# Patient Record
Sex: FEMALE | Race: WHITE | NOT HISPANIC OR LATINO | Employment: UNEMPLOYED | URBAN - METROPOLITAN AREA
[De-identification: names, ages, dates, MRNs, and addresses within clinical notes are randomized per-mention and may not be internally consistent; named-entity substitution may affect disease eponyms.]

---

## 2018-01-23 ENCOUNTER — OFFICE VISIT CONVERTED (OUTPATIENT)
Dept: ONCOLOGY | Facility: HOSPITAL | Age: 28
End: 2018-01-23
Attending: NURSE PRACTITIONER

## 2018-03-28 ENCOUNTER — OFFICE VISIT CONVERTED (OUTPATIENT)
Dept: OTOLARYNGOLOGY | Facility: CLINIC | Age: 28
End: 2018-03-28
Attending: OTOLARYNGOLOGY

## 2018-03-28 ENCOUNTER — CONVERSION ENCOUNTER (OUTPATIENT)
Dept: OTOLARYNGOLOGY | Facility: CLINIC | Age: 28
End: 2018-03-28

## 2018-04-24 ENCOUNTER — CONVERSION ENCOUNTER (OUTPATIENT)
Dept: FAMILY MEDICINE CLINIC | Facility: CLINIC | Age: 28
End: 2018-04-24

## 2018-04-24 ENCOUNTER — OFFICE VISIT CONVERTED (OUTPATIENT)
Dept: FAMILY MEDICINE CLINIC | Facility: CLINIC | Age: 28
End: 2018-04-24
Attending: FAMILY MEDICINE

## 2018-05-22 ENCOUNTER — OFFICE VISIT CONVERTED (OUTPATIENT)
Dept: OTOLARYNGOLOGY | Facility: CLINIC | Age: 28
End: 2018-05-22
Attending: OTOLARYNGOLOGY

## 2018-06-12 ENCOUNTER — CONVERSION ENCOUNTER (OUTPATIENT)
Dept: FAMILY MEDICINE CLINIC | Facility: CLINIC | Age: 28
End: 2018-06-12

## 2018-06-12 ENCOUNTER — OFFICE VISIT CONVERTED (OUTPATIENT)
Dept: FAMILY MEDICINE CLINIC | Facility: CLINIC | Age: 28
End: 2018-06-12
Attending: FAMILY MEDICINE

## 2018-09-13 ENCOUNTER — CONVERSION ENCOUNTER (OUTPATIENT)
Dept: FAMILY MEDICINE CLINIC | Facility: CLINIC | Age: 28
End: 2018-09-13

## 2018-09-13 ENCOUNTER — OFFICE VISIT CONVERTED (OUTPATIENT)
Dept: FAMILY MEDICINE CLINIC | Facility: CLINIC | Age: 28
End: 2018-09-13
Attending: FAMILY MEDICINE

## 2018-09-26 ENCOUNTER — OFFICE VISIT CONVERTED (OUTPATIENT)
Dept: OTOLARYNGOLOGY | Facility: CLINIC | Age: 28
End: 2018-09-26
Attending: OTOLARYNGOLOGY

## 2018-12-19 ENCOUNTER — OFFICE VISIT CONVERTED (OUTPATIENT)
Dept: OTOLARYNGOLOGY | Facility: CLINIC | Age: 28
End: 2018-12-19
Attending: OTOLARYNGOLOGY

## 2019-01-30 ENCOUNTER — HOSPITAL ENCOUNTER (OUTPATIENT)
Dept: OTHER | Facility: HOSPITAL | Age: 29
Discharge: HOME | End: 2019-01-30
Attending: INTERNAL MEDICINE

## 2019-01-31 ENCOUNTER — HOSPITAL ENCOUNTER (OUTPATIENT)
Dept: ONCOLOGY | Facility: HOSPITAL | Age: 29
Discharge: HOME | End: 2019-01-31
Attending: INTERNAL MEDICINE

## 2019-01-31 LAB
BASOPHILS # BLD AUTO: 0.02 10*3/UL (ref 0–0.2)
BASOPHILS NFR BLD AUTO: 0.28 % (ref 0–3)
EOSINOPHIL # BLD AUTO: 0.04 10*3/UL (ref 0–0.7)
EOSINOPHIL # BLD AUTO: 0.59 % (ref 0–7)
ERYTHROCYTE [DISTWIDTH] IN BLOOD BY AUTOMATED COUNT: 12.9 % (ref 11.5–14.5)
HBA1C MFR BLD: 7.58 G/DL (ref 12–16)
HCT VFR BLD AUTO: 20.5 % (ref 37–47)
LYMPHOCYTES # BLD AUTO: 1.15 10*3/UL (ref 1–5)
MCH RBC QN AUTO: 31.2 PG (ref 27–31)
MCHC RBC AUTO-ENTMCNC: 36.9 G/DL (ref 33–37)
MCV RBC AUTO: 84.5 FL (ref 81–99)
MONOCYTES # BLD AUTO: 0.72 10*3/UL (ref 0.2–1.2)
MONOCYTES NFR BLD AUTO: 10.2 % (ref 3–10)
NEUTROPHILS # BLD AUTO: 5.13 10*3/UL (ref 2–8)
NEUTROPHILS NFR BLD AUTO: 72.7 % (ref 30–85)
NRBC BLD AUTO-RTO: 0 % (ref 0–0.01)
PLATELET # BLD AUTO: 124 10*3/UL (ref 130–400)
PMV BLD AUTO: 7.6 FL (ref 7.4–10.4)
RBC # BLD AUTO: 2.43 10*6/UL (ref 4.2–5.4)
VARIANT LYMPHS NFR BLD MANUAL: 16.3 % (ref 20–45)
WBC # BLD AUTO: 7.06 10*3/UL (ref 4.8–10.8)

## 2019-02-01 LAB
CONV IMMUNOGLOBULIN G (IGG): 711 MG/DL (ref 700–1600)
CONV IMMUNOGLOBULIN M (IGM): 106 MG/DL (ref 26–217)
IGA SERPL-MCNC: 34 MG/DL (ref 87–352)

## 2019-02-03 ENCOUNTER — HOSPITAL ENCOUNTER (OUTPATIENT)
Dept: INFUSION THERAPY | Facility: HOSPITAL | Age: 29
Discharge: HOME | End: 2019-02-03
Attending: NURSE PRACTITIONER

## 2019-02-03 LAB
ABO GROUP BLD: NORMAL
BLD GP AB SCN SERPL QL: NORMAL
CONV ABD CONTROL: NORMAL
RH BLD: NORMAL

## 2019-02-04 ENCOUNTER — CONVERSION ENCOUNTER (OUTPATIENT)
Dept: FAMILY MEDICINE CLINIC | Facility: CLINIC | Age: 29
End: 2019-02-04

## 2019-02-04 ENCOUNTER — OFFICE VISIT CONVERTED (OUTPATIENT)
Dept: FAMILY MEDICINE CLINIC | Facility: CLINIC | Age: 29
End: 2019-02-04
Attending: FAMILY MEDICINE

## 2019-02-11 ENCOUNTER — HOSPITAL ENCOUNTER (OUTPATIENT)
Dept: OTHER | Facility: HOSPITAL | Age: 29
Discharge: HOME | End: 2019-02-11
Attending: INTERNAL MEDICINE

## 2019-02-11 LAB
BUN SERPL-MCNC: 23 MG/DL (ref 5–25)
CREAT UR-MCNC: 1.77 MG/DL (ref 0.5–0.9)

## 2019-02-12 LAB
CREAT 24H UR-MCNC: 54.9 MG/DL
CREAT 24H UR-MRATE: 0.4 G/(24.H) (ref 0.4–1.8)
SPECIMEN VOL 24H UR: 700 ML

## 2019-02-13 ENCOUNTER — HOSPITAL ENCOUNTER (OUTPATIENT)
Dept: OTHER | Facility: HOSPITAL | Age: 29
Discharge: HOME | End: 2019-02-13
Attending: INTERNAL MEDICINE

## 2019-02-13 LAB — CREAT UR-MCNC: 1.59 MG/DL (ref 0.5–0.9)

## 2019-02-18 ENCOUNTER — HOSPITAL ENCOUNTER (OUTPATIENT)
Dept: PERIOP | Facility: HOSPITAL | Age: 29
Setting detail: HOSPITAL OUTPATIENT SURGERY
Discharge: HOME | End: 2019-02-18
Attending: SURGERY

## 2019-02-18 LAB — HCG UR QL: NEGATIVE

## 2019-03-06 ENCOUNTER — HOSPITAL ENCOUNTER (OUTPATIENT)
Dept: LAB | Facility: HOSPITAL | Age: 29
Discharge: HOME | End: 2019-03-06
Attending: INTERNAL MEDICINE

## 2019-03-06 LAB
ANION GAP SERPL CALC-SCNC: 23 MMOL/L (ref 8–19)
APPEARANCE UR: CLEAR
BASOPHILS # BLD AUTO: 0.02 10*3/UL (ref 0–0.2)
BASOPHILS NFR BLD AUTO: 0.2 % (ref 0–3)
BILIRUB UR QL: NEGATIVE
BUN SERPL-MCNC: 8 MG/DL (ref 5–25)
BUN/CREAT SERPL: 7 {RATIO} (ref 6–20)
CALCIUM SERPL-MCNC: 9.8 MG/DL (ref 8.7–10.4)
CHLORIDE SERPL-SCNC: 108 MMOL/L (ref 99–111)
COLOR UR: YELLOW
CONV ABS IMM GRAN: 0.05 10*3/UL (ref 0–0.2)
CONV BACTERIA: NEGATIVE
CONV CO2: 17 MMOL/L (ref 22–32)
CONV COLLECTION SOURCE (UA): ABNORMAL
CONV IMMATURE GRAN: 0.5 % (ref 0–1.8)
CONV UROBILINOGEN IN URINE BY AUTOMATED TEST STRIP: 0.2 {EHRLICHU}/DL (ref 0.1–1)
CREAT UR-MCNC: 1.1 MG/DL (ref 0.5–0.9)
DEPRECATED RDW RBC AUTO: 61.1 FL (ref 36.4–46.3)
EOSINOPHIL # BLD AUTO: 0.02 10*3/UL (ref 0–0.7)
EOSINOPHIL # BLD AUTO: 0.2 % (ref 0–7)
ERYTHROCYTE [DISTWIDTH] IN BLOOD BY AUTOMATED COUNT: 17 % (ref 11.7–14.4)
GFR SERPLBLD BASED ON 1.73 SQ M-ARVRAT: >60 ML/MIN/{1.73_M2}
GLUCOSE SERPL-MCNC: 71 MG/DL (ref 65–99)
GLUCOSE UR QL: NEGATIVE MG/DL
HBA1C MFR BLD: 9 G/DL (ref 12–16)
HCT VFR BLD AUTO: 29.2 % (ref 37–47)
HGB UR QL STRIP: ABNORMAL
KETONES UR QL STRIP: NEGATIVE MG/DL
LEUKOCYTE ESTERASE UR QL STRIP: ABNORMAL
LYMPHOCYTES # BLD AUTO: 2.41 10*3/UL (ref 1–5)
MCH RBC QN AUTO: 30.7 PG (ref 27–31)
MCHC RBC AUTO-ENTMCNC: 30.8 G/DL (ref 33–37)
MCV RBC AUTO: 99.7 FL (ref 81–99)
MONOCYTES # BLD AUTO: 0.63 10*3/UL (ref 0.2–1.2)
MONOCYTES NFR BLD AUTO: 5.8 % (ref 3–10)
NEUTROPHILS # BLD AUTO: 7.68 10*3/UL (ref 2–8)
NEUTROPHILS NFR BLD AUTO: 71 % (ref 30–85)
NITRITE UR QL STRIP: NEGATIVE
NRBC CBCN: 0 % (ref 0–0.7)
OSMOLALITY SERPL CALC.SUM OF ELEC: 293 MOSM/KG (ref 273–304)
PH UR STRIP.AUTO: 7 [PH] (ref 5–8)
PLATELET # BLD AUTO: 344 10*3/UL (ref 130–400)
PMV BLD AUTO: 9.9 FL (ref 9.4–12.3)
POTASSIUM SERPL-SCNC: 4.6 MMOL/L (ref 3.5–5.3)
PROT UR QL: NEGATIVE MG/DL
RBC # BLD AUTO: 2.93 10*6/UL (ref 4.2–5.4)
RBC #/AREA URNS HPF: ABNORMAL /[HPF]
SODIUM SERPL-SCNC: 143 MMOL/L (ref 135–147)
SP GR UR: 1.01 (ref 1–1.03)
VARIANT LYMPHS NFR BLD MANUAL: 22.3 % (ref 20–45)
WBC # BLD AUTO: 10.81 10*3/UL (ref 4.8–10.8)
WBC #/AREA URNS HPF: ABNORMAL /[HPF]

## 2019-03-11 ENCOUNTER — OFFICE VISIT CONVERTED (OUTPATIENT)
Dept: FAMILY MEDICINE CLINIC | Facility: CLINIC | Age: 29
End: 2019-03-11
Attending: FAMILY MEDICINE

## 2019-03-13 ENCOUNTER — HOSPITAL ENCOUNTER (OUTPATIENT)
Dept: OTHER | Facility: HOSPITAL | Age: 29
Discharge: HOME | End: 2019-03-13
Attending: INTERNAL MEDICINE

## 2019-03-14 LAB
CONV IMMUNOGLOBULIN G (IGG): 440 MG/DL (ref 700–1600)
CONV IMMUNOGLOBULIN M (IGM): 70 MG/DL (ref 26–217)
IGA SERPL-MCNC: 22 MG/DL (ref 87–352)

## 2019-04-12 ENCOUNTER — HOSPITAL ENCOUNTER (OUTPATIENT)
Dept: OTHER | Facility: HOSPITAL | Age: 29
Discharge: HOME | End: 2019-04-12
Attending: INTERNAL MEDICINE

## 2019-04-12 LAB
ALBUMIN SERPL-MCNC: 3.6 G/DL (ref 3.5–5)
ALBUMIN/GLOB SERPL: 1.3 {RATIO} (ref 1.4–2.6)
ALP SERPL-CCNC: 122 U/L (ref 42–98)
ALT SERPL-CCNC: 9 U/L (ref 10–40)
ANION GAP SERPL CALC-SCNC: 15 MMOL/L (ref 8–19)
AST SERPL-CCNC: 14 U/L (ref 15–50)
BASOPHILS # BLD AUTO: 0.03 10*3/UL (ref 0–0.2)
BASOPHILS NFR BLD AUTO: 0.3 % (ref 0–3)
BILIRUB SERPL-MCNC: 0.21 MG/DL (ref 0.2–1.3)
BUN SERPL-MCNC: 11 MG/DL (ref 5–25)
BUN/CREAT SERPL: 12 {RATIO} (ref 6–20)
CALCIUM SERPL-MCNC: 9.6 MG/DL (ref 8.7–10.4)
CHLORIDE SERPL-SCNC: 104 MMOL/L (ref 99–111)
CONV ABS IMM GRAN: 0.03 10*3/UL (ref 0–0.2)
CONV CO2: 24 MMOL/L (ref 22–32)
CONV IMMATURE GRAN: 0.3 % (ref 0–1.8)
CONV TOTAL PROTEIN: 6.3 G/DL (ref 6.3–8.2)
CREAT UR-MCNC: 0.95 MG/DL (ref 0.5–0.9)
DEPRECATED RDW RBC AUTO: 46.1 FL (ref 36.4–46.3)
EOSINOPHIL # BLD AUTO: 0.02 10*3/UL (ref 0–0.7)
EOSINOPHIL # BLD AUTO: 0.2 % (ref 0–7)
ERYTHROCYTE [DISTWIDTH] IN BLOOD BY AUTOMATED COUNT: 13.2 % (ref 11.7–14.4)
GFR SERPLBLD BASED ON 1.73 SQ M-ARVRAT: >60 ML/MIN/{1.73_M2}
GLOBULIN UR ELPH-MCNC: 2.7 G/DL (ref 2–3.5)
GLUCOSE SERPL-MCNC: 78 MG/DL (ref 65–99)
HBA1C MFR BLD: 9.7 G/DL (ref 12–16)
HCT VFR BLD AUTO: 29.5 % (ref 37–47)
LYMPHOCYTES # BLD AUTO: 2.15 10*3/UL (ref 1–5)
MCH RBC QN AUTO: 31.5 PG (ref 27–31)
MCHC RBC AUTO-ENTMCNC: 32.9 G/DL (ref 33–37)
MCV RBC AUTO: 95.8 FL (ref 81–99)
MONOCYTES # BLD AUTO: 0.57 10*3/UL (ref 0.2–1.2)
MONOCYTES NFR BLD AUTO: 5.4 % (ref 3–10)
NEUTROPHILS # BLD AUTO: 7.82 10*3/UL (ref 2–8)
NEUTROPHILS NFR BLD AUTO: 73.6 % (ref 30–85)
NRBC CBCN: 0 % (ref 0–0.7)
OSMOLALITY SERPL CALC.SUM OF ELEC: 286 MOSM/KG (ref 273–304)
PLATELET # BLD AUTO: 271 10*3/UL (ref 130–400)
PMV BLD AUTO: 9.5 FL (ref 9.4–12.3)
POTASSIUM SERPL-SCNC: 3.8 MMOL/L (ref 3.5–5.3)
RBC # BLD AUTO: 3.08 10*6/UL (ref 4.2–5.4)
SODIUM SERPL-SCNC: 139 MMOL/L (ref 135–147)
VARIANT LYMPHS NFR BLD MANUAL: 20.2 % (ref 20–45)
WBC # BLD AUTO: 10.62 10*3/UL (ref 4.8–10.8)

## 2019-04-13 LAB
CONV IMMUNOGLOBULIN G (IGG): 331 MG/DL (ref 700–1600)
CONV IMMUNOGLOBULIN M (IGM): 71 MG/DL (ref 26–217)
IGA SERPL-MCNC: 20 MG/DL (ref 87–352)

## 2019-04-17 ENCOUNTER — OFFICE VISIT CONVERTED (OUTPATIENT)
Dept: FAMILY MEDICINE CLINIC | Facility: CLINIC | Age: 29
End: 2019-04-17
Attending: NURSE PRACTITIONER

## 2019-05-01 ENCOUNTER — OFFICE VISIT CONVERTED (OUTPATIENT)
Dept: OTOLARYNGOLOGY | Facility: CLINIC | Age: 29
End: 2019-05-01
Attending: OTOLARYNGOLOGY

## 2019-05-01 ENCOUNTER — CONVERSION ENCOUNTER (OUTPATIENT)
Dept: OTOLARYNGOLOGY | Facility: CLINIC | Age: 29
End: 2019-05-01

## 2019-05-14 ENCOUNTER — HOSPITAL ENCOUNTER (OUTPATIENT)
Dept: OTHER | Facility: HOSPITAL | Age: 29
Discharge: HOME | End: 2019-05-14
Attending: INTERNAL MEDICINE

## 2019-05-14 LAB
ALBUMIN SERPL-MCNC: 3.5 G/DL (ref 3.5–5)
ALBUMIN/GLOB SERPL: 1.4 {RATIO} (ref 1.4–2.6)
ALP SERPL-CCNC: 145 U/L (ref 42–98)
ALT SERPL-CCNC: 11 U/L (ref 10–40)
ANION GAP SERPL CALC-SCNC: 15 MMOL/L (ref 8–19)
AST SERPL-CCNC: 12 U/L (ref 15–50)
BASOPHILS # BLD AUTO: 0.04 10*3/UL (ref 0–0.2)
BASOPHILS NFR BLD AUTO: 0.3 % (ref 0–3)
BILIRUB SERPL-MCNC: 0.21 MG/DL (ref 0.2–1.3)
BUN SERPL-MCNC: 7 MG/DL (ref 5–25)
BUN/CREAT SERPL: 9 {RATIO} (ref 6–20)
CALCIUM SERPL-MCNC: 9.8 MG/DL (ref 8.7–10.4)
CHLORIDE SERPL-SCNC: 106 MMOL/L (ref 99–111)
CONV ABS IMM GRAN: 0.06 10*3/UL (ref 0–0.2)
CONV CO2: 23 MMOL/L (ref 22–32)
CONV IMMATURE GRAN: 0.4 % (ref 0–1.8)
CONV TOTAL PROTEIN: 6 G/DL (ref 6.3–8.2)
CREAT UR-MCNC: 0.82 MG/DL (ref 0.5–0.9)
DEPRECATED RDW RBC AUTO: 43.9 FL (ref 36.4–46.3)
EOSINOPHIL # BLD AUTO: 0.03 10*3/UL (ref 0–0.7)
EOSINOPHIL # BLD AUTO: 0.2 % (ref 0–7)
ERYTHROCYTE [DISTWIDTH] IN BLOOD BY AUTOMATED COUNT: 12.8 % (ref 11.7–14.4)
GFR SERPLBLD BASED ON 1.73 SQ M-ARVRAT: >60 ML/MIN/{1.73_M2}
GLOBULIN UR ELPH-MCNC: 2.5 G/DL (ref 2–3.5)
GLUCOSE SERPL-MCNC: 111 MG/DL (ref 65–99)
HBA1C MFR BLD: 10 G/DL (ref 12–16)
HCT VFR BLD AUTO: 29.6 % (ref 37–47)
LYMPHOCYTES # BLD AUTO: 2.23 10*3/UL (ref 1–5)
MCH RBC QN AUTO: 31.6 PG (ref 27–31)
MCHC RBC AUTO-ENTMCNC: 33.8 G/DL (ref 33–37)
MCV RBC AUTO: 93.7 FL (ref 81–99)
MONOCYTES # BLD AUTO: 0.58 10*3/UL (ref 0.2–1.2)
MONOCYTES NFR BLD AUTO: 4.3 % (ref 3–10)
NEUTROPHILS # BLD AUTO: 10.57 10*3/UL (ref 2–8)
NEUTROPHILS NFR BLD AUTO: 78.3 % (ref 30–85)
NRBC CBCN: 0 % (ref 0–0.7)
OSMOLALITY SERPL CALC.SUM OF ELEC: 289 MOSM/KG (ref 273–304)
PLATELET # BLD AUTO: 242 10*3/UL (ref 130–400)
PMV BLD AUTO: 9.7 FL (ref 9.4–12.3)
POTASSIUM SERPL-SCNC: 3.7 MMOL/L (ref 3.5–5.3)
RBC # BLD AUTO: 3.16 10*6/UL (ref 4.2–5.4)
SODIUM SERPL-SCNC: 140 MMOL/L (ref 135–147)
VARIANT LYMPHS NFR BLD MANUAL: 16.5 % (ref 20–45)
WBC # BLD AUTO: 13.51 10*3/UL (ref 4.8–10.8)

## 2019-05-15 LAB
CONV IMMUNOGLOBULIN G (IGG): 297 MG/DL (ref 700–1600)
CONV IMMUNOGLOBULIN M (IGM): 68 MG/DL (ref 26–217)
IGA SERPL-MCNC: 22 MG/DL (ref 87–352)

## 2019-05-22 ENCOUNTER — HOSPITAL ENCOUNTER (OUTPATIENT)
Dept: OTHER | Facility: HOSPITAL | Age: 29
Setting detail: RECURRING SERIES
Discharge: HOME | End: 2019-05-31
Attending: INTERNAL MEDICINE

## 2019-05-22 LAB
ALBUMIN SERPL-MCNC: 3.6 G/DL (ref 3.5–5)
ALBUMIN/GLOB SERPL: 1.3 {RATIO} (ref 1.4–2.6)
ALP SERPL-CCNC: 173 U/L (ref 42–98)
ALT SERPL-CCNC: 14 U/L (ref 10–40)
ANION GAP SERPL CALC-SCNC: 17 MMOL/L (ref 8–19)
AST SERPL-CCNC: 14 U/L (ref 15–50)
BASOPHILS # BLD AUTO: 0.01 10*3/UL (ref 0–0.2)
BASOPHILS NFR BLD AUTO: 0.1 % (ref 0–3)
BILIRUB SERPL-MCNC: 0.25 MG/DL (ref 0.2–1.3)
BUN SERPL-MCNC: 13 MG/DL (ref 5–25)
BUN/CREAT SERPL: 14 {RATIO} (ref 6–20)
CALCIUM SERPL-MCNC: 9.1 MG/DL (ref 8.7–10.4)
CHLORIDE SERPL-SCNC: 104 MMOL/L (ref 99–111)
CONV ABS IMM GRAN: 0.02 10*3/UL (ref 0–0.54)
CONV CO2: 21 MMOL/L (ref 22–32)
CONV EOSINOPHILS PERCENT BY MANUAL COUNT: 0.6 % (ref 0–7)
CONV IMMATURE GRAN: 0.2 % (ref 0–0.4)
CONV TOTAL PROTEIN: 6.4 G/DL (ref 6.3–8.2)
CREAT UR-MCNC: 0.92 MG/DL (ref 0.5–0.9)
EOSINOPHIL # BLD MANUAL: 0.07 10*3/UL (ref 0–0.7)
ERYTHROCYTE [DISTWIDTH] IN BLOOD BY AUTOMATED COUNT: 12.9 % (ref 11.5–14.5)
ERYTHROCYTE [DISTWIDTH] IN BLOOD BY AUTOMATED COUNT: 42.7 FL
GFR SERPLBLD BASED ON 1.73 SQ M-ARVRAT: >60 ML/MIN/{1.73_M2}
GLOBULIN UR ELPH-MCNC: 2.8 G/DL (ref 2–3.5)
GLUCOSE SERPL-MCNC: 80 MG/DL (ref 65–99)
HBA1C MFR BLD: 10.4 G/DL (ref 12–16)
HCT VFR BLD AUTO: 31 % (ref 37–47)
LYMPHOCYTES # BLD AUTO: 2.64 10*3/UL (ref 1–5)
LYMPHOCYTES NFR BLD AUTO: 22.2 % (ref 20–45)
MCH RBC QN AUTO: 30.6 PG (ref 27–31)
MCHC RBC AUTO-ENTMCNC: 33.5 G/DL (ref 33–37)
MCV RBC AUTO: 91.2 FL (ref 81–99)
MONOCYTES # BLD AUTO: 0.66 10*3/UL (ref 0.2–1.2)
MONOCYTES NFR BLD MANUAL: 5.5 % (ref 3–10)
NEUTROPHILS # BLD AUTO: 8.51 10*3/UL (ref 2–8)
NEUTROPHILS NFR BLD MANUAL: 71.4 % (ref 30–85)
OSMOLALITY SERPL CALC.SUM OF ELEC: 285 MOSM/KG (ref 273–304)
PLATELET # BLD AUTO: 269 10*3/UL (ref 130–400)
PMV BLD AUTO: 8.7 FL (ref 7.4–10.4)
POTASSIUM SERPL-SCNC: 3.6 MMOL/L (ref 3.5–5.3)
RBC MORPH BLD: 3.4 10*6/UL (ref 4.2–5.4)
SODIUM SERPL-SCNC: 138 MMOL/L (ref 135–147)
WBC # BLD AUTO: 11.91 10*3/UL (ref 4.8–10.8)

## 2019-05-23 LAB
CONV IMMUNOGLOBULIN G (IGG): 285 MG/DL (ref 700–1600)
CONV IMMUNOGLOBULIN M (IGM): 72 MG/DL (ref 26–217)
IGA SERPL-MCNC: 23 MG/DL (ref 87–352)

## 2019-07-02 ENCOUNTER — HOSPITAL ENCOUNTER (OUTPATIENT)
Dept: OTHER | Facility: HOSPITAL | Age: 29
Setting detail: RECURRING SERIES
Discharge: HOME | End: 2019-07-31
Attending: INTERNAL MEDICINE

## 2019-07-02 LAB
ALBUMIN SERPL-MCNC: 3.7 G/DL (ref 3.5–5)
ALBUMIN/GLOB SERPL: 1.3 {RATIO} (ref 1.4–2.6)
ALP SERPL-CCNC: 131 U/L (ref 42–98)
ALT SERPL-CCNC: 15 U/L (ref 10–40)
ANION GAP SERPL CALC-SCNC: 16 MMOL/L (ref 8–19)
AST SERPL-CCNC: 15 U/L (ref 15–50)
BASOPHILS # BLD AUTO: 0.03 10*3/UL (ref 0–0.2)
BASOPHILS NFR BLD AUTO: 0.3 % (ref 0–3)
BILIRUB SERPL-MCNC: 0.44 MG/DL (ref 0.2–1.3)
BUN SERPL-MCNC: 11 MG/DL (ref 5–25)
BUN/CREAT SERPL: 12 {RATIO} (ref 6–20)
CALCIUM SERPL-MCNC: 9.2 MG/DL (ref 8.7–10.4)
CHLORIDE SERPL-SCNC: 107 MMOL/L (ref 99–111)
CONV ABS IMM GRAN: 0.03 10*3/UL (ref 0–0.54)
CONV CO2: 22 MMOL/L (ref 22–32)
CONV EOSINOPHILS PERCENT BY MANUAL COUNT: 0.3 % (ref 0–7)
CONV IMMATURE GRAN: 0.3 % (ref 0–0.4)
CONV TOTAL PROTEIN: 6.6 G/DL (ref 6.3–8.2)
CREAT UR-MCNC: 0.94 MG/DL (ref 0.5–0.9)
EOSINOPHIL # BLD MANUAL: 0.03 10*3/UL (ref 0–0.7)
ERYTHROCYTE [DISTWIDTH] IN BLOOD BY AUTOMATED COUNT: 13.3 % (ref 11.5–14.5)
ERYTHROCYTE [DISTWIDTH] IN BLOOD BY AUTOMATED COUNT: 45 FL
GFR SERPLBLD BASED ON 1.73 SQ M-ARVRAT: >60 ML/MIN/{1.73_M2}
GLOBULIN UR ELPH-MCNC: 2.9 G/DL (ref 2–3.5)
GLUCOSE SERPL-MCNC: 79 MG/DL (ref 65–99)
HBA1C MFR BLD: 11.3 G/DL (ref 12–16)
HCT VFR BLD AUTO: 33.6 % (ref 37–47)
LYMPHOCYTES # BLD AUTO: 2.37 10*3/UL (ref 1–5)
LYMPHOCYTES NFR BLD AUTO: 20.5 % (ref 20–45)
MCH RBC QN AUTO: 30.4 PG (ref 27–31)
MCHC RBC AUTO-ENTMCNC: 33.6 G/DL (ref 33–37)
MCV RBC AUTO: 90.3 FL (ref 81–99)
MONOCYTES # BLD AUTO: 0.64 10*3/UL (ref 0.2–1.2)
MONOCYTES NFR BLD MANUAL: 5.5 % (ref 3–10)
NEUTROPHILS # BLD AUTO: 8.45 10*3/UL (ref 2–8)
NEUTROPHILS NFR BLD MANUAL: 73.1 % (ref 30–85)
OSMOLALITY SERPL CALC.SUM OF ELEC: 290 MOSM/KG (ref 273–304)
PLATELET # BLD AUTO: 276 10*3/UL (ref 130–400)
PMV BLD AUTO: 8.5 FL (ref 7.4–10.4)
POTASSIUM SERPL-SCNC: 3.8 MMOL/L (ref 3.5–5.3)
RBC MORPH BLD: 3.72 10*6/UL (ref 4.2–5.4)
SODIUM SERPL-SCNC: 141 MMOL/L (ref 135–147)
WBC # BLD AUTO: 11.55 10*3/UL (ref 4.8–10.8)

## 2019-07-03 LAB
CONV IMMUNOGLOBULIN G (IGG): 410 MG/DL (ref 700–1600)
CONV IMMUNOGLOBULIN M (IGM): 81 MG/DL (ref 26–217)
IGA SERPL-MCNC: 24 MG/DL (ref 87–352)

## 2019-08-12 ENCOUNTER — HOSPITAL ENCOUNTER (OUTPATIENT)
Dept: OTHER | Facility: HOSPITAL | Age: 29
Setting detail: RECURRING SERIES
Discharge: HOME | End: 2019-08-31
Attending: NURSE PRACTITIONER

## 2019-08-12 LAB
ALBUMIN SERPL-MCNC: 3.8 G/DL (ref 3.5–5)
ALBUMIN/GLOB SERPL: 1.5 {RATIO} (ref 1.4–2.6)
ALP SERPL-CCNC: 137 U/L (ref 42–98)
ALT SERPL-CCNC: 6 U/L (ref 10–40)
ANION GAP SERPL CALC-SCNC: 18 MMOL/L (ref 8–19)
AST SERPL-CCNC: 9 U/L (ref 15–50)
BASOPHILS # BLD AUTO: 0.03 10*3/UL (ref 0–0.2)
BASOPHILS NFR BLD AUTO: 0.2 % (ref 0–3)
BILIRUB SERPL-MCNC: 0.28 MG/DL (ref 0.2–1.3)
BUN SERPL-MCNC: 9 MG/DL (ref 5–25)
BUN/CREAT SERPL: 10 {RATIO} (ref 6–20)
CALCIUM SERPL-MCNC: 9.2 MG/DL (ref 8.7–10.4)
CHLORIDE SERPL-SCNC: 105 MMOL/L (ref 99–111)
CONV ABS IMM GRAN: 0.05 10*3/UL (ref 0–0.2)
CONV CO2: 22 MMOL/L (ref 22–32)
CONV IMMATURE GRAN: 0.4 % (ref 0–1.8)
CONV TOTAL PROTEIN: 6.3 G/DL (ref 6.3–8.2)
CREAT UR-MCNC: 0.93 MG/DL (ref 0.5–0.9)
DEPRECATED RDW RBC AUTO: 43.8 FL (ref 36.4–46.3)
EOSINOPHIL # BLD AUTO: 0.04 10*3/UL (ref 0–0.7)
EOSINOPHIL # BLD AUTO: 0.3 % (ref 0–7)
ERYTHROCYTE [DISTWIDTH] IN BLOOD BY AUTOMATED COUNT: 13.3 % (ref 11.7–14.4)
GFR SERPLBLD BASED ON 1.73 SQ M-ARVRAT: >60 ML/MIN/{1.73_M2}
GLOBULIN UR ELPH-MCNC: 2.5 G/DL (ref 2–3.5)
GLUCOSE SERPL-MCNC: 109 MG/DL (ref 65–99)
HCT VFR BLD AUTO: 31.8 % (ref 37–47)
HGB BLD-MCNC: 10.8 G/DL (ref 12–16)
LDH SERPL-CCNC: 159 U/L (ref 120–240)
LYMPHOCYTES # BLD AUTO: 2.41 10*3/UL (ref 1–5)
LYMPHOCYTES NFR BLD AUTO: 19 % (ref 20–45)
MCH RBC QN AUTO: 30.9 PG (ref 27–31)
MCHC RBC AUTO-ENTMCNC: 34 G/DL (ref 33–37)
MCV RBC AUTO: 90.9 FL (ref 81–99)
MONOCYTES # BLD AUTO: 0.51 10*3/UL (ref 0.2–1.2)
MONOCYTES NFR BLD AUTO: 4 % (ref 3–10)
NEUTROPHILS # BLD AUTO: 9.66 10*3/UL (ref 2–8)
NEUTROPHILS NFR BLD AUTO: 76.1 % (ref 30–85)
NRBC CBCN: 0 % (ref 0–0.7)
OSMOLALITY SERPL CALC.SUM OF ELEC: 291 MOSM/KG (ref 273–304)
PLATELET # BLD AUTO: 265 10*3/UL (ref 130–400)
PMV BLD AUTO: 9.2 FL (ref 9.4–12.3)
POTASSIUM SERPL-SCNC: 3.7 MMOL/L (ref 3.5–5.3)
RBC # BLD AUTO: 3.5 10*6/UL (ref 4.2–5.4)
SODIUM SERPL-SCNC: 141 MMOL/L (ref 135–147)
WBC # BLD AUTO: 12.7 10*3/UL (ref 4.8–10.8)

## 2019-08-13 LAB
CONV IMMUNOGLOBULIN G (IGG): 460 MG/DL (ref 700–1600)
CONV IMMUNOGLOBULIN M (IGM): 64 MG/DL (ref 26–217)
IGA SERPL-MCNC: 22 MG/DL (ref 87–352)

## 2019-09-04 ENCOUNTER — OFFICE VISIT CONVERTED (OUTPATIENT)
Dept: OTOLARYNGOLOGY | Facility: CLINIC | Age: 29
End: 2019-09-04
Attending: OTOLARYNGOLOGY

## 2019-09-04 ENCOUNTER — HOSPITAL ENCOUNTER (OUTPATIENT)
Dept: OTOLARYNGOLOGY | Facility: HOSPITAL | Age: 29
Discharge: HOME | End: 2019-09-04
Attending: OTOLARYNGOLOGY

## 2019-09-06 LAB — EYE OR EAR CULTURE: NORMAL

## 2019-09-13 ENCOUNTER — OFFICE VISIT CONVERTED (OUTPATIENT)
Dept: FAMILY MEDICINE CLINIC | Facility: CLINIC | Age: 29
End: 2019-09-13
Attending: FAMILY MEDICINE

## 2019-09-13 ENCOUNTER — CONVERSION ENCOUNTER (OUTPATIENT)
Dept: FAMILY MEDICINE CLINIC | Facility: CLINIC | Age: 29
End: 2019-09-13

## 2019-09-23 ENCOUNTER — HOSPITAL ENCOUNTER (OUTPATIENT)
Dept: OTHER | Facility: HOSPITAL | Age: 29
Setting detail: RECURRING SERIES
Discharge: HOME | End: 2019-09-30
Attending: INTERNAL MEDICINE

## 2019-09-23 LAB
ALBUMIN SERPL-MCNC: 4.4 G/DL (ref 3.5–5)
ALBUMIN/GLOB SERPL: 1.6 {RATIO} (ref 1.4–2.6)
ALP SERPL-CCNC: 144 U/L (ref 42–98)
ALT SERPL-CCNC: 16 U/L (ref 10–40)
ANION GAP SERPL CALC-SCNC: 14 MMOL/L (ref 8–19)
AST SERPL-CCNC: 14 U/L (ref 15–50)
BASOPHILS # BLD AUTO: 0.04 10*3/UL (ref 0–0.2)
BASOPHILS NFR BLD AUTO: 0.3 % (ref 0–3)
BILIRUB SERPL-MCNC: 0.31 MG/DL (ref 0.2–1.3)
BUN SERPL-MCNC: 16 MG/DL (ref 5–25)
BUN/CREAT SERPL: 17 {RATIO} (ref 6–20)
CALCIUM SERPL-MCNC: 9.4 MG/DL (ref 8.7–10.4)
CHLORIDE SERPL-SCNC: 105 MMOL/L (ref 99–111)
CONV ABS IMM GRAN: 0.05 10*3/UL (ref 0–0.2)
CONV CO2: 25 MMOL/L (ref 22–32)
CONV IMMATURE GRAN: 0.4 % (ref 0–1.8)
CONV TOTAL PROTEIN: 7.2 G/DL (ref 6.3–8.2)
CREAT UR-MCNC: 0.93 MG/DL (ref 0.5–0.9)
DEPRECATED RDW RBC AUTO: 42 FL (ref 36.4–46.3)
EOSINOPHIL # BLD AUTO: 0.15 10*3/UL (ref 0–0.7)
EOSINOPHIL # BLD AUTO: 1.2 % (ref 0–7)
ERYTHROCYTE [DISTWIDTH] IN BLOOD BY AUTOMATED COUNT: 12.8 % (ref 11.7–14.4)
GFR SERPLBLD BASED ON 1.73 SQ M-ARVRAT: >60 ML/MIN/{1.73_M2}
GLOBULIN UR ELPH-MCNC: 2.8 G/DL (ref 2–3.5)
GLUCOSE SERPL-MCNC: 85 MG/DL (ref 65–99)
HCT VFR BLD AUTO: 38.3 % (ref 37–47)
HGB BLD-MCNC: 13 G/DL (ref 12–16)
LYMPHOCYTES # BLD AUTO: 2.7 10*3/UL (ref 1–5)
LYMPHOCYTES NFR BLD AUTO: 22.2 % (ref 20–45)
MCH RBC QN AUTO: 30.4 PG (ref 27–31)
MCHC RBC AUTO-ENTMCNC: 33.9 G/DL (ref 33–37)
MCV RBC AUTO: 89.7 FL (ref 81–99)
MONOCYTES # BLD AUTO: 0.7 10*3/UL (ref 0.2–1.2)
MONOCYTES NFR BLD AUTO: 5.8 % (ref 3–10)
NEUTROPHILS # BLD AUTO: 8.53 10*3/UL (ref 2–8)
NEUTROPHILS NFR BLD AUTO: 70.1 % (ref 30–85)
NRBC CBCN: 0 % (ref 0–0.7)
OSMOLALITY SERPL CALC.SUM OF ELEC: 290 MOSM/KG (ref 273–304)
PLATELET # BLD AUTO: 280 10*3/UL (ref 130–400)
PMV BLD AUTO: 9.3 FL (ref 9.4–12.3)
POTASSIUM SERPL-SCNC: 4.3 MMOL/L (ref 3.5–5.3)
RBC # BLD AUTO: 4.27 10*6/UL (ref 4.2–5.4)
SODIUM SERPL-SCNC: 140 MMOL/L (ref 135–147)
WBC # BLD AUTO: 12.17 10*3/UL (ref 4.8–10.8)

## 2019-09-24 LAB
CONV IMMUNOGLOBULIN G (IGG): 617 MG/DL (ref 700–1600)
CONV IMMUNOGLOBULIN M (IGM): 103 MG/DL (ref 26–217)
IGA SERPL-MCNC: 27 MG/DL (ref 87–352)

## 2019-10-11 ENCOUNTER — OFFICE VISIT CONVERTED (OUTPATIENT)
Dept: OTOLARYNGOLOGY | Facility: CLINIC | Age: 29
End: 2019-10-11
Attending: OTOLARYNGOLOGY

## 2019-10-11 ENCOUNTER — CONVERSION ENCOUNTER (OUTPATIENT)
Dept: OTOLARYNGOLOGY | Facility: CLINIC | Age: 29
End: 2019-10-11

## 2019-11-04 ENCOUNTER — HOSPITAL ENCOUNTER (OUTPATIENT)
Dept: OTHER | Facility: HOSPITAL | Age: 29
Discharge: HOME | End: 2019-11-04
Attending: FAMILY MEDICINE

## 2019-11-04 ENCOUNTER — HOSPITAL ENCOUNTER (OUTPATIENT)
Dept: OTHER | Facility: HOSPITAL | Age: 29
Setting detail: RECURRING SERIES
Discharge: HOME | End: 2019-11-30
Attending: INTERNAL MEDICINE

## 2019-11-04 LAB
ALBUMIN SERPL-MCNC: 3.9 G/DL (ref 3.5–5)
ALBUMIN/GLOB SERPL: 1.5 {RATIO} (ref 1.4–2.6)
ALP SERPL-CCNC: 130 U/L (ref 42–98)
ALT SERPL-CCNC: 16 U/L (ref 10–40)
ANION GAP SERPL CALC-SCNC: 18 MMOL/L (ref 8–19)
AST SERPL-CCNC: 16 U/L (ref 15–50)
BASOPHILS # BLD AUTO: 0.03 10*3/UL (ref 0–0.2)
BASOPHILS NFR BLD AUTO: 0.3 % (ref 0–3)
BILIRUB SERPL-MCNC: 0.3 MG/DL (ref 0.2–1.3)
BUN SERPL-MCNC: 7 MG/DL (ref 5–25)
BUN/CREAT SERPL: 8 {RATIO} (ref 6–20)
CALCIUM SERPL-MCNC: 9.1 MG/DL (ref 8.7–10.4)
CHLORIDE SERPL-SCNC: 106 MMOL/L (ref 99–111)
CHOLEST SERPL-MCNC: 168 MG/DL (ref 107–200)
CHOLEST/HDLC SERPL: 4.8 {RATIO} (ref 3–6)
CONV ABS IMM GRAN: 0.06 10*3/UL (ref 0–0.2)
CONV CO2: 23 MMOL/L (ref 22–32)
CONV IMMATURE GRAN: 0.6 % (ref 0–1.8)
CONV TOTAL PROTEIN: 6.5 G/DL (ref 6.3–8.2)
CREAT UR-MCNC: 0.91 MG/DL (ref 0.5–0.9)
DEPRECATED RDW RBC AUTO: 44.5 FL (ref 36.4–46.3)
EOSINOPHIL # BLD AUTO: 0.02 10*3/UL (ref 0–0.7)
EOSINOPHIL # BLD AUTO: 0.2 % (ref 0–7)
ERYTHROCYTE [DISTWIDTH] IN BLOOD BY AUTOMATED COUNT: 13.6 % (ref 11.7–14.4)
FOLATE SERPL-MCNC: >20 NG/ML (ref 4.8–20)
GFR SERPLBLD BASED ON 1.73 SQ M-ARVRAT: >60 ML/MIN/{1.73_M2}
GLOBULIN UR ELPH-MCNC: 2.6 G/DL (ref 2–3.5)
GLUCOSE SERPL-MCNC: 74 MG/DL (ref 65–99)
HCT VFR BLD AUTO: 32.1 % (ref 37–47)
HDLC SERPL-MCNC: 35 MG/DL (ref 40–60)
HGB BLD-MCNC: 11.1 G/DL (ref 12–16)
LDLC SERPL CALC-MCNC: 85 MG/DL (ref 70–100)
LYMPHOCYTES # BLD AUTO: 2.04 10*3/UL (ref 1–5)
LYMPHOCYTES NFR BLD AUTO: 20.2 % (ref 20–45)
MCH RBC QN AUTO: 31.2 PG (ref 27–31)
MCHC RBC AUTO-ENTMCNC: 34.6 G/DL (ref 33–37)
MCV RBC AUTO: 90.2 FL (ref 81–99)
MONOCYTES # BLD AUTO: 0.47 10*3/UL (ref 0.2–1.2)
MONOCYTES NFR BLD AUTO: 4.6 % (ref 3–10)
NEUTROPHILS # BLD AUTO: 7.5 10*3/UL (ref 2–8)
NEUTROPHILS NFR BLD AUTO: 74.1 % (ref 30–85)
NRBC CBCN: 0 % (ref 0–0.7)
OSMOLALITY SERPL CALC.SUM OF ELEC: 293 MOSM/KG (ref 273–304)
PLATELET # BLD AUTO: 264 10*3/UL (ref 130–400)
PMV BLD AUTO: 9.3 FL (ref 9.4–12.3)
POTASSIUM SERPL-SCNC: 3.8 MMOL/L (ref 3.5–5.3)
RBC # BLD AUTO: 3.56 10*6/UL (ref 4.2–5.4)
SODIUM SERPL-SCNC: 143 MMOL/L (ref 135–147)
TRIGL SERPL-MCNC: 242 MG/DL (ref 40–150)
TSH SERPL-ACNC: 4.64 M[IU]/L (ref 0.27–4.2)
VIT B12 SERPL-MCNC: 190 PG/ML (ref 211–911)
VLDLC SERPL-MCNC: 48 MG/DL (ref 5–37)
WBC # BLD AUTO: 10.12 10*3/UL (ref 4.8–10.8)

## 2019-11-05 LAB
CONV IMMUNOGLOBULIN G (IGG): 565 MG/DL (ref 700–1600)
CONV IMMUNOGLOBULIN M (IGM): 84 MG/DL (ref 26–217)
IGA SERPL-MCNC: 24 MG/DL (ref 87–352)

## 2019-11-06 LAB — 1,25(OH)2D3 SERPL-MCNC: 38.3 PG/ML (ref 19.9–79.3)

## 2019-12-16 ENCOUNTER — HOSPITAL ENCOUNTER (OUTPATIENT)
Dept: OTHER | Facility: HOSPITAL | Age: 29
Setting detail: RECURRING SERIES
Discharge: HOME | End: 2019-12-31
Attending: INTERNAL MEDICINE

## 2019-12-16 LAB
ALBUMIN SERPL-MCNC: 3.9 G/DL (ref 3.5–5)
ALBUMIN/GLOB SERPL: 1.4 {RATIO} (ref 1.4–2.6)
ALP SERPL-CCNC: 136 U/L (ref 42–98)
ALT SERPL-CCNC: 18 U/L (ref 10–40)
ANION GAP SERPL CALC-SCNC: 16 MMOL/L (ref 8–19)
AST SERPL-CCNC: 15 U/L (ref 15–50)
BASOPHILS # BLD AUTO: 0.04 10*3/UL (ref 0–0.2)
BASOPHILS NFR BLD AUTO: 0.3 % (ref 0–3)
BILIRUB SERPL-MCNC: 0.42 MG/DL (ref 0.2–1.3)
BUN SERPL-MCNC: 8 MG/DL (ref 5–25)
BUN/CREAT SERPL: 7 {RATIO} (ref 6–20)
CALCIUM SERPL-MCNC: 9 MG/DL (ref 8.7–10.4)
CHLORIDE SERPL-SCNC: 105 MMOL/L (ref 99–111)
CONV ABS IMM GRAN: 0.04 10*3/UL (ref 0–0.2)
CONV CO2: 24 MMOL/L (ref 22–32)
CONV IMMATURE GRAN: 0.3 % (ref 0–1.8)
CONV TOTAL PROTEIN: 6.6 G/DL (ref 6.3–8.2)
CREAT UR-MCNC: 1.17 MG/DL (ref 0.5–0.9)
DEPRECATED RDW RBC AUTO: 42.1 FL (ref 36.4–46.3)
EOSINOPHIL # BLD AUTO: 0.05 10*3/UL (ref 0–0.7)
EOSINOPHIL # BLD AUTO: 0.4 % (ref 0–7)
ERYTHROCYTE [DISTWIDTH] IN BLOOD BY AUTOMATED COUNT: 12.8 % (ref 11.7–14.4)
GFR SERPLBLD BASED ON 1.73 SQ M-ARVRAT: >60 ML/MIN/{1.73_M2}
GLOBULIN UR ELPH-MCNC: 2.7 G/DL (ref 2–3.5)
GLUCOSE SERPL-MCNC: 82 MG/DL (ref 65–99)
HCT VFR BLD AUTO: 36.2 % (ref 37–47)
HGB BLD-MCNC: 12.5 G/DL (ref 12–16)
LYMPHOCYTES # BLD AUTO: 2.59 10*3/UL (ref 1–5)
LYMPHOCYTES NFR BLD AUTO: 19.5 % (ref 20–45)
MCH RBC QN AUTO: 30.6 PG (ref 27–31)
MCHC RBC AUTO-ENTMCNC: 34.5 G/DL (ref 33–37)
MCV RBC AUTO: 88.7 FL (ref 81–99)
MONOCYTES # BLD AUTO: 0.69 10*3/UL (ref 0.2–1.2)
MONOCYTES NFR BLD AUTO: 5.2 % (ref 3–10)
NEUTROPHILS # BLD AUTO: 9.89 10*3/UL (ref 2–8)
NEUTROPHILS NFR BLD AUTO: 74.3 % (ref 30–85)
NRBC CBCN: 0 % (ref 0–0.7)
OSMOLALITY SERPL CALC.SUM OF ELEC: 289 MOSM/KG (ref 273–304)
PLATELET # BLD AUTO: 278 10*3/UL (ref 130–400)
PMV BLD AUTO: 8.9 FL (ref 9.4–12.3)
POTASSIUM SERPL-SCNC: 4.1 MMOL/L (ref 3.5–5.3)
RBC # BLD AUTO: 4.08 10*6/UL (ref 4.2–5.4)
SODIUM SERPL-SCNC: 141 MMOL/L (ref 135–147)
WBC # BLD AUTO: 13.3 10*3/UL (ref 4.8–10.8)

## 2019-12-17 ENCOUNTER — HOSPITAL ENCOUNTER (OUTPATIENT)
Dept: ONCOLOGY | Facility: HOSPITAL | Age: 29
Discharge: HOME | End: 2019-12-17
Attending: INTERNAL MEDICINE

## 2019-12-17 LAB
CONV IMMUNOGLOBULIN G (IGG): 620 MG/DL (ref 700–1600)
CONV IMMUNOGLOBULIN M (IGM): 98 MG/DL (ref 26–217)
IGA SERPL-MCNC: 26 MG/DL (ref 87–352)

## 2020-01-06 ENCOUNTER — HOSPITAL ENCOUNTER (OUTPATIENT)
Dept: OTHER | Facility: HOSPITAL | Age: 30
Setting detail: RECURRING SERIES
Discharge: HOME | End: 2020-01-31
Attending: INTERNAL MEDICINE

## 2020-01-06 LAB
ALBUMIN SERPL-MCNC: 3.6 G/DL (ref 3.5–5)
ALBUMIN/GLOB SERPL: 1.2 {RATIO} (ref 1.4–2.6)
ALP SERPL-CCNC: 125 U/L (ref 42–98)
ALT SERPL-CCNC: 15 U/L (ref 10–40)
ANION GAP SERPL CALC-SCNC: 17 MMOL/L (ref 8–19)
AST SERPL-CCNC: 17 U/L (ref 15–50)
BASOPHILS # BLD AUTO: 0.02 10*3/UL (ref 0–0.2)
BASOPHILS NFR BLD AUTO: 0.2 % (ref 0–3)
BILIRUB SERPL-MCNC: 0.32 MG/DL (ref 0.2–1.3)
BUN SERPL-MCNC: 8 MG/DL (ref 5–25)
BUN/CREAT SERPL: 9 {RATIO} (ref 6–20)
CALCIUM SERPL-MCNC: 9.2 MG/DL (ref 8.7–10.4)
CHLORIDE SERPL-SCNC: 104 MMOL/L (ref 99–111)
CONV ABS IMM GRAN: 0.01 10*3/UL (ref 0–0.54)
CONV CO2: 22 MMOL/L (ref 22–32)
CONV EOSINOPHILS PERCENT BY MANUAL COUNT: 0.4 % (ref 0–7)
CONV IMMATURE GRAN: 0.1 % (ref 0–0.4)
CONV TOTAL PROTEIN: 6.6 G/DL (ref 6.3–8.2)
CREAT UR-MCNC: 0.85 MG/DL (ref 0.5–0.9)
EOSINOPHIL # BLD MANUAL: 0.04 10*3/UL (ref 0–0.7)
ERYTHROCYTE [DISTWIDTH] IN BLOOD BY AUTOMATED COUNT: 13.3 % (ref 11.5–14.5)
ERYTHROCYTE [DISTWIDTH] IN BLOOD BY AUTOMATED COUNT: 44.4 FL
GFR SERPLBLD BASED ON 1.73 SQ M-ARVRAT: >60 ML/MIN/{1.73_M2}
GLOBULIN UR ELPH-MCNC: 3 G/DL (ref 2–3.5)
GLUCOSE SERPL-MCNC: 72 MG/DL (ref 65–99)
HBA1C MFR BLD: 11.7 G/DL (ref 12–16)
HCT VFR BLD AUTO: 34.3 % (ref 37–47)
LYMPHOCYTES # BLD AUTO: 2.19 10*3/UL (ref 1–5)
LYMPHOCYTES NFR BLD AUTO: 19.9 % (ref 20–45)
MCH RBC QN AUTO: 31 PG (ref 27–31)
MCHC RBC AUTO-ENTMCNC: 34.1 G/DL (ref 33–37)
MCV RBC AUTO: 91 FL (ref 81–99)
MONOCYTES # BLD AUTO: 0.51 10*3/UL (ref 0.2–1.2)
MONOCYTES NFR BLD MANUAL: 4.6 % (ref 3–10)
NEUTROPHILS # BLD AUTO: 8.24 10*3/UL (ref 2–8)
NEUTROPHILS NFR BLD MANUAL: 74.8 % (ref 30–85)
OSMOLALITY SERPL CALC.SUM OF ELEC: 285 MOSM/KG (ref 273–304)
PLATELET # BLD AUTO: 251 10*3/UL (ref 130–400)
PMV BLD AUTO: 8.7 FL (ref 7.4–10.4)
POTASSIUM SERPL-SCNC: 3.8 MMOL/L (ref 3.5–5.3)
RBC MORPH BLD: 3.77 10*6/UL (ref 4.2–5.4)
SODIUM SERPL-SCNC: 139 MMOL/L (ref 135–147)
WBC # BLD AUTO: 11.01 10*3/UL (ref 4.8–10.8)

## 2020-01-07 ENCOUNTER — HOSPITAL ENCOUNTER (OUTPATIENT)
Dept: ONCOLOGY | Facility: HOSPITAL | Age: 30
Discharge: HOME | End: 2020-01-07
Attending: NURSE PRACTITIONER

## 2020-01-07 LAB
CONV IMMUNOGLOBULIN G (IGG): 548 MG/DL (ref 700–1600)
CONV IMMUNOGLOBULIN M (IGM): 86 MG/DL (ref 26–217)
IGA SERPL-MCNC: 26 MG/DL (ref 87–352)

## 2020-02-17 ENCOUNTER — HOSPITAL ENCOUNTER (OUTPATIENT)
Dept: OTHER | Facility: HOSPITAL | Age: 30
Setting detail: RECURRING SERIES
Discharge: STILL ADMITTED OR ADMINISTRATIVE | End: 2020-05-14
Attending: INTERNAL MEDICINE

## 2020-02-17 LAB
ALBUMIN SERPL-MCNC: 3.7 G/DL (ref 3.5–5)
ALBUMIN/GLOB SERPL: 1.4 {RATIO} (ref 1.4–2.6)
ALP SERPL-CCNC: 125 U/L (ref 42–98)
ALT SERPL-CCNC: 16 U/L (ref 10–40)
ANION GAP SERPL CALC-SCNC: 13 MMOL/L (ref 8–19)
AST SERPL-CCNC: 12 U/L (ref 15–50)
BASOPHILS # BLD AUTO: 0.02 10*3/UL (ref 0–0.2)
BASOPHILS NFR BLD AUTO: 0.2 % (ref 0–3)
BILIRUB SERPL-MCNC: 0.25 MG/DL (ref 0.2–1.3)
BUN SERPL-MCNC: 11 MG/DL (ref 5–25)
BUN/CREAT SERPL: 13 {RATIO} (ref 6–20)
CALCIUM SERPL-MCNC: 9.6 MG/DL (ref 8.7–10.4)
CALCIUM SPEC-SCNC: 9.4 MG/DL (ref 8.7–10.4)
CHLORIDE SERPL-SCNC: 102 MMOL/L (ref 99–111)
CONV ABS IMM GRAN: 0.02 10*3/UL (ref 0–0.54)
CONV CO2: 25 MMOL/L (ref 22–32)
CONV EOSINOPHILS PERCENT BY MANUAL COUNT: 0.2 % (ref 0–7)
CONV IMMATURE GRAN: 0.2 % (ref 0–0.4)
CONV TOTAL PROTEIN: 6.3 G/DL (ref 6.3–8.2)
CREAT UR-MCNC: 0.86 MG/DL (ref 0.5–0.9)
EOSINOPHIL # BLD MANUAL: 0.03 10*3/UL (ref 0–0.7)
ERYTHROCYTE [DISTWIDTH] IN BLOOD BY AUTOMATED COUNT: 12.8 % (ref 11.5–14.5)
ERYTHROCYTE [DISTWIDTH] IN BLOOD BY AUTOMATED COUNT: 42.2 FL
GFR SERPLBLD BASED ON 1.73 SQ M-ARVRAT: >60 ML/MIN/{1.73_M2}
GLOBULIN UR ELPH-MCNC: 2.6 G/DL (ref 2–3.5)
GLUCOSE SERPL-MCNC: 97 MG/DL (ref 65–99)
HBA1C MFR BLD: 11.5 G/DL (ref 12–16)
HCT VFR BLD AUTO: 34.2 % (ref 37–47)
LYMPHOCYTES # BLD AUTO: 2.28 10*3/UL (ref 1–5)
LYMPHOCYTES NFR BLD AUTO: 18.2 % (ref 20–45)
MCH RBC QN AUTO: 30.2 PG (ref 27–31)
MCHC RBC AUTO-ENTMCNC: 33.6 G/DL (ref 33–37)
MCV RBC AUTO: 89.8 FL (ref 81–99)
MONOCYTES # BLD AUTO: 0.5 10*3/UL (ref 0.2–1.2)
MONOCYTES NFR BLD MANUAL: 4 % (ref 3–10)
NEUTROPHILS # BLD AUTO: 9.69 10*3/UL (ref 2–8)
NEUTROPHILS NFR BLD MANUAL: 77.2 % (ref 30–85)
OSMOLALITY SERPL CALC.SUM OF ELEC: 281 MOSM/KG (ref 273–304)
PLATELET # BLD AUTO: 215 10*3/UL (ref 130–400)
PMV BLD AUTO: 9.2 FL (ref 7.4–10.4)
POTASSIUM SERPL-SCNC: 3.6 MMOL/L (ref 3.5–5.3)
RBC MORPH BLD: 3.81 10*6/UL (ref 4.2–5.4)
SODIUM SERPL-SCNC: 136 MMOL/L (ref 135–147)
WBC # BLD AUTO: 12.54 10*3/UL (ref 4.8–10.8)

## 2020-02-18 LAB
CONV IMMUNOGLOBULIN G (IGG): 563 MG/DL (ref 700–1600)
CONV IMMUNOGLOBULIN M (IGM): 91 MG/DL (ref 26–217)
IGA SERPL-MCNC: 26 MG/DL (ref 87–352)

## 2020-03-16 ENCOUNTER — CONVERSION ENCOUNTER (OUTPATIENT)
Dept: FAMILY MEDICINE CLINIC | Facility: CLINIC | Age: 30
End: 2020-03-16

## 2020-03-16 ENCOUNTER — OFFICE VISIT CONVERTED (OUTPATIENT)
Dept: FAMILY MEDICINE CLINIC | Facility: CLINIC | Age: 30
End: 2020-03-16
Attending: FAMILY MEDICINE

## 2020-03-20 ENCOUNTER — HOSPITAL ENCOUNTER (OUTPATIENT)
Dept: GENERAL RADIOLOGY | Facility: HOSPITAL | Age: 30
Discharge: HOME | End: 2020-03-20
Attending: FAMILY MEDICINE

## 2020-03-30 LAB
ALBUMIN SERPL-MCNC: 3.7 G/DL (ref 3.5–5)
ALBUMIN/GLOB SERPL: 1.4 {RATIO} (ref 1.4–2.6)
ALP SERPL-CCNC: 123 U/L (ref 42–98)
ALT SERPL-CCNC: 19 U/L (ref 10–40)
ANION GAP SERPL CALC-SCNC: 15 MMOL/L (ref 8–19)
AST SERPL-CCNC: 18 U/L (ref 15–50)
BASOPHILS # BLD AUTO: 0.02 10*3/UL (ref 0–0.2)
BASOPHILS NFR BLD AUTO: 0.2 % (ref 0–3)
BILIRUB SERPL-MCNC: 0.27 MG/DL (ref 0.2–1.3)
BUN SERPL-MCNC: 8 MG/DL (ref 5–25)
BUN/CREAT SERPL: 9 {RATIO} (ref 6–20)
CALCIUM SERPL-MCNC: 9.6 MG/DL (ref 8.7–10.4)
CALCIUM SPEC-SCNC: 9.4 MG/DL (ref 8.7–10.4)
CHLORIDE SERPL-SCNC: 103 MMOL/L (ref 99–111)
CONV ABS IMM GRAN: 0.02 10*3/UL (ref 0–0.54)
CONV CO2: 23 MMOL/L (ref 22–32)
CONV EOSINOPHILS PERCENT BY MANUAL COUNT: 0.3 % (ref 0–7)
CONV IMMATURE GRAN: 0.2 % (ref 0–0.4)
CONV TOTAL PROTEIN: 6.3 G/DL (ref 6.3–8.2)
CREAT UR-MCNC: 0.86 MG/DL (ref 0.5–0.9)
EOSINOPHIL # BLD MANUAL: 0.04 10*3/UL (ref 0–0.7)
ERYTHROCYTE [DISTWIDTH] IN BLOOD BY AUTOMATED COUNT: 13.7 % (ref 11.5–14.5)
ERYTHROCYTE [DISTWIDTH] IN BLOOD BY AUTOMATED COUNT: 45.7 FL
GFR SERPLBLD BASED ON 1.73 SQ M-ARVRAT: >60 ML/MIN/{1.73_M2}
GLOBULIN UR ELPH-MCNC: 2.6 G/DL (ref 2–3.5)
GLUCOSE SERPL-MCNC: 99 MG/DL (ref 65–99)
HBA1C MFR BLD: 11.9 G/DL (ref 12–16)
HCT VFR BLD AUTO: 35 % (ref 37–47)
LYMPHOCYTES # BLD AUTO: 2.1 10*3/UL (ref 1–5)
LYMPHOCYTES NFR BLD AUTO: 17.1 % (ref 20–45)
MCH RBC QN AUTO: 31 PG (ref 27–31)
MCHC RBC AUTO-ENTMCNC: 34 G/DL (ref 33–37)
MCV RBC AUTO: 91.1 FL (ref 81–99)
MONOCYTES # BLD AUTO: 0.59 10*3/UL (ref 0.2–1.2)
MONOCYTES NFR BLD MANUAL: 4.8 % (ref 3–10)
NEUTROPHILS # BLD AUTO: 9.53 10*3/UL (ref 2–8)
NEUTROPHILS NFR BLD MANUAL: 77.4 % (ref 30–85)
OSMOLALITY SERPL CALC.SUM OF ELEC: 282 MOSM/KG (ref 273–304)
PLATELET # BLD AUTO: 240 10*3/UL (ref 130–400)
PMV BLD AUTO: 9 FL (ref 7.4–10.4)
POTASSIUM SERPL-SCNC: 3.5 MMOL/L (ref 3.5–5.3)
RBC MORPH BLD: 3.84 10*6/UL (ref 4.2–5.4)
SODIUM SERPL-SCNC: 137 MMOL/L (ref 135–147)
WBC # BLD AUTO: 12.3 10*3/UL (ref 4.8–10.8)

## 2020-03-31 ENCOUNTER — HOSPITAL ENCOUNTER (OUTPATIENT)
Dept: ONCOLOGY | Facility: HOSPITAL | Age: 30
Discharge: HOME | End: 2020-03-31
Attending: INTERNAL MEDICINE

## 2020-03-31 ENCOUNTER — OFFICE VISIT CONVERTED (OUTPATIENT)
Dept: ONCOLOGY | Facility: HOSPITAL | Age: 30
End: 2020-03-31
Attending: INTERNAL MEDICINE

## 2020-03-31 LAB
CONV IMMUNOGLOBULIN G (IGG): 588 MG/DL (ref 586–1602)
CONV IMMUNOGLOBULIN M (IGM): 90 MG/DL (ref 26–217)
IGA SERPL-MCNC: 27 MG/DL (ref 87–352)

## 2020-04-27 ENCOUNTER — OFFICE VISIT CONVERTED (OUTPATIENT)
Dept: OTOLARYNGOLOGY | Facility: CLINIC | Age: 30
End: 2020-04-27
Attending: OTOLARYNGOLOGY

## 2020-05-11 LAB
ALBUMIN SERPL-MCNC: 3.7 G/DL (ref 3.5–5)
ALBUMIN/GLOB SERPL: 1.3 {RATIO} (ref 1.4–2.6)
ALP SERPL-CCNC: 128 U/L (ref 42–98)
ALT SERPL-CCNC: 22 U/L (ref 10–40)
ANION GAP SERPL CALC-SCNC: 14 MMOL/L (ref 8–19)
AST SERPL-CCNC: 15 U/L (ref 15–50)
BASOPHILS # BLD AUTO: 0.02 10*3/UL (ref 0–0.2)
BASOPHILS NFR BLD AUTO: 0.2 % (ref 0–3)
BILIRUB SERPL-MCNC: 0.19 MG/DL (ref 0.2–1.3)
BUN SERPL-MCNC: 7 MG/DL (ref 5–25)
BUN/CREAT SERPL: 9 {RATIO} (ref 6–20)
CALCIUM SERPL-MCNC: 9.7 MG/DL (ref 8.7–10.4)
CALCIUM SPEC-SCNC: 9.5 MG/DL (ref 8.7–10.4)
CHLORIDE SERPL-SCNC: 106 MMOL/L (ref 99–111)
CONV ABS IMM GRAN: 0.03 10*3/UL (ref 0–0.54)
CONV CO2: 23 MMOL/L (ref 22–32)
CONV EOSINOPHILS PERCENT BY MANUAL COUNT: 0.2 % (ref 0–7)
CONV IMMATURE GRAN: 0.2 % (ref 0–0.4)
CONV TOTAL PROTEIN: 6.5 G/DL (ref 6.3–8.2)
CREAT UR-MCNC: 0.77 MG/DL (ref 0.5–0.9)
EOSINOPHIL # BLD MANUAL: 0.03 10*3/UL (ref 0–0.7)
ERYTHROCYTE [DISTWIDTH] IN BLOOD BY AUTOMATED COUNT: 12.8 % (ref 11.5–14.5)
ERYTHROCYTE [DISTWIDTH] IN BLOOD BY AUTOMATED COUNT: 43 FL
FOLATE SERPL-MCNC: >20 NG/ML (ref 4.8–20)
GFR SERPLBLD BASED ON 1.73 SQ M-ARVRAT: >60 ML/MIN/{1.73_M2}
GLOBULIN UR ELPH-MCNC: 2.8 G/DL (ref 2–3.5)
GLUCOSE SERPL-MCNC: 88 MG/DL (ref 65–99)
HBA1C MFR BLD: 11.9 G/DL (ref 12–16)
HCT VFR BLD AUTO: 34.5 % (ref 37–47)
LYMPHOCYTES # BLD AUTO: 2.24 10*3/UL (ref 1–5)
LYMPHOCYTES NFR BLD AUTO: 17.9 % (ref 20–45)
MCH RBC QN AUTO: 31.2 PG (ref 27–31)
MCHC RBC AUTO-ENTMCNC: 34.5 G/DL (ref 33–37)
MCV RBC AUTO: 90.6 FL (ref 81–99)
MONOCYTES # BLD AUTO: 0.61 10*3/UL (ref 0.2–1.2)
MONOCYTES NFR BLD MANUAL: 4.9 % (ref 3–10)
NEUTROPHILS # BLD AUTO: 9.61 10*3/UL (ref 2–8)
NEUTROPHILS NFR BLD MANUAL: 76.6 % (ref 30–85)
OSMOLALITY SERPL CALC.SUM OF ELEC: 285 MOSM/KG (ref 273–304)
PLATELET # BLD AUTO: 254 10*3/UL (ref 130–400)
PMV BLD AUTO: 9 FL (ref 7.4–10.4)
POTASSIUM SERPL-SCNC: 3.8 MMOL/L (ref 3.5–5.3)
RBC MORPH BLD: 3.81 10*6/UL (ref 4.2–5.4)
SODIUM SERPL-SCNC: 139 MMOL/L (ref 135–147)
VIT B12 SERPL-MCNC: 358 PG/ML (ref 211–911)
WBC # BLD AUTO: 12.54 10*3/UL (ref 4.8–10.8)

## 2020-05-12 LAB
CONV IMMUNOGLOBULIN G (IGG): 609 MG/DL (ref 586–1602)
CONV IMMUNOGLOBULIN M (IGM): 98 MG/DL (ref 26–217)
IGA SERPL-MCNC: 25 MG/DL (ref 87–352)

## 2020-06-22 ENCOUNTER — HOSPITAL ENCOUNTER (OUTPATIENT)
Dept: OTHER | Facility: HOSPITAL | Age: 30
Setting detail: RECURRING SERIES
Discharge: HOME | End: 2020-09-20
Attending: INTERNAL MEDICINE

## 2020-06-22 LAB
ALBUMIN SERPL-MCNC: 3.9 G/DL (ref 3.5–5)
ALBUMIN/GLOB SERPL: 1.3 {RATIO} (ref 1.4–2.6)
ALP SERPL-CCNC: 143 U/L (ref 42–98)
ALT SERPL-CCNC: 22 U/L (ref 10–40)
ANION GAP SERPL CALC-SCNC: 17 MMOL/L (ref 8–19)
AST SERPL-CCNC: 19 U/L (ref 15–50)
BASOPHILS # BLD AUTO: 0.02 10*3/UL (ref 0–0.2)
BASOPHILS NFR BLD AUTO: 0.2 % (ref 0–3)
BILIRUB SERPL-MCNC: 0.36 MG/DL (ref 0.2–1.3)
BUN SERPL-MCNC: 6 MG/DL (ref 5–25)
BUN/CREAT SERPL: 8 {RATIO} (ref 6–20)
CALCIUM SERPL-MCNC: 9.3 MG/DL (ref 8.7–10.4)
CALCIUM SPEC-SCNC: 9.2 MG/DL (ref 8.7–10.4)
CHLORIDE SERPL-SCNC: 103 MMOL/L (ref 99–111)
CONV ABS IMM GRAN: 0.03 10*3/UL (ref 0–0.54)
CONV CO2: 22 MMOL/L (ref 22–32)
CONV EOSINOPHILS PERCENT BY MANUAL COUNT: 0.2 % (ref 0–7)
CONV IMMATURE GRAN: 0.2 % (ref 0–0.4)
CONV TOTAL PROTEIN: 6.8 G/DL (ref 6.3–8.2)
CREAT UR-MCNC: 0.8 MG/DL (ref 0.5–0.9)
EOSINOPHIL # BLD MANUAL: 0.03 10*3/UL (ref 0–0.7)
ERYTHROCYTE [DISTWIDTH] IN BLOOD BY AUTOMATED COUNT: 13.2 % (ref 11.5–14.5)
ERYTHROCYTE [DISTWIDTH] IN BLOOD BY AUTOMATED COUNT: 43.5 FL
GFR SERPLBLD BASED ON 1.73 SQ M-ARVRAT: >60 ML/MIN/{1.73_M2}
GLOBULIN UR ELPH-MCNC: 2.9 G/DL (ref 2–3.5)
GLUCOSE SERPL-MCNC: 81 MG/DL (ref 65–99)
HBA1C MFR BLD: 12.9 G/DL (ref 12–16)
HCT VFR BLD AUTO: 37.4 % (ref 37–47)
LYMPHOCYTES # BLD AUTO: 2.55 10*3/UL (ref 1–5)
LYMPHOCYTES NFR BLD AUTO: 20.6 % (ref 20–45)
MCH RBC QN AUTO: 30.9 PG (ref 27–31)
MCHC RBC AUTO-ENTMCNC: 34.5 G/DL (ref 33–37)
MCV RBC AUTO: 89.7 FL (ref 81–99)
MONOCYTES # BLD AUTO: 0.63 10*3/UL (ref 0.2–1.2)
MONOCYTES NFR BLD MANUAL: 5.1 % (ref 3–10)
NEUTROPHILS # BLD AUTO: 9.11 10*3/UL (ref 2–8)
NEUTROPHILS NFR BLD MANUAL: 73.7 % (ref 30–85)
OSMOLALITY SERPL CALC.SUM OF ELEC: 283 MOSM/KG (ref 273–304)
PLATELET # BLD AUTO: 287 10*3/UL (ref 130–400)
PMV BLD AUTO: 8.8 FL (ref 7.4–10.4)
POTASSIUM SERPL-SCNC: 3.8 MMOL/L (ref 3.5–5.3)
RBC MORPH BLD: 4.17 10*6/UL (ref 4.2–5.4)
SODIUM SERPL-SCNC: 138 MMOL/L (ref 135–147)
WBC # BLD AUTO: 12.37 10*3/UL (ref 4.8–10.8)

## 2020-06-23 ENCOUNTER — OFFICE VISIT CONVERTED (OUTPATIENT)
Dept: ONCOLOGY | Facility: HOSPITAL | Age: 30
End: 2020-06-23
Attending: INTERNAL MEDICINE

## 2020-06-23 LAB
CONV IMMUNOGLOBULIN G (IGG): 598 MG/DL (ref 586–1602)
CONV IMMUNOGLOBULIN M (IGM): 97 MG/DL (ref 26–217)
IGA SERPL-MCNC: 27 MG/DL (ref 87–352)

## 2020-07-31 ENCOUNTER — HOSPITAL ENCOUNTER (OUTPATIENT)
Dept: OTHER | Facility: HOSPITAL | Age: 30
Discharge: HOME | End: 2020-07-31
Attending: INTERNAL MEDICINE

## 2020-07-31 LAB
ALBUMIN SERPL-MCNC: 3.8 G/DL (ref 3.5–5)
ALBUMIN/GLOB SERPL: 1.5 {RATIO} (ref 1.4–2.6)
ALP SERPL-CCNC: 140 U/L (ref 42–98)
ALT SERPL-CCNC: 16 U/L (ref 10–40)
ANION GAP SERPL CALC-SCNC: 12 MMOL/L (ref 8–19)
AST SERPL-CCNC: 16 U/L (ref 15–50)
BASOPHILS # BLD AUTO: 0.03 10*3/UL (ref 0–0.2)
BASOPHILS NFR BLD AUTO: 0.2 % (ref 0–3)
BILIRUB SERPL-MCNC: 0.37 MG/DL (ref 0.2–1.3)
BUN SERPL-MCNC: 9 MG/DL (ref 5–25)
BUN/CREAT SERPL: 12 {RATIO} (ref 6–20)
CALCIUM SERPL-MCNC: 8.9 MG/DL (ref 8.7–10.4)
CHLORIDE SERPL-SCNC: 106 MMOL/L (ref 99–111)
CONV ABS IMM GRAN: 0.04 10*3/UL (ref 0–0.54)
CONV CO2: 23 MMOL/L (ref 22–32)
CONV EOSINOPHILS PERCENT BY MANUAL COUNT: 0.2 % (ref 0–7)
CONV IMMATURE GRAN: 0.3 % (ref 0–0.4)
CONV TOTAL PROTEIN: 6.4 G/DL (ref 6.3–8.2)
CREAT UR-MCNC: 0.73 MG/DL (ref 0.5–0.9)
EOSINOPHIL # BLD MANUAL: 0.03 10*3/UL (ref 0–0.7)
ERYTHROCYTE [DISTWIDTH] IN BLOOD BY AUTOMATED COUNT: 13.4 % (ref 11.5–14.5)
ERYTHROCYTE [DISTWIDTH] IN BLOOD BY AUTOMATED COUNT: 43.9 FL
GFR SERPLBLD BASED ON 1.73 SQ M-ARVRAT: >60 ML/MIN/{1.73_M2}
GLOBULIN UR ELPH-MCNC: 2.6 G/DL (ref 2–3.5)
GLUCOSE SERPL-MCNC: 82 MG/DL (ref 65–99)
HBA1C MFR BLD: 12.2 G/DL (ref 12–16)
HCT VFR BLD AUTO: 35.7 % (ref 37–47)
LYMPHOCYTES # BLD AUTO: 2.47 10*3/UL (ref 1–5)
LYMPHOCYTES NFR BLD AUTO: 18.4 % (ref 20–45)
MCH RBC QN AUTO: 30.6 PG (ref 27–31)
MCHC RBC AUTO-ENTMCNC: 34.2 G/DL (ref 33–37)
MCV RBC AUTO: 89.5 FL (ref 81–99)
MONOCYTES # BLD AUTO: 0.68 10*3/UL (ref 0.2–1.2)
MONOCYTES NFR BLD MANUAL: 5.1 % (ref 3–10)
NEUTROPHILS # BLD AUTO: 10.21 10*3/UL (ref 2–8)
NEUTROPHILS NFR BLD MANUAL: 75.8 % (ref 30–85)
OSMOLALITY SERPL CALC.SUM OF ELEC: 282 MOSM/KG (ref 273–304)
PLATELET # BLD AUTO: 249 10*3/UL (ref 130–400)
PMV BLD AUTO: 9 FL (ref 7.4–10.4)
POTASSIUM SERPL-SCNC: 3.7 MMOL/L (ref 3.5–5.3)
RBC MORPH BLD: 3.99 10*6/UL (ref 4.2–5.4)
SODIUM SERPL-SCNC: 137 MMOL/L (ref 135–147)
WBC # BLD AUTO: 13.46 10*3/UL (ref 4.8–10.8)

## 2020-08-04 ENCOUNTER — OFFICE VISIT CONVERTED (OUTPATIENT)
Dept: ONCOLOGY | Facility: HOSPITAL | Age: 30
End: 2020-08-04
Attending: INTERNAL MEDICINE

## 2020-08-28 ENCOUNTER — OFFICE VISIT CONVERTED (OUTPATIENT)
Dept: OTOLARYNGOLOGY | Facility: CLINIC | Age: 30
End: 2020-08-28
Attending: OTOLARYNGOLOGY

## 2020-09-11 ENCOUNTER — HOSPITAL ENCOUNTER (OUTPATIENT)
Dept: OTHER | Facility: HOSPITAL | Age: 30
Discharge: HOME | End: 2020-09-11
Attending: INTERNAL MEDICINE

## 2020-09-11 ENCOUNTER — HOSPITAL ENCOUNTER (OUTPATIENT)
Dept: OTHER | Facility: HOSPITAL | Age: 30
Discharge: HOME | End: 2020-09-11
Attending: FAMILY MEDICINE

## 2020-09-11 LAB
ALBUMIN SERPL-MCNC: 3.8 G/DL (ref 3.5–5)
ALBUMIN/GLOB SERPL: 1.5 {RATIO} (ref 1.4–2.6)
ALP SERPL-CCNC: 130 U/L (ref 42–98)
ALT SERPL-CCNC: 15 U/L (ref 10–40)
ANION GAP SERPL CALC-SCNC: 12 MMOL/L (ref 8–19)
AST SERPL-CCNC: 14 U/L (ref 15–50)
BASOPHILS # BLD AUTO: 0.01 10*3/UL (ref 0–0.2)
BASOPHILS NFR BLD AUTO: 0.1 % (ref 0–3)
BILIRUB SERPL-MCNC: 0.39 MG/DL (ref 0.2–1.3)
BUN SERPL-MCNC: 8 MG/DL (ref 5–25)
BUN/CREAT SERPL: 10 {RATIO} (ref 6–20)
CALCIUM SERPL-MCNC: 9.3 MG/DL (ref 8.7–10.4)
CHLORIDE SERPL-SCNC: 102 MMOL/L (ref 99–111)
CHOLEST SERPL-MCNC: 198 MG/DL (ref 107–200)
CHOLEST/HDLC SERPL: 6.2 {RATIO} (ref 3–6)
CONV ABS IMM GRAN: 0.02 10*3/UL (ref 0–0.54)
CONV CO2: 24 MMOL/L (ref 22–32)
CONV EOSINOPHILS PERCENT BY MANUAL COUNT: 0.2 % (ref 0–7)
CONV IMMATURE GRAN: 0.1 % (ref 0–0.4)
CONV TOTAL PROTEIN: 6.4 G/DL (ref 6.3–8.2)
CREAT UR-MCNC: 0.77 MG/DL (ref 0.5–0.9)
EOSINOPHIL # BLD MANUAL: 0.03 10*3/UL (ref 0–0.7)
ERYTHROCYTE [DISTWIDTH] IN BLOOD BY AUTOMATED COUNT: 13.9 % (ref 11.5–14.5)
ERYTHROCYTE [DISTWIDTH] IN BLOOD BY AUTOMATED COUNT: 46.2 FL
FOLATE SERPL-MCNC: 19.8 NG/ML (ref 4.8–20)
GFR SERPLBLD BASED ON 1.73 SQ M-ARVRAT: >60 ML/MIN/{1.73_M2}
GLOBULIN UR ELPH-MCNC: 2.6 G/DL (ref 2–3.5)
GLUCOSE SERPL-MCNC: 80 MG/DL (ref 65–99)
HBA1C MFR BLD: 12.4 G/DL (ref 12–16)
HCT VFR BLD AUTO: 36.4 % (ref 37–47)
HDLC SERPL-MCNC: 32 MG/DL (ref 40–60)
LDLC SERPL CALC-MCNC: 112 MG/DL (ref 70–100)
LYMPHOCYTES # BLD AUTO: 2.45 10*3/UL (ref 1–5)
LYMPHOCYTES NFR BLD AUTO: 18.1 % (ref 20–45)
MCH RBC QN AUTO: 30.9 PG (ref 27–31)
MCHC RBC AUTO-ENTMCNC: 34.1 G/DL (ref 33–37)
MCV RBC AUTO: 90.8 FL (ref 81–99)
MONOCYTES # BLD AUTO: 0.67 10*3/UL (ref 0.2–1.2)
MONOCYTES NFR BLD MANUAL: 5 % (ref 3–10)
NEUTROPHILS # BLD AUTO: 10.33 10*3/UL (ref 2–8)
NEUTROPHILS NFR BLD MANUAL: 76.5 % (ref 30–85)
OSMOLALITY SERPL CALC.SUM OF ELEC: 275 MOSM/KG (ref 273–304)
PLATELET # BLD AUTO: 274 10*3/UL (ref 130–400)
PMV BLD AUTO: 9.4 FL (ref 7.4–10.4)
POTASSIUM SERPL-SCNC: 3.5 MMOL/L (ref 3.5–5.3)
RBC MORPH BLD: 4.01 10*6/UL (ref 4.2–5.4)
SODIUM SERPL-SCNC: 134 MMOL/L (ref 135–147)
T4 FREE SERPL-MCNC: 1.5 NG/DL (ref 0.9–1.8)
TRIGL SERPL-MCNC: 272 MG/DL (ref 40–150)
TSH SERPL-ACNC: 5.28 M[IU]/L (ref 0.27–4.2)
VIT B12 SERPL-MCNC: 539 PG/ML (ref 211–911)
VLDLC SERPL-MCNC: 54 MG/DL (ref 5–37)
WBC # BLD AUTO: 13.51 10*3/UL (ref 4.8–10.8)

## 2020-09-12 LAB
CONV IMMUNOGLOBULIN G (IGG): 586 MG/DL (ref 586–1602)
CONV IMMUNOGLOBULIN M (IGM): 94 MG/DL (ref 26–217)
IGA SERPL-MCNC: 27 MG/DL (ref 87–352)

## 2020-09-14 LAB — 1,25(OH)2D3 SERPL-MCNC: 23.1 PG/ML (ref 19.9–79.3)

## 2020-09-17 ENCOUNTER — OFFICE VISIT CONVERTED (OUTPATIENT)
Dept: FAMILY MEDICINE CLINIC | Facility: CLINIC | Age: 30
End: 2020-09-17
Attending: FAMILY MEDICINE

## 2020-10-09 ENCOUNTER — HOSPITAL ENCOUNTER (OUTPATIENT)
Dept: GENERAL RADIOLOGY | Facility: HOSPITAL | Age: 30
Discharge: HOME | End: 2020-10-09
Attending: FAMILY MEDICINE

## 2020-10-23 ENCOUNTER — HOSPITAL ENCOUNTER (OUTPATIENT)
Dept: OTHER | Facility: HOSPITAL | Age: 30
Discharge: HOME | End: 2020-10-23
Attending: INTERNAL MEDICINE

## 2020-10-23 LAB
ALBUMIN SERPL-MCNC: 3.9 G/DL (ref 3.5–5)
ALBUMIN/GLOB SERPL: 1.4 {RATIO} (ref 1.4–2.6)
ALP SERPL-CCNC: 133 U/L (ref 42–98)
ALT SERPL-CCNC: 23 U/L (ref 10–40)
ANION GAP SERPL CALC-SCNC: 15 MMOL/L (ref 8–19)
AST SERPL-CCNC: 19 U/L (ref 15–50)
BASOPHILS # BLD AUTO: 0.03 10*3/UL (ref 0–0.2)
BASOPHILS NFR BLD AUTO: 0.2 % (ref 0–3)
BILIRUB SERPL-MCNC: 0.33 MG/DL (ref 0.2–1.3)
BUN SERPL-MCNC: 10 MG/DL (ref 5–25)
BUN/CREAT SERPL: 13 {RATIO} (ref 6–20)
CALCIUM SERPL-MCNC: 9.3 MG/DL (ref 8.7–10.4)
CHLORIDE SERPL-SCNC: 104 MMOL/L (ref 99–111)
CONV ABS IMM GRAN: 0.01 10*3/UL (ref 0–0.54)
CONV CO2: 23 MMOL/L (ref 22–32)
CONV EOSINOPHILS PERCENT BY MANUAL COUNT: 0.4 % (ref 0–7)
CONV IMMATURE GRAN: 0.1 % (ref 0–0.4)
CONV TOTAL PROTEIN: 6.6 G/DL (ref 6.3–8.2)
CREAT UR-MCNC: 0.79 MG/DL (ref 0.5–0.9)
EOSINOPHIL # BLD MANUAL: 0.05 10*3/UL (ref 0–0.7)
ERYTHROCYTE [DISTWIDTH] IN BLOOD BY AUTOMATED COUNT: 13 % (ref 11.5–14.5)
ERYTHROCYTE [DISTWIDTH] IN BLOOD BY AUTOMATED COUNT: 43.3 FL
GFR SERPLBLD BASED ON 1.73 SQ M-ARVRAT: >60 ML/MIN/{1.73_M2}
GLOBULIN UR ELPH-MCNC: 2.7 G/DL (ref 2–3.5)
GLUCOSE SERPL-MCNC: 82 MG/DL (ref 65–99)
HBA1C MFR BLD: 12.6 G/DL (ref 12–16)
HCT VFR BLD AUTO: 36.9 % (ref 37–47)
LYMPHOCYTES # BLD AUTO: 2.87 10*3/UL (ref 1–5)
LYMPHOCYTES NFR BLD AUTO: 22.3 % (ref 20–45)
MCH RBC QN AUTO: 30.9 PG (ref 27–31)
MCHC RBC AUTO-ENTMCNC: 34.1 G/DL (ref 33–37)
MCV RBC AUTO: 90.4 FL (ref 81–99)
MONOCYTES # BLD AUTO: 0.68 10*3/UL (ref 0.2–1.2)
MONOCYTES NFR BLD MANUAL: 5.3 % (ref 3–10)
NEUTROPHILS # BLD AUTO: 9.24 10*3/UL (ref 2–8)
NEUTROPHILS NFR BLD MANUAL: 71.7 % (ref 30–85)
OSMOLALITY SERPL CALC.SUM OF ELEC: 284 MOSM/KG (ref 273–304)
PLATELET # BLD AUTO: 250 10*3/UL (ref 130–400)
PMV BLD AUTO: 9 FL (ref 7.4–10.4)
POTASSIUM SERPL-SCNC: 3.7 MMOL/L (ref 3.5–5.3)
RBC MORPH BLD: 4.08 10*6/UL (ref 4.2–5.4)
SODIUM SERPL-SCNC: 138 MMOL/L (ref 135–147)
WBC # BLD AUTO: 12.88 10*3/UL (ref 4.8–10.8)

## 2020-10-27 ENCOUNTER — OFFICE VISIT CONVERTED (OUTPATIENT)
Dept: ONCOLOGY | Facility: HOSPITAL | Age: 30
End: 2020-10-27
Attending: INTERNAL MEDICINE

## 2020-10-27 ENCOUNTER — HOSPITAL ENCOUNTER (OUTPATIENT)
Dept: OTHER | Facility: HOSPITAL | Age: 30
Setting detail: RECURRING SERIES
Discharge: HOME | End: 2021-01-25
Attending: INTERNAL MEDICINE

## 2020-12-04 ENCOUNTER — LAB REQUISITION (OUTPATIENT)
Dept: LAB | Facility: HOSPITAL | Age: 30
End: 2020-12-04

## 2020-12-04 ENCOUNTER — HOSPITAL ENCOUNTER (OUTPATIENT)
Dept: OTHER | Facility: HOSPITAL | Age: 30
Discharge: HOME | End: 2020-12-04
Attending: FAMILY MEDICINE

## 2020-12-04 DIAGNOSIS — Z00.00 ROUTINE GENERAL MEDICAL EXAMINATION AT A HEALTH CARE FACILITY: ICD-10-CM

## 2020-12-04 LAB
ALBUMIN SERPL-MCNC: 3.7 G/DL (ref 3.5–5)
ALBUMIN SERPL-MCNC: 3.8 G/DL (ref 3.5–5)
ALBUMIN/GLOB SERPL: 1.2 {RATIO} (ref 1.4–2.6)
ALBUMIN/GLOB SERPL: 1.3 {RATIO} (ref 1.4–2.6)
ALP SERPL-CCNC: 131 U/L (ref 42–98)
ALP SERPL-CCNC: 133 U/L (ref 42–98)
ALT SERPL-CCNC: 13 U/L (ref 10–40)
ALT SERPL-CCNC: 13 U/L (ref 10–40)
ANION GAP SERPL CALC-SCNC: 13 MMOL/L (ref 8–19)
ANION GAP SERPL CALC-SCNC: 16 MMOL/L (ref 8–19)
AST SERPL-CCNC: 14 U/L (ref 15–50)
AST SERPL-CCNC: 21 U/L (ref 15–50)
BASOPHILS # BLD AUTO: 0.02 10*3/UL (ref 0–0.2)
BASOPHILS NFR BLD AUTO: 0.1 % (ref 0–3)
BILIRUB SERPL-MCNC: 0.36 MG/DL (ref 0.2–1.3)
BILIRUB SERPL-MCNC: 0.38 MG/DL (ref 0.2–1.3)
BUN SERPL-MCNC: 8 MG/DL (ref 5–25)
BUN SERPL-MCNC: 9 MG/DL (ref 5–25)
BUN/CREAT SERPL: 10 {RATIO} (ref 6–20)
BUN/CREAT SERPL: 10 {RATIO} (ref 6–20)
CALCIUM SERPL-MCNC: 9.4 MG/DL (ref 8.7–10.4)
CALCIUM SERPL-MCNC: 9.4 MG/DL (ref 8.7–10.4)
CHLORIDE SERPL-SCNC: 104 MMOL/L (ref 99–111)
CHLORIDE SERPL-SCNC: 104 MMOL/L (ref 99–111)
CHOLEST SERPL-MCNC: 178 MG/DL (ref 107–200)
CHOLEST/HDLC SERPL: 5.1 {RATIO} (ref 3–6)
CONV ABS IMM GRAN: 0.03 10*3/UL (ref 0–0.54)
CONV CO2: 22 MMOL/L (ref 22–32)
CONV CO2: 24 MMOL/L (ref 22–32)
CONV EOSINOPHILS PERCENT BY MANUAL COUNT: 0.2 % (ref 0–7)
CONV IMMATURE GRAN: 0.2 % (ref 0–0.4)
CONV TOTAL PROTEIN: 6.5 G/DL (ref 6.3–8.2)
CONV TOTAL PROTEIN: 7 G/DL (ref 6.3–8.2)
CREAT UR-MCNC: 0.79 MG/DL (ref 0.5–0.9)
CREAT UR-MCNC: 0.9 MG/DL (ref 0.5–0.9)
EOSINOPHIL # BLD MANUAL: 0.03 10*3/UL (ref 0–0.7)
ERYTHROCYTE [DISTWIDTH] IN BLOOD BY AUTOMATED COUNT: 13.6 % (ref 11.5–14.5)
ERYTHROCYTE [DISTWIDTH] IN BLOOD BY AUTOMATED COUNT: 45.2 FL
FOLATE SERPL-MCNC: 17.9 NG/ML (ref 4.78–24.2)
GFR SERPLBLD BASED ON 1.73 SQ M-ARVRAT: >60 ML/MIN/{1.73_M2}
GFR SERPLBLD BASED ON 1.73 SQ M-ARVRAT: >60 ML/MIN/{1.73_M2}
GLOBULIN UR ELPH-MCNC: 2.8 G/DL (ref 2–3.5)
GLOBULIN UR ELPH-MCNC: 3.2 G/DL (ref 2–3.5)
GLUCOSE SERPL-MCNC: 78 MG/DL (ref 65–99)
GLUCOSE SERPL-MCNC: 78 MG/DL (ref 65–99)
HBA1C MFR BLD: 12.3 G/DL (ref 12–16)
HCT VFR BLD AUTO: 36.8 % (ref 37–47)
HDLC SERPL-MCNC: 35 MG/DL (ref 40–60)
LDLC SERPL CALC-MCNC: 86 MG/DL (ref 70–100)
LYMPHOCYTES # BLD AUTO: 2.81 10*3/UL (ref 1–5)
LYMPHOCYTES NFR BLD AUTO: 20 % (ref 20–45)
MCH RBC QN AUTO: 30.8 PG (ref 27–31)
MCHC RBC AUTO-ENTMCNC: 33.4 G/DL (ref 33–37)
MCV RBC AUTO: 92 FL (ref 81–99)
MONOCYTES # BLD AUTO: 0.67 10*3/UL (ref 0.2–1.2)
MONOCYTES NFR BLD MANUAL: 4.8 % (ref 3–10)
NEUTROPHILS # BLD AUTO: 10.51 10*3/UL (ref 2–8)
NEUTROPHILS NFR BLD MANUAL: 74.7 % (ref 30–85)
OSMOLALITY SERPL CALC.SUM OF ELEC: 281 MOSM/KG (ref 273–304)
OSMOLALITY SERPL CALC.SUM OF ELEC: 284 MOSM/KG (ref 273–304)
PLATELET # BLD AUTO: 258 10*3/UL (ref 130–400)
PMV BLD AUTO: 9.1 FL (ref 7.4–10.4)
POTASSIUM SERPL-SCNC: 3.6 MMOL/L (ref 3.5–5.3)
POTASSIUM SERPL-SCNC: 3.7 MMOL/L (ref 3.5–5.3)
RBC MORPH BLD: 4 10*6/UL (ref 4.2–5.4)
SODIUM SERPL-SCNC: 137 MMOL/L (ref 135–147)
SODIUM SERPL-SCNC: 138 MMOL/L (ref 135–147)
TRIGL SERPL-MCNC: 284 MG/DL (ref 40–150)
TSH SERPL-ACNC: 3.43 M[IU]/L (ref 0.27–4.2)
VIT B12 BLD-MCNC: 643 PG/ML (ref 211–946)
VLDLC SERPL-MCNC: 57 MG/DL (ref 5–37)
WBC # BLD AUTO: 14.07 10*3/UL (ref 4.8–10.8)

## 2020-12-04 PROCEDURE — 82746 ASSAY OF FOLIC ACID SERUM: CPT

## 2020-12-04 PROCEDURE — 82607 VITAMIN B-12: CPT

## 2020-12-05 LAB
CONV IMMUNOGLOBULIN G (IGG): 603 MG/DL (ref 586–1602)
CONV IMMUNOGLOBULIN M (IGM): 96 MG/DL (ref 26–217)
IGA SERPL-MCNC: 26 MG/DL (ref 87–352)

## 2020-12-07 LAB — 1,25(OH)2D3 SERPL-MCNC: 36.1 PG/ML (ref 19.9–79.3)

## 2020-12-17 ENCOUNTER — OFFICE VISIT CONVERTED (OUTPATIENT)
Dept: FAMILY MEDICINE CLINIC | Facility: CLINIC | Age: 30
End: 2020-12-17
Attending: FAMILY MEDICINE

## 2020-12-30 ENCOUNTER — CONVERSION ENCOUNTER (OUTPATIENT)
Dept: OTOLARYNGOLOGY | Facility: CLINIC | Age: 30
End: 2020-12-30

## 2020-12-30 ENCOUNTER — OFFICE VISIT CONVERTED (OUTPATIENT)
Dept: OTOLARYNGOLOGY | Facility: CLINIC | Age: 30
End: 2020-12-30
Attending: OTOLARYNGOLOGY

## 2021-01-15 LAB
ALBUMIN SERPL-MCNC: 4 G/DL (ref 3.5–5)
ALBUMIN/GLOB SERPL: 1.5 {RATIO} (ref 1.4–2.6)
ALP SERPL-CCNC: 82 U/L (ref 42–98)
ALT SERPL-CCNC: 21 U/L (ref 10–40)
ANION GAP SERPL CALC-SCNC: 15 MMOL/L (ref 8–19)
AST SERPL-CCNC: 18 U/L (ref 15–50)
BASOPHILS # BLD AUTO: 0.03 10*3/UL (ref 0–0.2)
BASOPHILS NFR BLD AUTO: 0.3 % (ref 0–3)
BILIRUB SERPL-MCNC: 0.34 MG/DL (ref 0.2–1.3)
BUN SERPL-MCNC: 12 MG/DL (ref 5–25)
BUN/CREAT SERPL: 12 {RATIO} (ref 6–20)
CALCIUM SERPL-MCNC: 9 MG/DL (ref 8.7–10.4)
CHLORIDE SERPL-SCNC: 105 MMOL/L (ref 99–111)
CONV ABS IMM GRAN: 0.03 10*3/UL (ref 0–0.54)
CONV CO2: 21 MMOL/L (ref 22–32)
CONV EOSINOPHILS PERCENT BY MANUAL COUNT: 0.2 % (ref 0–7)
CONV IMMATURE GRAN: 0.3 % (ref 0–0.4)
CONV TOTAL PROTEIN: 6.6 G/DL (ref 6.3–8.2)
CREAT UR-MCNC: 0.99 MG/DL (ref 0.5–0.9)
EOSINOPHIL # BLD MANUAL: 0.02 10*3/UL (ref 0–0.7)
ERYTHROCYTE [DISTWIDTH] IN BLOOD BY AUTOMATED COUNT: 12.8 % (ref 11.5–14.5)
ERYTHROCYTE [DISTWIDTH] IN BLOOD BY AUTOMATED COUNT: 42.7 FL
GFR SERPLBLD BASED ON 1.73 SQ M-ARVRAT: >60 ML/MIN/{1.73_M2}
GLOBULIN UR ELPH-MCNC: 2.6 G/DL (ref 2–3.5)
GLUCOSE SERPL-MCNC: 80 MG/DL (ref 65–99)
HBA1C MFR BLD: 12 G/DL (ref 12–16)
HCT VFR BLD AUTO: 35 % (ref 37–47)
LYMPHOCYTES # BLD AUTO: 2.27 10*3/UL (ref 1–5)
LYMPHOCYTES NFR BLD AUTO: 22.7 % (ref 20–45)
MCH RBC QN AUTO: 31 PG (ref 27–31)
MCHC RBC AUTO-ENTMCNC: 34.3 G/DL (ref 33–37)
MCV RBC AUTO: 90.4 FL (ref 81–99)
MONOCYTES # BLD AUTO: 0.61 10*3/UL (ref 0.2–1.2)
MONOCYTES NFR BLD MANUAL: 6.1 % (ref 3–10)
NEUTROPHILS # BLD AUTO: 7.04 10*3/UL (ref 2–8)
NEUTROPHILS NFR BLD MANUAL: 70.4 % (ref 30–85)
OSMOLALITY SERPL CALC.SUM OF ELEC: 283 MOSM/KG (ref 273–304)
PLATELET # BLD AUTO: 249 10*3/UL (ref 130–400)
PMV BLD AUTO: 9.1 FL (ref 7.4–10.4)
POTASSIUM SERPL-SCNC: 3.6 MMOL/L (ref 3.5–5.3)
RBC MORPH BLD: 3.87 10*6/UL (ref 4.2–5.4)
SODIUM SERPL-SCNC: 137 MMOL/L (ref 135–147)
WBC # BLD AUTO: 10 10*3/UL (ref 4.8–10.8)

## 2021-01-19 ENCOUNTER — OFFICE VISIT CONVERTED (OUTPATIENT)
Dept: ONCOLOGY | Facility: HOSPITAL | Age: 31
End: 2021-01-19
Attending: NURSE PRACTITIONER

## 2021-02-26 ENCOUNTER — HOSPITAL ENCOUNTER (OUTPATIENT)
Dept: OTHER | Facility: HOSPITAL | Age: 31
Discharge: HOME | End: 2021-02-26
Attending: FAMILY MEDICINE

## 2021-02-26 LAB
ALBUMIN SERPL-MCNC: 4 G/DL (ref 3.5–5)
ALBUMIN/GLOB SERPL: 1.5 {RATIO} (ref 1.4–2.6)
ALP SERPL-CCNC: 77 U/L (ref 42–98)
ALT SERPL-CCNC: 17 U/L (ref 10–40)
ANION GAP SERPL CALC-SCNC: 15 MMOL/L (ref 8–19)
AST SERPL-CCNC: 17 U/L (ref 15–50)
BASOPHILS # BLD AUTO: 0.06 10*3/UL (ref 0–0.2)
BASOPHILS NFR BLD AUTO: 0.6 % (ref 0–3)
BILIRUB SERPL-MCNC: 0.43 MG/DL (ref 0.2–1.3)
BUN SERPL-MCNC: 12 MG/DL (ref 5–25)
BUN/CREAT SERPL: 12 {RATIO} (ref 6–20)
CALCIUM SERPL-MCNC: 9.4 MG/DL (ref 8.7–10.4)
CHLORIDE SERPL-SCNC: 104 MMOL/L (ref 99–111)
CHOLEST SERPL-MCNC: 225 MG/DL (ref 107–200)
CHOLEST/HDLC SERPL: 6.6 {RATIO} (ref 3–6)
CONV ABS IMM GRAN: 0.03 10*3/UL (ref 0–0.54)
CONV CO2: 20 MMOL/L (ref 22–32)
CONV EOSINOPHILS PERCENT BY MANUAL COUNT: 1.1 % (ref 0–7)
CONV IMMATURE GRAN: 0.3 % (ref 0–0.4)
CONV TOTAL PROTEIN: 6.6 G/DL (ref 6.3–8.2)
CREAT UR-MCNC: 1.01 MG/DL (ref 0.5–0.9)
EOSINOPHIL # BLD MANUAL: 0.12 10*3/UL (ref 0–0.7)
ERYTHROCYTE [DISTWIDTH] IN BLOOD BY AUTOMATED COUNT: 13.5 % (ref 11.5–14.5)
ERYTHROCYTE [DISTWIDTH] IN BLOOD BY AUTOMATED COUNT: 45.1 FL
GFR SERPLBLD BASED ON 1.73 SQ M-ARVRAT: >60 ML/MIN/{1.73_M2}
GLOBULIN UR ELPH-MCNC: 2.6 G/DL (ref 2–3.5)
GLUCOSE SERPL-MCNC: 84 MG/DL (ref 65–99)
HBA1C MFR BLD: 12 G/DL (ref 12–16)
HCT VFR BLD AUTO: 35.4 % (ref 37–47)
HDLC SERPL-MCNC: 34 MG/DL (ref 40–60)
LDLC SERPL CALC-MCNC: 130 MG/DL (ref 70–100)
LYMPHOCYTES # BLD AUTO: 2.18 10*3/UL (ref 1–5)
LYMPHOCYTES NFR BLD AUTO: 20.6 % (ref 20–45)
MCH RBC QN AUTO: 30.7 PG (ref 27–31)
MCHC RBC AUTO-ENTMCNC: 33.9 G/DL (ref 33–37)
MCV RBC AUTO: 90.5 FL (ref 81–99)
MONOCYTES # BLD AUTO: 0.62 10*3/UL (ref 0.2–1.2)
MONOCYTES NFR BLD MANUAL: 5.9 % (ref 3–10)
NEUTROPHILS # BLD AUTO: 7.55 10*3/UL (ref 2–8)
NEUTROPHILS NFR BLD MANUAL: 71.5 % (ref 30–85)
OSMOLALITY SERPL CALC.SUM OF ELEC: 279 MOSM/KG (ref 273–304)
PLATELET # BLD AUTO: 266 10*3/UL (ref 130–400)
PMV BLD AUTO: 9 FL (ref 7.4–10.4)
POTASSIUM SERPL-SCNC: 3.6 MMOL/L (ref 3.5–5.3)
RBC MORPH BLD: 3.91 10*6/UL (ref 4.2–5.4)
SODIUM SERPL-SCNC: 135 MMOL/L (ref 135–147)
TRIGL SERPL-MCNC: 305 MG/DL (ref 40–150)
TSH SERPL-ACNC: 2.97 M[IU]/L (ref 0.27–4.2)
VIT B12 SERPL-MCNC: 733 PG/ML (ref 211–911)
VLDLC SERPL-MCNC: 61 MG/DL (ref 5–37)
WBC # BLD AUTO: 10.56 10*3/UL (ref 4.8–10.8)

## 2021-03-02 LAB — 1,25(OH)2D3 SERPL-MCNC: 24.2 PG/ML (ref 19.9–79.3)

## 2021-03-03 LAB
CONV IMMUNOGLOBULIN G (IGG): 577 MG/DL (ref 586–1602)
CONV IMMUNOGLOBULIN M (IGM): 62 MG/DL (ref 26–217)
IGA SERPL-MCNC: 23 MG/DL (ref 87–352)

## 2021-04-09 LAB
ALBUMIN SERPL-MCNC: 4 G/DL (ref 3.5–5)
ALBUMIN/GLOB SERPL: 1.4 {RATIO} (ref 1.4–2.6)
ALP SERPL-CCNC: 108 U/L (ref 42–98)
ALT SERPL-CCNC: 53 U/L (ref 10–40)
ANION GAP SERPL CALC-SCNC: 16 MMOL/L (ref 8–19)
AST SERPL-CCNC: 39 U/L (ref 15–50)
BASOPHILS # BLD AUTO: 0.02 10*3/UL (ref 0–0.2)
BASOPHILS NFR BLD AUTO: 0.2 % (ref 0–3)
BILIRUB SERPL-MCNC: 1.99 MG/DL (ref 0.2–1.3)
BUN SERPL-MCNC: 10 MG/DL (ref 5–25)
BUN/CREAT SERPL: 10 {RATIO} (ref 6–20)
CALCIUM SERPL-MCNC: 9.6 MG/DL (ref 8.7–10.4)
CHLORIDE SERPL-SCNC: 105 MMOL/L (ref 99–111)
CONV ABS IMM GRAN: 0.02 10*3/UL (ref 0–0.54)
CONV CO2: 20 MMOL/L (ref 22–32)
CONV EOSINOPHILS PERCENT BY MANUAL COUNT: 0.6 % (ref 0–7)
CONV IMMATURE GRAN: 0.2 % (ref 0–0.4)
CONV TOTAL PROTEIN: 6.9 G/DL (ref 6.3–8.2)
CREAT UR-MCNC: 0.97 MG/DL (ref 0.5–0.9)
EOSINOPHIL # BLD MANUAL: 0.06 10*3/UL (ref 0–0.7)
ERYTHROCYTE [DISTWIDTH] IN BLOOD BY AUTOMATED COUNT: 13.4 % (ref 11.5–14.5)
ERYTHROCYTE [DISTWIDTH] IN BLOOD BY AUTOMATED COUNT: 44 FL
GFR SERPLBLD BASED ON 1.73 SQ M-ARVRAT: >60 ML/MIN/{1.73_M2}
GLOBULIN UR ELPH-MCNC: 2.9 G/DL (ref 2–3.5)
GLUCOSE SERPL-MCNC: 84 MG/DL (ref 65–99)
HBA1C MFR BLD: 12.5 G/DL (ref 12–16)
HCT VFR BLD AUTO: 37.6 % (ref 37–47)
LYMPHOCYTES # BLD AUTO: 1.92 10*3/UL (ref 1–5)
LYMPHOCYTES NFR BLD AUTO: 20.6 % (ref 20–45)
MCH RBC QN AUTO: 30 PG (ref 27–31)
MCHC RBC AUTO-ENTMCNC: 33.2 G/DL (ref 33–37)
MCV RBC AUTO: 90.4 FL (ref 81–99)
MONOCYTES # BLD AUTO: 0.5 10*3/UL (ref 0.2–1.2)
MONOCYTES NFR BLD MANUAL: 5.4 % (ref 3–10)
NEUTROPHILS # BLD AUTO: 6.78 10*3/UL (ref 2–8)
NEUTROPHILS NFR BLD MANUAL: 73 % (ref 30–85)
OSMOLALITY SERPL CALC.SUM OF ELEC: 282 MOSM/KG (ref 273–304)
PLATELET # BLD AUTO: 291 10*3/UL (ref 130–400)
PMV BLD AUTO: 9 FL (ref 7.4–10.4)
POTASSIUM SERPL-SCNC: 3.7 MMOL/L (ref 3.5–5.3)
RBC MORPH BLD: 4.16 10*6/UL (ref 4.2–5.4)
SODIUM SERPL-SCNC: 137 MMOL/L (ref 135–147)
WBC # BLD AUTO: 9.3 10*3/UL (ref 4.8–10.8)

## 2021-04-12 ENCOUNTER — OFFICE VISIT CONVERTED (OUTPATIENT)
Dept: FAMILY MEDICINE CLINIC | Facility: CLINIC | Age: 31
End: 2021-04-12
Attending: FAMILY MEDICINE

## 2021-04-12 ENCOUNTER — CONVERSION ENCOUNTER (OUTPATIENT)
Dept: FAMILY MEDICINE CLINIC | Facility: CLINIC | Age: 31
End: 2021-04-12

## 2021-04-13 ENCOUNTER — OFFICE VISIT CONVERTED (OUTPATIENT)
Dept: ONCOLOGY | Facility: HOSPITAL | Age: 31
End: 2021-04-13
Attending: INTERNAL MEDICINE

## 2021-04-30 ENCOUNTER — HOSPITAL ENCOUNTER (OUTPATIENT)
Dept: LAB | Facility: HOSPITAL | Age: 31
Discharge: HOME | End: 2021-04-30
Attending: FAMILY MEDICINE

## 2021-04-30 LAB
ALBUMIN SERPL-MCNC: 3.9 G/DL (ref 3.5–5)
ALBUMIN/GLOB SERPL: 1.1 {RATIO} (ref 1.4–2.6)
ALP SERPL-CCNC: 87 U/L (ref 42–98)
ALT SERPL-CCNC: 41 U/L (ref 10–40)
ANION GAP SERPL CALC-SCNC: 15 MMOL/L (ref 8–19)
AST SERPL-CCNC: 33 U/L (ref 15–50)
BILIRUB SERPL-MCNC: 0.59 MG/DL (ref 0.2–1.3)
BUN SERPL-MCNC: 15 MG/DL (ref 5–25)
BUN/CREAT SERPL: 13 {RATIO} (ref 6–20)
CALCIUM SERPL-MCNC: 10.3 MG/DL (ref 8.7–10.4)
CHLORIDE SERPL-SCNC: 107 MMOL/L (ref 99–111)
CONV CO2: 22 MMOL/L (ref 22–32)
CONV TOTAL PROTEIN: 7.6 G/DL (ref 6.3–8.2)
CREAT UR-MCNC: 1.18 MG/DL (ref 0.5–0.9)
EST. AVERAGE GLUCOSE BLD GHB EST-MCNC: 100 MG/DL
GFR SERPLBLD BASED ON 1.73 SQ M-ARVRAT: >60 ML/MIN/{1.73_M2}
GLOBULIN UR ELPH-MCNC: 3.7 G/DL (ref 2–3.5)
GLUCOSE SERPL-MCNC: 76 MG/DL (ref 65–99)
HBA1C MFR BLD: 5.1 % (ref 3.5–5.7)
OSMOLALITY SERPL CALC.SUM OF ELEC: 290 MOSM/KG (ref 273–304)
POTASSIUM SERPL-SCNC: 4 MMOL/L (ref 3.5–5.3)
SODIUM SERPL-SCNC: 140 MMOL/L (ref 135–147)

## 2021-05-03 LAB
CONV HEPATITIS B SURFACE AG W CONFIRMATION RE: NEGATIVE
HAV IGM SERPL QL IA: NEGATIVE
HBV CORE IGM SERPL QL IA: NEGATIVE
HCV AB SER DONR QL: <0.1 S/CO RATIO (ref 0–0.9)

## 2021-05-05 ENCOUNTER — OFFICE VISIT CONVERTED (OUTPATIENT)
Dept: OTOLARYNGOLOGY | Facility: CLINIC | Age: 31
End: 2021-05-05
Attending: OTOLARYNGOLOGY

## 2021-05-12 NOTE — PROGRESS NOTES
"   Progress Note      Patient Name: Francine Preciado   Patient ID: [id number]   Sex: Female   YOB: 1990    Primary Care Provider: Arnie Rucker MD    Visit Date: April 27, 2020    Provider: Jose Elias Jorgensen MD   Location: [facility]   Location Address: [address] [place] [address], [address] [place] [address]  [id number]   Location Phone: [phone]          Chief Complaint     \"Her left ear has been bothering her.\"       History Of Present Illness     Francine Preciado is a 29 year old /White female with past medical history significant for common variable immunodeficiency, developmental delay, 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 She underwent an audiogram on 5/11/17 which revealed bilateral moderate to profound mixed hearing loss. Speech discrimination was 100% bilaterally at 90 dB. Tympanograms were type B bilaterally.  She wears bilateral behind-the-ear hearing aids.    She returns today for routine follow-up. She was last seen on 10/11/2019 at which time both ears appeared healthy.  Her mom tells me that her left ear has been bothering her and draining for the past few weeks.  They have tried the ofloxacin drops without improvement.  Francine tells me that her ears are sore.  She has not had any drainage from her mastoid cavity on the right. She denies any major change in her hearing and has not experienced any tinnitus, vertigo, fever, chills, or night sweats.                             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; Central perforation of tympanic membrane of right ear; Chronic Mastoiditis; Heart Murmur; Hypertension; Kabuki make-up syndrome; Mitral valve prolapse; Mixed conductive and sensorineural hearing loss of both ears; Otorrhea, left; Recurrent otitis media; Skin Disease/Psoriasis/eczema         Past Surgical History  *No Past Surgical History "         Medication List  Allergy Shots; atenolol 25 mg oral tablet; azelastine 137 mcg (0.1 %) nasal aerosol,spray; cranberry oral; dexamethasone sodium phosphate 0.1 % ophthalmic (eye) drops; docusate sodium 100 mg oral capsule; EpiPen 0.3 mg/0.3 mL injection auto-injector; Linzess 145 mcg oral capsule; montelukast 10 mg oral tablet; Multi Vitamin 9 mg iron/15 mL oral liquid; ofloxacin 0.3 % ophthalmic (eye) drops; pantoprazole 20 mg oral tablet,delayed release (DR/EC); Pirmella 1-35 mg-mcg oral tablet; Viactiv 500-500-40 mg-unit-mcg oral tablet,chewable; Xyzal 5 mg oral tablet; Zaditor 0.025 % (0.035 %) ophthalmic (eye) drops; Zantac 150 mg oral tablet         Allergy List  Ceftin; Neosporin; Seasonal         Family Medical History  Brain Neoplasm, Malignant; Stroke; Heart Disease; Diabetes, unspecified type; Leukemia         Reproductive History   0 Para 0 0 0 0       Social History  *Denies Alcohol Use; Second hand smoke exposure (Current every day); Single; Tobacco (Never); Unemployed         Immunizations  Name Date Admin   Influenza    Influenza    Influenza    Influenza    Influenza    Influenza          Review of Systems  · Constitutional  o Denies  o : fever, night sweats, weight loss  · Eyes  o Denies  o : discharge from eye, impaired vision  · HENT  o Admits  o : *See HPI  · Cardiovascular  o Denies  o : chest pain, irregular heart beats  · Respiratory  o Denies  o : shortness of breath, wheezing, coughing up blood  · Gastrointestinal  o Denies  o : heartburn, reflux, vomiting blood  · Genitourinary  o Denies  o : frequency  · Integument  o Denies  o : rash, skin dryness  · Neurologic  o Denies  o : seizures, loss of balance, loss of consciousness, dizziness  · Endocrine  o Denies  o : cold intolerance, heat intolerance  · Heme-Lymph  o Denies  o : easy bleeding, anemia      Vitals  Date Time BP Position Site L\R Cuff Size HR RR TEMP (F) WT  HT  BMI kg/m2 BSA m2 O2 Sat        2020 01:48 PM  "     72 - R 15 98.2 146lbs 6oz 4'  7\" 34.02 1.61 98 %          Physical Examination  · Constitutional  o Appearance  o : well developed, well-nourished, alert and in no acute distress, voice clear and strong  · Head and Face  o Head  o :   § Inspection  § : no deformities or lesions  o Face  o :   § Inspection  § : No facial lesions; House-Brackmann I/VI bilaterally  § Palpation  § : No TMJ crepitus nor  muscle tenderness bilaterally  · Eyes  o Vision  o :   § Visual Fields  § : Extraocular movements are intact. No spontaneous or gaze-induced nystagmus.  o Conjunctivae  o : clear  o Sclerae  o : clear  o Pupils and Irises  o : pupils equal, round, and reactive to light.   · Ears, Nose, Mouth and Throat  o Ears  o :   § External Ears  § : appearance within normal limits, no lesions present  § Otoscopic Examination  § : Right mastoid cavity with minimal cerumen which was debrided. It is otherwise healthy in appearance. Left external auditory canal with purulent drainage dependently. Left tympanic membrane with a 15% posterior inferior perforation draining purulence. This was suctioned and gentamicin drops placed.  § Hearing  § : intact to conversational voice both ears  o Nose  o :   § External Nose  § : appearance normal  § Intranasal Exam  § : mucosa within normal limits, vestibules normal, no intranasal lesions present, septum midline, sinuses non tender to percussion  o Oral Cavity  o :   § Oral Mucosa  § : oral mucosa normal without pallor or cyanosis  § Lips  § : lip appearance normal  § Teeth  § : Partial dentition for age  § Gums  § : gums pink, non-swollen, no bleeding present  § Tongue  § : tongue appearance normal; normal mobility  § Palate  § : hard palate normal, soft palate appearance normal with symmetric mobility  o Throat  o :   § Oropharynx  § : no inflammation or lesions present, tonsils within normal limits  · Neck  o Inspection/Palpation  o : normal appearance, no masses or tenderness, " trachea midline; thyroid size normal, nontender, no nodules or masses present on palpation  · Respiratory  o Respiratory Effort  o : breathing unlabored  o Inspection of Chest  o : normal appearance, no retractions  · Cardiovascular  o Heart  o : regular rate and rhythm  · Lymphatic  o Neck  o : no lymphadenopathy present  o Supraclavicular Nodes  o : no lymphadenopathy present  o Preauricular Nodes  o : no lymphadenopathy present  · Skin and Subcutaneous Tissue  o General Inspection  o : Regarding face and neck - there are no rashes present, no lesions present, and no areas of discoloration  · Neurologic  o Cranial Nerves  o : cranial nerves II-XII are grossly intact bilaterally  o Gait and Station  o : normal gait, able to stand without diffculty  · Psychiatric  o Judgement and Insight  o : judgment and insight intact  o Mood and Affect  o : mood normal, affect appropriate          Assessment  · Mixed conductive and sensorineural hearing loss, bilateral     389.22/H90.6  · Tympanic membrane perforation, left     384.20/H72.92  · Chronic mastoiditis of right side     383.1/H70.11    Problems Reconciled  Plan  · Medications  o gentamicin 0.3 % ophthalmic (eye) drops   SIG: Place 5 drops in affected ear twice daily x14 days   DISP: (1) 5 ml drop btl with 3 refills  Prescribed on 04/27/2020     o Medications have been Reconciled  o Transition of Care or Provider Policy  · Instructions  o Impressions and findings were discussed with Francine and her mother at great length. Currently, her left perforation is draining jose purulence which has not responded to the ofloxacin and dexamethasone drops that she has used previously. We discussed other options for management including a trial of Ciprodex but she tells me that her insurance would not cover these drops and they are quite expensive out-of-pocket. Therefore, she will be tried on a course of gentamicin ophthalmic. She will call in 2 weeks if Francine is not feeling any better  to arrange follow-up otherwise, she will follow-up in 4 to 5 months or sooner if needed.            Electronically Signed by: Jose Elias Jorgensen MD -Author on April 27, 2020 02:40:17 PM

## 2021-05-13 NOTE — PROGRESS NOTES
Progress Note      Patient Name: Francine Preciado   Patient ID: [id number]   Sex: Female   YOB: 1990    Primary Care Provider: Arnie Rucker MD    Visit Date: September 17, 2020    Provider: Arnie Rucker MD   Location: [facility]   Location Address: [address] [place] [address], [address] [place] [address]  [id number]   Location Phone: [phone]          Chief Complaint     6 mo f/u  refills          History Of Present Illness  Francine Preciado is a 30 year old /White female who presents for evaluation and treatment of:      Hx of common variable immunodefiency. She sees hematology on regular basis. She sees Dr. Lawton.     Hx of osteopenia severe.  She has established care with rheumatologist.      Hx of HTN.  She is on atenolol daily.  also controls tachycardia.       Hx of HLD/elevated triglycerides that are monitored.  Triglycerides are elevated.    Hx of GERD. overall controlled.    Hx of constipation. uncontrolled on docusate.    She has a hx of allergic rhinitis. she sees allergist and ENT. she is on allergy shots.    Hx of CKD..she sees kidney specialists.    The thyroid function test is abnoraml/hypofunctioning. she c.o constipation and weight gain.       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 --  --    Central perforation of tympanic membrane of right ear --  --    Chronic Mastoiditis --  --    Heart Murmur --  --    Hypertension --  --    Kabuki make-up syndrome --  --    Mitral valve prolapse --  --    Mixed conductive and sensorineural hearing loss of both ears --  --    Otorrhea, left --  --    Recurrent otitis media --  --    Skin Disease/Psoriasis/eczema --  --          Past Surgical History  Procedure Name Date Notes   *No Past Surgical History --  --          Medication List  Name Date Started Instructions   Allergy Shots  2 x week   atenolol 25 mg oral tablet 09/13/2019 take 1 tablet (25 mg) by  oral route once daily   azelastine 137 mcg (0.1 %) nasal aerosol,spray 06/15/2018 spray 2 sprays in each nostril by intranasal route 2 times per day   dexamethasone sodium phosphate 0.1 % ophthalmic (eye) drops 09/14/2020 Place 3-5 drops in the affected ear twice daily 14 days   docusate sodium 100 mg oral capsule 09/11/2018 take 1 capsule (100 mg) by oral route once daily prn for constipation   EpiPen 0.3 mg/0.3 mL injection auto-injector  inject 0.3 milliliter (0.3 mg) by intramuscular route once as needed for anaphylaxis   Fish Oil 1,000 mg (120 mg-180 mg) oral capsule  take 1 capsule by oral route daily   gentamicin 0.3 % ophthalmic (eye) drops 04/27/2020 Place 5 drops in affected ear twice daily x14 days   Linzess 145 mcg oral capsule 03/16/2020 take 1 capsule (145 mcg) by oral route once daily on an empty stomach at least 30 minutes before 1st meal of the day for 90 days   montelukast 10 mg oral tablet 09/13/2019 take 1 tablet (10 mg) by oral route once daily in the evening for 90 days   Multi Vitamin 9 mg iron/15 mL oral liquid  take 1 milliliter by oral route daily   pantoprazole 20 mg oral tablet,delayed release (DR/EC)  take 1 tablet (20 mg) by oral route once daily   Pirmella 1-35 mg-mcg oral tablet  take 1 tablet by oral route for 21 consecutive days, followed by 7 days off for 28 days   Viactiv 500-500-40 mg-unit-mcg oral tablet,chewable  chew 1 tablet by oral route daily   Xyzal 5 mg oral tablet 09/13/2019 take 1 tablet (5 mg) by oral route once daily in the evening for 90 days   Zaditor 0.025 % (0.035 %) ophthalmic (eye) drops 03/11/2019 instill 1 drop into affected eye(s) by ophthalmic route 2 times per day prn   Zantac 150 mg oral tablet 09/11/2018 take 1 tablet (150 mg) by oral route once daily at bedtime for 30 days         Allergy List  Allergen Name Date Reaction Notes   Ceftin --  --  --    Neosporin --  --  --    Seasonal --  --  --        Allergies Reconciled  Family Medical History  Disease  Name Relative/Age Notes   Brain Neoplasm, Malignant  --    Stroke  aunt/uncle and grandparents   Heart Disease  grandparents   Diabetes, unspecified type  aunt/uncle and grandparents   Leukemia Father/   --          Reproductive History  Menstrual   Pregnancy Summary   Total Pregnancies: 0 Full Term: 0 Premature: 0   Ab Induced: 0 Ab Spontaneous: 0 Ectopics: 0   Multiples: 0 Livin         Social History  Finding Status Start/Stop Quantity Notes   *Denies Alcohol Use --  --/-- --  --    Second hand smoke exposure Current every day --/-- --  --    Single --  --/-- --  lives with mom   Tobacco Never --/-- --  --    Unemployed --  --/-- --  --          Immunizations  NameDate Admin Mfg Trade Name Lot Number Route Inj VIS Given VIS Publication   [id number]/ [facility] Fluzone Quadrivalent [id number] [facility] 10/29/2019    Comments:    Fhhvmwpku24/07/2018 [facility] Fluzone > 3 Years [id number] [facility] 2018   Comments: pt left office in stable condition   Xekcdzwzw08/ [facility] Fluarix, quadrivalent, preservative free [id number]  RD 10/19/2017 2012   Comments:    Shmbgjbel47/ [facility] Fluarix, quadrivalent, preservative free [id number]  RD 10/13/2016 2015   Comments: pt left office in stable condition   Iksqyeucd08/02/2015 [facility] Fluarix, quadrivalent, preservative free [id number] [facility] 2015   Comments:    Aogihrles65/04/2013 [facility] Fluarix-PF > 3 Years [id number] [facility] 2013   Comments:          Review of Systems  · Constitutional  o Denies  o : fever, weight gain, weight loss  · Eyes  o Denies  o : diplopia, recent changes, blurred vision, redness of eye, eye pain, discharge from eye  · Cardiovascular  o Denies  o : palpitation, chest pain, claudication, pedal edema  · Respiratory  o Denies  o : shortness of breath, , PND/Orthopnea, hemoptysis, dry cough, productive cough  · Gastrointestinal  o Denies  o : nausea, vomiting, reflux, diarrhea, abdominal pain, blood in  "stools  · Genitourinary  o Denies  o : frequency, urgency, dysuria, vaginal discharge, penile discharge, incontinence, nocturia, irregular menses, hot flashes  · Neurologic  o Denies  o : unsteady gait, weakness, dizziness, H/A  · Musculoskeletal  o Denies  o : myalgias, joint pain, joint swelling  · Endocrine  o Denies  o : heat intolerance, cold intolerance, polyuria, polydipsia  · Psychiatric  o Denies  o : suicidal ideation, homicidal ideation, mood changes, hallucinations, memory  · Heme-Lymph  o Denies  o : easy bleeding, easy bruising, edema, lymph node enlargement or tenderness  · Allergic-Immunologic  o Denies  o : bees, frequent illnesses, seasonal allergies      Vitals  Date Time BP Position Site L\R Cuff Size HR RR TEMP (F) WT  HT  BMI kg/m2 BSA m2 O2 Sat HC       09/17/2020 02:38 /62 Sitting    89 - R  97.1 147lbs 2oz 4'  7\" 34.19 1.61 97 %          Physical Examination  · Constitutional  o Appearance  o : alert, in no acute distress, well developed, well-nourished  · Head and Face  o Head  o : normocephalic, atraumatic, non tender, no palpable masses or nodules.  o Face  o : no facial lesions  · Eyes  o Vision  o : Acuity: grossly normal at distance, Conjuntivae: Normal, Sclerae white  · Respiratory  o Auscultation of Lungs  o : normal breath sounds throughout  · Cardiovascular  o Heart  o : Regular rate and rhythm, Normal S1,S2   · Psychiatric  o Mood and Affect  o : normal mood and affect              Assessment  · Routine lab draw     V72.60/Z01.89  · Vitamin D deficiency     268.9/E55.9  · Fatigue     780.79/R53.83  · Hypertension     401.9/I10  · Abnormal thyroid function test     794.5/R94.6  · B12 deficiency     266.2/E53.8  · GERD (gastroesophageal reflux disease)     530.81/K21.9  · CKD (chronic kidney disease)     585.9/N18.9       f.u as directed  start thyroid med.  thyroid ultrasound    stop omega red and do fish oil 1200 mg twice a day    f/u in 3 months. "       Plan  · Orders  o Lipid Panel [facility] (55446) - V72.60/Z01.89, 780.79/R53.83, 401.9/I10 - 12/17/2020  o B12 Folate levels (B12FO) - 266.2/E53.8, V72.60/Z01.89, 780.79/R53.83 - 12/17/2020  o TSH [facility] (52129) - 794.5/R94.6 - 12/17/2020  o Vitamin D (25-Hydroxy) Level (69376) - 268.9/E55.9 - 12/17/2020  o CMP [facility] (14952) - V72.60/Z01.89, 780.79/R53.83, 401.9/I10 - 12/17/2020  o Thyroid Ultrasound. (51449) - 794.5/R94.6 - 09/17/2020  o Vitamin B12 Injection () - 266.2/E53.8 - 09/17/2020   Injection - Vitamin B12; Dose: 1,000 mcg; Site: Right Deltoid; Route: intramuscular; Date: 09/17/2020 15:49:33; Exp: 04/01/2022; Lot: [id number]; Mfg: N/A; TradeName: cyanocobalamin (vitamin B-12); [facility]; Administered By: Elizabeth Hernandez MA; Comment: pt tolerated well  o IM/SQ - Injection Fee [facility] (88388) - 266.2/E53.8 - 09/17/2020  o ACO-14: Influenza immunization administered or previously received () - - 09/17/2020  o ACO-39: Current medications updated and reviewed () - - 09/17/2020  · Medications  o Medications have been Reconciled  o Transition of Care or Provider Policy  · Instructions  o Electronically Identified Patient Education Materials Provided Electronically  · Disposition  o Call or Return if symptoms worsen or persist.  o Care Transition            Electronically Signed by: Arnie Rucker MD -Author on September 17, 2020 09:07:14 PM

## 2021-05-13 NOTE — PROGRESS NOTES
"   Progress Note      Patient Name: Francine Preciado   Patient ID: [id number]   Sex: Female   YOB: 1990    Primary Care Provider: Arnie Rucker MD    Visit Date: August 28, 2020    Provider: Jose Elias Jorgensen MD   Location: [facility]   Location Address: [address] [place] [address], [address] [place] [address]  [id number]   Location Phone: [phone]          Chief Complaint     \"My ears are bad.\"       History Of Present Illness     Francine Preciado is a 30 year old /White female with past medical history significant for common variable immunodeficiency, developmental delay, 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 She underwent an audiogram on 5/11/17 which revealed bilateral moderate to profound mixed hearing loss. Speech discrimination was 100% bilaterally at 90 dB. Tympanograms were type B bilaterally.  She wears bilateral behind-the-ear hearing aids.    She returns today for routine follow-up. She was last seen on 4/27/2020 at which time her left ear had been draining and sore.  Examination that day revealed jose purulence draining from her left tympanic membrane perforation.  She was placed on gentamicin ophthalmic which her mom tells me she did well with.  She reports that they have been using ofloxacin drops over the past 5 days because her ears have been bothering her again.  Francine denies any major change in her hearing and has not experienced any tinnitus, vertigo, fever, chills, or night sweats.              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; Central perforation of tympanic membrane of right ear; Chronic Mastoiditis; Heart Murmur; Hypertension; Kabuki make-up syndrome; Mitral valve prolapse; Mixed conductive and sensorineural hearing loss of both ears; Otorrhea, left; Recurrent otitis media; Skin Disease/Psoriasis/eczema         Past Surgical History  *No " Past Surgical History         Medication List  Allergy Shots; atenolol 25 mg oral tablet; azelastine 137 mcg (0.1 %) nasal aerosol,spray; docusate sodium 100 mg oral capsule; EpiPen 0.3 mg/0.3 mL injection auto-injector; Fish Oil 1,000 mg (120 mg-180 mg) oral capsule; gentamicin 0.3 % ophthalmic (eye) drops; Linzess 145 mcg oral capsule; montelukast 10 mg oral tablet; Multi Vitamin 9 mg iron/15 mL oral liquid; pantoprazole 20 mg oral tablet,delayed release (DR/EC); Pirmella 1-35 mg-mcg oral tablet; Viactiv 500-500-40 mg-unit-mcg oral tablet,chewable; Xyzal 5 mg oral tablet; Zaditor 0.025 % (0.035 %) ophthalmic (eye) drops; Zantac 150 mg oral tablet         Allergy List  Ceftin; Neosporin; Seasonal         Family Medical History  Brain Neoplasm, Malignant; Stroke; Heart Disease; Diabetes, unspecified type; Leukemia         Reproductive History   0 Para 0 0 0 0       Social History  *Denies Alcohol Use; Second hand smoke exposure (Current every day); Single; Tobacco (Never); Unemployed         Immunizations  Name Date Admin   Influenza    Influenza    Influenza    Influenza    Influenza    Influenza          Review of Systems  · Constitutional  o Denies  o : fever, night sweats, weight loss  · Eyes  o Denies  o : discharge from eye, impaired vision  · HENT  o Admits  o : *See HPI  · Cardiovascular  o Denies  o : chest pain, irregular heart beats  · Respiratory  o Denies  o : shortness of breath, wheezing, coughing up blood  · Gastrointestinal  o Denies  o : heartburn, reflux, vomiting blood  · Genitourinary  o Denies  o : frequency  · Integument  o Denies  o : rash, skin dryness  · Neurologic  o Denies  o : seizures, loss of balance, loss of consciousness, dizziness  · Endocrine  o Denies  o : cold intolerance, heat intolerance  · Heme-Lymph  o Denies  o : easy bleeding, anemia      Vitals  Date Time BP Position Site L\R Cuff Size HR RR TEMP (F) WT  HT  BMI kg/m2 BSA m2 O2 Sat        2020 01:33 PM        " 97.5 148lbs 6oz 4'  7\" 34.49 1.62           Physical Examination  · Constitutional  o Appearance  o : well developed, well-nourished, alert and in no acute distress, voice clear and strong  · Head and Face  o Head  o :   § Inspection  § : no deformities or lesions  o Face  o :   § Inspection  § : No facial lesions; House-Brackmann I/VI bilaterally  § Palpation  § : No TMJ crepitus nor  muscle tenderness bilaterally  · Eyes  o Vision  o :   § Visual Fields  § : Extraocular movements are intact. No spontaneous or gaze-induced nystagmus.  o Conjunctivae  o : clear  o Sclerae  o : clear  o Pupils and Irises  o : pupils equal, round, and reactive to light.   · Ears, Nose, Mouth and Throat  o Ears  o :   § External Ears  § : appearance within normal limits, no lesions present  § Otoscopic Examination  § : Left external auditory canal is normal in appearance. Left tympanic membrane with a stable 15% posterior inferior perforation which is dry. Right mastoid cavity was debrided of cerumen and squamous debris. It was slightly moist and the skin irritated.  § Hearing  § : intact to conversational voice both ears  o Nose  o :   § External Nose  § : appearance normal  § Intranasal Exam  § : mucosa within normal limits, vestibules normal, no intranasal lesions present, septum midline, sinuses non tender to percussion  o Oral Cavity  o :   § Oral Mucosa  § : oral mucosa normal without pallor or cyanosis  § Lips  § : lip appearance normal  § Teeth  § : Partial dentition for age  § Gums  § : gums pink, non-swollen, no bleeding present  § Tongue  § : tongue appearance normal; normal mobility  § Palate  § : hard palate normal, soft palate appearance normal with symmetric mobility  o Throat  o :   § Oropharynx  § : no inflammation or lesions present, tonsils within normal limits  · Neck  o Inspection/Palpation  o : normal appearance, no masses or tenderness, trachea midline; thyroid size normal, nontender, no nodules or " masses present on palpation  · Respiratory  o Respiratory Effort  o : breathing unlabored  · Lymphatic  o Neck  o : no lymphadenopathy present  o Supraclavicular Nodes  o : no lymphadenopathy present  o Preauricular Nodes  o : no lymphadenopathy present  · Skin and Subcutaneous Tissue  o General Inspection  o : Regarding face and neck - there are no rashes present, no lesions present, and no areas of discoloration  · Neurologic  o Cranial Nerves  o : cranial nerves II-XII are grossly intact bilaterally  o Gait and Station  o : normal gait, able to stand without diffculty  · Psychiatric  o Judgement and Insight  o : judgment and insight intact  o Mood and Affect  o : mood normal, affect appropriate          Assessment  · Mixed conductive and sensorineural hearing loss, bilateral     389.22/H90.6  · Tympanic membrane perforation, left     384.20/H72.92  · Chronic mastoiditis of right side     383.1/H70.11    Problems Reconciled  Plan  · Medications  o dexamethasone sodium phosphate 0.1 % ophthalmic (eye) drops   SIG: Place 3-5 drops in the affected ear twice daily 14 days   DISP: (1) 5 ml drop btl with 1 refills  Prescribed on 09/14/2020     o Medications have been Reconciled  o Transition of Care or Provider Policy  · Instructions  o Impressions and findings were discussed with Francine and her mother at great length. Currently, she is seen for follow-up of her ears both of which have been bothering her recently. Her right mastoid cavity was debrided and was found to be inflamed. They will continue with the ofloxacin drops and we will add dexamethasone to this regimen. She will follow-up in 4 months or sooner if needed            Electronically Signed by: Jose Elias Jorgensen MD -Author on September 14, 2020 08:57:19 AM

## 2021-05-14 ENCOUNTER — CONVERSION ENCOUNTER (OUTPATIENT)
Dept: FAMILY MEDICINE CLINIC | Facility: CLINIC | Age: 31
End: 2021-05-14

## 2021-05-14 ENCOUNTER — OFFICE VISIT CONVERTED (OUTPATIENT)
Dept: FAMILY MEDICINE CLINIC | Facility: CLINIC | Age: 31
End: 2021-05-14
Attending: FAMILY MEDICINE

## 2021-05-14 ENCOUNTER — HOSPITAL ENCOUNTER (OUTPATIENT)
Dept: FAMILY MEDICINE CLINIC | Facility: CLINIC | Age: 31
Discharge: HOME | End: 2021-05-14
Attending: FAMILY MEDICINE

## 2021-05-14 VITALS
BODY MASS INDEX: 33.4 KG/M2 | RESPIRATION RATE: 18 BRPM | HEART RATE: 74 BPM | HEIGHT: 55 IN | SYSTOLIC BLOOD PRESSURE: 124 MMHG | WEIGHT: 144.31 LBS | TEMPERATURE: 96.7 F | DIASTOLIC BLOOD PRESSURE: 80 MMHG | OXYGEN SATURATION: 95 %

## 2021-05-14 VITALS
OXYGEN SATURATION: 97 % | BODY MASS INDEX: 34.05 KG/M2 | SYSTOLIC BLOOD PRESSURE: 127 MMHG | TEMPERATURE: 97.1 F | HEART RATE: 89 BPM | WEIGHT: 147.12 LBS | DIASTOLIC BLOOD PRESSURE: 62 MMHG | HEIGHT: 55 IN

## 2021-05-14 VITALS
HEART RATE: 75 BPM | WEIGHT: 142.12 LBS | OXYGEN SATURATION: 98 % | TEMPERATURE: 97.6 F | HEIGHT: 55 IN | BODY MASS INDEX: 32.89 KG/M2 | DIASTOLIC BLOOD PRESSURE: 65 MMHG | SYSTOLIC BLOOD PRESSURE: 109 MMHG

## 2021-05-14 VITALS — HEIGHT: 55 IN | TEMPERATURE: 97.3 F | WEIGHT: 147.12 LBS | BODY MASS INDEX: 34.05 KG/M2

## 2021-05-14 VITALS — HEIGHT: 55 IN | WEIGHT: 148.37 LBS | TEMPERATURE: 97.5 F | BODY MASS INDEX: 34.34 KG/M2

## 2021-05-14 LAB — B-HEM STREP SPEC QL CULT: NEGATIVE

## 2021-05-14 NOTE — PROGRESS NOTES
Progress Note      Patient Name: Francine Preciado   Patient ID: [id number]   Sex: Female   YOB: 1990    Primary Care Provider: Arnie Rucker MD    Visit Date: December 17, 2020    Provider: Arnie Rucker MD   Location: [facility]   Location Address: [address] [place] [address], [address] [place] [address]  [id number]   Location Phone: [phone]          Chief Complaint     3 month follow up       History Of Present Illness  Francine Preciado is a 30 year old /White female who presents for evaluation and treatment of:      Hx of common variable immunodefiency. She sees hematology on regular basis. She sees Dr. Lawton.     Hx of osteopenia severe.  She has established care with rheumatologist.      Hx of HTN.  She is on atenolol daily.  also controls tachycardia.       Hx of HLD/elevated triglycerides that are monitod.  Triglycerides are elevated uncontrolled on the fish oil.    Hx of GERD. overall controlled.    Hx of constipation.     She has a hx of allergic rhinitis. she sees allergist and ENT. she is on allergy shots.    Hx of CKD..she sees kidney specialists.    Hx of hypothyroidism.. Controlled now on the 50 mcg. thyroid ultrsaound was normal.       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 --  --    Central perforation of tympanic membrane of right ear --  --    Chronic Mastoiditis --  --    Heart Murmur --  --    Hypertension --  --    Kabuki make-up syndrome --  --    Mitral valve prolapse --  --    Mixed conductive and sensorineural hearing loss of both ears --  --    Otorrhea, left --  --    Recurrent otitis media --  --    Skin Disease/Psoriasis/eczema --  --          Past Surgical History  Procedure Name Date Notes   *No Past Surgical History --  --          Medication List  Name Date Started Instructions   Allergy Shots  2 x week   atenolol 25 mg oral tablet 11/03/2020 TAKE ONE TABLET BY MOUTH DAILY    azelastine 137 mcg (0.1 %) nasal aerosol,spray 06/15/2018 spray 2 sprays in each nostril by intranasal route 2 times per day   cyanocobalamin (vitamin B-12) 1,000 mcg/mL injection solution  inject 0.1 milliliter (100 mcg) by intramuscular route once a month   dexamethasone sodium phosphate 0.1 % ophthalmic (eye) drops 09/14/2020 Place 3-5 drops in the affected ear twice daily 14 days   docusate sodium 100 mg oral capsule 09/11/2018 take 1 capsule (100 mg) by oral route once daily prn for constipation   EpiPen 0.3 mg/0.3 mL injection auto-injector  inject 0.3 milliliter (0.3 mg) by intramuscular route once as needed for anaphylaxis   Fish Oil 1,000 mg (120 mg-180 mg) oral capsule  take 1 capsule by oral route daily   gentamicin 0.3 % ophthalmic (eye) drops 04/27/2020 Place 5 drops in affected ear twice daily x14 days   levothyroxine 50 mcg oral tablet 09/17/2020 take 1 tablet (50 mcg) by oral route once daily for 90 days   Linzess 145 mcg oral capsule 03/16/2020 take 1 capsule (145 mcg) by oral route once daily on an empty stomach at least 30 minutes before 1st meal of the day for 90 days   montelukast 10 mg oral tablet 09/13/2019 take 1 tablet (10 mg) by oral route once daily in the evening for 90 days   Multi Vitamin 9 mg iron/15 mL oral liquid  take 1 milliliter by oral route daily   pantoprazole 20 mg oral tablet,delayed release (DR/EC)  take 1 tablet (20 mg) by oral route once daily   Pirmella 1-35 mg-mcg oral tablet  take 1 tablet by oral route for 21 consecutive days, followed by 7 days off for 28 days   Viactiv 500-500-40 mg-unit-mcg oral tablet,chewable  chew 1 tablet by oral route daily   Xyzal 5 mg oral tablet 09/17/2020 take 1 tablet (5 mg) by oral route once daily in the evening for 90 days   Zaditor 0.025 % (0.035 %) ophthalmic (eye) drops 03/11/2019 instill 1 drop into affected eye(s) by ophthalmic route 2 times per day prn   Zantac 150 mg oral tablet 09/11/2018 take 1 tablet (150 mg) by oral route  once daily at bedtime for 30 days         Allergy List  Allergen Name Date Reaction Notes   Ceftin --  --  --    Neosporin --  --  --    Seasonal --  --  --          Family Medical History  Disease Name Relative/Age Notes   Brain Neoplasm, Malignant  --    Stroke  aunt/uncle and grandparents   Heart Disease  grandparents   Diabetes, unspecified type  aunt/uncle and grandparents   Leukemia Father/   --          Reproductive History  Menstrual   Pregnancy Summary   Total Pregnancies: 0 Full Term: 0 Premature: 0   Ab Induced: 0 Ab Spontaneous: 0 Ectopics: 0   Multiples: 0 Livin         Social History  Finding Status Start/Stop Quantity Notes   *Denies Alcohol Use --  --/-- --  --    Second hand smoke exposure Current every day --/-- --  --    Single --  --/-- --  lives with mom   Tobacco Never --/-- --  --    Unemployed --  --/-- --  --          Immunizations  NameDate Admin Mfg Trade Name Lot Number Route Inj VIS Given VIS Publication   [id number]/ [facility] Fluzone Quadrivalent [id number] IM LD 10/14/2020 08/15/2019   Comments: pt tolerated injection well         Review of Systems  · Constitutional  o Denies  o : fever, weight gain, weight loss  · Eyes  o Denies  o : diplopia, recent changes, blurred vision, redness of eye, eye pain, discharge from eye  · Cardiovascular  o Denies  o : palpitation, chest pain, claudication, pedal edema  · Respiratory  o Denies  o : shortness of breath, , PND/Orthopnea, hemoptysis, dry cough, productive cough  · Gastrointestinal  o Denies  o : nausea, vomiting, reflux, diarrhea, abdominal pain, blood in stools  · Genitourinary  o Denies  o : frequency, urgency, dysuria, vaginal discharge, penile discharge, incontinence, nocturia, irregular menses, hot flashes  · Neurologic  o Denies  o : unsteady gait, weakness, dizziness, H/A  · Musculoskeletal  o Denies  o : myalgias, joint pain, joint swelling  · Endocrine  o Denies  o : heat intolerance, cold intolerance, polyuria,  "polydipsia  · Psychiatric  o Denies  o : suicidal ideation, homicidal ideation, mood changes, hallucinations, memory  · Heme-Lymph  o Denies  o : easy bleeding, easy bruising, edema, lymph node enlargement or tenderness  · Allergic-Immunologic  o Denies  o : bees, frequent illnesses, seasonal allergies      Vitals  Date Time BP Position Site L\R Cuff Size HR RR TEMP (F) WT  HT  BMI kg/m2 BSA m2 O2 Sat FR L/min FiO2 HC       12/17/2020 03:59 /80 Sitting    74 - R 18 96.7 144lbs 5oz 4'  7\" 33.54 1.59 95 %  21%          Physical Examination  · Constitutional  o Appearance  o : alert, in no acute distress, well developed, well-nourished  · Head and Face  o Head  o : normocephalic, atraumatic, non tender, no palpable masses or nodules.  o Face  o : no facial lesions  · Eyes  o Vision  o : Acuity: grossly normal at distance, Conjuntivae: Normal, Sclerae white  · Respiratory  o Auscultation of Lungs  o : normal breath sounds throughout  · Cardiovascular  o Heart  o : Regular rate and rhythm, Normal S1,S2   · Psychiatric  o Mood and Affect  o : normal mood and affect              Assessment  · Hypertension     401.9/I10  · Vitamin D deficiency     268.9/E55.9  · Abnormal thyroid function test     794.5/R94.6  · Vitamin B12 deficiency     266.2/E53.8  · Routine lab draw     V72.60/Z01.89  · Fatigue     780.79/R53.83  · HLD (hyperlipidemia)     272.4/E78.5  · Hypothyroidism     244.9/E03.9       f/u as directed.  stop fish oil.  start fenofibrate      f/u as directed.       Plan  · Orders  o Vitamin B12 Injection () - 266.2/E53.8 - 12/17/2020   Injection - Vitamin B12; Dose: 1ml; Site: Right Deltoid; Route: intramuscular; Date: 12/17/2020 16:57:08; Exp: 07/01/2022; Lot: ; Mfg: RASTA GUZMAN; TradeName: cyanocobalamin (vitamin B-12); Location: [facility]; Administered By: Leidy Pennington MA; Comment: Patient tolerated well  o Lipid Panel [facility] ([id number]) - 401.9/I10, V72.60/Z01.89, " 780.79/R53.83 - 03/17/2021  o Vitamin B12 level (84598) - V72.60/Z01.89, 266.2/E53.8, 780.79/R53.83 - 03/17/2021  o TSH [facility] ([id number]) - 794.5/R94.6 - 03/17/2021  o Vitamin D Level (26950) - 268.9/E55.9 - 03/17/2021  o CMP [facility] ([id number]) - 401.9/I10, V72.60/Z01.89, 780.79/R53.83 - 03/17/2021  o IM/SQ - Injection Fee [facility] ([id number]) - 266.2/E53.8 - 12/17/2020  o ACO-14: Influenza immunization administered or previously received () - - 12/17/2020  o ACO-39: Current medications updated and reviewed (1159F, ) - - 12/17/2020  · Medications  o Medications have been Reconciled  o Transition of Care or Provider Policy  · Instructions  o Electronically Identified Patient Education Materials Provided Electronically  · Disposition  o Call or Return if symptoms worsen or persist.  o Care Transition            Electronically Signed by: Arnie Rucker MD -Author on December 17, 2020 05:27:32 PM

## 2021-05-14 NOTE — PROGRESS NOTES
Progress Note      Patient Name: Francine Preciado   Patient ID: [id number]   Sex: Female   YOB: 1990    Primary Care Provider: Arnie Rucker MD    Visit Date: April 12, 2021    Provider: Arnie Rucker MD   [place] [facility] [place]   Location Address: [address] [place] [address], [address]  [id number]   Location Phone: [phone]          Chief Complaint  · Annual Wellness Exam      History Of Present Illness  The patient is a 30 year old /White female who has come to this office for her Annual Wellness Visit.   Her Primary Care Provider is Arnie Rucker MD. Her comprehensive Care Team list, including suppliers, has been updated on the Facesheet. Her medical/family history, height, weight, BMI, and blood pressure have been reviewed and are in the chart. The Health Risk Assessment has been completed and scanned in the chart.   Medications are listed in the medication list.   The active problem list includes: PROBLEM LIST   The patient HISTORY OF SUBSTANCE USE have a history of substance use.   Patient reports her diet is adequate.   The Mini-Cog has been administered and is scanned in chart. The results are negative. Her cognitive function is without limitation.   A hearing loss screen was completed today and the result is negative.   Patient does not have any risk factors for depression. Patient completed the PHQ-9 today and it has been scanned in the chart. The total score is.   The GAIT SCREENING TOOL was administered today and the result is negative.   The Rodgers Index of [place] in ADLs indicated full function (score of 6).   A Falls Risk Assessment has been completed, including a review of home fall hazards and medication review.   Overall, the patient's functional ability is noted by this provider to be within normal limits. Her level of safety is noted to be within normal limits. Her balance/gait is within normal limits. There WHAT IS FALL ASSESSMENT in the  past year. Patient-specific home safety recommendations have been reviewed and a copy has been given to patient.   She denies issues with leaking urine.   There are no additional risk factors identified.   Living Will/Advanced Directive STATUS OF ADVANCED DIRECTIVE.   Personalized health advice was given to the patient and a written health screening schedule was established; see Plan for details.       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; Central perforation of tympanic membrane of right ear; Chronic Mastoiditis; Heart Murmur; Hypertension; Kabuki make-up syndrome; Mitral valve prolapse; Mixed conductive and sensorineural hearing loss of both ears; Otorrhea, left; Recurrent otitis media; Skin Disease/Psoriasis/eczema         Past Surgical History  *No Past Surgical History         Medication List  Allergy Shots; atenolol 25 mg oral tablet; azelastine 137 mcg (0.1 %) nasal aerosol,spray; cyanocobalamin (vitamin B-12) 1,000 mcg/mL injection solution; docusate sodium 100 mg oral capsule; EpiPen 0.3 mg/0.3 mL injection auto-injector; fenofibrate 160 mg oral tablet; Fish Oil 1,000 mg (120 mg-180 mg) oral capsule; levothyroxine 50 mcg oral tablet; Linzess 145 mcg oral capsule; montelukast 10 mg oral tablet; Multi Vitamin 9 mg iron/15 mL oral liquid; pantoprazole 20 mg oral tablet,delayed release (DR/EC); Pirmella 1-35 mg-mcg oral tablet; Viactiv 500-500-40 mg-unit-mcg oral tablet,chewable; Xyzal 5 mg oral tablet; Zaditor 0.025 % (0.035 %) ophthalmic (eye) drops         Allergy List  Ceftin; Neosporin; Seasonal       Allergies Reconciled  Family Medical History  Brain Neoplasm, Malignant; Stroke; Heart Disease; Diabetes, unspecified type; Leukemia         Reproductive History   0 Para 0 0 0 0       Social History  *Denies Alcohol Use; Second hand smoke exposure (Current every day); Single; Tobacco (Never); Unemployed         Immunizations  Name Date Admin   Influenza 10/14/2020  "  Influenza 10/29/2019   Influenza 11/07/2018   Influenza 10/19/2017   Influenza 10/13/2016   Influenza 11/02/2015   Influenza 11/04/2013         Review of Systems  · Constitutional  o Denies  o : night sweats  · Eyes  o Denies  o : double vision, blurred vision  · HENT  o Denies  o : vertigo, recent head injury  · Breasts  o Denies  o : abnormal changes in breast size, additional breast symptoms except as noted in the HPI  · Cardiovascular  o Denies  o : chest pain, irregular heart beats  · Respiratory  o Denies  o : shortness of breath, productive cough  · Gastrointestinal  o Denies  o : nausea, vomiting  · Genitourinary  o Denies  o : dysuria, urinary retention  · Integument  o Denies  o : hair growth change, new skin lesions  · Neurologic  o Denies  o : altered mental status, seizures  · Musculoskeletal  o Denies  o : joint swelling, limitation of motion  · Endocrine  o Denies  o : cold intolerance, heat intolerance  · Heme-Lymph  o Denies  o : petechiae, lymph node enlargement or tenderness  · Allergic-Immunologic  o Denies  o : frequent illnesses      Vitals  Date Time BP Position Site L\R Cuff Size HR RR TEMP (F) WT  HT  BMI kg/m2 BSA m2 O2 Sat FR L/min FiO2 HC       04/12/2021 03:04 /65 Sitting    75 - R  97.6 142lbs 2oz 4'  7\" 33.03 1.58 98 %            Physical Examination  · Head and Face  o Head  o : normocephalic, atraumatic, non tender, no palpable masses or nodules.  o Face  o : no facial lesions  · Eyes  o Vision  o : Acuity: grossly normal at distance, Conjuntivae: Normal, Sclerae white  · Respiratory  o Auscultation of Lungs  o : normal breath sounds throughout  · Cardiovascular  o Heart  o : Regular rate and rhythm, Normal S1,S2   · Psychiatric  o Mood and Affect  o : normal mood and affect          Assessment  · Encounter for Medicare annual wellness exam     V70.0/Z00.00  · Screening for depression     V79.0/Z13.89  · Screening for alcoholism     V79.1/Z13.39       f/u sa directed  start " low dose statin.       Plan  · Orders  o Falls Risk Assessment Completed (3288F) - V70.0/Z00.00 - 04/12/2021  o Brief hearing screening (written) [facility] () - V70.0/Z00.00 - 04/12/2021  o Annual Wellness Visit-includes a Personalized Prevention Plan of Service (PPS), SUBSEQUENT VISIT (Medicare) () - V70.0/Z00.00 - 04/12/2021  o ACO-13: Fall Risk Screening with 2 or more falls in past year or any fall with injury in the past year (1100F) - V70.0/Z00.00 - 04/12/2021  o Presence or absence of urinary incontinence assessed (SUMAYA) (1090F) - V70.0/Z00.00 - 04/12/2021  o Annual depression screening using the PHQ-9 tool, 15 minutes (, [address]) - V79.0/Z13.89 - 04/12/2021  o ACO-18: Positive screen for clinical depression using a standardized tool and a follow-up plan documented () - V79.0/Z13.89 - 04/12/2021  o Annual alcohol screening using the AUDIT-C tool, 15 minutes [facility] ([address], ) - V79.1/Z13.39 - 04/12/2021  o Negative alcohol screening () - V79.1/Z13.39 - 04/12/2021  o Influenza immunization was ordered or administered () - - 04/12/2021  o ACO-39: Current medications updated and reviewed (, 1159F) - - 04/12/2021  · Medications  o Medications have been Reconciled  o Transition of Care or Provider Policy  · Instructions  o Health Risk Assessment has been reviewed with the patient.  o Written health screening schedule for next 5-10 years was established with patient; information scanned in chart and given/mailed to patient.  o Fall prevention methods discussed and a copy of recommendations given/mailed to patient.  o Today's PHQ-9 score is: 10___  o Positive depression screen. Today's Plan: _____  o Audit-C Questionnaire completed and scanned into the EMR under the designated folder within the patient's documents.  o Audit-C score of 0-4 - Negative Screen - Brief Discussion  o Electronically Identified Patient Education Materials Provided Electronically  · Disposition  o Call or Return if  symptoms worsen or persist.  o Care Transition            Electronically Signed by: Arnie Rucker MD -Author on April 13, 2021 05:38:57 AM

## 2021-05-14 NOTE — PROGRESS NOTES
Progress Note      Patient Name: Francine Preciado   Patient ID: [id number]   Sex: Female   YOB: 1990    Primary Care Provider: Arnie Rucker MD   Referring Provider: Arnie Rucker MD    Visit Date: April 12, 2021    Provider: Arnie Rucker MD   [place] [facility] [place] Address: [address] [place] [address], [address]  [id number]   Location Phone: [phone]          History Of Present Illness  Francine Preciado is a 30 year old /White female who presents for evaluation and treatment of:      Hx of common variable immunodefiency. She sees hematology on regular basis. She sees      Hx of osteopenia severe.  She has established care with rheumatologist.      Hx of HTN.  She is on atenolol daily.  also controls tachycardia.       Hx of HLD/elevated triglycerides that are monitored. Uncontrolled lipids.     Triglycerides are elevated uncontrolled on the fish oil.    Hx of GERD. overall controlled.    Hx of constipation.     She has a hx of allergic rhinitis. she sees allergist and ENT. she is on allergy shots.    Hx of CKD..she sees kidney specialists.    Hx of hypothyroidism.. Controlled.       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3 mg/0.3 mL injection auto-injector; fenofibrate 160 mg oral tablet; Fish Oil 1,000 mg (120 mg-180 mg) oral  capsule; levothyroxine 50 mcg oral tablet; Linzess 145 mcg oral capsule; montelukast 10 mg oral tablet; Multi Vitamin 9 mg iron/15 mL oral liquid; pantoprazole 20 mg oral tablet,delayed release (DR/EC); Pirmella 1-35 mg-mcg oral tablet; Viactiv 500-500-40 mg-unit-mcg oral tablet,chewable; Xyzal 5 mg oral tablet; Zaditor 0.025 % (0.035 %) ophthalmic (eye) drops         Allergy List  Ceftin; Neosporin; Seasonal       Allergies Reconciled  Family Medical History  Brain Neoplasm, Malignant; Stroke; Heart Disease; Diabetes, unspecified type; Leukemia         Reproductive History   0 Para 0 0 0 0       Social History  *Denies Alcohol Use; Second hand smoke exposure (Current every day); Single; Tobacco (Never); Unemployed         Immunizations  Name Date Admin   Influenza 10/14/2020   Influenza 10/29/2019   Influenza 2018   Influenza 10/19/2017   Influenza 10/13/2016   Influenza 2015   Influenza 2013         Review of Systems  · Constitutional  o Denies  o : weight gain, weight loss  · Eyes  o Denies  o : diplopia, recent changes, blurred vision, redness of eye, eye pain, discharge from eye  · Cardiovascular  o Denies  o : palpitation, chest pain, claudication, pedal edema  · Respiratory  o Denies  o : shortness of breath, , PND/Orthopnea, hemoptysis, dry cough, productive cough  · Gastrointestinal  o Denies  o : nausea, vomiting, reflux, diarrhea, abdominal pain, blood in stools  · Genitourinary  o Denies  o : frequency, urgency, dysuria, vaginal discharge, penile discharge, incontinence, nocturia, irregular menses, hot flashes  · Neurologic  o Denies  o : unsteady gait, weakness, dizziness, H/A  · Musculoskeletal  o Denies  o : myalgias, joint pain, joint swelling  · Endocrine  o Denies  o : heat intolerance, cold intolerance, polyuria, polydipsia  · Psychiatric  o Denies  o : suicidal ideation, homicidal ideation, mood changes, hallucinations, memory  · Heme-Lymph  o Denies  o : easy  "bleeding, easy bruising, edema, lymph node enlargement or tenderness  · Allergic-Immunologic  o Denies  o : bees, frequent illnesses, seasonal allergies      Vitals  Date Time BP Position Site L\R Cuff Size HR RR TEMP (F) WT  HT  BMI kg/m2 BSA m2 O2 Sat FR L/min FiO2 HC       04/12/2021 03:04 /65 Sitting    75 - R  97.6 142lbs 2oz 4'  7\" 33.03 1.58 98 %            Physical Examination  · Constitutional  o Appearance  o : alert, in no acute distress, well developed, well-nourished  · Head and Face  o Head  o : normocephalic, atraumatic, non tender, no palpable masses or nodules.  o Face  o : no facial lesions  · Eyes  o Vision  o : Acuity: grossly normal at distance, Conjuntivae: Normal, Sclerae white  · Respiratory  o Auscultation of Lungs  o : normal breath sounds throughout  · Cardiovascular  o Heart  o : Regular rate and rhythm, Normal S1,S2   · Psychiatric  o Mood and Affect  o : normal mood and affect          Assessment  · Elevated glucose     790.29/R73.09  · Hypertension     401.9/I10  · Vitamin D deficiency     268.9/E55.9  · B12 deficiency     266.2/E53.8  · Routine lab draw     V72.60/Z01.89  · Fatigue     780.79/R53.83  · HLD (hyperlipidemia)     272.4/E78.5  · Hypothyroidism     244.9/E03.9       f/u as directed  start low dose statin.  monitor labs.       Plan  · Orders  o Physical, Primary Care Panel (CBC, CMP, Lipid, TSH) [facility] (86140, 87830, 65803, 91442) - 401.9/I10, 780.79/R53.83, 272.4/E78.5, 244.9/E03.9 - 07/12/2021  o Vitamin B12 level (43548) - 266.2/E53.8, 780.79/R53.83 - 07/12/2021  o Vitamin D (25-Hydroxy) Level (10671) - 268.9/E55.9 - 07/12/2021  o Hgb A1c [facility] (78536) - 790.29/R73.09, 780.79/R53.83, V72.60/Z01.89 - 07/12/2021  o Lipid Panel [facility] (03831) - 401.9/I10, V72.60/Z01.89, 780.79/R53.83 - 07/12/2021  o ACO-14: Influenza immunization administered or previously received () - - 04/29/2021  o ACO-39: Current medications updated and reviewed (, 6893F) - - " 04/29/2021  · Medications  o Medications have been Reconciled  · Instructions  o Electronically Identified Patient Education Materials Provided Electronically  · Disposition  o Call or Return if symptoms worsen or persist.  o Care Transition            Electronically Signed by: Arnie Rucker MD -Author on April 29, 2021 09:52:02 AM

## 2021-05-14 NOTE — PROGRESS NOTES
"   Progress Note      Patient Name: Francine Preciado   Patient ID: [id number]   Sex: Female   YOB: 1990    Primary Care Provider: Arnie Rucker MD    Visit Date: December 30, 2020    Provider: Jose Elias Jorgensen MD   Location: [facility]   Location Address: [address] [place] [address], [address] [place] [address]  [id number]   Location Phone: [phone]          Chief Complaint     \"My right ear has been acting up.\"       History Of Present Illness     Francine Preciado is a 30 year old /White female with past medical history significant for common variable immunodeficiency, developmental delay, 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 She underwent an audiogram on 5/11/17 which revealed bilateral moderate to profound mixed hearing loss. Speech discrimination was 100% bilaterally at 90 dB. Tympanograms were type B bilaterally.  She wears bilateral behind-the-ear hearing aids.    She returns today for routine follow-up.  She was last seen on 28/20 which time she reported both ears to be bothersome.  Her mastoid cavity was debrided at her tympanic membrane perforation on the left.  Dry.  Dexamethasone was added to her regimen ofloxacin.  She tells me that she has done well until a few days ago when her left ear began bothering her.  She has not had any otorrhea and denies any issues with the right ear.  Francine denies any major change in her hearing and has not experienced any tinnitus, vertigo, fever, chills, or night sweats.  According to her mother they have been using ofloxacin drops without any improvement.              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; Central perforation of tympanic membrane of right ear; Chronic Mastoiditis; Heart Murmur; Hypertension; Kabuki make-up syndrome; Mitral valve prolapse; Mixed conductive and sensorineural hearing loss of both ears; " Otorrhea, left; Recurrent otitis media; Skin Disease/Psoriasis/eczema         Past Surgical History  *No Past Surgical History         Medication List  Allergy Shots; atenolol 25 mg oral tablet; azelastine 137 mcg (0.1 %) nasal aerosol,spray; cyanocobalamin (vitamin B-12) 1,000 mcg/mL injection solution; docusate sodium 100 mg oral capsule; EpiPen 0.3 mg/0.3 mL injection auto-injector; fenofibrate 160 mg oral tablet; Fish Oil 1,000 mg (120 mg-180 mg) oral capsule; levothyroxine 50 mcg oral tablet; Linzess 145 mcg oral capsule; montelukast 10 mg oral tablet; Multi Vitamin 9 mg iron/15 mL oral liquid; pantoprazole 20 mg oral tablet,delayed release (DR/EC); Pirmella 1-35 mg-mcg oral tablet; Viactiv 500-500-40 mg-unit-mcg oral tablet,chewable; Xyzal 5 mg oral tablet; Zaditor 0.025 % (0.035 %) ophthalmic (eye) drops         Allergy List  Ceftin; Neosporin; Seasonal       Allergies Reconciled  Family Medical History  Brain Neoplasm, Malignant; Stroke; Heart Disease; Diabetes, unspecified type; Leukemia         Reproductive History   0 Para 0 0 0 0       Social History  *Denies Alcohol Use; Second hand smoke exposure (Current every day); Single; Tobacco (Never); Unemployed         Immunizations  Name Date Admin   Influenza 10/14/2020   Influenza 10/29/2019   Influenza 2018   Influenza 10/19/2017   Influenza 10/13/2016   Influenza 2015   Influenza 2013         Review of Systems  · Constitutional  o Denies  o : fever, night sweats, weight loss  · Eyes  o Denies  o : discharge from eye, impaired vision  · HENT  o Admits  o : *See HPI  · Cardiovascular  o Denies  o : chest pain, irregular heart beats  · Respiratory  o Denies  o : shortness of breath, wheezing, coughing up blood  · Gastrointestinal  o Denies  o : heartburn, reflux, vomiting blood  · Genitourinary  o Denies  o : frequency  · Integument  o Denies  o : rash, skin dryness  · Neurologic  o Denies  o : seizures, loss of balance, loss of  "consciousness, dizziness  · Endocrine  o Denies  o : cold intolerance, heat intolerance  · Heme-Lymph  o Denies  o : easy bleeding, anemia      Vitals  Date Time BP Position Site L\R Cuff Size HR RR TEMP (F) WT  HT  BMI kg/m2 BSA m2 O2 Sat FR L/min FiO2 HC       12/30/2020 03:15 PM        97.3 147lbs 2oz 4'  7\" 34.19 1.61             Physical Examination  · Constitutional  o Appearance  o : well developed, well-nourished, alert and in no acute distress, voice clear and strong  · Head and Face  o Head  o :   § Inspection  § : no deformities or lesions  o Face  o :   § Inspection  § : No facial lesions; House-Brackmann I/VI bilaterally  § Palpation  § : No TMJ crepitus nor  muscle tenderness bilaterally  · Eyes  o Vision  o :   § Visual Fields  § : Extraocular movements are intact. No spontaneous or gaze-induced nystagmus.  o Conjunctivae  o : clear  o Sclerae  o : clear  o Pupils and Irises  o : pupils equal, round, and reactive to light.   · Ears, Nose, Mouth and Throat  o Ears  o :   § External Ears  § : appearance within normal limits, no lesions present  § Otoscopic Examination  § : Left external auditory canal is normal in appearance. Left tympanic membrane with stable 15% posterior inferior perforation which is slightly moist. Ciprodex drops were instilled. Right mastoid cavity with moderate cerumen and squamous debris which was debrided with a #7 suction. The skin appears healthy.  § Hearing  § : intact to conversational voice both ears  o Nose  o :   § External Nose  § : appearance normal  § Intranasal Exam  § : mucosa within normal limits, vestibules normal, no intranasal lesions present, septum midline, sinuses non tender to percussion  o Oral Cavity  o :   § Oral Mucosa  § : oral mucosa normal without pallor or cyanosis  § Lips  § : lip appearance normal  § Teeth  § : normal dentition for age  § Gums  § : gums pink, non-swollen, no bleeding present  § Tongue  § : tongue appearance normal; normal " mobility  § Palate  § : hard palate normal, soft palate appearance normal with symmetric mobility  o Throat  o :   § Oropharynx  § : no inflammation or lesions present, tonsils within normal limits  · Neck  o Inspection/Palpation  o : normal appearance, no masses or tenderness, trachea midline; thyroid size normal, nontender, no nodules or masses present on palpation  · Respiratory  o Respiratory Effort  o : breathing unlabored  · Lymphatic  o Neck  o : no lymphadenopathy present  o Supraclavicular Nodes  o : no lymphadenopathy present  o Preauricular Nodes  o : no lymphadenopathy present  · Skin and Subcutaneous Tissue  o General Inspection  o : Regarding face and neck - there are no rashes present, no lesions present, and no areas of discoloration  · Neurologic  o Cranial Nerves  o : cranial nerves II-XII are grossly intact bilaterally  o Gait and Station  o : normal gait, able to stand without diffculty  · Psychiatric  o Judgement and Insight  o : judgment and insight intact  o Mood and Affect  o : mood normal, affect appropriate          Assessment  · Mixed conductive and sensorineural hearing loss, bilateral     389.22/H90.6  · Tympanic membrane perforation, left     384.20/H72.92  · Chronic mastoiditis of right side     383.1/H70.11      Plan  · Medications  o Medications have been Reconciled  o Transition of Care or Provider Policy  · Instructions  o Impressions and findings were discussed with Francine and her mother at great length. Currently, she is seen for evaluation of left-sided otalgia and chronic irritation of the right mastoid cavity. Right mastoid cavity was debrided and the left tympanic membrane is a stable perforation which is slightly moist today. Ciprodex drops were instilled and we discussed water precautions. She will follow-up in 4-6 months or sooner if needed.            Electronically Signed by: Jose Elias Jorgensen MD -Author on December 30, 2020 05:43:24 PM

## 2021-05-15 VITALS
HEART RATE: 95 BPM | OXYGEN SATURATION: 98 % | HEIGHT: 55 IN | DIASTOLIC BLOOD PRESSURE: 60 MMHG | WEIGHT: 135.25 LBS | BODY MASS INDEX: 31.3 KG/M2 | TEMPERATURE: 97 F | SYSTOLIC BLOOD PRESSURE: 102 MMHG

## 2021-05-15 VITALS
RESPIRATION RATE: 15 BRPM | HEIGHT: 55 IN | OXYGEN SATURATION: 98 % | WEIGHT: 146.37 LBS | TEMPERATURE: 98.2 F | BODY MASS INDEX: 33.87 KG/M2 | HEART RATE: 72 BPM

## 2021-05-15 VITALS
OXYGEN SATURATION: 98 % | WEIGHT: 108.5 LBS | HEIGHT: 57 IN | DIASTOLIC BLOOD PRESSURE: 56 MMHG | SYSTOLIC BLOOD PRESSURE: 105 MMHG | BODY MASS INDEX: 23.41 KG/M2 | HEART RATE: 80 BPM | TEMPERATURE: 98.2 F

## 2021-05-15 VITALS
BODY MASS INDEX: 29.47 KG/M2 | DIASTOLIC BLOOD PRESSURE: 72 MMHG | SYSTOLIC BLOOD PRESSURE: 117 MMHG | TEMPERATURE: 98.4 F | RESPIRATION RATE: 16 BRPM | WEIGHT: 127.37 LBS | OXYGEN SATURATION: 99 % | HEART RATE: 78 BPM | HEIGHT: 55 IN

## 2021-05-15 VITALS
SYSTOLIC BLOOD PRESSURE: 126 MMHG | BODY MASS INDEX: 32.17 KG/M2 | HEART RATE: 77 BPM | OXYGEN SATURATION: 100 % | HEIGHT: 55 IN | DIASTOLIC BLOOD PRESSURE: 63 MMHG | WEIGHT: 139 LBS | TEMPERATURE: 98.9 F | RESPIRATION RATE: 15 BRPM

## 2021-05-15 VITALS
WEIGHT: 124 LBS | TEMPERATURE: 98.7 F | HEART RATE: 103 BPM | HEIGHT: 55 IN | DIASTOLIC BLOOD PRESSURE: 76 MMHG | BODY MASS INDEX: 28.7 KG/M2 | RESPIRATION RATE: 16 BRPM | SYSTOLIC BLOOD PRESSURE: 112 MMHG | OXYGEN SATURATION: 98 %

## 2021-05-15 VITALS
OXYGEN SATURATION: 99 % | DIASTOLIC BLOOD PRESSURE: 72 MMHG | TEMPERATURE: 98.3 F | HEIGHT: 55 IN | BODY MASS INDEX: 32.26 KG/M2 | HEART RATE: 95 BPM | SYSTOLIC BLOOD PRESSURE: 126 MMHG | WEIGHT: 139.37 LBS | RESPIRATION RATE: 16 BRPM

## 2021-05-16 VITALS
HEIGHT: 57 IN | OXYGEN SATURATION: 95 % | SYSTOLIC BLOOD PRESSURE: 116 MMHG | WEIGHT: 133.56 LBS | TEMPERATURE: 97.2 F | DIASTOLIC BLOOD PRESSURE: 54 MMHG | BODY MASS INDEX: 28.81 KG/M2 | HEART RATE: 76 BPM

## 2021-05-16 VITALS
BODY MASS INDEX: 32.21 KG/M2 | TEMPERATURE: 97.5 F | WEIGHT: 139.19 LBS | HEIGHT: 55 IN | DIASTOLIC BLOOD PRESSURE: 72 MMHG | SYSTOLIC BLOOD PRESSURE: 114 MMHG | OXYGEN SATURATION: 98 % | HEART RATE: 91 BPM

## 2021-05-16 VITALS
OXYGEN SATURATION: 98 % | RESPIRATION RATE: 15 BRPM | BODY MASS INDEX: 31.47 KG/M2 | SYSTOLIC BLOOD PRESSURE: 112 MMHG | DIASTOLIC BLOOD PRESSURE: 55 MMHG | HEIGHT: 55 IN | HEART RATE: 80 BPM | WEIGHT: 136 LBS | TEMPERATURE: 98.8 F

## 2021-05-16 VITALS
HEART RATE: 87 BPM | WEIGHT: 134.12 LBS | BODY MASS INDEX: 31.04 KG/M2 | TEMPERATURE: 98.3 F | DIASTOLIC BLOOD PRESSURE: 67 MMHG | HEIGHT: 55 IN | SYSTOLIC BLOOD PRESSURE: 101 MMHG | RESPIRATION RATE: 15 BRPM | OXYGEN SATURATION: 98 %

## 2021-05-16 VITALS
RESPIRATION RATE: 18 BRPM | HEIGHT: 57 IN | BODY MASS INDEX: 28.69 KG/M2 | DIASTOLIC BLOOD PRESSURE: 62 MMHG | TEMPERATURE: 98.3 F | SYSTOLIC BLOOD PRESSURE: 102 MMHG | OXYGEN SATURATION: 98 % | WEIGHT: 133 LBS | HEART RATE: 84 BPM

## 2021-05-16 VITALS
HEIGHT: 55 IN | WEIGHT: 136.25 LBS | SYSTOLIC BLOOD PRESSURE: 119 MMHG | TEMPERATURE: 98.4 F | HEART RATE: 93 BPM | DIASTOLIC BLOOD PRESSURE: 66 MMHG | RESPIRATION RATE: 16 BRPM | OXYGEN SATURATION: 99 % | BODY MASS INDEX: 31.53 KG/M2

## 2021-05-16 VITALS
SYSTOLIC BLOOD PRESSURE: 123 MMHG | HEIGHT: 55 IN | OXYGEN SATURATION: 95 % | TEMPERATURE: 97.4 F | WEIGHT: 121.44 LBS | BODY MASS INDEX: 28.1 KG/M2 | DIASTOLIC BLOOD PRESSURE: 65 MMHG | HEART RATE: 73 BPM

## 2021-05-16 VITALS
HEIGHT: 55 IN | SYSTOLIC BLOOD PRESSURE: 109 MMHG | BODY MASS INDEX: 28.72 KG/M2 | DIASTOLIC BLOOD PRESSURE: 66 MMHG | WEIGHT: 124.12 LBS | OXYGEN SATURATION: 98 % | HEART RATE: 74 BPM | TEMPERATURE: 98.4 F

## 2021-05-16 VITALS
DIASTOLIC BLOOD PRESSURE: 53 MMHG | SYSTOLIC BLOOD PRESSURE: 107 MMHG | BODY MASS INDEX: 30.78 KG/M2 | RESPIRATION RATE: 16 BRPM | WEIGHT: 133 LBS | HEIGHT: 55 IN | OXYGEN SATURATION: 98 % | TEMPERATURE: 98.9 F | HEART RATE: 71 BPM

## 2021-05-16 LAB — BACTERIA SPEC AEROBE CULT: NORMAL

## 2021-05-21 ENCOUNTER — HOSPITAL ENCOUNTER (OUTPATIENT)
Dept: OTHER | Facility: HOSPITAL | Age: 31
Discharge: HOME | End: 2021-05-21
Attending: INTERNAL MEDICINE

## 2021-05-21 LAB
ALBUMIN SERPL-MCNC: 4 G/DL (ref 3.5–5)
ALBUMIN/GLOB SERPL: 1.3 {RATIO} (ref 1.4–2.6)
ALP SERPL-CCNC: 62 U/L (ref 42–98)
ALT SERPL-CCNC: 10 U/L (ref 10–40)
ANION GAP SERPL CALC-SCNC: 15 MMOL/L (ref 8–19)
AST SERPL-CCNC: 15 U/L (ref 15–50)
BASOPHILS # BLD AUTO: 0.02 10*3/UL (ref 0–0.2)
BASOPHILS NFR BLD AUTO: 0.2 % (ref 0–3)
BILIRUB SERPL-MCNC: 0.33 MG/DL (ref 0.2–1.3)
BUN SERPL-MCNC: 10 MG/DL (ref 5–25)
BUN/CREAT SERPL: 9 {RATIO} (ref 6–20)
CALCIUM SERPL-MCNC: 9.1 MG/DL (ref 8.7–10.4)
CHLORIDE SERPL-SCNC: 106 MMOL/L (ref 99–111)
CONV ABS IMM GRAN: 0.02 10*3/UL (ref 0–0.2)
CONV CO2: 23 MMOL/L (ref 22–32)
CONV IMMATURE GRAN: 0.2 % (ref 0–1.8)
CONV TOTAL PROTEIN: 7 G/DL (ref 6.3–8.2)
CREAT UR-MCNC: 1.06 MG/DL (ref 0.5–0.9)
DEPRECATED RDW RBC AUTO: 48.5 FL (ref 36.4–46.3)
EOSINOPHIL # BLD AUTO: 0.03 10*3/UL (ref 0–0.7)
EOSINOPHIL # BLD AUTO: 0.3 % (ref 0–7)
ERYTHROCYTE [DISTWIDTH] IN BLOOD BY AUTOMATED COUNT: 14.4 % (ref 11.7–14.4)
GFR SERPLBLD BASED ON 1.73 SQ M-ARVRAT: >60 ML/MIN/{1.73_M2}
GLOBULIN UR ELPH-MCNC: 3 G/DL (ref 2–3.5)
GLUCOSE SERPL-MCNC: 140 MG/DL (ref 65–99)
HCT VFR BLD AUTO: 35.5 % (ref 37–47)
HGB BLD-MCNC: 11.7 G/DL (ref 12–16)
LYMPHOCYTES # BLD AUTO: 1.62 10*3/UL (ref 1–5)
LYMPHOCYTES NFR BLD AUTO: 17.8 % (ref 20–45)
MCH RBC QN AUTO: 30.8 PG (ref 27–31)
MCHC RBC AUTO-ENTMCNC: 33 G/DL (ref 33–37)
MCV RBC AUTO: 93.4 FL (ref 81–99)
MONOCYTES # BLD AUTO: 0.69 10*3/UL (ref 0.2–1.2)
MONOCYTES NFR BLD AUTO: 7.6 % (ref 3–10)
NEUTROPHILS # BLD AUTO: 6.73 10*3/UL (ref 2–8)
NEUTROPHILS NFR BLD AUTO: 73.9 % (ref 30–85)
NRBC CBCN: 0 % (ref 0–0.7)
OSMOLALITY SERPL CALC.SUM OF ELEC: 291 MOSM/KG (ref 273–304)
PLATELET # BLD AUTO: 293 10*3/UL (ref 130–400)
PMV BLD AUTO: 9.4 FL (ref 9.4–12.3)
POTASSIUM SERPL-SCNC: 3.5 MMOL/L (ref 3.5–5.3)
RBC # BLD AUTO: 3.8 10*6/UL (ref 4.2–5.4)
SODIUM SERPL-SCNC: 140 MMOL/L (ref 135–147)
WBC # BLD AUTO: 9.11 10*3/UL (ref 4.8–10.8)

## 2021-05-22 LAB
CONV IMMUNOGLOBULIN G (IGG): 505 MG/DL (ref 586–1602)
CONV IMMUNOGLOBULIN M (IGM): 44 MG/DL (ref 26–217)
IGA SERPL-MCNC: 19 MG/DL (ref 87–352)

## 2021-05-25 ENCOUNTER — HOSPITAL ENCOUNTER (OUTPATIENT)
Dept: OTHER | Facility: HOSPITAL | Age: 31
Setting detail: RECURRING SERIES
Discharge: HOME | End: 2021-05-27
Attending: INTERNAL MEDICINE

## 2021-05-27 ENCOUNTER — HOSPITAL ENCOUNTER (OUTPATIENT)
Dept: GENERAL RADIOLOGY | Facility: HOSPITAL | Age: 31
Discharge: HOME | End: 2021-05-27
Attending: FAMILY MEDICINE

## 2021-05-28 VITALS
RESPIRATION RATE: 14 BRPM | OXYGEN SATURATION: 100 % | SYSTOLIC BLOOD PRESSURE: 110 MMHG | WEIGHT: 139.33 LBS | HEIGHT: 56 IN | DIASTOLIC BLOOD PRESSURE: 56 MMHG | TEMPERATURE: 97.6 F | HEART RATE: 98 BPM | BODY MASS INDEX: 31.34 KG/M2

## 2021-05-28 VITALS
TEMPERATURE: 98 F | WEIGHT: 147.27 LBS | SYSTOLIC BLOOD PRESSURE: 126 MMHG | HEART RATE: 93 BPM | HEIGHT: 56 IN | DIASTOLIC BLOOD PRESSURE: 68 MMHG | SYSTOLIC BLOOD PRESSURE: 123 MMHG | BODY MASS INDEX: 33.04 KG/M2 | DIASTOLIC BLOOD PRESSURE: 65 MMHG | WEIGHT: 145.5 LBS | HEART RATE: 81 BPM | HEART RATE: 75 BPM | TEMPERATURE: 97.7 F | SYSTOLIC BLOOD PRESSURE: 122 MMHG | OXYGEN SATURATION: 99 % | BODY MASS INDEX: 31.49 KG/M2 | RESPIRATION RATE: 16 BRPM | BODY MASS INDEX: 32.73 KG/M2 | OXYGEN SATURATION: 98 % | WEIGHT: 145.5 LBS | TEMPERATURE: 98.1 F | OXYGEN SATURATION: 98 % | RESPIRATION RATE: 20 BRPM | RESPIRATION RATE: 18 BRPM | DIASTOLIC BLOOD PRESSURE: 69 MMHG

## 2021-05-28 VITALS
RESPIRATION RATE: 16 BRPM | WEIGHT: 147.71 LBS | DIASTOLIC BLOOD PRESSURE: 64 MMHG | HEART RATE: 90 BPM | DIASTOLIC BLOOD PRESSURE: 70 MMHG | HEART RATE: 88 BPM | TEMPERATURE: 97.2 F | BODY MASS INDEX: 34.33 KG/M2 | SYSTOLIC BLOOD PRESSURE: 130 MMHG | RESPIRATION RATE: 18 BRPM | WEIGHT: 143.3 LBS | OXYGEN SATURATION: 99 % | SYSTOLIC BLOOD PRESSURE: 120 MMHG | OXYGEN SATURATION: 100 % | TEMPERATURE: 98.6 F | BODY MASS INDEX: 33.31 KG/M2

## 2021-05-28 VITALS
OXYGEN SATURATION: 96 % | RESPIRATION RATE: 18 BRPM | HEART RATE: 95 BPM | BODY MASS INDEX: 33.41 KG/M2 | SYSTOLIC BLOOD PRESSURE: 126 MMHG | WEIGHT: 143.74 LBS | DIASTOLIC BLOOD PRESSURE: 68 MMHG | TEMPERATURE: 97.4 F

## 2021-05-28 NOTE — PROGRESS NOTES
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  She is with her mom.  Her mom did     most of the talking.  She was concerned about the patient's renal function given    her history of kidney dysfunction requiring dialysis for some time.  She has not    had any recent infections.  Quantitative immunoglobulins show normal IgG levels     and normal IgM levels.  She has low IgA around 25-27 (normal range ).            I asked if the patient had ever been seen by an immunologist in the past 5     years.  She has not.  I offered to refer the patient to an immunologist who may     be able to set the patient up with home health infusions.  The patient's mother     said she does not want home health coming into her home.            She has been newly diagnosed with hypothyroidism with a TSH greater than 5.  Her    primary care provider started her on levothyroxine.            Clinical Trial Participant      No            ECOG Performance Status      1            Most Recent Lab Findings      Laboratory Tests      10/23/20 10:33            10/23/20 10:35            PAST, FAMILY   Past Medical History       Past Medical History:  Kidney Disease, Thyroid Disease      Other PMH:        Kabuki Syndrome      Other Hematology History:        COMMON VARIABLE IMMUNODEFICIENCY            Past Surgical History      Appendectomy, VAD Placement            Family History      Family History:  No Family History            Social History      Lives independently:  No      Number of Children:  0      Occupation:  disabled            Tobacco Use      Tobacco status:  Never smoker            Alcohol Use      Alcohol intake:  None            Substance Use      Substance use:  Denies use            REVIEW OF SYSTEMS      General:  Denies: Appetite Change, Fatigue, Fever, Night Sweats, Weight Gain,     Weight Loss      Eye:  Denies Blurred Vision, Denies Corrective Lenses, Denies Diplopia, Denies     Vision Changes      ENT:  Denies Headache, Denies Hearing Loss, Denies Hoarseness, Denies Sore     Throat      Cardiovascular:  Denies Chest Pain, Denies Palpitations      Respiratory:  Denies: Cough, Coughing Blood, Productive Cough, Shortness of Air,    Wheezing      Gastrointestinal:  Denies Bloody Stools, Denies Constipation, Denies Diarrhea,     Denies Nausea/Vomiting, Denies Problem Swallowing, Denies Unable to Control     Bowels      Genitourinary:  Denies Blood in Urine, Denies Incontinence, Denies Painful     Urination      Musculoskeletal:  Denies Back Pain, Denies Muscle Pain, Denies Painful Joints      Integumentary:  Denies Itching, Denies Lesions, Denies Rash      Neurologic:  Denies Dizziness, Denies Numbness\Tingling, Denies Seizures      Psychiatric:  Denies Anxiety, Denies Depression      Endocrine:  Denies Cold Intolerance, Denies Heat Intolerance      Hematologic/Lymphatic:  Denies Bruising, Denies Bleeding, Denies Enlarged Lymph     Nodes      Reproductive:  Denies: Menopause, Heavy Periods, Pregnant, Still Menstruating            VITAL SIGNS AND SCORES      Vitals      Weight 147 lbs 11.331 oz / 67.0 kg       Temperature 98.6 F / 37 C - Temporal      Pulse 90      Respirations 18      Blood Pressure 120/64 Sitting      Pulse Oximetry 99%, ROOM AIR            Pain Score      Experiencing any pain?:  No      Pain Scale Used:  Numerical      Pain Intensity:  0            Fatigue Score      Experiencing any fatigue?:  No      Fatigue (0-10 scale):  0 (none)            EXAM      General: Alert, cooperative, no acute distress      HEENT: Oropharynx clear, no exudates, notable facial anomalies      Respiratory: CTAB, normal respiratory effort      Cardiovascular: RRR, no murmur, no peripheral edema      Skin: Normal tone, no rash, no lesions      Psychiatric: Difficult to assess as the patient did little talking herself,     appears calm      Neurologic: normal motor movement, no weakness, numbness, dizziness      Musculoskeletal: Normal muscle strength, normal muscle tone      Extremities: No clubbing, cyanosis, or deformities            PREVENTION      Date Influenza Vaccine Given:  Oct 1, 2019      Influenza Vaccine Declined:  No      2 or More Falls in Past Year?:  No      Fall Past Year with Injury?:  No      Encouraged to follow-up with:  PCP regarding preventative exams.      Chart initiated by      ORLY BUSTILLO [facility]            ALLERGY/MEDS      Allergies      Coded Allergies:             BACITRACIN (Verified  Allergy, Unknown, RASH, 10/27/20)           NEOMYCIN (Verified  Allergy, Unknown, RASH, 10/27/20)           POLYMYXIN B (Verified  Allergy, Unknown, RASH, 10/27/20)           CEFUROXIME (Verified  Adverse Reaction, Unknown, throws up, and sleeps all     the time, 10/27/20)           CITRUS AND DERIVATIVES (Verified  Adverse Reaction, Unknown, SEVERE     DIARRHEA, 10/27/20)           MILK (Verified  Adverse Reaction, Unknown, diarrhea, passes blood,     10/27/20)      Uncoded Allergies:             rice (Allergy, Mild, nausea, 5/29/18)            Medications      Last Reconciled on 2/18/20 11:26 by JAIRO CARRILLO  JIN      Levothyroxine (Levothyroxine) 0.05 Mg Tablet      0.05 MG PO QDAY@07, #30 TAB 0 Refills         Reported         10/27/20       Noreth A/Etinyl Estradiol/Fe Fum (Junel Fe 1/20) 1 Each Tablet      1 TAB PO QDAY, TAB         Reported         10/27/20       Lidocaine/Prilocaine (Emla) 30 Gm Cream..g.      1 APL TOPICAL ONCE for 7 Days, #2 GM 3 Refills         Prov: JAIRO ABDI onc         2/18/20       diphenhydrAMINE HCl (BENADRYL) 25 Mg Capsule      25 MG PO HS PRN for CONGESTION, #100 TAB         Reported         9/24/19       Cranberry Extract (Cranberry) 200 Mg Cap      200 MG PO HS for 30 Days, #30 CAP         Reported         4/12/19       Furosemide (Furosemide) 40 Mg Tablet      40 MG PO QDAY for 30 Days, #30 TAB         Prov: Roque Aguirre         1/18/19       Montelukast Sodium (Montelukast*) 10 Mg Tablet      10 MG PO QDAY         Reported         1/7/19       raNITIdine HCL (raNITIdine HCL) 150 Mg Tablet      150 MG PO QDAY         Reported         1/7/19       Levocetirizine (Levocetirizine) 5 Mg Tablet      5 MG PO QDAY         Reported         1/7/19       Atenolol (ATENOLOL) 25 Mg Tablet      25 MG PO QDAY, #30 TAB 0 Refills         Reported         2/15/15      Medications Reviewed:  No Changes made to meds            IMPRESSION/PLAN      Plan      Common variable immunodeficiency: Diagnosed in 2012.  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 Patient has been getting IVIG Every 6 wee    ks.  She is not currently monitored by an immunologist.  I discussed a referral     to an immunologist but the patient's mother is not interested at this time.  We     will discuss again at the next appointment in 6 weeks.  My goal is for the     patient be primarily cared for by an immunologist but come to me only for IVIG     infusions with clinic appointments every 6 months.            Hypothyroidism: Patient recently had a TSH greater than 5.  Her primary care      provider started on levothyroxine.            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            Patient Education      Patient Education Provided:  Yes            Electronically signed by FATIMAH MONTANA  11/29/2020 20:14       Disclaimer: Converted document may not contain table formatting or lab diagrams. Please see [employer] [facility] for the authenticated document.

## 2021-05-28 NOTE — PROGRESS NOTES
Patient: KAMERON FELTON     Acct: [id number]     Report: #[id number]  UNIT #: [id number]     : 1990    Encounter Date:2020  PRIMARY CARE: SCOTTY GONZALES  ***Signed***  --------------------------------------------------------------------------------------------------------------------  NURSE INTAKE      Visit Type      Established Patient Visit            Chief Complaint      COMMON VARIABLE IMMUNODEFICIENCY            Referring Provider/Copies To      Referring Provider:  SCOTTY GONZALES      Primary Care Provider:  SCOTTY GONZALES      Copies To:   SCOTTY GONZALES            History and Present Illness      Past History      1) 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       7) IVIG: Cr 0.94. (19).      8) IVIG every 6 weeks. 19.       9) Treatment held due to increasing creatinine (19)      10) IVIG resumed. Creatine returned to normal. (20)      11) IVIG given q 6 weeks.  normal kidney function. (20)      12) Ongoing IVIG q 6 weeks (3/31/20)      13) Ongoing IVIG and surveillance (20)      14) Stable IgG trough; ongoing q 6 weeks IVIG (20)      15) IVIG and surveillance (20)            HPI - Hematology Interim      Ms. Felton comes in for f/u regarding several chronic issues:            1) CVID: Ms. Felton comes in for follow-up.  In the interim, she reports that     she is doing well.  She denies fevers or chills or sweats.  She denies nausea or    vomiting.  She does report significant allergies.  She reports that she receives     intermittent allergy shots to address this.  Her symptoms are of mild severity.     No other modifying factors or associated signs or symptoms.  She otherwise     denies fevers or chills or sweats.  She denies dysuria or diarrhea.            2) Leukocytosis: Ms. Preciado denies fevers or chills or sweats.  She denies cough    or dysuria.            3) Anemia: Ms. Preciado denies melena or hematochezia or other bleeding symptoms.            Most Recent Lab Findings      Laboratory Tests      7/31/20 10:46            7/31/20 10:54            PAST, FAMILY   Past Medical History      Past Medical History:  Kidney Disease      Other Hematology History:        COMMON VARIABLE IMMUNODEFICIENCY            Past Surgical History      Appendectomy, VAD Placement (and removal)            Family History      Family History:  No Family History            Social History      Marital Status:  Single      Lives independently:  No      Number of Children:  0      Occupation:  disabled            Tobacco Use      Tobacco status:  Never smoker            Alcohol Use      Alcohol intake:  0-2 drinks per day            Substance Use      Substance use:  Denies use            REVIEW OF SYSTEMS      General:  Denies: Appetite Change, Fatigue, Fever, Night Sweats, Weight Gain,     Weight Loss      Eye:  Denies Blurred Vision, Denies Corrective Lenses, Denies Diplopia, Denies     Vision Changes      ENT:  Denies Headache, Denies Hearing Loss, Denies Hoarseness, Denies Sore     Throat      Cardiovascular:  Denies Chest Pain, Denies Palpitations      Respiratory:  Denies: Cough, Coughing Blood, Productive Cough, Shortness of Air,    Wheezing      Gastrointestinal:  Denies Bloody Stools, Denies Constipation, Denies Diarrhea,     Denies Nausea/Vomiting, Denies Problem Swallowing, Denies Unable to Control     Bowels      Genitourinary:  Denies Blood in Urine, Denies Incontinence, Denies Painful     Urination      Musculoskeletal:  Denies Back Pain,  Denies Muscle Pain, Denies Painful Joints      Integumentary:  Denies Itching, Denies Lesions, Denies Rash      Neurologic:  Denies Dizziness, Denies Numbness\Tingling, Denies Seizures      Psychiatric:  Denies Anxiety, Denies Depression      Endocrine:  Denies Cold Intolerance, Denies Heat Intolerance      Hematologic/Lymphatic:  Denies Bruising, Denies Bleeding, Denies Enlarged Lymph     Nodes      Reproductive:  Denies: Menopause, Heavy Periods, Pregnant, Still Menstruating            VITAL SIGNS AND SCORES      Vitals      Weight 147 lbs 4.276 oz / 66.8 kg      Temperature 98 F / 36.67 C - Temporal      Pulse 93      Respirations 20      Blood Pressure 123/65 Sitting      Pulse Oximetry 99%, ROOM AIR            Pain Score      Experiencing any pain?:  No            Fatigue Score      Experiencing any fatigue?:  Yes      Fatigue (0-10 scale):  2            EXAM      General Appearance:  Positive for: Alert, Oriented x3      Eye:  Positive for: Anicteric Sclerae, PERRLA      HEENT:  Negative for: Scleral Icterus, Thrush      Neck:  Positive for: Supple      Respiratory:  Negative for: Rales, Rhochi      Abdomen/Gastro:  Positive for: Soft;          Negative for: Hepatosplenomegaly      Cardiovascular:  Positive for: RRR;          Negative for: Rub      Skin:  Negative for: Induration, Lesions      Psychiatric:  Positive for: AAO X 3, Appropriate Affect      Lower Extremities:  Negative for: Edema            PREVENTION      Hx Influenza Vaccination:  Yes      Date Influenza Vaccine Given:  Oct 1, 2019      Influenza Vaccine Declined:  No      Hx Pneumococcal Vaccination:  No      Encouraged to follow-up with:  PCP regarding preventative exams.            ALLERGY/MEDS      Allergies      Coded Allergies:             BACITRACIN (Verified  Allergy, Unknown, RASH, 8/4/20)           NEOMYCIN (Verified  Allergy, Unknown, RASH, 8/4/20)           POLYMYXIN B (Verified  Allergy, Unknown, RASH, 8/4/20)           CEFUROXIME  (Verified  Adverse Reaction, Unknown, throws up, and sleeps all     the time, 8/4/20)           CITRUS AND DERIVATIVES (Verified  Adverse Reaction, Unknown, SEVERE     DIARRHEA, 8/4/20)           MILK (Verified  Adverse Reaction, Unknown, diarrhea, passes blood, 8/4/20)      Uncoded Allergies:             rice (Allergy, Mild, nausea, 5/29/18)            Medications      Last Reconciled on 2/18/20 11:26 by JAIRO ABDI      Lidocaine/Prilocaine (Emla) 30 Gm Cream..g.      1 APL TOPICAL ONCE for 7 Days, #2 GM 3 Refills         Prov: JAIRO ABDI onc         2/18/20       diphenhydrAMINE HCl (BENADRYL) 25 Mg Capsule      25 MG PO HS PRN for CONGESTION, #100 TAB         Reported         9/24/19       Cranberry Extract (Cranberry) 200 Mg Cap      200 MG PO HS for 30 Days, #30 CAP         Reported         4/12/19       Furosemide (Furosemide) 40 Mg Tablet      40 MG PO QDAY for 30 Days, #30 TAB         Prov: Roque Aguirre         1/18/19       Montelukast Sodium (Montelukast*) 10 Mg Tablet      10 MG PO QDAY         Reported         1/7/19       raNITIdine HCL (raNITIdine HCL) 150 Mg Tablet      150 MG PO QDAY         Reported         1/7/19       Levocetirizine (Levocetirizine) 5 Mg Tablet      5 MG PO QDAY         Reported         1/7/19       Atenolol (ATENOLOL) 25 Mg Tablet      25 MG PO QDAY, #30 TAB 0 Refills         Reported         2/15/15      Medications Reviewed:  No Changes made to meds            IMPRESSION/PLAN      Impression      1) CVID: Ms. Preciado is a 30-year-old female with a history of combined variable     immunodeficiency.  She is tolerating IVIG treatment well without significant     side effects.  We will proceed with treatment today.  She will return for     treatment in 6 weeks.  She will then return for follow-up and treatment in 12     weeks.  She will call with any new or worsening symptoms in the interim.            2) Leukocytosis: This is clinically stable.  We will continue  to follow this.      Further evaluation will be contingent upon progression of the finding or     associated symptoms.  It appears to be reactive in nature at this time.            3) Anemia: This is stable.  We will follow this.            Diagnosis      Common variable immunodeficiency - D83.9            Leukocytosis         Leukocytosis, unspecified type - D72.829         Leukocytosis type: unspecified            Anemia         Anemia in other chronic diseases classified elsewhere - D63.8         Other causes of anemia: chronic disease, other            Plan      1) Proceed with IVIG today            2) Treatment in 6 weeks with cbc/cmp/IgG prior            3) RTC 12 weeks with cbc/cmp/IgG prior            Patient Education      Patient Education Provided:  Yes            Electronically signed by ODALIS MARTINS  08/04/2020 10:15       Disclaimer: Converted document may not contain table formatting or lab diagrams. Please see [facility] for the authenticated document.

## 2021-05-28 NOTE — PROGRESS NOTES
Patient: KAMERON FELTON     Acct: [id number]     Report: #[id number]  UNIT #: [id number]     : 1990    Encounter Date:2020  PRIMARY CARE: SCOTTY GONZALES  ***Signed***  --------------------------------------------------------------------------------------------------------------------  NURSE INTAKE      Visit Type      Established Patient Visit            Chief Complaint      common variable immunodeficiency            Referring Provider/Copies To      Referring Provider:  SCOTTY GONZALES      Primary Care Provider:  SCOTTY GONZALES      Copies To:   SCOTTY GONZALES            History and Present Illness      Past History      1) 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       7) IVIG: Cr 0.94. (19).      8) IVIG every 6 weeks. 19.       9) Treatment held due to increasing creatinine (19)      10) IVIG resumed. Creatine returned to normal. (20)      11) IVIG given q 6 weeks.  normal kidney function. (20)      12) Ongoing IVIG q 6 weeks (3/31/20)      13) Ongoing IVIG and surveillance (20)      14) Stable IgG trough; ongoing q 6 weeks IVIG (20)            HPI - Hematology Interim      Ms. Felton comes in for f/u regarding several chronic issues:            1) CVID: Ms. Felton comes in for follow-up.  In the interim, she reports that     she is tolerating IVIG treatment well.  She denies significant side effects.      She denies fevers or chills or sweats.  She denies cough or dysuria or any     infectious symptoms in the interim.            2)  Leukocytosis: Ms. Preciado reports that she continues to receive allergy shots     intermittently.  She denies fevers or chills or sweats.  She denies cough or     dysuria.            3) Anemia: Ms. Preciado denies melena or hematochezia or other bleeding symptoms.            Most Recent Lab Findings      Laboratory Tests      6/22/20 10:13            6/22/20 10:51            PAST, FAMILY   Past Medical History      Past Medical History:  Kidney Disease      Other Hematology History:        COMMON VARIABLE IMMUNODEFICIENCY            Past Surgical History      Appendectomy, VAD Placement (and removal)            Family History      Family History:  No Family History            Social History      Marital Status:  Single      Lives independently:  No      Number of Children:  0      Occupation:  disabled            Tobacco Use      Tobacco status:  Never smoker            Alcohol Use      Alcohol intake:  0-2 drinks per day            Substance Use      Substance use:  Denies use            REVIEW OF SYSTEMS      General:  Denies: Appetite Change, Fatigue, Fever, Night Sweats, Weight Gain,     Weight Loss      Eye:  Denies Blurred Vision, Denies Corrective Lenses, Denies Diplopia, Denies     Vision Changes      ENT:  Denies Headache, Denies Hearing Loss, Denies Hoarseness, Denies Sore     Throat      Cardiovascular:  Denies Chest Pain, Denies Palpitations      Respiratory:  Denies: Cough, Coughing Blood, Productive Cough, Shortness of Air,    Wheezing      Gastrointestinal:  Denies Bloody Stools, Denies Constipation, Denies Diarrhea,     Denies Nausea/Vomiting, Denies Problem Swallowing, Denies Unable to Control Lamine    wels      Genitourinary:  Denies Blood in Urine, Denies Incontinence, Denies Painful     Urination      Musculoskeletal:  Denies Back Pain, Denies Muscle Pain, Denies Painful Joints      Integumentary:  Denies Itching, Denies Lesions, Denies Rash      Neurologic:  Denies Dizziness, Denies Numbness\Tingling,  Denies Seizures      Psychiatric:  Denies Anxiety, Denies Depression      Endocrine:  Denies Cold Intolerance, Denies Heat Intolerance      Hematologic/Lymphatic:  Denies Bruising, Denies Bleeding, Denies Enlarged Lymph     Nodes      Reproductive:  Denies: Menopause, Heavy Periods, Pregnant, Still Menstruating            VITAL SIGNS AND SCORES      Vitals      Height 4 ft 7.98 in / 142.2 cm      Weight 145 lbs 8.058 oz / 66.0 kg      BSA 1.55 m2      BMI 32.6 kg/m2      Temperature 97.7 F / 36.5 C - Temporal      Pulse 75      Respirations 16      Blood Pressure 126/68 Sitting      Pulse Oximetry 98%, ROOM AIR            Pain Score      Experiencing any pain?:  No      Pain Scale Used:  Numerical      Pain Intensity:  0            Fatigue Score      Experiencing any fatigue?:  No      Fatigue (0-10 scale):  0 (none)            EXAM      General Appearance:  Positive for: Alert      Eye:  Positive for: PERRLA      HEENT:  Negative for: Scleral Icterus, Thrush      Neck:  Positive for: Supple      Respiratory:  Negative for: Rales, Rhochi      Abdomen/Gastro:  Positive for: Soft      Skin:  Negative for: Induration, Lesions      Psychiatric:  Positive for: Appropriate Affect      Lower Extremities:  Negative for: Edema            PREVENTION      Hx Influenza Vaccination:  Yes      Date Influenza Vaccine Given:  Oct 1, 2019      Influenza Vaccine Declined:  No      2 or More Falls Past Year?:  No      Fall Past Year with Injury?:  No      Hx Pneumococcal Vaccination:  No      Encouraged to follow-up with:  PCP regarding preventative exams.      Chart initiated by      AMANDA ETIENNE CMA            ALLERGY/MEDS      Allergies      Coded Allergies:             BACITRACIN (Verified  Allergy, Unknown, RASH, 6/23/20)           NEOMYCIN (Verified  Allergy, Unknown, RASH, 6/23/20)           POLYMYXIN B (Verified  Allergy, Unknown, RASH, 6/23/20)           CEFUROXIME (Verified  Adverse Reaction, Unknown, throws up, and sleeps  all     the time, 6/23/20)           CITRUS AND DERIVATIVES (Verified  Adverse Reaction, Unknown, SEVERE     DIARRHEA, 6/23/20)           MILK (Verified  Adverse Reaction, Unknown, diarrhea, passes blood, 6/23/20)      Uncoded Allergies:             rice (Allergy, Mild, nausea, 5/29/18)            Medications      Last Reconciled on 2/18/20 11:26 by JAIRO ABDI      Lidocaine/Prilocaine (Emla) 30 Gm Cream..g.      1 APL TOPICAL ONCE for 7 Days, #2 GM 3 Refills         Prov: JAIRO ABDI onc         2/18/20       diphenhydrAMINE HCl (BENADRYL) 25 Mg Capsule      25 MG PO HS PRN for CONGESTION, #100 TAB         Reported         9/24/19       Cranberry Extract (Cranberry) 200 Mg Cap      200 MG PO HS for 30 Days, #30 CAP         Reported         4/12/19       Furosemide (Furosemide) 40 Mg Tablet      40 MG PO QDAY for 30 Days, #30 TAB         Prov: Roque Aguirre         1/18/19       Montelukast Sodium (Montelukast*) 10 Mg Tablet      10 MG PO QDAY         Reported         1/7/19       raNITIdine HCL (raNITIdine HCL) 150 Mg Tablet      150 MG PO QDAY         Reported         1/7/19       Levocetirizine (Levocetirizine) 5 Mg Tablet      5 MG PO QDAY         Reported         1/7/19       Atenolol (ATENOLOL) 25 Mg Tablet      25 MG PO QDAY, #30 TAB 0 Refills         Reported         2/15/15      Medications Reviewed:  No Changes made to meds            IMPRESSION/PLAN      Impression      1) CVID: Ms. Preciado is a 29-year-old female with a history of combined variable     immunodeficiency.  Thus far, she is tolerating IVIG treatment well.  She denies     infections or complications in the interim.  Her trough IgG level remains     adequate.  She will continue treatment on an every 6-week basis.  She will     return in 6 weeks for her next treatment with blood work prior.  She will then     return in 12 weeks for follow-up.  She will call with any new or worsening     symptoms in the interim.            2)  Leukocytosis: This is stable.  We will continue surveillance for this.            3) Anemia: This is improved.  We will continue to follow this.            Diagnosis      Common variable immunodeficiency - D83.9            Leukocytosis         Leukocytosis, unspecified type - D72.829         Leukocytosis type: unspecified            Anemia         Anemia in other chronic diseases classified elsewhere - D63.8         Other causes of anemia: chronic disease, other            Plan      1) Proceed with IVIG today            2) Treatment in 6 weeks with cbc/bmp prior            3) RTC 12 weeks with cbc/cmp/IgG level 1-2 days prior            Patient Education      Patient Education Provided:  Yes            Electronically signed by ODALIS MARTINS  06/23/2020 10:07       Disclaimer: Converted document may not contain table formatting or lab diagrams. Please see [facility] for the authenticated document.

## 2021-05-28 NOTE — PROGRESS NOTES
Patient: FRANCINE FELTON     Acct: [id number]     Report: #[id number]  UNIT #: [id number]     : 1990    Encounter Date:2021  PRIMARY CARE: SCOTTY GONZALES  ***Signed***  --------------------------------------------------------------------------------------------------------------------  NURSE INTAKE      Visit Type      Established Patient Visit            Chief Complaint      COMMON VARIABLE IMMUNODEFICIENCY            History and Present Illness      Past History      CVID: Diagnosed by Immunologist 12; presented with frequent/severe     infections             Treatment History:      - on IVIG      - Course complicated by ARF; was on dialysis (2019)      - Treatment held due to increasing creatinine (19)      - ongoing q 6 weeks IVIG            Past Hx      1) CVID: Diagnosed by Immunologist 12; presented with frequent/severe     infections             Treatment History:      - on IVIG      - Course complicated by ARF; was on dialysis (2019)      - Treatment held due to increasing creatinine (19)      - ongoing q 6 weeks IVIG      -IVIG. tolerating well. (21)            HPI - Hematology Interim      1) CVID: Diagnosed by Immunologist 12; presented with frequent/severe     infections.             Ms. Francine Felton presents with her mother for IVIG infusion for CVID.             She is receiving every 6 weeks. Tolerating treatment well. Denies any     intolerable side effects associated with treatment. She last saw Dr. Montes De Oca in     2020. At this visit, it was discussed to see immunology for follow     up and just have hematology give IVIG infusions. It was also discussed having     IVIG infusions at home.             Mother does not wish to have IVIG infusions at home.             Labs today are acceptable for treatment.            Most Recent Lab Findings      Laboratory Tests      1/15/21 09:49            1/15/21 09:56            PAST, FAMILY   Past  Medical History      Past Medical History:  Kidney Disease, Thyroid Disease      Other PMH:        Kabuki Syndrome      Other Hematology History:        COMMON VARIABLE IMMUNODEFICIENCY            Past Surgical History      Appendectomy, VAD Placement            Family History      Family History:  No Family History            Social History      Lives independently:  No      Number of Children:  0      Occupation:  disabled            Tobacco Use      Tobacco status:  Never smoker            Alcohol Use      Alcohol intake:  None            Substance Use      Substance use:  Denies use            REVIEW OF SYSTEMS      General:  Admits: Fatigue;          Denies: Appetite Change, Fever, Night Sweats, Weight Gain, Weight Loss      Eye:  Denies Blurred Vision, Denies Corrective Lenses, Denies Diplopia, Denies     Vision Changes      ENT:  Denies Headache, Denies Hearing Loss, Denies Hoarseness, Denies Sore     Throat      Cardiovascular:  Denies Chest Pain, Denies Palpitations      Respiratory:  Denies: Cough, Coughing Blood, Productive Cough, Shortness of Air,    Wheezing      Gastrointestinal:  Denies Bloody Stools, Denies Constipation, Denies Diarrhea,     Denies Nausea/Vomiting, Denies Problem Swallowing, Denies Unable to Control     Bowels      Genitourinary:  Denies Blood in Urine, Denies Incontinence, Denies Painful     Urination      Musculoskeletal:  Denies Back Pain, Denies Muscle Pain, Denies Painful Joints      Integumentary:  Denies Itching, Denies Lesions, Denies Rash      Neurologic:  Denies Dizziness, Denies Numbness\Tingling, Denies Seizures      Psychiatric:  Denies Anxiety, Denies Depression      Endocrine:  Denies Cold Intolerance, Denies Heat Intolerance      Hematologic/Lymphatic:  Denies Bruising, Denies Bleeding, Denies Enlarged Lymph     Nodes      Reproductive:  Denies: Menopause, Heavy Periods, Pregnant, Still Menstruating            VITAL SIGNS AND SCORES      Vitals      Weight 143 lbs  11.839 oz / 65.2 kg      Temperature 97.4 F / 36.33 C - Temporal      Pulse 95      Respirations 18      Blood Pressure 126/68 Sitting      Pulse Oximetry 96%, RM AIR            Pain Score      Experiencing any pain?:  No      Pain Scale Used:  Numerical      Pain Intensity:  0            Fatigue Score      Experiencing any fatigue?:  Yes      Fatigue (0-10 scale):  2            EXAM      General appearance:  in no apparent distress, cooperative, appears stated age.      HEENT: No pallor, no icterus, oral mucosa moist      Neck: Supple, trachea central-not deviated      Lymph nodes: none palpable peripherally      Cardiovascular: S1-S2 heard, no murmurs, no rubs, no gallops.      Breast exam:      Respiratory: Clear to auscultation bilaterally, no adventitious sounds      Abdomen/gastro: Soft, nontender, no palpable hepatosplenomegaly, bowel sounds     heard      Skin: No lesions, no rashes, no petechiae.      Extremities: No pedal edema, peripheral pulses felt, no clubbing      Musculoskeletal: Normal tone, no rigidity of muscles; range of motion intact      Spine: No deformities      Psychiatric: Alert and oriented x3, appropriate affect, intact judgment      Neurologic: Cranial nerves II through XII grossly intact, no gross neurological     deficits noted.            PREVENTION      Date Influenza Vaccine Given:  Oct 1, 2019      Influenza Vaccine Declined:  No      2 or More Falls in Past Year?:  No      Fall Past Year with Injury?:  No      Encouraged to follow-up with:  PCP regarding preventative exams.      Chart initiated by      CHRISTIANO OLIVER MA            ALLERGY/MEDS      Allergies      Coded Allergies:             BACITRACIN (Verified  Allergy, Unknown, RASH, 1/19/21)           NEOMYCIN (Verified  Allergy, Unknown, RASH, 1/19/21)           POLYMYXIN B (Verified  Allergy, Unknown, RASH, 1/19/21)           CEFUROXIME (Verified  Adverse Reaction, Unknown, throws up, and sleeps all     the time, 1/19/21)            CITRUS AND DERIVATIVES (Verified  Adverse Reaction, Unknown, SEVERE     DIARRHEA, 1/19/21)           MILK (Verified  Adverse Reaction, Unknown, diarrhea, passes blood, 1/19/21)      Uncoded Allergies:             rice (Allergy, Mild, nausea, 5/29/18)            Medications      Last Reconciled on 1/19/21 10:56 by JAIRO ABDI      Fenofibrate (Fenofibrate*) 160 Mg Tablet      160 MG PO QDAY, TAB         Reported         1/19/21       Levothyroxine (Levothyroxine) 0.05 Mg Tablet      0.05 MG PO QDAY@07, #30 TAB 0 Refills         Reported         10/27/20       Noreth A/Etinyl Estradiol/Fe Fum (Junel Fe 1/20) 1 Each Tablet      1 TAB PO QDAY, TAB         Reported         10/27/20       Lidocaine/Prilocaine (Emla) 30 Gm Cream..g.      1 APL TOPICAL ONCE for 7 Days, #2 GM 3 Refills         Prov: JAIRO ABDI onc         2/18/20       diphenhydrAMINE HCl (BENADRYL) 25 Mg Capsule      25 MG PO HS PRN for CONGESTION, #100 TAB         Reported         9/24/19       Cranberry Extract (Cranberry) 200 Mg Cap      200 MG PO HS for 30 Days, #30 CAP         Reported         4/12/19       Furosemide (Furosemide) 40 Mg Tablet      40 MG PO QDAY for 30 Days, #30 TAB         Prov: Roque Aguirre         1/18/19       Montelukast Sodium (Montelukast*) 10 Mg Tablet      10 MG PO QDAY         Reported         1/7/19       raNITIdine HCL (raNITIdine HCL) 150 Mg Tablet      150 MG PO QDAY         Reported         1/7/19       Levocetirizine (Levocetirizine) 5 Mg Tablet      5 MG PO QDAY         Reported         1/7/19       Atenolol (ATENOLOL) 25 Mg Tablet      25 MG PO QDAY, #30 TAB 0 Refills         Reported         2/15/15      Medications Reviewed:  No Changes made to meds            IMPRESSION/PLAN      Impression      1) CVID: Diagnosed by Immunologist 8/20/12; presented with frequent/severe     infections.             Ms. Francine Preciado presents with her mother for IVIG infusion for CVID. Francine reports     she is doing  well in the interim and denies any fevers, chills or recent     infections.             She is receiving every 6 weeks. Tolerating treatment well. Denies any     intolerable side effects associated with treatment. She last saw Dr. Montes De Oca in     November of 2020. At this visit, it was discussed to see immunology for follow     up and just have hematology give IVIG infusions. It was also discussed having I    VIG infusions at home.             Mother does not wish to have IVIG infusions at home.             Labs today are acceptable for treatment.             Ms. Preciado reports she is following with allergy for CVID, ENT, and cardiology     and PCP for oral contraceptives.            Plan      1) labs reviewed. OK for IVIG today.             She will receive every 6 weeks and see provider visit every 12 weeks.             Follow with all specialty consults as instructed.             Return in 6 weeks for IVIG infusions and 12 weeks for IVIG infusion and provider    visit.             Call with any questions or concerns.            Pain      Pain Zero Today            Advanced Care Plan Discussion      Declines Discussion [id number]            Patient Education            Immunoglobulin Therapy      Patient Education Provided:  Yes            Electronically signed by JAIRO ABDI onc  01/19/2021 10:57       Disclaimer: Converted document may not contain table formatting or lab diagrams. Please see [facility] for the authenticated document.

## 2021-05-28 NOTE — PROGRESS NOTES
Patient: FRANCINE FELTON     Acct: [id number]     Report: #[id number]  UNIT #: [id number]     : 1990    Encounter Date:2021  PRIMARY CARE: SCOTTY GONZALES  ***Signed***  --------------------------------------------------------------------------------------------------------------------  NURSE INTAKE      Visit Type      Established Patient Visit            Chief Complaint      COMMON VARIABLE IMMUNODEFICIENCY            Referring Provider/Copies To      Referring Provider:  SCOTTY GONZALES      Primary Care Provider:  SCOTTY GONZALES      Copies To:   SCOTTY GONZALES            History and Present Illness      Past Oncology Illness History      CVID: Diagnosed by Immunologist 12; presented with frequent/severe     infections             Treatment History:      - on IVIG      - Course complicated by ARF; was on dialysis (2019)      - Treatment held due to increasing creatinine (19)      - ongoing q 6 weeks IVIG            HPI - Oncology Interim      Patient comes in today for her next dose of IVIG.  She tells me that she has no     new complaints or concerns today.  She did get her Covid vaccine.  On review of     the labs these were all within normal limits with the exception of total     bilirubin which was 1.99.  Asked her mother she had remembered a history of Francine     having hyperbilirubinemia.  She tells me that she has not since she was a     .  The patient denies any abdominal pain or signs of general illness.            Clinical Trial Participant      No            ECOG Performance Status      1            Most Recent Lab Findings      Laboratory Tests      21 10:09            21 10:13            PAST, FAMILY   Past Medical History      Past Medical History:  Kidney Disease, Thyroid Disease      Other PMH:        Kabuki Syndrome      Other Hematology History:        COMMON VARIABLE IMMUNODEFICIENCY            Past Surgical History      Appendectomy, VAD Placement             Family History      Family History:  No Family History            Social History      Lives independently:  No      Number of Children:  0      Occupation:  disabled            Tobacco Use      Tobacco status:  Never smoker            Alcohol Use      Alcohol intake:  None            Substance Use      Substance use:  Denies use            REVIEW OF SYSTEMS      General:  Admits: Fatigue;          Denies: Appetite Change, Fever, Night Sweats, Weight Gain, Weight Loss      Eye:  Denies Blurred Vision, Denies Corrective Lenses, Denies Diplopia, Denies     Vision Changes      ENT:  Denies Headache, Denies Hearing Loss, Denies Hoarseness, Denies Sore     Throat      Cardiovascular:  Denies Chest Pain, Denies Palpitations      Respiratory:  Denies: Cough, Coughing Blood, Productive Cough, Shortness of Air,    Wheezing      Gastrointestinal:  Denies Bloody Stools, Denies Constipation, Denies Diarrhea,     Denies Nausea/Vomiting, Denies Problem Swallowing, Denies Unable to Control     Bowels      Genitourinary:  Denies Blood in Urine, Denies Incontinence, Denies Painful     Urination      Musculoskeletal:  Denies Back Pain, Denies Muscle Pain, Denies Painful Joints      Integumentary:  Denies Itching, Denies Lesions, Denies Rash      Neurologic:  Denies Dizziness, Denies Numbness\Tingling, Denies Seizures      Psychiatric:  Denies Anxiety, Denies Depression      Endocrine:  Denies Cold Intolerance, Denies Heat Intolerance      Hematologic/Lymphatic:  Denies Bruising, Denies Bleeding, Denies Enlarged Lymph     Nodes      Reproductive:  Denies: Menopause, Heavy Periods, Pregnant, Still Menstruating            VITAL SIGNS AND SCORES      Vitals      Weight 143 lbs 4.784 oz / 65.0 kg      Temperature 97.2 F / 36.22 C - Temporal      Pulse 88      Respirations 16      Blood Pressure 130/70 Sitting      Pulse Oximetry 100%, RM AIR            Pain Score      Experiencing any pain?:  No      Pain Scale Used:  Numerical       Pain Intensity:  0            Fatigue Score      Experiencing any fatigue?:  Yes      Fatigue (0-10 scale):  1            EXAM      General: Alert, cooperative, no acute distress      Eyes: Anicteric sclera, PERRLA      Respiratory: CTAB, normal respiratory effort      Abdomen: Normal active bowel sounds, no tenderness, no distention      Cardiovascular: RRR, no murmur, no lower extremity edema      Skin: Normal tone, no rash, no lesions, no jaundice      Psychiatric: Appropriate affect, very pleasant and talkative today      Neurologic: No focal sensory or motor deficits, no weakness, numbness, dizziness      Musculoskeletal: Normal muscle strength and tone      Extremities: No clubbing, cyanosis, or deformities            PREVENTION      Date Influenza Vaccine Given:  Oct 1, 2019      Influenza Vaccine Declined:  No      2 or More Falls in Past Year?:  No      Fall Past Year with Injury?:  No      Encouraged to follow-up with:  PCP regarding preventative exams.      Chart initiated by      PRATIBHA GOODEN CMA            ALLERGY/MEDS      Allergies      Coded Allergies:             BACITRACIN (Verified  Allergy, Unknown, RASH, 4/13/21)           NEOMYCIN (Verified  Allergy, Unknown, RASH, 4/13/21)           POLYMYXIN B (Verified  Allergy, Unknown, RASH, 4/13/21)           CEFUROXIME (Verified  Adverse Reaction, Unknown, throws up, and sleeps all     the time, 4/13/21)           CITRUS AND DERIVATIVES (Verified  Adverse Reaction, Unknown, SEVERE     DIARRHEA, 4/13/21)           MILK (Verified  Adverse Reaction, Unknown, diarrhea, passes blood, 4/13/21)      Uncoded Allergies:             rice (Allergy, Mild, nausea, 5/29/18)            Medications      Last Reconciled on 4/13/21 16:53 by FATIMAH MONTANA      Fenofibrate (Fenofibrate*) 160 Mg Tablet      160 MG PO QDAY, TAB         Reported         1/19/21       Levothyroxine (Levothyroxine) 0.05 Mg Tablet      0.05 MG PO QDAY@07, #30 TAB 0 Refills         Reported          10/27/20       Noreth A/Etinyl Estradiol/Fe Fum (Junel Fe 1/20) 1 Each Tablet      1 TAB PO QDAY, TAB         Reported         10/27/20       Lidocaine/Prilocaine (Emla) 30 Gm Cream..g.      1 APL TOPICAL ONCE for 7 Days, #2 GM 3 Refills         Prov: JAIRO ABDI onc         2/18/20       diphenhydrAMINE HCl (BENADRYL) 25 Mg Capsule      25 MG PO HS PRN for CONGESTION, #100 TAB         Reported         9/24/19       Cranberry Extract (Cranberry) 200 Mg Cap      200 MG PO HS for 30 Days, #30 CAP         Reported         4/12/19       Furosemide (Furosemide) 40 Mg Tablet      40 MG PO QDAY for 30 Days, #30 TAB         Prov: HectorRoque venegas         1/18/19       Montelukast Sodium (Montelukast*) 10 Mg Tablet      10 MG PO QDAY         Reported         1/7/19       raNITIdine HCL (raNITIdine HCL) 150 Mg Tablet      150 MG PO QDAY         Reported         1/7/19       Levocetirizine (Levocetirizine) 5 Mg Tablet      5 MG PO QDAY         Reported         1/7/19       Atenolol (ATENOLOL) 25 Mg Tablet      25 MG PO QDAY, #30 TAB 0 Refills         Reported         2/15/15      Medications Reviewed:  No Changes made to meds            IMPRESSION/PLAN      Plan      Common variable immunodeficiency: Diagnosed in 2012.  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 Patient has been getting IVIG Every 6     weeks.  She is not currently monitored by an immunologist.  I discussed a     referral to an immunologist but the patient's mother is not interested at this     time.             Hypothyroidism: primary care provider started on her levothyroxine.            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            Patient Education      Patient Education Provided:  Yes            Electronically signed by FATIMAH MONTANA  04/13/2021 16:53        Disclaimer: Converted document may not contain table formatting or lab diagrams. Please see [employer] for the authenticated document.

## 2021-05-28 NOTE — H&P
Patient: KAMERON FELTON     Acct: [id number]     Report: #[id number]  UNIT #: [id number]     : 1990    Encounter Date:2018  PRIMARY CARE: SCOTTY GONZALES  ***Signed***  --------------------------------------------------------------------------------------------------------------------  Chief Complaint      2018      IMMUNODEFICIENCY SYNDROME            Vitals      Height 56 in / 142.24 cm      Weight 139 lbs 5.292 oz / 63.2 kg      BSA 1.61 m2      BMI 31.2 kg/m2      Temperature 97.6 F / 36.44 C - Temporal      Pulse 98      Respirations 14      Blood Pressure 110/56      Pulse Oximetry 100%, ROOM AIR            General Information      Referring Provider:  SCOTTY GONZALES      Provider Not Found in Lookup:  ROCIO ETIENNE      [facility] Provider 2:  LUPE JOE            Allergies      Coded Allergies:             BACITRACIN (Verified  Allergy, Unknown, RASH, 17)           NEOMYCIN (Verified  Allergy, Unknown, RASH, 17)           POLYMYXIN B (Verified  Allergy, Unknown, RASH, 17)           CEFUROXIME (Verified  Adverse Reaction, Unknown, throws up, and sleeps all     the time, 17)           CITRUS AND DERIVATIVES (Verified  Adverse Reaction, Unknown, SEVERE     DIARRHEA, 17)           MILK (Verified  Adverse Reaction, Unknown, diarrhea, passes blood, 17    )      Uncoded Allergies:             rice (Allergy, Unknown, nausea, 2/27/15)            Medications      Last Reconciled on 18 14:13 by NO BUSTILLO      Promethazine HCl/DM (Promethazine DM) 473 Ml Syrup      5 ML PO Q4H Y for COUGH, #120 ML 0 Refills         Reported         17       Junior-Fluticasone (Fluticasone 50 mcg) 16 Gm Spray.susp      1 PUFFS NARE EACH QDAY, #1 BOTTLE 0 Refills         Reported         17       Pantoprazole (Protonix*) 40 Mg Tablet      40 MG PO QDAY@07, #30 TAB 0 Refills         Reported         3/30/16       Atenolol (Atenolol*) 25 Mg Tablet      25 MG PO QDAY,  "#30 TAB 0 Refills         Reported         2/15/15       Multivitamins (Multi-Day Vitamin) 1 Tab Tablet      2 TAB PO QDAY, TAB         Reported         4/10/14       Ascorbic Acid (Sahra C*) 500 Mg Tablet      500 MG PO QDAY         Reported         4/10/14       Cetirizine HCl (Cetirizine HCl*) 10 Mg Tablet      10 MG PO QDAY, TAB         Reported         4/10/14      Current Medications      Current Medications Reviewed 1/23/18            Pain and Fatigue Scales      Pain Assessment:           Experiencing any pain?:  No      Fatigue:           Experiencing any fatigue?:  No            Chemo Status      On Oral Chemotherapy?:  No            Other      Clinical Trial Participant?:  No            Past Medical History      Hematology/oncology:  REPORTS HX OF: Other hematologic history (Immunodeficiency    )      Genetic/metabolic:  REPORTS HX OF: Other genetic history (Kabuki)            Past Oncology Illness History      CHIEF COMPLAINT:  COMMON VARIABLE IMMUNODEFICIENCY, KABUKI SYNDROME, HISTORY OF     RECURRENT INFECTIONS.             Ms. Preciado is a very pleasant 27-year-old female with a past medical history as     noted, who per her mom's report has been seeing an outside hematologist for the     last few years, approximately every 4 months. At her initial appointment it was     unclear what the treatment or treatment plan was in regards to her care.  In     any event, she transferred her care to me on 03/12/2014 for the primary issue     of her immunodeficiency syndrome.  At a previous clinic appointment with she     and her mother, her mother reported that she just wanted someone to \"help my     daughter\".  It appeared that he patient was suffering from recurrent infections     including ear infections and urinary tract infections which were causing a     great deal of distress for both the patient and her family.  Her mother denied     any infections requiring hospitalization including pneumonia.           " "  At a previous clinic appointment with her, I recommended that she be evaluated     by an immunologist.  Since that appointment she has seen Dr. Boone, who is an     immunologist in [place], [place], and his testing was conclusive for     common variable immunodeficiency syndrome which explained her recurrent and      frequent infections. Her quantitative immunoglobulins were checked and IgG was     low at 431. We then discussed the risks and benefits of receiving IVIG to     bolster her immune system and reduce the risk of life threatening infection.     She initiated this treatment on 8/6/14 and today returns for scheduled follow-    up and IVIG infusion. Her IgG level has responded  well to this treatment and     she has been afebrile and without any recurrence of  recurrent infection since     this treatment began.             Interim history:       Her IgG has been markedly improved since starting IVIG. Her mother again states     that she has had \"way less\" infections since starting IVIG than years prior.     She has not had a \"major\" infection per her mother since February 2015. She     complains of seasonal allergies but denies any fever. She has had only 2 minor     infections since initiating IVIG. She denies any urinary complaints. She denies     any cough. No rash. No swelling. She presents for her 4-6 week dose of IVIG.     She did have a complaint of heavy menses and was started on birth control for     this issue. This is stable.            She had required a port revision. Port now functioning well. She has been seen     by ENT for inner ear polyps. This workup and treatment is ongoing per her     mother.  She had an URI in December 2016 and ear infection which has been     treated with ear drops.              Here for IVIG.  Is seeing Pediatrician left earache. Resolved.  Did have some     seasonal allergies and bronchitis, no f/c, no weight loss. We have been     monitoring her closely for " any transfusion related issues.            Presented for IVIG for common variable immunodeficiency on 6/27/17.  Recently     had allergy testing.  She is waiting results.  Has not been ill recently.             Presents for IVIG for common variable immunodeficiency on 8/8/17.  Has not been     ill this summer.  Denies use of antibiotics.  No fevers, chills, night sweats.              Presents for IVIG for common variable immunodeficiency on 10/31/17.  Has not     been hospitalized for infection.  Has a sinus infection and is being treated     currently.  She again denies fevers, chills. She has started weekly allergy     shots.            Presents for IVIG for common variable immunodeficiency on 1/23/18.  Recently     had an abscess tooth pulled.  She denies fever, chills.            ROS      General:  Denies: Appetite change, Fatigue, Weight loss      Eyes:  Denies: Blurred vision, Corrective lenses      Ears, nose, mouth, throat:  Denies: Headache, Seizures, Visual Changes      Cardiovascular:  Denies: Chest pain, Irregular heartbeat, Palpitations      Respiratory:  Denies: Shortness of Air, Productive cough, Coughing blood      Gastrointestinal:  Denies: Nausea, Vomiting, Constipation, Diarrhea      Kidney/Bladder:  Denies: Painful Urination, Blood in urine      Musculoskeletal:  Denies: New Back pain, Muscle weakness      Skin:  DENIES: Easy Bleeding, Nail changes, Rash      Neurological:  Denies: Dizziness, Numbness\Tingling      Psychiatric:  Complains of: AAO X 3, Denies: Anxiety, Panic attacks      Hematologic/lymphatic:  Denies: Bruising, Bleeding, Enlarged Lymph Nodes            Vitals            Vital Signs              Date Time  Temp Pulse Resp B/P (MAP) Pulse Ox O2 Delivery O2 Flow Rate FiO2             12/12/17 10:17 97.6 87 18 125/63                General Appearance:  Alert, Oriented X3, No acute distress      Eyes:  Anicteric Sclerae, Moist Conjunctiva      HEENT:  Orophraynx clear, No Erythema       Neck:  Supple, Full ROM      Respiratory:  CTAB, No Crackels      Abdomen\Gastro:  Soft, No Masses      Cardio:  RRR, No Murmur, No, Peripheral Edema      Skin:  Normal Temperature, Normal Tone, No Rash, lesions, ulcers      Psychiatric:  Appropriate Affect, AAO x3      Neuro:  Cranial Nerve II-XII Inta, No Focal Sensory Deficit      Muscularskeletal:  Normal Gait and Station, Full ROM of extremeties      Extremities:  No Digital Cyanosis, No Digital Ischemia      Lymphatic:  No Cervical, No Supraclavicular, No Axillary            Current Plan      Ms. Preciado returns to clinic for continuation of treatment for her common     variable immunodeficiency. She has initiated and continues on treatment with     prophylactic ideal body weight dosed IVIG to decrease the risk of any     significant infection including sepsis and death. For ease of her schedule we     have currently set her dosing at every 4-6 week intervals. I have set a target     of an IgG level of 600-1000. Her last IgG was again at our target goal of above     600. Clinically she has had much less issues with infections since receiving     IVIG as noted. We are monitoring closely for any infusion reactions from IVIG.            She has tolerated these infusions and access of her Port-A-Cath exceedingly     well. In the past I counseled her extensively on the risk of infection and     death given her common variable immunodeficiency syndrome. She and her mother     voiced understanding of this issue. We will proceed with treatment today.     Toxicity evaluation. Labs and infusion treatment reviewed. No adverse events     noted. Will monitor for infusion reaction.            Her port was previously not able to draw but still flushed adequately for use;     this was revised in April 2016 and is now functioning well. I will plan on     increasing her schedule to 4-6 weeks if she can remain without a fever or     infection. As noted she had an issue with her  teeth and had 2 dental procedures     performed without any subsequent infection or fever. For her seasonal allergies     I have again recommended OTC Claritin. She denies any fever currently. This is     stable.            Previously she unfortunately had an extravasation of the IVIG at the port site.     Appropriate protocol was followed and she remained stable throughout the     extravasation.             I note her workup and treatment by ENT for ear polyps per the patient's mother.     I have requested that they keep these appointments. Continues care with ENT and     her Pediatrician for otalgia. Has also been treated for seasonal allergies. May     require additional antibiotics intermittently given her immunodeficiency.            RECENT PLAN:      Will continue treatment with IVIG as noted. Overall she has had a significant     reduction in her infections.            She is here on 10/31/17 for her scheduled IVIG infusion. She has started     allergy injections as noted. Afebrile today.            Recent Plan: 1/23/18      Continue IVIG.  IGG in 12/2017 was 573.  Will continue to monitor.               Available for immediate release. Please forward a copy of this dictation to Dr. Arnie Rucker and Dr. Paolo Barber            TREATMENT HISTORY      Other:       1. IVIG infusions (started 8/6/14)      Common variable immunodeficiency - D83.9            Recurrent infections - B99.9            Kabuki syndrome - Q89.8      Follow-up:  1 Month      Next Visit Labs:  CBC, CMP      Intensive Monitor for Toxicity:  Labs Reviewed, Meds\Narcotics Reviewed      Labs Reviewed During Visit?:  Yes      Review/Other:  Received Lab Test, Ordered Lab Test, Reviewed Medicine Test      ECOG Score:  0      Attending      See the above note for details. I saw and evaluated the patient and agree with     the NP's findings and plan as documented. I reviewed the review of systems and     past histories. I reviewed the patient's  case and plan with the NP as noted.     Francine presents for continuation of treatment for her immunodeficiency with IVIG.     In the interim it appears she had a tooth abscess and 2 teeth were extracted.     She reports no issues today including no issues with fever. We will proceed     with treatment. Repeat labs pending.            Hx Influenza Vaccination:  Yes (2015)      Hx Pneumococcal Vaccination:  Yes (4/2014)                 Disclaimer: Converted document may not contain table formatting or lab diagrams. Please see [employer] for the authenticated document.

## 2021-05-28 NOTE — PROGRESS NOTES
Patient: KAMERON FELTON     Acct: [id number]     Report: #[id number]  UNIT #: [id number]     : 1990    Encounter Date:2020  PRIMARY CARE: SCOTTY GONZALES  ***Signed***  --------------------------------------------------------------------------------------------------------------------  NURSE INTAKE      Visit Type      Established Patient Visit            Chief Complaint      COMMON VARIABLE IMMUNODEFIENCY            Referring Provider/Copies To      Referring Provider:  SCOTTY GONZALES      Primary Care Provider:  SCOTTY GONZALES      Copies To:   SCOTTY GONZALES            History and Present Illness      Past History      1) 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       7) IVIG: Cr 0.94. (19).      8) IVIG every 6 weeks. 19.       9) Treatment held due to increasing creatinine (19)      10) IVIG resumed. Creatine returned to normal. (20)      11) IVIG given q 6 weeks.  normal kidney function. (20)      12) Ongoing IVIG q 6 weeks (3/31/20)            HPI - Hematology Interim      Ms. Felton comes in for f/u regarding several chronic issues:            1) CVID: Ms. Felton comes in for follow-up.  In the interim, she reports that     she is tolerating IVIG treatment adequately.  She denies significant side     effects.  She denies fevers or chills or sweats.  She denies cough or dysuria.      She does report some thyroid abnormalities.  She is following with her primary     care provider regarding this.  She also reports B12 deficiency.  She is      receiving B12 injections.  She denies neuropathic symptoms.            2) Leukocytosis: Ms. Preciado denies fevers or chills or sweats.  She denies     diarrhea or dysuria.            3) Anemia: Ms. Preciado denies melena or hematochezia.            PAST, FAMILY   Past Medical History      Past Medical History:  Kidney Disease            Past Surgical History      Appendectomy, VAD Placement (and removal)            Family History      Family History:  No Family History            Social History      Marital Status:  Single      Lives independently:  No      Number of Children:  0      Occupation:  disabled            Tobacco Use      Tobacco status:  Never smoker            Alcohol Use      Alcohol intake:  0-2 drinks per day            Substance Use      Substance use:  Denies use            REVIEW OF SYSTEMS      General:  Denies: Appetite Change, Fatigue, Fever, Night Sweats, Weight Gain,     Weight Loss      Eye:  Denies Blurred Vision, Denies Corrective Lenses, Denies Diplopia, Denies     Vision Changes      ENT:  Denies Headache, Denies Hearing Loss, Denies Hoarseness, Denies Sore     Throat      Cardiovascular:  Denies Chest Pain, Denies Palpitations      Respiratory:  Denies: Coughing Blood, Productive Cough, Shortness of Air,     Wheezing      Gastrointestinal:  Denies Bloody Stools, Denies Constipation, Denies Diarrhea,     Denies Nausea/Vomiting, Denies Problem Swallowing, Denies Unable to Control     Bowels      Genitourinary:  Denies Blood in Urine, Denies Incontinence, Denies Painful     Urination      Musculoskeletal:  Denies Back Pain, Denies Muscle Pain, Denies Painful Joints      Integumentary:  Denies Itching, Denies Lesions, Denies Rash      Neurologic:  Denies Dizziness, Denies Numbness\Tingling, Denies Seizures      Psychiatric:  Denies Anxiety, Denies Depression      Endocrine:  Denies Cold Intolerance, Denies Heat Intolerance      Hematologic/Lymphatic:  Denies Bruising, Denies Bleeding,  Denies Enlarged Lymph     Nodes      Reproductive:  Denies: Menopause, Heavy Periods, Pregnant, Still Menstruating            VITAL SIGNS AND SCORES      Vitals      Weight 145 lbs 8.058 oz / 66.0 kg      Temperature 98.1 F / 36.72 C - Temporal      Pulse 81      Respirations 18      Blood Pressure 122/69 Sitting      Pulse Oximetry 98%, ROOM AIR            Pain Score      Experiencing any pain?:  No      Pain Scale Used:  Numerical      Pain Intensity:  0            Fatigue Score      Experiencing any fatigue?:  No      Fatigue (0-10 scale):  0 (none)            EXAM      General Appearance:  Positive for: Alert      Eye:  Positive for: Anicteric Sclerae      HEENT:  Negative for: Scleral Icterus      Skin:  Negative for: Induration, Lesions      Psychiatric:  Positive for: Appropriate Affect      Lower Extremities:  Negative for: Edema            PREVENTION      Hx Influenza Vaccination:  Yes      Date Influenza Vaccine Given:  Oct 1, 2019      Influenza Vaccine Declined:  No      Hx Pneumococcal Vaccination:  No      Encouraged to follow-up with:  PCP regarding preventative exams.            ALLERGY/MEDS      Allergies      Coded Allergies:             BACITRACIN (Verified  Allergy, Unknown, RASH, 3/31/20)           NEOMYCIN (Verified  Allergy, Unknown, RASH, 3/31/20)           POLYMYXIN B (Verified  Allergy, Unknown, RASH, 3/31/20)           CEFUROXIME (Verified  Adverse Reaction, Unknown, throws up, and sleeps all     the time, 3/31/20)           CITRUS AND DERIVATIVES (Verified  Adverse Reaction, Unknown, SEVERE     DIARRHEA, 3/31/20)           MILK (Verified  Adverse Reaction, Unknown, diarrhea, passes blood, 3/31/20)      Uncoded Allergies:             rice (Allergy, Mild, nausea, 5/29/18)            Medications      Last Reconciled on 2/18/20 11:26 by JAIRO ABDI      Lidocaine/Prilocaine (Emla) 30 Gm Cream..g.      1 APL TOPICAL ONCE for 7 Days, #2 GM 3 Refills         Prov: JAIRO ABDI onc          2/18/20       diphenhydrAMINE HCl (BENADRYL) 25 Mg Capsule      25 MG PO HS PRN for CONGESTION, #100 TAB         Reported         9/24/19       Cranberry Extract (Cranberry) 200 Mg Cap      200 MG PO HS for 30 Days, #30 CAP         Reported         4/12/19       Furosemide (Furosemide) 40 Mg Tablet      40 MG PO QDAY for 30 Days, #30 TAB         Prov: Badiwala,Anesh         1/18/19       Montelukast Sodium (Montelukast*) 10 Mg Tablet      10 MG PO QDAY         Reported         1/7/19       raNITIdine HCL (raNITIdine HCL) 150 Mg Tablet      150 MG PO QDAY         Reported         1/7/19       Levocetirizine (Levocetirizine) 5 Mg Tablet      5 MG PO QDAY         Reported         1/7/19       Atenolol (ATENOLOL) 25 Mg Tablet      25 MG PO QDAY, #30 TAB 0 Refills         Reported         2/15/15      Medications Reviewed:  No Changes made to meds            IMPRESSION/PLAN      Impression      1) CVID: Ms. Preciado is a 29-year-old female with a history of combined variable     immunodeficiency.  She is tolerating IVIG treatment adequately.  Her IgG level     remains within normal limits.  We will continue her current treatment on an     every 6-week basis.  We will continue to follow IgG trough levels.  She will     return for treatment in 6 weeks with blood work prior.  She will return for a     clinical evaluation prior to her next treatment in 12 weeks.  She indicates     understanding and is amenable to this plan.            2) Leukocytosis: This is clinically stable.  We will follow this.            3) Anemia: This stable.  We will follow this.            Diagnosis      Common variable immunodeficiency - D83.9            Leukocytosis         Leukocytosis, unspecified type - D72.829         Leukocytosis type: unspecified            Anemia         Anemia in other chronic diseases classified elsewhere - D63.8         Other causes of anemia: chronic disease, other            Plan      1) Proceed with IVIG today;  add B12 to todays labs            2) Treatment in 6 weeks with cbc/cmp/IgG 2-3 days prior            3) RTC 12 weeks with cbc/cmp/IgG 2-3 days prior            Patient Education      Patient Education Provided:  Yes            Electronically signed by ODALIS MARTINS  03/31/2020 11:01       Disclaimer: Converted document may not contain table formatting or lab diagrams. Please see [employer] for the authenticated document.

## 2021-06-05 NOTE — PROGRESS NOTES
Progress Note      Patient Name: Francine Preciado   Patient ID: [id number]   Sex: Female   YOB: 1990    Primary Care Provider: Arnie Rucker MD   Referring Provider: Arnie Rucker MD    Visit Date: May 14, 2021    Provider: Arnie Rucker MD   Location: [facility]   Location Address: [address] [place] [address], [address] [place] [address]  [id number]   Location Phone: [phone]          Chief Complaint     Sore throat, sinus pressure, headaches       History Of Present Illness  Francine Preciado is a 30 year old /White female who presents for evaluation and treatment of:      PT presents for eval.    She c/o sinus pain/pressure/cough, and sinus congestion for 4 days. she has already been on amoxicillin and no improvement. She also c/o sore throat.   Denies fever or chills.       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 --  --    Central perforation of tympanic membrane of right ear --  --    Chronic Mastoiditis --  --    COVID-19 vaccine series completed --  --    Heart Murmur --  --    Hypertension --  --    Kabuki make-up syndrome --  --    Mitral valve prolapse --  --    Mixed conductive and sensorineural hearing loss of both ears --  --    Otorrhea, left --  --    Recurrent otitis media --  --    Skin Disease/Psoriasis/eczema --  --          Past Surgical History  Procedure Name Date Notes   *No Past Surgical History --  --          Medication List  Name Date Started Instructions   Allergy Shots  2 x week   atenolol 25 mg oral tablet 04/13/2021 TAKE ONE TABLET BY MOUTH DAILY   atorvastatin 10 mg oral tablet 04/13/2021 take 1 tablet (10 mg) by oral route once daily at bedtime for 90 days   azelastine 137 mcg (0.1 %) nasal aerosol,spray 06/15/2018 spray 2 sprays in each nostril by intranasal route 2 times per day   azithromycin 250 mg oral tablet 05/14/2021 take 2 tablets (500 mg) by oral route once daily for 1 day then  1 tablet (250 mg) by oral route once daily for 4 days   Bromfed DM 2-30-10 mg/5 mL oral syrup 05/21/2021 take 10 milliliters by oral route every 6 hours prn for cough   cyanocobalamin (vitamin B-12) 1,000 mcg/mL injection solution 04/20/2021 inject 0.1 milliliter (100 mcg) by intramuscular route once a month   docusate sodium 100 mg oral capsule 09/11/2018 take 1 capsule (100 mg) by oral route once daily prn for constipation   EpiPen 0.3 mg/0.3 mL injection auto-injector  inject 0.3 milliliter (0.3 mg) by intramuscular route once as needed for anaphylaxis   fenofibrate 160 mg oral tablet 04/13/2021 take 1 tablet (160 mg) by oral route once daily for 90 days   Fish Oil 1,000 mg (120 mg-180 mg) oral capsule  take 1 capsule by oral route daily   levothyroxine 50 mcg oral tablet 04/13/2021 TAKE ONE TABLET BY MOUTH DAILY   Linzess 145 mcg oral capsule 04/13/2021 take 1 capsule (145 mcg) by oral route once daily on an empty stomach at least 30 minutes before 1st meal of the day for 90 days   montelukast 10 mg oral tablet 12/18/2020 take 1 tablet (10 mg) by oral route once daily in the evening for 90 days   Multi Vitamin 9 mg iron/15 mL oral liquid  take 1 milliliter by oral route daily   pantoprazole 20 mg oral tablet,delayed release (DR/EC)  take 1 tablet (20 mg) by oral route once daily   Pirmella 1-35 mg-mcg oral tablet  take 1 tablet by oral route for 21 consecutive days, followed by 7 days off for 28 days   Viactiv 500-500-40 mg-unit-mcg oral tablet,chewable  chew 1 tablet by oral route daily   Xyzal 5 mg oral tablet 12/18/2020 take 1 tablet (5 mg) by oral route once daily in the evening for 90 days         Allergy List  Allergen Name Date Reaction Notes   Ceftin --  --  --    Neosporin --  --  --    Seasonal --  --  --        Allergies Reconciled  Family Medical History  Disease Name Relative/Age Notes   Brain Neoplasm, Malignant  --    Stroke  aunt/uncle and grandparents   Heart Disease  grandparents   Diabetes,  unspecified type  aunt/uncle and grandparents   Leukemia Father/   --          Reproductive History  Menstrual   Pregnancy Summary   Total Pregnancies: 0 Full Term: 0 Premature: 0   Ab Induced: 0 Ab Spontaneous: 0 Ectopics: 0   Multiples: 0 Livin         Social History  Finding Status Start/Stop Quantity Notes   *Denies Alcohol Use --  --/-- --  --    Second hand smoke exposure Current every day --/-- --  --    Single --  --/-- --  lives with mom   Tobacco Never --/-- --  --    Unemployed --  --/-- --  --          Immunizations  NameDate Admin Mfg Trade Name Lot Number Route Inj VIS Given VIS Publication   COVID [id number] NE Not Entered  NE NE 2021    Comments:    COVID [id number] NE Not Entered  NE NE 2021    Comments:    [id number]/ [facility] Fluzone Quadrivalent [id number] IM LD 10/14/2020 08/15/2019   Comments: pt tolerated injection well         Review of Systems  · Constitutional  o Denies  o : night sweats  · Eyes  o Denies  o : double vision, blurred vision  · HENT  o Denies  o : vertigo, recent head injury  · Breasts  o Denies  o : abnormal changes in breast size, additional breast symptoms except as noted in the HPI  · Cardiovascular  o Denies  o : chest pain, irregular heart beats  · Respiratory  o Denies  o : shortness of breath  · Gastrointestinal  o Denies  o : nausea, vomiting  · Genitourinary  o Denies  o : dysuria, urinary retention  · Integument  o Denies  o : hair growth change, new skin lesions  · Neurologic  o Denies  o : altered mental status, seizures  · Musculoskeletal  o Denies  o : joint swelling, limitation of motion  · Endocrine  o Denies  o : cold intolerance, heat intolerance  · Heme-Lymph  o Denies  o : petechiae, lymph node enlargement or tenderness  · Allergic-Immunologic  o Denies  o : frequent illnesses      Vitals  Date Time BP Position Site L\R Cuff Size HR RR TEMP (F) WT  HT  BMI kg/m2 BSA m2 O2 Sat FR L/min FiO2        2021 02:31 /72  "Sitting    86 - R 18 96.6 142lbs 2oz 4'  7\" 33.03 1.58 99 %  21%          Physical Examination  · Constitutional  o Appearance  o : alert, in no acute distress, well developed, well-nourished  · Head and Face  o Head  o : normocephalic, atraumatic, non tender, no palpable masses or nodules.  o Face  o : TTP maxillary sinuses  · Eyes  o Vision  o : Acuity: grossly normal at distance, Conjuntivae: Normal, Sclerae white  · Ears, Nose, Mouth and Throat  o Ears  o : Ext. Ears: Normal shape, Non tender, EACs: Normal , TMs: Normal, Hearing: intact to conversational voice bilaterally  o Nose  o : Nose: Normal shape, No external deformity, No nasal discharge, Mucosa: normal, Septum: midline, Sinuses: Nontender   o Oral Cavity  o : Normal oral mucosa, Normal lips, Gums: normal pink, non swollen, Tongue: Normal appearance, Palate : Normal   o Throat  o : Oropharynx: no inflmation or lesions, Tonsils: within normal limits  · Neck  o Inspection/Palpation  o : Supple, no masses or tenderness, no deformities, Trachea: Midline, ROM: with in normal limits, no neck stiffness  o Thyroid  o : no thyomegaly, no palpabale masses   · Respiratory  o Auscultation of Lungs  o : normal breath sounds throughout  · Cardiovascular  o Heart  o : Regular rate and rhythm, Normal S1,S2   · Psychiatric  o Mood and Affect  o : normal mood and affect          Results  · In-Office Procedures  o Lab procedure  § Rapid strep screen (19041)   § Beta Strep Gp A Culture: Negative   § Internal Control Verified?: Yes       Assessment  · Fatigue     780.79/R53.83  · Sinus infection     473.9/J32.9  · Throat pain     784.1/R07.0       steorid shot  zpak           Plan  · Orders  o IM - Injection Fee [facility] (45577) - 780.79/R53.83 - 05/14/2021  o ACO-14: Influenza immunization administered or previously received [facility] () - - 05/23/2021  o ACO-39: Current medications updated and reviewed (1159F, ) - - 05/14/2021  o 4.00 - Kenalog Injection 40mg (-6) - " 473.9/J32.9, 784.1/R07.0 - 05/14/2021   Injection - Kenalog 40 mg; Dose: 40 mg; Site: Left Gluteus; Route: intramuscular; Date: 05/14/2021 15:28:12; Exp: 06/01/2022; Lot: [id number]; Mfg: [employer] TradeName: triamcinolone; Location: [facility]; Administered By: Leidy Pennington MA; Comment: Patient tolerated well  o IM/SQ - Injection Fee [facility] (24996) - 473.9/J32.9, 784.1/R07.0 - 05/14/2021  o Rapid strep screen (40370) - 473.9/J32.9, 784.1/R07.0 - 05/14/2021  o Vitamin B12 Injection () - 780.79/R53.83 - 05/14/2021   Injection - Vitamin B12; Dose: 1 ml; Site: Left Deltoid; Route: intramuscular; Date: 05/14/2021 15:27:25; Exp: 06/01/2022; Lot: [id number]; Mfg: [employer]; TradeName: cyanocobalamin (vitamin B-12); Location: [facility]; Administered By: Leidy Pennington MA; Comment: patient tolerated well  · Medications  o Zaditor 0.025 % (0.035 %) ophthalmic (eye) drops   SIG: instill 1 drop into affected eye(s) by ophthalmic route 2 times per day prn   DISP: (1) 5 ml drop btl with 0 refills  Discontinued on 05/14/2021     o Medications have been Reconciled  o Transition of Care or Provider Policy  · Instructions  o Patient was educated/instructed on their diagnosis, treatment and medications prior to discharge from the clinic today.  o Electronically Identified Patient Education Materials Provided Electronically  · Disposition  o Call or Return if symptoms worsen or persist.  o Care Transition            Electronically Signed by: Arnie Rucker MD -Author on May 23, 2021 09:35:26 PM

## 2021-06-05 NOTE — PROGRESS NOTES
"   Progress Note      Patient Name: Francine Preciado   Patient ID: [id number]   Sex: Female   YOB: 1990    Primary Care Provider: Arnie Rucker MD    Visit Date: May 5, 2021    Provider: Jose Elias Jorgensen MD   Location: [facility]   Location Address: [address] [place] [address], [address] [place] [address]  [id number]   Location Phone: [phone]          Chief Complaint     \"We have only had to use her drops a couple times.\"       History Of Present Illness     Francine Preciado is a 30 year old /White female with past medical history significant for common variable immunodeficiency, developmental delay, 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 She underwent an audiogram on 5/11/17 which revealed bilateral moderate to profound mixed hearing loss. Speech discrimination was 100% bilaterally at 90 dB. Tympanograms were type B bilaterally.  She wears bilateral behind-the-ear hearing aids.    She returns today for routine follow-up.  She was last seen on 12/30/2020 at which time her right mastoid cavity was debrided on the left tympanic membrane perforation was stable.  Her mom tells me that since her last appointment she has been seen by her dentist and requires for teeth to be extracted.  She is currently on day 4 of amoxicillin for those infected teeth.  Francine denies any major change in her hearing and has not experienced any otorrhea, tinnitus, vertigo, fever, chills, or night sweats.  Her mom tells me that they have only had to use the ofloxacin drops once or twice since her last appointment.  She mentions that she recently had new earmolds made for her hearing aids.         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; Central perforation of tympanic membrane of right ear; Chronic Mastoiditis; Heart Murmur; Hypertension; Kabuki make-up syndrome; Mitral valve prolapse; Mixed " conductive and sensorineural hearing loss of both ears; Otorrhea, left; Recurrent otitis media; Skin Disease/Psoriasis/eczema         Past Surgical History  *No Past Surgical History         Medication List  Allergy Shots; atenolol 25 mg oral tablet; atorvastatin 10 mg oral tablet; azelastine 137 mcg (0.1 %) nasal aerosol,spray; cyanocobalamin (vitamin B-12) 1,000 mcg/mL injection solution; docusate sodium 100 mg oral capsule; EpiPen 0.3 mg/0.3 mL injection auto-injector; fenofibrate 160 mg oral tablet; Fish Oil 1,000 mg (120 mg-180 mg) oral capsule; levothyroxine 50 mcg oral tablet; Linzess 145 mcg oral capsule; montelukast 10 mg oral tablet; Multi Vitamin 9 mg iron/15 mL oral liquid; pantoprazole 20 mg oral tablet,delayed release (DR/EC); Pirmella 1-35 mg-mcg oral tablet; Viactiv 500-500-40 mg-unit-mcg oral tablet,chewable; Xyzal 5 mg oral tablet; Zaditor 0.025 % (0.035 %) ophthalmic (eye) drops         Allergy List  Ceftin; Neosporin; Seasonal         Family Medical History  Brain Neoplasm, Malignant; Stroke; Heart Disease; Diabetes, unspecified type; Leukemia         Reproductive History   0 Para 0 0 0 0       Social History  *Denies Alcohol Use; Second hand smoke exposure (Current every day); Single; Tobacco (Never); Unemployed         Immunizations  Name Date Admin   Influenza 10/14/2020   Influenza 10/29/2019   Influenza 2018   Influenza 10/19/2017   Influenza 10/13/2016   Influenza 2015   Influenza 2013         Review of Systems  · Constitutional  o Denies  o : fever, night sweats, weight loss  · Eyes  o Denies  o : discharge from eye, impaired vision  · HENT  o Admits  o : *See HPI  · Cardiovascular  o Denies  o : chest pain, irregular heart beats  · Respiratory  o Denies  o : shortness of breath, wheezing, coughing up blood  · Gastrointestinal  o Denies  o : heartburn, reflux, vomiting blood  · Genitourinary  o Denies  o : frequency  · Integument  o Denies  o : rash, skin  "dryness  · Neurologic  o Denies  o : seizures, loss of balance, loss of consciousness, dizziness  · Endocrine  o Denies  o : cold intolerance, heat intolerance  · Heme-Lymph  o Denies  o : easy bleeding, anemia      Vitals  Date Time BP Position Site L\R Cuff Size HR RR TEMP (F) WT  HT  BMI kg/m2 BSA m2 O2 Sat FR L/min FiO2        05/05/2021 01:48 PM        97.5 146lbs 6oz 4'  7\" 34.02 1.61             Physical Examination  · Constitutional  o Appearance  o : well developed, well-nourished, alert and in no acute distress, voice clear and strong  · Head and Face  o Head  o :   § Inspection  § : no deformities or lesions  o Face  o :   § Inspection  § : No facial lesions; House-Brackmann I/VI bilaterally  § Palpation  § : No TMJ crepitus nor  muscle tenderness bilaterally  · Eyes  o Vision  o :   § Visual Fields  § : Extraocular movements are intact. No spontaneous or gaze-induced nystagmus.  o Conjunctivae  o : clear  o Sclerae  o : clear  o Pupils and Irises  o : pupils equal, round, and reactive to light.   · Ears, Nose, Mouth and Throat  o Ears  o :   § External Ears  § : appearance within normal limits, no lesions present  § Otoscopic Examination  § : Left external auditory canal is normal in appearance. Left tympanic membrane with stable 15% posterior inferior perforation which is dry. Right mastoid cavity with moderate cerumen and squamous debris which was debrided using the operating microscope and a #7 suction. The skin is healthy.  § Hearing  § : intact to conversational voice both ears  o Nose  o :   § External Nose  § : appearance normal  § Intranasal Exam  § : mucosa within normal limits, vestibules normal, no intranasal lesions present, septum midline, sinuses non tender to percussion  o Oral Cavity  o :   § Oral Mucosa  § : oral mucosa normal without pallor or cyanosis  § Lips  § : lip appearance normal  § Teeth  § : Partial dentition for age  § Gums  § : gums pink, non-swollen, no bleeding " present  § Tongue  § : tongue appearance normal; normal mobility  § Palate  § : hard palate normal, soft palate appearance normal with symmetric mobility  o Throat  o :   § Oropharynx  § : no inflammation or lesions present, tonsils within normal limits  · Neck  o Inspection/Palpation  o : normal appearance, no masses or tenderness, trachea midline; thyroid size normal, nontender, no nodules or masses present on palpation  · Respiratory  o Respiratory Effort  o : breathing unlabored  · Lymphatic  o Neck  o : no lymphadenopathy present  o Supraclavicular Nodes  o : no lymphadenopathy present  o Preauricular Nodes  o : no lymphadenopathy present  · Skin and Subcutaneous Tissue  o General Inspection  o : Regarding face and neck - there are no rashes present, no lesions present, and no areas of discoloration  · Neurologic  o Cranial Nerves  o : cranial nerves II-XII are grossly intact bilaterally  o Gait and Station  o : normal gait, able to stand without diffculty  · Psychiatric  o Judgement and Insight  o : judgment and insight intact  o Mood and Affect  o : mood normal, affect appropriate          Assessment  · Mixed conductive and sensorineural hearing loss, bilateral     389.22/H90.6  · Tympanic membrane perforation, left     384.20/H72.92  · Chronic mastoiditis of right side     383.1/H70.11      Plan  · Orders  o Mastoid debridement [facility] ([id number]) - 389.22/H90.6, 383.1/H70.11 - 05/05/2021  · Medications  o Medications have been Reconciled  o Transition of Care or Provider Policy  · Instructions  o Impressions and findings were discussed with Francine and her mother at great length. Currently, she is doing well from an otologic standpoint aside from moderate cerumen and squamous debris buildup in her right mastoid cavity which was debrided today using a #7 suction in the operating microscope. We discussed that they should consider contacting the [facility] for her extractions as they are  having difficulty finding anyone that would take her insurance. She will follow-up with me in 6 months or sooner if needed.            Electronically Signed by: Jose Elias Jorgensen MD -Author on May 5, 2021 02:27:53 PM

## 2021-06-22 ENCOUNTER — CLINICAL SUPPORT (OUTPATIENT)
Dept: FAMILY MEDICINE CLINIC | Facility: CLINIC | Age: 31
End: 2021-06-22

## 2021-06-22 DIAGNOSIS — E53.8 B12 DEFICIENCY: Primary | ICD-10-CM

## 2021-06-22 PROCEDURE — 96372 THER/PROPH/DIAG INJ SC/IM: CPT | Performed by: FAMILY MEDICINE

## 2021-06-22 RX ORDER — CYANOCOBALAMIN 1000 UG/ML
1000 INJECTION, SOLUTION INTRAMUSCULAR; SUBCUTANEOUS
Status: DISCONTINUED | OUTPATIENT
Start: 2021-06-22 | End: 2021-10-18

## 2021-06-22 RX ADMIN — CYANOCOBALAMIN 1000 MCG: 1000 INJECTION, SOLUTION INTRAMUSCULAR; SUBCUTANEOUS at 15:34

## 2021-06-30 PROBLEM — Z45.2 ENCOUNTER FOR ADJUSTMENT OR MANAGEMENT OF VASCULAR ACCESS DEVICE: Status: ACTIVE | Noted: 2021-06-30

## 2021-06-30 PROBLEM — D83.9 CVID (COMMON VARIABLE IMMUNODEFICIENCY) (HCC): Status: ACTIVE | Noted: 2021-06-30

## 2021-06-30 RX ORDER — HEPARIN SODIUM (PORCINE) LOCK FLUSH IV SOLN 100 UNIT/ML 100 UNIT/ML
500 SOLUTION INTRAVENOUS AS NEEDED
Status: CANCELLED | OUTPATIENT
Start: 2021-07-02

## 2021-06-30 RX ORDER — SODIUM CHLORIDE 0.9 % (FLUSH) 0.9 %
20 SYRINGE (ML) INJECTION AS NEEDED
Status: CANCELLED | OUTPATIENT
Start: 2021-07-02

## 2021-07-01 DIAGNOSIS — D83.9 CVID (COMMON VARIABLE IMMUNODEFICIENCY) (HCC): Primary | ICD-10-CM

## 2021-07-01 PROBLEM — Z45.2 ENCOUNTER FOR ADJUSTMENT OR MANAGEMENT OF VASCULAR ACCESS DEVICE: Status: ACTIVE | Noted: 2021-07-01

## 2021-07-01 RX ORDER — DIPHENHYDRAMINE HCL 25 MG
25 CAPSULE ORAL ONCE
Status: CANCELLED | OUTPATIENT
Start: 2021-07-06

## 2021-07-01 RX ORDER — SODIUM CHLORIDE 0.9 % (FLUSH) 0.9 %
20 SYRINGE (ML) INJECTION AS NEEDED
Status: CANCELLED | OUTPATIENT
Start: 2021-07-02

## 2021-07-01 RX ORDER — MEPERIDINE HYDROCHLORIDE 25 MG/ML
25 INJECTION INTRAMUSCULAR; INTRAVENOUS; SUBCUTANEOUS
Status: CANCELLED | OUTPATIENT
Start: 2021-07-06

## 2021-07-01 RX ORDER — HEPARIN SODIUM (PORCINE) LOCK FLUSH IV SOLN 100 UNIT/ML 100 UNIT/ML
500 SOLUTION INTRAVENOUS AS NEEDED
Status: CANCELLED | OUTPATIENT
Start: 2021-07-02

## 2021-07-01 RX ORDER — FAMOTIDINE 10 MG/ML
20 INJECTION, SOLUTION INTRAVENOUS ONCE
Status: CANCELLED | OUTPATIENT
Start: 2021-07-06

## 2021-07-01 RX ORDER — DIPHENHYDRAMINE HYDROCHLORIDE 50 MG/ML
50 INJECTION INTRAMUSCULAR; INTRAVENOUS AS NEEDED
Status: CANCELLED | OUTPATIENT
Start: 2021-07-06

## 2021-07-01 RX ORDER — FAMOTIDINE 10 MG/ML
20 INJECTION, SOLUTION INTRAVENOUS AS NEEDED
Status: CANCELLED | OUTPATIENT
Start: 2021-07-06

## 2021-07-01 RX ORDER — SODIUM CHLORIDE 9 MG/ML
250 INJECTION, SOLUTION INTRAVENOUS ONCE
Status: CANCELLED | OUTPATIENT
Start: 2021-07-06

## 2021-07-02 ENCOUNTER — HOSPITAL ENCOUNTER (OUTPATIENT)
Dept: ONCOLOGY | Facility: HOSPITAL | Age: 31
Setting detail: INFUSION SERIES
Discharge: HOME | End: 2021-07-02

## 2021-07-02 ENCOUNTER — APPOINTMENT (OUTPATIENT)
Dept: ONCOLOGY | Facility: HOSPITAL | Age: 31
End: 2021-07-02

## 2021-07-02 DIAGNOSIS — D83.9 CVID (COMMON VARIABLE IMMUNODEFICIENCY) (HCC): Primary | ICD-10-CM

## 2021-07-02 DIAGNOSIS — Z45.2 ENCOUNTER FOR ADJUSTMENT OR MANAGEMENT OF VASCULAR ACCESS DEVICE: ICD-10-CM

## 2021-07-02 LAB
BASOPHILS # BLD AUTO: 0.02 10*3/MM3 (ref 0–0.2)
BASOPHILS NFR BLD AUTO: 0.2 % (ref 0–1.5)
DEPRECATED RDW RBC AUTO: 49.5 FL (ref 37–54)
EOSINOPHIL # BLD AUTO: 0.03 10*3/MM3 (ref 0–0.4)
EOSINOPHIL NFR BLD AUTO: 0.3 % (ref 0.3–6.2)
ERYTHROCYTE [DISTWIDTH] IN BLOOD BY AUTOMATED COUNT: 14.3 % (ref 12.3–15.4)
HCT VFR BLD AUTO: 34.8 % (ref 34–46.6)
HGB BLD-MCNC: 11.4 G/DL (ref 12–15.9)
IGA1 MFR SER: 17 MG/DL (ref 70–400)
IGG1 SER-MCNC: 507 MG/DL (ref 700–1600)
IGM SERPL-MCNC: 46 MG/DL (ref 40–230)
IMM GRANULOCYTES # BLD AUTO: 0.02 10*3/MM3 (ref 0–0.05)
IMM GRANULOCYTES NFR BLD AUTO: 0.2 % (ref 0–0.5)
LYMPHOCYTES # BLD AUTO: 2.1 10*3/MM3 (ref 0.7–3.1)
LYMPHOCYTES NFR BLD AUTO: 19.4 % (ref 19.6–45.3)
MCH RBC QN AUTO: 30.8 PG (ref 26.6–33)
MCHC RBC AUTO-ENTMCNC: 32.8 G/DL (ref 31.5–35.7)
MCV RBC AUTO: 94.1 FL (ref 79–97)
MONOCYTES # BLD AUTO: 0.62 10*3/MM3 (ref 0.1–0.9)
MONOCYTES NFR BLD AUTO: 5.7 % (ref 5–12)
NEUTROPHILS NFR BLD AUTO: 74.2 % (ref 42.7–76)
NEUTROPHILS NFR BLD AUTO: 8.01 10*3/MM3 (ref 1.7–7)
PLATELET # BLD AUTO: 295 10*3/MM3 (ref 140–450)
PMV BLD AUTO: 9.1 FL (ref 6–12)
RBC # BLD AUTO: 3.7 10*6/MM3 (ref 3.77–5.28)
WBC # BLD AUTO: 10.8 10*3/MM3 (ref 3.4–10.8)

## 2021-07-02 PROCEDURE — 85025 COMPLETE CBC W/AUTO DIFF WBC: CPT | Performed by: INTERNAL MEDICINE

## 2021-07-02 PROCEDURE — 82784 ASSAY IGA/IGD/IGG/IGM EACH: CPT | Performed by: INTERNAL MEDICINE

## 2021-07-02 PROCEDURE — 25010000002 HEPARIN LOCK FLUSH PER 10 UNITS: Performed by: INTERNAL MEDICINE

## 2021-07-02 PROCEDURE — 36591 DRAW BLOOD OFF VENOUS DEVICE: CPT

## 2021-07-02 RX ORDER — HEPARIN SODIUM (PORCINE) LOCK FLUSH IV SOLN 100 UNIT/ML 100 UNIT/ML
500 SOLUTION INTRAVENOUS AS NEEDED
Status: DISCONTINUED | OUTPATIENT
Start: 2021-07-02 | End: 2021-07-03 | Stop reason: HOSPADM

## 2021-07-02 RX ORDER — SODIUM CHLORIDE 0.9 % (FLUSH) 0.9 %
20 SYRINGE (ML) INJECTION AS NEEDED
Status: CANCELLED | OUTPATIENT
Start: 2021-07-02

## 2021-07-02 RX ORDER — SODIUM CHLORIDE 0.9 % (FLUSH) 0.9 %
20 SYRINGE (ML) INJECTION AS NEEDED
Status: DISCONTINUED | OUTPATIENT
Start: 2021-07-02 | End: 2021-07-03 | Stop reason: HOSPADM

## 2021-07-02 RX ORDER — HEPARIN SODIUM (PORCINE) LOCK FLUSH IV SOLN 100 UNIT/ML 100 UNIT/ML
500 SOLUTION INTRAVENOUS AS NEEDED
Status: CANCELLED | OUTPATIENT
Start: 2021-07-02

## 2021-07-02 RX ADMIN — HEPARIN SODIUM (PORCINE) LOCK FLUSH IV SOLN 100 UNIT/ML 500 UNITS: 100 SOLUTION at 09:59

## 2021-07-02 RX ADMIN — SODIUM CHLORIDE, PRESERVATIVE FREE 20 ML: 5 INJECTION INTRAVENOUS at 09:59

## 2021-07-06 ENCOUNTER — HOSPITAL ENCOUNTER (OUTPATIENT)
Dept: ONCOLOGY | Facility: HOSPITAL | Age: 31
Setting detail: INFUSION SERIES
Discharge: HOME | End: 2021-07-06

## 2021-07-06 ENCOUNTER — OFFICE VISIT (OUTPATIENT)
Dept: ONCOLOGY | Facility: HOSPITAL | Age: 31
End: 2021-07-06

## 2021-07-06 VITALS
RESPIRATION RATE: 18 BRPM | WEIGHT: 140.87 LBS | DIASTOLIC BLOOD PRESSURE: 56 MMHG | TEMPERATURE: 98 F | SYSTOLIC BLOOD PRESSURE: 115 MMHG | HEART RATE: 87 BPM | BODY MASS INDEX: 32.74 KG/M2 | OXYGEN SATURATION: 100 %

## 2021-07-06 VITALS
OXYGEN SATURATION: 100 % | BODY MASS INDEX: 32.75 KG/M2 | SYSTOLIC BLOOD PRESSURE: 125 MMHG | DIASTOLIC BLOOD PRESSURE: 56 MMHG | WEIGHT: 140.9 LBS | RESPIRATION RATE: 18 BRPM | TEMPERATURE: 98 F | HEART RATE: 79 BPM

## 2021-07-06 DIAGNOSIS — Z45.2 ENCOUNTER FOR ADJUSTMENT OR MANAGEMENT OF VASCULAR ACCESS DEVICE: Primary | ICD-10-CM

## 2021-07-06 DIAGNOSIS — D83.9 CVID (COMMON VARIABLE IMMUNODEFICIENCY) (HCC): ICD-10-CM

## 2021-07-06 DIAGNOSIS — D83.9 CVID (COMMON VARIABLE IMMUNODEFICIENCY) (HCC): Primary | ICD-10-CM

## 2021-07-06 PROBLEM — Z92.29 COVID-19 VACCINE SERIES COMPLETED: Status: ACTIVE | Noted: 2021-07-06

## 2021-07-06 PROBLEM — H70.10 CHRONIC MASTOIDITIS: Status: ACTIVE | Noted: 2021-07-06

## 2021-07-06 PROBLEM — H92.12 OTORRHEA, LEFT: Status: ACTIVE | Noted: 2021-07-06

## 2021-07-06 PROBLEM — H72.00 CENTRAL PERFORATION OF TYMPANIC MEMBRANE: Status: ACTIVE | Noted: 2021-07-06

## 2021-07-06 PROBLEM — Q89.8: Status: ACTIVE | Noted: 2021-07-06

## 2021-07-06 PROBLEM — I10 HYPERTENSION: Status: ACTIVE | Noted: 2021-07-06

## 2021-07-06 PROBLEM — H90.6 MIXED CONDUCTIVE AND SENSORINEURAL HEARING LOSS OF BOTH EARS: Status: ACTIVE | Noted: 2021-07-06

## 2021-07-06 PROBLEM — R01.1 HEART MURMUR: Status: ACTIVE | Noted: 2021-07-06

## 2021-07-06 PROBLEM — H66.90 RECURRENT OTITIS MEDIA: Status: ACTIVE | Noted: 2021-07-06

## 2021-07-06 PROBLEM — L30.9 PSORIASIS-ECZEMA OVERLAP CONDITION: Status: ACTIVE | Noted: 2021-07-06

## 2021-07-06 PROBLEM — I34.1 MITRAL VALVE PROLAPSE: Status: ACTIVE | Noted: 2021-07-06

## 2021-07-06 PROCEDURE — 96375 TX/PRO/DX INJ NEW DRUG ADDON: CPT

## 2021-07-06 PROCEDURE — 99213 OFFICE O/P EST LOW 20 MIN: CPT | Performed by: INTERNAL MEDICINE

## 2021-07-06 PROCEDURE — 25010000002 HEPARIN LOCK FLUSH PER 10 UNITS: Performed by: INTERNAL MEDICINE

## 2021-07-06 PROCEDURE — 63710000001 DIPHENHYDRAMINE PER 50 MG: Performed by: INTERNAL MEDICINE

## 2021-07-06 PROCEDURE — 25010000002 IMMUNE GLOBULIN (HUMAN) 10 GM/100ML SOLUTION: Performed by: INTERNAL MEDICINE

## 2021-07-06 PROCEDURE — 96365 THER/PROPH/DIAG IV INF INIT: CPT

## 2021-07-06 RX ORDER — DIPHENHYDRAMINE HCL 25 MG
25 CAPSULE ORAL ONCE
Status: COMPLETED | OUTPATIENT
Start: 2021-07-06 | End: 2021-07-06

## 2021-07-06 RX ORDER — ATORVASTATIN CALCIUM 10 MG/1
TABLET, FILM COATED ORAL
COMMUNITY
Start: 2021-07-05 | End: 2022-03-07 | Stop reason: SDUPTHER

## 2021-07-06 RX ORDER — LEVOTHYROXINE SODIUM 0.05 MG/1
TABLET ORAL
COMMUNITY
Start: 2021-06-07 | End: 2022-03-07 | Stop reason: SDUPTHER

## 2021-07-06 RX ORDER — CETIRIZINE HYDROCHLORIDE 10 MG/1
TABLET ORAL
COMMUNITY
End: 2021-08-12

## 2021-07-06 RX ORDER — EPINEPHRINE 0.3 MG/.3ML
0.3 INJECTION SUBCUTANEOUS
COMMUNITY

## 2021-07-06 RX ORDER — ACETAMINOPHEN AND CODEINE PHOSPHATE 300; 30 MG/1; MG/1
TABLET ORAL
COMMUNITY
Start: 2021-04-30 | End: 2021-08-12

## 2021-07-06 RX ORDER — FAMOTIDINE 10 MG/ML
20 INJECTION, SOLUTION INTRAVENOUS ONCE
Status: COMPLETED | OUTPATIENT
Start: 2021-07-06 | End: 2021-07-06

## 2021-07-06 RX ORDER — NORETHINDRONE ACETATE AND ETHINYL ESTRADIOL 1; 20 MG/1; UG/1
TABLET ORAL
COMMUNITY
Start: 2021-06-14 | End: 2021-10-22 | Stop reason: SDUPTHER

## 2021-07-06 RX ORDER — MEDROXYPROGESTERONE ACETATE 150 MG/ML
1 INJECTION, SUSPENSION INTRAMUSCULAR
COMMUNITY
End: 2021-08-12

## 2021-07-06 RX ORDER — HEPARIN SODIUM (PORCINE) LOCK FLUSH IV SOLN 100 UNIT/ML 100 UNIT/ML
500 SOLUTION INTRAVENOUS AS NEEDED
Status: CANCELLED | OUTPATIENT
Start: 2021-07-06

## 2021-07-06 RX ORDER — HYDROCHLOROTHIAZIDE 12.5 MG/1
TABLET ORAL
COMMUNITY
End: 2021-08-12

## 2021-07-06 RX ORDER — FENOFIBRATE 160 MG/1
TABLET ORAL
COMMUNITY
Start: 2021-06-10 | End: 2022-03-07 | Stop reason: SDUPTHER

## 2021-07-06 RX ORDER — SODIUM CHLORIDE 0.9 % (FLUSH) 0.9 %
20 SYRINGE (ML) INJECTION AS NEEDED
Status: DISCONTINUED | OUTPATIENT
Start: 2021-07-06 | End: 2021-07-07 | Stop reason: HOSPADM

## 2021-07-06 RX ORDER — HEPARIN SODIUM (PORCINE) LOCK FLUSH IV SOLN 100 UNIT/ML 100 UNIT/ML
500 SOLUTION INTRAVENOUS AS NEEDED
Status: DISCONTINUED | OUTPATIENT
Start: 2021-07-06 | End: 2021-07-07 | Stop reason: HOSPADM

## 2021-07-06 RX ORDER — SODIUM CHLORIDE 9 MG/ML
250 INJECTION, SOLUTION INTRAVENOUS ONCE
Status: COMPLETED | OUTPATIENT
Start: 2021-07-06 | End: 2021-07-06

## 2021-07-06 RX ORDER — MONTELUKAST SODIUM 10 MG/1
TABLET ORAL
COMMUNITY
Start: 2021-05-25

## 2021-07-06 RX ORDER — NORETHINDRONE AND ETHINYL ESTRADIOL 1 MG-35MCG
KIT ORAL
COMMUNITY
End: 2021-08-12

## 2021-07-06 RX ORDER — LEVOCETIRIZINE DIHYDROCHLORIDE 5 MG/1
TABLET, FILM COATED ORAL
COMMUNITY
Start: 2021-07-05

## 2021-07-06 RX ORDER — ATENOLOL 25 MG/1
TABLET ORAL
COMMUNITY
Start: 2021-04-13 | End: 2022-03-07 | Stop reason: SDUPTHER

## 2021-07-06 RX ORDER — SODIUM CHLORIDE 0.9 % (FLUSH) 0.9 %
20 SYRINGE (ML) INJECTION AS NEEDED
Status: CANCELLED | OUTPATIENT
Start: 2021-07-06

## 2021-07-06 RX ORDER — CIPROFLOXACIN AND DEXAMETHASONE 3; 1 MG/ML; MG/ML
4 SUSPENSION/ DROPS AURICULAR (OTIC)
COMMUNITY
End: 2021-11-05 | Stop reason: SDUPTHER

## 2021-07-06 RX ADMIN — IMMUNE GLOBULIN INFUSION (HUMAN) 10 G: 100 INJECTION, SOLUTION INTRAVENOUS; SUBCUTANEOUS at 11:17

## 2021-07-06 RX ADMIN — DIPHENHYDRAMINE HYDROCHLORIDE 25 MG: 25 CAPSULE ORAL at 11:10

## 2021-07-06 RX ADMIN — FAMOTIDINE 20 MG: 10 INJECTION INTRAVENOUS at 11:07

## 2021-07-06 RX ADMIN — SODIUM CHLORIDE 250 ML: 9 INJECTION, SOLUTION INTRAVENOUS at 11:06

## 2021-07-06 RX ADMIN — SODIUM CHLORIDE, PRESERVATIVE FREE 20 ML: 5 INJECTION INTRAVENOUS at 12:52

## 2021-07-06 RX ADMIN — HEPARIN SODIUM (PORCINE) LOCK FLUSH IV SOLN 100 UNIT/ML 500 UNITS: 100 SOLUTION at 12:52

## 2021-07-06 NOTE — PROGRESS NOTES
Francine Preciado   : 1990     LOCATION: [facility]     Chief Complaint  COMMON CARRIABLE IMMUNODEFICIENCY (-TRT FU)    Referring Provider: Josselin Montes De Oca MD PhD  PCP: Arnie Rucker MD    Oncology/Hematology History    No history exists.           Subjective        History of Present Illness   CV I D- patient continues on every 4-6 weeks IVIG  Patient reports she is feeling well overall  She has not had any recent infections  Denies fevers or chills  She will have dental extraction on the  with a plan to remove 9 teeth    Review of Systems   Constitutional: Negative for appetite change, diaphoresis, fatigue, fever, unexpected weight gain and unexpected weight loss.   HENT: Negative for hearing loss, mouth sores, sore throat, swollen glands, trouble swallowing and voice change.    Eyes: Negative for blurred vision.   Respiratory: Negative for cough, shortness of breath and wheezing.    Cardiovascular: Negative for chest pain and palpitations.   Gastrointestinal: Negative for abdominal pain, blood in stool, constipation, diarrhea, nausea and vomiting.   Endocrine: Negative for cold intolerance and heat intolerance.   Genitourinary: Negative for difficulty urinating, dysuria, frequency, hematuria and urinary incontinence.   Musculoskeletal: Negative for arthralgias, back pain and myalgias.   Skin: Negative for rash, skin lesions and bruise.   Neurological: Negative for dizziness, seizures, weakness, numbness and headache.   Hematological: Does not bruise/bleed easily.   Psychiatric/Behavioral: Negative for depressed mood. The patient is not nervous/anxious.    All other systems reviewed and are negative.    Current Outpatient Medications on File Prior to Visit   Medication Sig Dispense Refill   • acetaminophen-codeine (TYLENOL #3) 300-30 MG per tablet      • atenolol (TENORMIN) 25 MG tablet atenolol 25 mg oral tablet TAKE ONE TABLET BY MOUTH DAILY 2021   Active     • atorvastatin (LIPITOR) 10 MG tablet      • cetirizine (zyrTEC) 10 MG tablet cetirizine 10 mg oral tablet take 1 tablet (10 mg) by oral route once daily for 30 days   Suspended     • ciprofloxacin-dexamethasone (Ciprodex) 0.3-0.1 % otic suspension 4 drops.     • EPINEPHrine (EpiPen 2-Jaswinder) 0.3 MG/0.3ML solution auto-injector injection 0.3 mL.     • fenofibrate 160 MG tablet      • hydroCHLOROthiazide (HYDRODIURIL) 12.5 MG tablet hydrochlorothiazide 12.5 mg oral tablet take 1 tablet (12.5 mg) by oral route once daily for 90 days   Suspended     • Junel 1/20 1-20 MG-MCG per tablet      • levocetirizine (XYZAL) 5 MG tablet      • levothyroxine (SYNTHROID, LEVOTHROID) 50 MCG tablet      • medroxyPROGESTERone Acetate (Depo-Provera) 150 MG/ML suspension prefilled syringe 1 mL.     • montelukast (SINGULAIR) 10 MG tablet      • norethindrone-ethinyl estradiol (Pirmella 1/35) 1-35 MG-MCG per tablet Pirmella 1-35 mg-mcg oral tablet take 1 tablet by oral route for 21 consecutive days, followed by 7 days off for 28 days   Active       Current Facility-Administered Medications on File Prior to Visit   Medication Dose Route Frequency Provider Last Rate Last Admin   • cyanocobalamin injection 1,000 mcg  1,000 mcg Intramuscular Q28 Days Arnie Rucker MD   1,000 mcg at 06/22/21 1534       Allergies   Allergen Reactions   • Cefuroxime Axetil Unknown - Low Severity   • Neosporin [Bacitracin-Polymyxin B] Unknown - Low Severity       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    • Central perforation of tympanic membrane of right ear    • Chronic mastoiditis    • COVID-19 vaccine series completed    • Heart murmur    • Hypertension    • Kabuki make-up syndrome    • Mitral valve prolapse    • Mixed conductive and sensorineural hearing loss of both ears    • Otorrhea, left    • Recurrent otitis media    • Skin disease     PSORIASIS/ECXEMA     Past Surgical History:    Procedure Laterality Date   • NO PAST SURGERIES       Social History     Socioeconomic History   • Marital status: Single     Spouse name: Not on file   • Number of children: Not on file   • Years of education: Not on file   • Highest education level: Not on file   Tobacco Use   • Smoking status: Current Every Day Smoker     Types: Cigarettes   Substance and Sexual Activity   • Alcohol use: Not Currently     Comment: DENIES     Family History   Problem Relation Age of Onset   • Leukemia Father    • Heart disease Other      Immunization History   Administered Date(s) Administered   • COVID-19 (PFIZER) 04/04/2021, 04/25/2021   • Flu Vaccine Quad PF >18YRS 11/04/2013, 11/02/2015, 10/19/2017, 10/29/2019   • Flu Vaccine Quad PF >36MO 11/04/2013, 10/24/2014, 11/02/2015, 10/13/2016, 10/19/2017   • Hep B, Adolescent or Pediatric 11/24/1998, 01/14/1999, 06/02/1999   • Influenza LAIV (Nasal) 10/13/2016, 10/14/2020   • Influenza TIV (IM) 09/17/2009, 11/07/2018   • Influenza, Unspecified 10/14/2020   • MMR 08/19/1996   • Pneumococcal Polysaccharide (PPSV23) 10/04/2012, 04/10/2014   • influenza Split 10/02/2018       Objective     Vitals:    07/06/21 1014   BP: 115/56   Pulse: 87   Resp: 18   Temp: 98 °F (36.7 °C)   SpO2: 100%   Weight: 63.9 kg (140 lb 14 oz)   PainSc: 0-No pain     Body mass index is 32.74 kg/m².     Physical Exam  Vitals reviewed.     deferred for discussion of treatment          Vitamin B-12   Date Value Ref Range Status   02/26/2021 733 211 - 911 pg/mL Final   12/04/2020 643 211 - 946 pg/mL Final   09/11/2020 539 211 - 911 pg/mL Final   05/11/2020 358 211 - 911 pg/mL Final     Folate   Date Value Ref Range Status   12/04/2020 17.90 4.78 - 24.20 ng/mL Final   09/11/2020 19.8 4.8 - 20.0 ng/mL Final     Comment:     Results may be falsely decreased due to light exposure if  testing added on after collection.     05/11/2020 >20.0 4.8 - 20.0 ng/mL Final     Comment:     Results may be falsely decreased due to  light exposure if  testing added on after collection.     11/04/2019 >20.0 4.8 - 20.0 ng/mL Final     Comment:     Results may be falsely decreased due to light exposure if  testing added on after collection.       ECOG score: 1           PHQ-9 Total Score: 0         Result Review :   The following data was reviewed by: Shelbi Amor MD on 07/06/2021:  Lab Results   Component Value Date    HGB 11.4 (L) 07/02/2021    HCT 34.8 07/02/2021    MCV 94.1 07/02/2021     07/02/2021    WBC 10.80 07/02/2021    NEUTROABS 8.01 (H) 07/02/2021    LYMPHSABS 2.10 07/02/2021    MONOSABS 0.62 07/02/2021    EOSABS 0.03 07/02/2021    BASOSABS 0.02 07/02/2021     Lab Results   Component Value Date    BUN 10 05/21/2021    CREATININE 1.06 (H) 05/21/2021     05/21/2021    K 3.5 05/21/2021     05/21/2021    CO2 23 05/21/2021    CALCIUM 9.1 05/21/2021    PROTEINTOT 7.0 05/21/2021    ALBUMIN 4.0 05/21/2021    BILITOT 0.33 05/21/2021    ALKPHOS 62 05/21/2021    AST 15 05/21/2021    ALT 10 05/21/2021         Data reviewed: labs          Assessment and Plan      ASSESSMENT  CVID  PLAN/RECOMMENDATIONS  Patient is clinically stable and labs are within normal limits  We will continue her IVIG for her common variable immunodeficiency  Follow-up 4 to 6 weeks prior to her next IVIG infusion  Diagnoses and all orders for this visit:    1. CVID (common variable immunodeficiency) (CMS/HCC) (Primary)        Patient was given instructions and counseling regarding her condition or for health maintenance advice. Please see specific information pulled into the AVS if appropriate.     Shelbi Amor MD    7/6/2021

## 2021-07-15 VITALS
RESPIRATION RATE: 18 BRPM | WEIGHT: 142.12 LBS | BODY MASS INDEX: 32.89 KG/M2 | OXYGEN SATURATION: 99 % | HEART RATE: 86 BPM | SYSTOLIC BLOOD PRESSURE: 116 MMHG | DIASTOLIC BLOOD PRESSURE: 72 MMHG | HEIGHT: 55 IN | TEMPERATURE: 96.6 F

## 2021-07-15 VITALS — TEMPERATURE: 97.5 F | WEIGHT: 146.37 LBS | BODY MASS INDEX: 33.87 KG/M2 | HEIGHT: 55 IN

## 2021-08-10 ENCOUNTER — LAB (OUTPATIENT)
Dept: LAB | Facility: HOSPITAL | Age: 31
End: 2021-08-10

## 2021-08-10 ENCOUNTER — TRANSCRIBE ORDERS (OUTPATIENT)
Dept: LAB | Facility: HOSPITAL | Age: 31
End: 2021-08-10

## 2021-08-10 DIAGNOSIS — Z01.89 ROUTINE LAB DRAW: ICD-10-CM

## 2021-08-10 DIAGNOSIS — E55.9 VITAMIN D DEFICIENCY: ICD-10-CM

## 2021-08-10 DIAGNOSIS — R53.83 FATIGUE, UNSPECIFIED TYPE: ICD-10-CM

## 2021-08-10 DIAGNOSIS — E03.9 HYPOTHYROIDISM, UNSPECIFIED TYPE: ICD-10-CM

## 2021-08-10 DIAGNOSIS — R73.09 ELEVATED GLUCOSE: ICD-10-CM

## 2021-08-10 DIAGNOSIS — E53.8 B12 DEFICIENCY: ICD-10-CM

## 2021-08-10 DIAGNOSIS — E78.5 HYPERLIPIDEMIA, UNSPECIFIED HYPERLIPIDEMIA TYPE: ICD-10-CM

## 2021-08-10 DIAGNOSIS — I10 HYPERTENSION, UNSPECIFIED TYPE: Primary | ICD-10-CM

## 2021-08-10 DIAGNOSIS — I10 HYPERTENSION, UNSPECIFIED TYPE: ICD-10-CM

## 2021-08-10 LAB
25(OH)D3 SERPL-MCNC: 48.1 NG/ML (ref 30–100)
ALBUMIN SERPL-MCNC: 4.2 G/DL (ref 3.5–5.2)
ALBUMIN/GLOB SERPL: 1.4 G/DL
ALP SERPL-CCNC: 54 U/L (ref 39–117)
ALT SERPL W P-5'-P-CCNC: 12 U/L (ref 1–33)
ANION GAP SERPL CALCULATED.3IONS-SCNC: 13.6 MMOL/L (ref 5–15)
AST SERPL-CCNC: 17 U/L (ref 1–32)
BASOPHILS # BLD AUTO: 0.06 10*3/MM3 (ref 0–0.2)
BASOPHILS NFR BLD AUTO: 0.5 % (ref 0–1.5)
BILIRUB SERPL-MCNC: 0.6 MG/DL (ref 0–1.2)
BUN SERPL-MCNC: 12 MG/DL (ref 6–20)
BUN/CREAT SERPL: 11 (ref 7–25)
CALCIUM SPEC-SCNC: 9.6 MG/DL (ref 8.6–10.5)
CHLORIDE SERPL-SCNC: 103 MMOL/L (ref 98–107)
CHOLEST SERPL-MCNC: 131 MG/DL (ref 0–200)
CO2 SERPL-SCNC: 22.4 MMOL/L (ref 22–29)
CREAT SERPL-MCNC: 1.09 MG/DL (ref 0.57–1)
DEPRECATED RDW RBC AUTO: 45.6 FL (ref 37–54)
EOSINOPHIL # BLD AUTO: 0.06 10*3/MM3 (ref 0–0.4)
EOSINOPHIL NFR BLD AUTO: 0.5 % (ref 0.3–6.2)
ERYTHROCYTE [DISTWIDTH] IN BLOOD BY AUTOMATED COUNT: 13.4 % (ref 12.3–15.4)
GFR SERPL CREATININE-BSD FRML MDRD: 59 ML/MIN/1.73
GLOBULIN UR ELPH-MCNC: 3 GM/DL
GLUCOSE SERPL-MCNC: 70 MG/DL (ref 65–99)
HBA1C MFR BLD: 4.7 % (ref 4.8–5.6)
HCT VFR BLD AUTO: 38.7 % (ref 34–46.6)
HDLC SERPL-MCNC: 38 MG/DL (ref 40–60)
HGB BLD-MCNC: 12.9 G/DL (ref 12–15.9)
IMM GRANULOCYTES # BLD AUTO: 0.06 10*3/MM3 (ref 0–0.05)
IMM GRANULOCYTES NFR BLD AUTO: 0.5 % (ref 0–0.5)
LDLC SERPL CALC-MCNC: 65 MG/DL (ref 0–100)
LDLC/HDLC SERPL: 1.6 {RATIO}
LYMPHOCYTES # BLD AUTO: 2.88 10*3/MM3 (ref 0.7–3.1)
LYMPHOCYTES NFR BLD AUTO: 23.1 % (ref 19.6–45.3)
MCH RBC QN AUTO: 31.3 PG (ref 26.6–33)
MCHC RBC AUTO-ENTMCNC: 33.3 G/DL (ref 31.5–35.7)
MCV RBC AUTO: 93.9 FL (ref 79–97)
MONOCYTES # BLD AUTO: 0.85 10*3/MM3 (ref 0.1–0.9)
MONOCYTES NFR BLD AUTO: 6.8 % (ref 5–12)
NEUTROPHILS NFR BLD AUTO: 68.6 % (ref 42.7–76)
NEUTROPHILS NFR BLD AUTO: 8.58 10*3/MM3 (ref 1.7–7)
NRBC BLD AUTO-RTO: 0 /100 WBC (ref 0–0.2)
PLATELET # BLD AUTO: 283 10*3/MM3 (ref 140–450)
PMV BLD AUTO: 9.9 FL (ref 6–12)
POTASSIUM SERPL-SCNC: 4.4 MMOL/L (ref 3.5–5.2)
PROT SERPL-MCNC: 7.2 G/DL (ref 6–8.5)
RBC # BLD AUTO: 4.12 10*6/MM3 (ref 3.77–5.28)
SODIUM SERPL-SCNC: 139 MMOL/L (ref 136–145)
TRIGL SERPL-MCNC: 161 MG/DL (ref 0–150)
TSH SERPL DL<=0.05 MIU/L-ACNC: 1.83 UIU/ML (ref 0.27–4.2)
VIT B12 BLD-MCNC: 568 PG/ML (ref 211–946)
VLDLC SERPL-MCNC: 28 MG/DL (ref 5–40)
WBC # BLD AUTO: 12.49 10*3/MM3 (ref 3.4–10.8)

## 2021-08-10 PROCEDURE — 85025 COMPLETE CBC W/AUTO DIFF WBC: CPT

## 2021-08-10 PROCEDURE — 36415 COLL VENOUS BLD VENIPUNCTURE: CPT

## 2021-08-10 PROCEDURE — 82607 VITAMIN B-12: CPT

## 2021-08-10 PROCEDURE — 82306 VITAMIN D 25 HYDROXY: CPT

## 2021-08-10 PROCEDURE — 80061 LIPID PANEL: CPT

## 2021-08-10 PROCEDURE — 83036 HEMOGLOBIN GLYCOSYLATED A1C: CPT

## 2021-08-10 PROCEDURE — 84443 ASSAY THYROID STIM HORMONE: CPT

## 2021-08-10 PROCEDURE — 80053 COMPREHEN METABOLIC PANEL: CPT

## 2021-08-12 ENCOUNTER — OFFICE VISIT (OUTPATIENT)
Dept: FAMILY MEDICINE CLINIC | Facility: CLINIC | Age: 31
End: 2021-08-12

## 2021-08-12 VITALS
RESPIRATION RATE: 18 BRPM | HEART RATE: 78 BPM | OXYGEN SATURATION: 98 % | TEMPERATURE: 97.1 F | BODY MASS INDEX: 32.21 KG/M2 | WEIGHT: 138.6 LBS | SYSTOLIC BLOOD PRESSURE: 121 MMHG | DIASTOLIC BLOOD PRESSURE: 72 MMHG

## 2021-08-12 DIAGNOSIS — N18.2 STAGE 2 CHRONIC KIDNEY DISEASE: ICD-10-CM

## 2021-08-12 DIAGNOSIS — E78.2 MIXED HYPERLIPIDEMIA: ICD-10-CM

## 2021-08-12 DIAGNOSIS — K21.9 GASTROESOPHAGEAL REFLUX DISEASE WITHOUT ESOPHAGITIS: ICD-10-CM

## 2021-08-12 DIAGNOSIS — R53.83 FATIGUE, UNSPECIFIED TYPE: ICD-10-CM

## 2021-08-12 DIAGNOSIS — E03.9 HYPOTHYROIDISM, ACQUIRED: ICD-10-CM

## 2021-08-12 DIAGNOSIS — E66.9 OBESITY (BMI 30.0-34.9): ICD-10-CM

## 2021-08-12 DIAGNOSIS — I10 ESSENTIAL (PRIMARY) HYPERTENSION: ICD-10-CM

## 2021-08-12 DIAGNOSIS — E53.8 B12 DEFICIENCY: ICD-10-CM

## 2021-08-12 DIAGNOSIS — E55.9 VITAMIN D DEFICIENCY: ICD-10-CM

## 2021-08-12 PROCEDURE — 99214 OFFICE O/P EST MOD 30 MIN: CPT | Performed by: FAMILY MEDICINE

## 2021-08-12 PROCEDURE — 96372 THER/PROPH/DIAG INJ SC/IM: CPT | Performed by: FAMILY MEDICINE

## 2021-08-12 RX ORDER — RANITIDINE 150 MG/1
150 TABLET ORAL NIGHTLY
COMMUNITY
End: 2021-08-12

## 2021-08-12 RX ADMIN — CYANOCOBALAMIN 1000 MCG: 1000 INJECTION, SOLUTION INTRAMUSCULAR; SUBCUTANEOUS at 07:53

## 2021-08-12 NOTE — PROGRESS NOTES
Chief Complaint   Patient presents with   • Hypertension     3 month follow up    • Hyperlipidemia   • Vitamin D Deficiency   • Hypothyroidism     Subjective   Francine Preciado is a 31 y.o. female who presents to the office for follow-up.     The following portions of the patient's history were reviewed and updated as appropriate: allergies, current medications, past family history, past medical history, past social history, past surgical history and problem list.    Review of Systems   Constitutional: Negative for chills, fever and unexpected weight loss.   Respiratory: Negative for cough, chest tightness and shortness of breath.    Cardiovascular: Negative for chest pain and palpitations.   Gastrointestinal: Negative for abdominal pain, constipation, diarrhea, nausea and vomiting.        Objective   Vitals:    08/12/21 0712   BP: 121/72   BP Location: Left arm   Patient Position: Sitting   Cuff Size: Adult   Pulse: 78   Resp: 18   Temp: 97.1 °F (36.2 °C)   SpO2: 98%   Weight: 62.9 kg (138 lb 9.6 oz)     Body mass index is 32.21 kg/m².  Physical Exam  Vitals reviewed.   Constitutional:       General: She is not in acute distress.     Appearance: Normal appearance. She is not ill-appearing.   HENT:      Head: Normocephalic.   Eyes:      Conjunctiva/sclera: Conjunctivae normal.   Cardiovascular:      Rate and Rhythm: Normal rate and regular rhythm.   Pulmonary:      Effort: Pulmonary effort is normal.      Breath sounds: Normal breath sounds.   Skin:     Findings: No lesion or rash.   Neurological:      Mental Status: She is alert and oriented to person, place, and time.   Psychiatric:         Mood and Affect: Mood normal.          Assessment/Plan   Diagnoses and all orders for this visit:    1. Obesity (BMI 30.0-34.9) (Primary)    2. Vitamin D deficiency  -     Vitamin D 25 Hydroxy; Future    3. Fatigue, unspecified type  -     TSH; Future    4. Mixed hyperlipidemia  -     CBC Auto Differential; Future  -      Comprehensive Metabolic Panel; Future  -     Lipid Panel; Future  -     Vitamin B12 & Folate; Future  -     Urinalysis With Microscopic If Indicated (No Culture) - Urine, Clean Catch; Future             F/u as directed  Cholesterol improved.  Continue to see specialist    No follow-ups on file.   PHQ-2/PHQ-9 Depression Screening 7/6/2021   Little interest or pleasure in doing things 0   Feeling down, depressed, or hopeless 0   Total Score 0

## 2021-08-12 NOTE — PROGRESS NOTES
Chief Complaint   Patient presents with   • Hypertension     3 month follow up    • Hyperlipidemia   • Vitamin D Deficiency   • Hypothyroidism     Subjective    Francine Preciado is a 31 y.o. female who presents to the office for follow-up.     History of common variable immunodeficiency.  Sees hematologist on a regular basis.    History of osteopenia.  She is established with rheumatologist.    History of hypertension.  Controlled.    History of hyperlipidemia.  Now controlled.  On the atorvastatin.    History of hypertriglyceridemia.  Better controlled.  She is on fish oil    History of GERD controlled.    History of constipation controlled.    History of allergic rhinitis.  She does see a ENT.  On allergy shots.    History of chronic kidney disease.  Controlled or stable.    History of hypothyroidism controlled.    The following portions of the patient's history were reviewed and updated as appropriate: allergies, current medications, past family history, past medical history, past social history, past surgical history and problem list.    Review of Systems   Constitutional: Negative for chills, fever and unexpected weight loss.   Respiratory: Negative for cough, chest tightness and shortness of breath.    Cardiovascular: Negative for chest pain and palpitations.   Gastrointestinal: Negative for abdominal pain, constipation, diarrhea, nausea and vomiting.        Objective   Vitals:    08/12/21 0712   BP: 121/72   BP Location: Left arm   Patient Position: Sitting   Cuff Size: Adult   Pulse: 78   Resp: 18   Temp: 97.1 °F (36.2 °C)   SpO2: 98%   Weight: 62.9 kg (138 lb 9.6 oz)     Body mass index is 32.21 kg/m².  Physical Exam  Vitals reviewed.   Constitutional:       General: She is not in acute distress.     Appearance: Normal appearance. She is not ill-appearing.   HENT:      Head: Normocephalic.   Eyes:      Conjunctiva/sclera: Conjunctivae normal.   Cardiovascular:      Rate and Rhythm: Normal rate and regular  rhythm.   Pulmonary:      Effort: Pulmonary effort is normal.      Breath sounds: Normal breath sounds.   Skin:     Findings: No lesion or rash.   Neurological:      Mental Status: She is alert and oriented to person, place, and time.   Psychiatric:         Mood and Affect: Mood normal.          Assessment/Plan   Diagnoses and all orders for this visit:    1. Essential (primary) hypertension  Comments:  controlled    2. Mixed hyperlipidemia  Comments:  controlled  Orders:  -     CBC Auto Differential; Future  -     Comprehensive Metabolic Panel; Future  -     Lipid Panel; Future  -     Vitamin B12 & Folate; Future  -     Urinalysis With Microscopic If Indicated (No Culture) - Urine, Clean Catch; Future    3. Hypothyroidism, acquired    4. Obesity (BMI 30.0-34.9)    5. Vitamin D deficiency  -     Vitamin D 25 Hydroxy; Future    6. Fatigue, unspecified type  -     TSH; Future    7. Gastroesophageal reflux disease without esophagitis  Comments:  controlled    8. Stage 2 chronic kidney disease    9. B12 deficiency         Fu as directed  Continue current medications      Return in about 6 months (around 2/12/2022) for Annual physical.   PHQ-2/PHQ-9 Depression Screening 7/6/2021   Little interest or pleasure in doing things 0   Feeling down, depressed, or hopeless 0   Total Score 0

## 2021-08-13 ENCOUNTER — HOSPITAL ENCOUNTER (OUTPATIENT)
Dept: ONCOLOGY | Facility: HOSPITAL | Age: 31
Setting detail: INFUSION SERIES
Discharge: HOME | End: 2021-08-13

## 2021-08-13 DIAGNOSIS — Z45.2 ENCOUNTER FOR ADJUSTMENT OR MANAGEMENT OF VASCULAR ACCESS DEVICE: ICD-10-CM

## 2021-08-13 DIAGNOSIS — D83.9 CVID (COMMON VARIABLE IMMUNODEFICIENCY) (HCC): Primary | ICD-10-CM

## 2021-08-13 LAB
BASOPHILS # BLD AUTO: 0.03 10*3/MM3 (ref 0–0.2)
BASOPHILS NFR BLD AUTO: 0.3 % (ref 0–1.5)
DEPRECATED RDW RBC AUTO: 45.4 FL (ref 37–54)
EOSINOPHIL # BLD AUTO: 0.03 10*3/MM3 (ref 0–0.4)
EOSINOPHIL NFR BLD AUTO: 0.3 % (ref 0.3–6.2)
ERYTHROCYTE [DISTWIDTH] IN BLOOD BY AUTOMATED COUNT: 13.3 % (ref 12.3–15.4)
HCT VFR BLD AUTO: 34.2 % (ref 34–46.6)
HGB BLD-MCNC: 11.3 G/DL (ref 12–15.9)
IGA1 MFR SER: 19 MG/DL (ref 70–400)
IGG1 SER-MCNC: 496 MG/DL (ref 700–1600)
IGM SERPL-MCNC: 51 MG/DL (ref 40–230)
IMM GRANULOCYTES # BLD AUTO: 0.02 10*3/MM3 (ref 0–0.05)
IMM GRANULOCYTES NFR BLD AUTO: 0.2 % (ref 0–0.5)
LYMPHOCYTES # BLD AUTO: 2.19 10*3/MM3 (ref 0.7–3.1)
LYMPHOCYTES NFR BLD AUTO: 22.2 % (ref 19.6–45.3)
MCH RBC QN AUTO: 30.8 PG (ref 26.6–33)
MCHC RBC AUTO-ENTMCNC: 33 G/DL (ref 31.5–35.7)
MCV RBC AUTO: 93.2 FL (ref 79–97)
MONOCYTES # BLD AUTO: 0.57 10*3/MM3 (ref 0.1–0.9)
MONOCYTES NFR BLD AUTO: 5.8 % (ref 5–12)
NEUTROPHILS NFR BLD AUTO: 7.02 10*3/MM3 (ref 1.7–7)
NEUTROPHILS NFR BLD AUTO: 71.2 % (ref 42.7–76)
PLATELET # BLD AUTO: 257 10*3/MM3 (ref 140–450)
PMV BLD AUTO: 9.3 FL (ref 6–12)
RBC # BLD AUTO: 3.67 10*6/MM3 (ref 3.77–5.28)
WBC # BLD AUTO: 9.86 10*3/MM3 (ref 3.4–10.8)

## 2021-08-13 PROCEDURE — 36591 DRAW BLOOD OFF VENOUS DEVICE: CPT

## 2021-08-13 PROCEDURE — 85025 COMPLETE CBC W/AUTO DIFF WBC: CPT | Performed by: INTERNAL MEDICINE

## 2021-08-13 PROCEDURE — 82784 ASSAY IGA/IGD/IGG/IGM EACH: CPT | Performed by: INTERNAL MEDICINE

## 2021-08-13 PROCEDURE — 25010000002 HEPARIN LOCK FLUSH PER 10 UNITS: Performed by: INTERNAL MEDICINE

## 2021-08-13 RX ORDER — HEPARIN SODIUM (PORCINE) LOCK FLUSH IV SOLN 100 UNIT/ML 100 UNIT/ML
500 SOLUTION INTRAVENOUS AS NEEDED
Status: DISCONTINUED | OUTPATIENT
Start: 2021-08-13 | End: 2021-08-14 | Stop reason: HOSPADM

## 2021-08-13 RX ORDER — SODIUM CHLORIDE 0.9 % (FLUSH) 0.9 %
20 SYRINGE (ML) INJECTION AS NEEDED
Status: DISCONTINUED | OUTPATIENT
Start: 2021-08-13 | End: 2021-08-14 | Stop reason: HOSPADM

## 2021-08-13 RX ORDER — HEPARIN SODIUM (PORCINE) LOCK FLUSH IV SOLN 100 UNIT/ML 100 UNIT/ML
500 SOLUTION INTRAVENOUS AS NEEDED
Status: CANCELLED | OUTPATIENT
Start: 2021-08-13

## 2021-08-13 RX ORDER — SODIUM CHLORIDE 0.9 % (FLUSH) 0.9 %
20 SYRINGE (ML) INJECTION AS NEEDED
Status: CANCELLED | OUTPATIENT
Start: 2021-08-13

## 2021-08-13 RX ADMIN — HEPARIN SODIUM (PORCINE) LOCK FLUSH IV SOLN 100 UNIT/ML 500 UNITS: 100 SOLUTION at 09:50

## 2021-08-13 RX ADMIN — SODIUM CHLORIDE, PRESERVATIVE FREE 20 ML: 5 INJECTION INTRAVENOUS at 09:50

## 2021-08-13 NOTE — ADDENDUM NOTE
Encounter addended by: Froilan Sepulveda RN on: 8/13/2021 10:29 AM   Actions taken: Flowsheet accepted

## 2021-08-17 ENCOUNTER — OFFICE VISIT (OUTPATIENT)
Dept: ONCOLOGY | Facility: HOSPITAL | Age: 31
End: 2021-08-17

## 2021-08-17 ENCOUNTER — HOSPITAL ENCOUNTER (OUTPATIENT)
Dept: ONCOLOGY | Facility: HOSPITAL | Age: 31
Setting detail: INFUSION SERIES
Discharge: HOME | End: 2021-08-17

## 2021-08-17 VITALS
DIASTOLIC BLOOD PRESSURE: 70 MMHG | RESPIRATION RATE: 18 BRPM | BODY MASS INDEX: 31.53 KG/M2 | SYSTOLIC BLOOD PRESSURE: 115 MMHG | TEMPERATURE: 98.8 F | OXYGEN SATURATION: 97 % | HEIGHT: 55 IN | WEIGHT: 136.24 LBS | HEART RATE: 94 BPM

## 2021-08-17 VITALS
WEIGHT: 136.24 LBS | SYSTOLIC BLOOD PRESSURE: 113 MMHG | BODY MASS INDEX: 31.67 KG/M2 | RESPIRATION RATE: 18 BRPM | DIASTOLIC BLOOD PRESSURE: 51 MMHG | HEART RATE: 92 BPM | OXYGEN SATURATION: 97 % | TEMPERATURE: 98.2 F

## 2021-08-17 DIAGNOSIS — D83.9 CVID (COMMON VARIABLE IMMUNODEFICIENCY) (HCC): ICD-10-CM

## 2021-08-17 DIAGNOSIS — Z45.2 ENCOUNTER FOR ADJUSTMENT OR MANAGEMENT OF VASCULAR ACCESS DEVICE: Primary | ICD-10-CM

## 2021-08-17 DIAGNOSIS — D83.9 CVID (COMMON VARIABLE IMMUNODEFICIENCY) (HCC): Primary | ICD-10-CM

## 2021-08-17 PROCEDURE — 63710000001 DIPHENHYDRAMINE PER 50 MG: Performed by: INTERNAL MEDICINE

## 2021-08-17 PROCEDURE — 96365 THER/PROPH/DIAG IV INF INIT: CPT

## 2021-08-17 PROCEDURE — 25010000002 IMMUNE GLOBULIN (HUMAN) 10 GM/100ML SOLUTION: Performed by: INTERNAL MEDICINE

## 2021-08-17 PROCEDURE — 96375 TX/PRO/DX INJ NEW DRUG ADDON: CPT

## 2021-08-17 PROCEDURE — 99213 OFFICE O/P EST LOW 20 MIN: CPT | Performed by: INTERNAL MEDICINE

## 2021-08-17 PROCEDURE — 96366 THER/PROPH/DIAG IV INF ADDON: CPT

## 2021-08-17 PROCEDURE — 25010000002 HEPARIN LOCK FLUSH PER 10 UNITS: Performed by: INTERNAL MEDICINE

## 2021-08-17 RX ORDER — HEPARIN SODIUM (PORCINE) LOCK FLUSH IV SOLN 100 UNIT/ML 100 UNIT/ML
500 SOLUTION INTRAVENOUS AS NEEDED
Status: CANCELLED | OUTPATIENT
Start: 2021-08-17

## 2021-08-17 RX ORDER — SODIUM CHLORIDE 0.9 % (FLUSH) 0.9 %
20 SYRINGE (ML) INJECTION AS NEEDED
Status: CANCELLED | OUTPATIENT
Start: 2021-08-17

## 2021-08-17 RX ORDER — DIPHENHYDRAMINE HCL 25 MG
25 CAPSULE ORAL ONCE
Status: COMPLETED | OUTPATIENT
Start: 2021-08-17 | End: 2021-08-17

## 2021-08-17 RX ORDER — SODIUM CHLORIDE 0.9 % (FLUSH) 0.9 %
20 SYRINGE (ML) INJECTION AS NEEDED
Status: DISCONTINUED | OUTPATIENT
Start: 2021-08-17 | End: 2021-08-18 | Stop reason: HOSPADM

## 2021-08-17 RX ORDER — DIPHENHYDRAMINE HYDROCHLORIDE 50 MG/ML
50 INJECTION INTRAMUSCULAR; INTRAVENOUS AS NEEDED
Status: CANCELLED | OUTPATIENT
Start: 2021-08-17

## 2021-08-17 RX ORDER — MEPERIDINE HYDROCHLORIDE 25 MG/ML
25 INJECTION INTRAMUSCULAR; INTRAVENOUS; SUBCUTANEOUS
Status: CANCELLED | OUTPATIENT
Start: 2021-08-17

## 2021-08-17 RX ORDER — FAMOTIDINE 10 MG/ML
20 INJECTION, SOLUTION INTRAVENOUS AS NEEDED
Status: CANCELLED | OUTPATIENT
Start: 2021-08-17

## 2021-08-17 RX ORDER — FAMOTIDINE 10 MG/ML
20 INJECTION, SOLUTION INTRAVENOUS ONCE
Status: COMPLETED | OUTPATIENT
Start: 2021-08-17 | End: 2021-08-17

## 2021-08-17 RX ORDER — HEPARIN SODIUM (PORCINE) LOCK FLUSH IV SOLN 100 UNIT/ML 100 UNIT/ML
500 SOLUTION INTRAVENOUS AS NEEDED
Status: DISCONTINUED | OUTPATIENT
Start: 2021-08-17 | End: 2021-08-18 | Stop reason: HOSPADM

## 2021-08-17 RX ORDER — SODIUM CHLORIDE 9 MG/ML
250 INJECTION, SOLUTION INTRAVENOUS ONCE
Status: COMPLETED | OUTPATIENT
Start: 2021-08-17 | End: 2021-08-17

## 2021-08-17 RX ADMIN — IMMUNE GLOBULIN INFUSION (HUMAN) 10 G: 100 INJECTION, SOLUTION INTRAVENOUS; SUBCUTANEOUS at 12:20

## 2021-08-17 RX ADMIN — DIPHENHYDRAMINE HYDROCHLORIDE 25 MG: 25 CAPSULE ORAL at 11:57

## 2021-08-17 RX ADMIN — SODIUM CHLORIDE 250 ML: 9 INJECTION, SOLUTION INTRAVENOUS at 11:55

## 2021-08-17 RX ADMIN — HEPARIN SODIUM (PORCINE) LOCK FLUSH IV SOLN 100 UNIT/ML 500 UNITS: 100 SOLUTION at 14:03

## 2021-08-17 RX ADMIN — FAMOTIDINE 20 MG: 10 INJECTION INTRAVENOUS at 11:58

## 2021-08-17 RX ADMIN — SODIUM CHLORIDE, PRESERVATIVE FREE 20 ML: 5 INJECTION INTRAVENOUS at 14:03

## 2021-08-17 NOTE — PROGRESS NOTES
Patient  Francine Preciado    Location  [facility] HEMATOLOGY & ONCOLOGY    Chief Complaint  COMMON VARIABLE IMMUNODEFIENCY and Chemotherapy    Referring Provider: Josselin Montes De Oca MD PhD  PCP: Arnie Rucker MD    Subjective          Oncology/Hematology History    No history exists.       History of Present Illness  Patient comes in again today with her mom for her next cycle of IVIG.  She has had no significant health changes.  We did discuss that she got the Covid vaccine.    Review of Systems   Constitutional: Positive for fatigue. Negative for appetite change, diaphoresis, fever, unexpected weight gain and unexpected weight loss.   HENT: Negative for hearing loss, sore throat and voice change.    Eyes: Negative for blurred vision, double vision, pain, redness and visual disturbance.   Respiratory: Negative for cough, shortness of breath and wheezing.    Cardiovascular: Negative for chest pain, palpitations and leg swelling.   Endocrine: Negative for cold intolerance, heat intolerance, polydipsia and polyuria.   Genitourinary: Negative for decreased urine volume, difficulty urinating, frequency and urinary incontinence.   Musculoskeletal: Negative for arthralgias, back pain, joint swelling and myalgias.   Skin: Negative for color change, rash, skin lesions and bruise.   Neurological: Negative for dizziness, seizures, numbness and headache.   Hematological: Negative for adenopathy. Does not bruise/bleed easily.   Psychiatric/Behavioral: Negative for depressed mood. The patient is not nervous/anxious.        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    • Central perforation of tympanic membrane of right ear    • Chronic mastoiditis    • COVID-19 vaccine series completed    • Heart murmur    • Hypertension    • Kabuki make-up syndrome    • Mitral valve prolapse    • Mixed conductive and sensorineural hearing loss of both ears    • Otorrhea,  left    • Recurrent otitis media    • Skin disease     PSORIASIS/ECXEMA     Past Surgical History:   Procedure Laterality Date   • NO PAST SURGERIES       Social History     Socioeconomic History   • Marital status: Single     Spouse name: Not on file   • Number of children: Not on file   • Years of education: Not on file   • Highest education level: Not on file   Tobacco Use   • Smoking status: Current Every Day Smoker     Types: Cigarettes   • Smokeless tobacco: Never Used   Substance and Sexual Activity   • Alcohol use: Not Currently     Comment: DENIES   • Drug use: Never     Family History   Problem Relation Age of Onset   • Leukemia Father    • Heart disease Other        Objective   Physical Exam  General: Alert, cooperative, no acute distress  Eyes: Anicteric sclera, PERRLA  Respiratory: normal respiratory effort  Cardiovascular: no lower extremity edema  Skin: Normal tone, no rash, no lesions  Psychiatric: Appropriate affect, intact judgment  Neurologic: No focal sensory or motor deficits, normal cognition   Musculoskeletal: Normal muscle strength and tone  Extremities: No clubbing, cyanosis, or deformities    Vitals:    08/17/21 1048   BP: 113/51   Pulse: 92   Resp: 18   Temp: 98.2 °F (36.8 °C)   TempSrc: Temporal   SpO2: 97%   Weight: 61.8 kg (136 lb 3.9 oz)   PainSc: 0-No pain               PHQ-9 Total Score:         Result Review :   The following data was reviewed by: Josselin Montes De Oca MD PhD on 08/17/2021:  Lab Results   Component Value Date    HGB 11.3 (L) 08/13/2021    HCT 34.2 08/13/2021    MCV 93.2 08/13/2021     08/13/2021    WBC 9.86 08/13/2021    NEUTROABS 7.02 (H) 08/13/2021    LYMPHSABS 2.19 08/13/2021    MONOSABS 0.57 08/13/2021    EOSABS 0.03 08/13/2021    BASOSABS 0.03 08/13/2021     Lab Results   Component Value Date    GLUCOSE 70 08/10/2021    BUN 12 08/10/2021    CREATININE 1.09 (H) 08/10/2021     08/10/2021    K 4.4 08/10/2021     08/10/2021    CO2 22.4 08/10/2021     CALCIUM 9.6 08/10/2021    PROTEINTOT 7.2 08/10/2021    ALBUMIN 4.20 08/10/2021    BILITOT 0.6 08/10/2021    ALKPHOS 54 08/10/2021    AST 17 08/10/2021    ALT 12 08/10/2021          Assessment and Plan    Diagnoses and all orders for this visit:    1. CVID (common variable immunodeficiency) (CMS/HCC) (Primary)      I reviewed the patient's labs and there is no evidence of toxicity.  She will get her next dose of IVIG.  She will follow up in clinic every other treatment and will alternate appointments with our nurse practitioner Kori.    Patient was given instructions and counseling regarding her condition or for health maintenance advice. Please see specific information pulled into the AVS if appropriate.     Josseiln Montes De Oca MD PhD    8/30/2021

## 2021-08-24 ENCOUNTER — APPOINTMENT (OUTPATIENT)
Dept: ONCOLOGY | Facility: HOSPITAL | Age: 31
End: 2021-08-24

## 2021-09-14 ENCOUNTER — CLINICAL SUPPORT (OUTPATIENT)
Dept: FAMILY MEDICINE CLINIC | Facility: CLINIC | Age: 31
End: 2021-09-14

## 2021-09-14 DIAGNOSIS — E53.8 B12 DEFICIENCY: ICD-10-CM

## 2021-09-14 PROCEDURE — 96372 THER/PROPH/DIAG INJ SC/IM: CPT | Performed by: FAMILY MEDICINE

## 2021-09-14 RX ADMIN — CYANOCOBALAMIN 1000 MCG: 1000 INJECTION, SOLUTION INTRAMUSCULAR; SUBCUTANEOUS at 14:03

## 2021-09-24 ENCOUNTER — HOSPITAL ENCOUNTER (OUTPATIENT)
Dept: ONCOLOGY | Facility: HOSPITAL | Age: 31
Setting detail: INFUSION SERIES
Discharge: HOME | End: 2021-09-24

## 2021-09-24 DIAGNOSIS — D83.9 CVID (COMMON VARIABLE IMMUNODEFICIENCY) (HCC): Primary | ICD-10-CM

## 2021-09-24 DIAGNOSIS — Z45.2 ENCOUNTER FOR ADJUSTMENT OR MANAGEMENT OF VASCULAR ACCESS DEVICE: ICD-10-CM

## 2021-09-24 LAB
BASOPHILS # BLD AUTO: 0.01 10*3/MM3 (ref 0–0.2)
BASOPHILS NFR BLD AUTO: 0.1 % (ref 0–1.5)
DEPRECATED RDW RBC AUTO: 44.1 FL (ref 37–54)
EOSINOPHIL # BLD AUTO: 0.08 10*3/MM3 (ref 0–0.4)
EOSINOPHIL NFR BLD AUTO: 0.8 % (ref 0.3–6.2)
ERYTHROCYTE [DISTWIDTH] IN BLOOD BY AUTOMATED COUNT: 13.1 % (ref 12.3–15.4)
HCT VFR BLD AUTO: 34.2 % (ref 34–46.6)
HGB BLD-MCNC: 11.4 G/DL (ref 12–15.9)
IGA1 MFR SER: 17 MG/DL (ref 70–400)
IGG1 SER-MCNC: 503 MG/DL (ref 700–1600)
IGM SERPL-MCNC: 49 MG/DL (ref 40–230)
IMM GRANULOCYTES # BLD AUTO: 0.01 10*3/MM3 (ref 0–0.05)
IMM GRANULOCYTES NFR BLD AUTO: 0.1 % (ref 0–0.5)
LYMPHOCYTES # BLD AUTO: 1.82 10*3/MM3 (ref 0.7–3.1)
LYMPHOCYTES NFR BLD AUTO: 18.1 % (ref 19.6–45.3)
MCH RBC QN AUTO: 30.6 PG (ref 26.6–33)
MCHC RBC AUTO-ENTMCNC: 33.3 G/DL (ref 31.5–35.7)
MCV RBC AUTO: 91.7 FL (ref 79–97)
MONOCYTES # BLD AUTO: 0.56 10*3/MM3 (ref 0.1–0.9)
MONOCYTES NFR BLD AUTO: 5.6 % (ref 5–12)
NEUTROPHILS NFR BLD AUTO: 7.56 10*3/MM3 (ref 1.7–7)
NEUTROPHILS NFR BLD AUTO: 75.3 % (ref 42.7–76)
PLATELET # BLD AUTO: 289 10*3/MM3 (ref 140–450)
PMV BLD AUTO: 9.3 FL (ref 6–12)
RBC # BLD AUTO: 3.73 10*6/MM3 (ref 3.77–5.28)
WBC # BLD AUTO: 10.04 10*3/MM3 (ref 3.4–10.8)

## 2021-09-24 PROCEDURE — 85025 COMPLETE CBC W/AUTO DIFF WBC: CPT | Performed by: INTERNAL MEDICINE

## 2021-09-24 PROCEDURE — 82784 ASSAY IGA/IGD/IGG/IGM EACH: CPT | Performed by: INTERNAL MEDICINE

## 2021-09-24 PROCEDURE — 36591 DRAW BLOOD OFF VENOUS DEVICE: CPT

## 2021-09-24 PROCEDURE — 25010000002 HEPARIN LOCK FLUSH PER 10 UNITS: Performed by: INTERNAL MEDICINE

## 2021-09-24 RX ORDER — HEPARIN SODIUM (PORCINE) LOCK FLUSH IV SOLN 100 UNIT/ML 100 UNIT/ML
500 SOLUTION INTRAVENOUS AS NEEDED
Status: CANCELLED | OUTPATIENT
Start: 2021-09-24

## 2021-09-24 RX ORDER — SODIUM CHLORIDE 0.9 % (FLUSH) 0.9 %
20 SYRINGE (ML) INJECTION AS NEEDED
Status: CANCELLED | OUTPATIENT
Start: 2021-09-24

## 2021-09-24 RX ORDER — SODIUM CHLORIDE 0.9 % (FLUSH) 0.9 %
20 SYRINGE (ML) INJECTION AS NEEDED
Status: DISCONTINUED | OUTPATIENT
Start: 2021-09-24 | End: 2021-09-25 | Stop reason: HOSPADM

## 2021-09-24 RX ORDER — HEPARIN SODIUM (PORCINE) LOCK FLUSH IV SOLN 100 UNIT/ML 100 UNIT/ML
500 SOLUTION INTRAVENOUS AS NEEDED
Status: DISCONTINUED | OUTPATIENT
Start: 2021-09-24 | End: 2021-09-25 | Stop reason: HOSPADM

## 2021-09-24 RX ADMIN — SODIUM CHLORIDE, PRESERVATIVE FREE 20 ML: 5 INJECTION INTRAVENOUS at 11:43

## 2021-09-24 RX ADMIN — HEPARIN SODIUM (PORCINE) LOCK FLUSH IV SOLN 100 UNIT/ML 500 UNITS: 100 SOLUTION at 11:44

## 2021-09-28 ENCOUNTER — OFFICE VISIT (OUTPATIENT)
Dept: ONCOLOGY | Facility: HOSPITAL | Age: 31
End: 2021-09-28

## 2021-09-28 ENCOUNTER — HOSPITAL ENCOUNTER (OUTPATIENT)
Dept: ONCOLOGY | Facility: HOSPITAL | Age: 31
Setting detail: INFUSION SERIES
Discharge: HOME | End: 2021-09-28

## 2021-09-28 VITALS
BODY MASS INDEX: 31.82 KG/M2 | RESPIRATION RATE: 18 BRPM | SYSTOLIC BLOOD PRESSURE: 105 MMHG | DIASTOLIC BLOOD PRESSURE: 56 MMHG | HEART RATE: 74 BPM | TEMPERATURE: 96.9 F | WEIGHT: 136.91 LBS | OXYGEN SATURATION: 100 %

## 2021-09-28 VITALS
BODY MASS INDEX: 31.68 KG/M2 | SYSTOLIC BLOOD PRESSURE: 105 MMHG | DIASTOLIC BLOOD PRESSURE: 56 MMHG | RESPIRATION RATE: 18 BRPM | WEIGHT: 136.91 LBS | OXYGEN SATURATION: 100 % | TEMPERATURE: 96.9 F | HEIGHT: 55 IN | HEART RATE: 74 BPM

## 2021-09-28 DIAGNOSIS — D83.9 CVID (COMMON VARIABLE IMMUNODEFICIENCY) (HCC): Primary | ICD-10-CM

## 2021-09-28 DIAGNOSIS — Z45.2 ENCOUNTER FOR ADJUSTMENT OR MANAGEMENT OF VASCULAR ACCESS DEVICE: ICD-10-CM

## 2021-09-28 PROCEDURE — 25010000002 HEPARIN LOCK FLUSH PER 10 UNITS: Performed by: INTERNAL MEDICINE

## 2021-09-28 PROCEDURE — 63710000001 DIPHENHYDRAMINE PER 50 MG: Performed by: INTERNAL MEDICINE

## 2021-09-28 PROCEDURE — 96375 TX/PRO/DX INJ NEW DRUG ADDON: CPT

## 2021-09-28 PROCEDURE — 96365 THER/PROPH/DIAG IV INF INIT: CPT

## 2021-09-28 PROCEDURE — 25010000002 IMMUNE GLOBULIN (HUMAN) 10 GM/100ML SOLUTION: Performed by: INTERNAL MEDICINE

## 2021-09-28 PROCEDURE — 99213 OFFICE O/P EST LOW 20 MIN: CPT | Performed by: NURSE PRACTITIONER

## 2021-09-28 RX ORDER — HEPARIN SODIUM (PORCINE) LOCK FLUSH IV SOLN 100 UNIT/ML 100 UNIT/ML
500 SOLUTION INTRAVENOUS AS NEEDED
Status: DISCONTINUED | OUTPATIENT
Start: 2021-09-28 | End: 2021-09-29 | Stop reason: HOSPADM

## 2021-09-28 RX ORDER — DIPHENHYDRAMINE HYDROCHLORIDE 50 MG/ML
50 INJECTION INTRAMUSCULAR; INTRAVENOUS AS NEEDED
Status: CANCELLED | OUTPATIENT
Start: 2021-09-28

## 2021-09-28 RX ORDER — SODIUM CHLORIDE 9 MG/ML
250 INJECTION, SOLUTION INTRAVENOUS ONCE
Status: COMPLETED | OUTPATIENT
Start: 2021-09-28 | End: 2021-09-28

## 2021-09-28 RX ORDER — SODIUM CHLORIDE 0.9 % (FLUSH) 0.9 %
20 SYRINGE (ML) INJECTION AS NEEDED
Status: DISCONTINUED | OUTPATIENT
Start: 2021-09-28 | End: 2021-09-29 | Stop reason: HOSPADM

## 2021-09-28 RX ORDER — DIPHENHYDRAMINE HCL 25 MG
25 CAPSULE ORAL ONCE
Status: COMPLETED | OUTPATIENT
Start: 2021-09-28 | End: 2021-09-28

## 2021-09-28 RX ORDER — FAMOTIDINE 10 MG/ML
20 INJECTION, SOLUTION INTRAVENOUS AS NEEDED
Status: CANCELLED | OUTPATIENT
Start: 2021-09-28

## 2021-09-28 RX ORDER — HEPARIN SODIUM (PORCINE) LOCK FLUSH IV SOLN 100 UNIT/ML 100 UNIT/ML
500 SOLUTION INTRAVENOUS AS NEEDED
Status: CANCELLED | OUTPATIENT
Start: 2021-11-05

## 2021-09-28 RX ORDER — FAMOTIDINE 10 MG/ML
20 INJECTION, SOLUTION INTRAVENOUS ONCE
Status: COMPLETED | OUTPATIENT
Start: 2021-09-28 | End: 2021-09-28

## 2021-09-28 RX ORDER — SODIUM CHLORIDE 0.9 % (FLUSH) 0.9 %
20 SYRINGE (ML) INJECTION AS NEEDED
Status: CANCELLED | OUTPATIENT
Start: 2021-11-05

## 2021-09-28 RX ORDER — MEPERIDINE HYDROCHLORIDE 25 MG/ML
25 INJECTION INTRAMUSCULAR; INTRAVENOUS; SUBCUTANEOUS
Status: CANCELLED | OUTPATIENT
Start: 2021-09-28

## 2021-09-28 RX ADMIN — SODIUM CHLORIDE 250 ML: 9 INJECTION, SOLUTION INTRAVENOUS at 10:52

## 2021-09-28 RX ADMIN — IMMUNE GLOBULIN INFUSION (HUMAN) 10 G: 100 INJECTION, SOLUTION INTRAVENOUS; SUBCUTANEOUS at 11:10

## 2021-09-28 RX ADMIN — HEPARIN SODIUM (PORCINE) LOCK FLUSH IV SOLN 100 UNIT/ML 500 UNITS: 100 SOLUTION at 12:25

## 2021-09-28 RX ADMIN — FAMOTIDINE 20 MG: 10 INJECTION INTRAVENOUS at 11:01

## 2021-09-28 RX ADMIN — SODIUM CHLORIDE, PRESERVATIVE FREE 20 ML: 5 INJECTION INTRAVENOUS at 12:24

## 2021-09-28 RX ADMIN — DIPHENHYDRAMINE HYDROCHLORIDE 25 MG: 25 CAPSULE ORAL at 10:52

## 2021-09-28 NOTE — PROGRESS NOTES
Chief Complaint  COMMON VARIABLE IMMUNODEFICIENCY (-TRT FU)    Arnie Rucker MD Watson, Andrea, MD Subjective          Francine Preciado presents to [facility] HEMATOLOGY [facility] ONCOLOGY for CVID.     History of Present Illness     Ms. Francine Preciado presents with her mother for  IVIG infusion every 4-6 weeks.  Last infusion was on 8/17/21. She reports she is tolerating infusions well with no adverse side effects. She denies any fevers, chills or rash. She does report an inner ear infection for which she takes antibiotic ear drops for when this flares. She is also receiving B-12 injections at our facility monthly as well and last B-12 injection was on 9/14/21.     CBC completed on 9/24/21 with WBC 10.04, Hgb of 11.4, Platelet count of 289,000. IgG level of 503, IgA 17, IgM of 49. CMP done in 8/10/21 with acceptable renal function noted.       Oncology/Hematology History    No history exists.       Review of Systems   Constitutional: Negative.  Negative for appetite change, diaphoresis, fever, unexpected weight gain and unexpected weight loss.   HENT: Negative for hearing loss, sore throat and voice change.    Eyes: Negative for blurred vision, double vision, pain, redness and visual disturbance.   Respiratory: Negative for cough, shortness of breath and wheezing.    Cardiovascular: Negative for chest pain, palpitations and leg swelling.   Endocrine: Negative for cold intolerance, heat intolerance, polydipsia and polyuria.   Genitourinary: Negative for decreased urine volume, difficulty urinating, frequency and urinary incontinence.   Musculoskeletal: Negative for arthralgias, back pain, joint swelling and myalgias.   Skin: Negative for color change, rash, skin lesions and bruise.   Neurological: Negative for dizziness, seizures, numbness and headache.   Hematological: Negative for adenopathy. Does not bruise/bleed easily.   Psychiatric/Behavioral: Negative for depressed mood. The patient is not  nervous/anxious.    All other systems reviewed and are negative.      Current Outpatient Medications on File Prior to Visit   Medication Sig Dispense Refill   • atenolol (TENORMIN) 25 MG tablet atenolol 25 mg oral tablet TAKE ONE TABLET BY MOUTH DAILY 4/13/2021  Active     • atorvastatin (LIPITOR) 10 MG tablet      • ciprofloxacin-dexamethasone (Ciprodex) 0.3-0.1 % otic suspension 4 drops.     • EPINEPHrine (EpiPen 2-Jaswinder) 0.3 MG/0.3ML solution auto-injector injection 0.3 mL.     • fenofibrate 160 MG tablet      • Junel 1/20 1-20 MG-MCG per tablet      • levocetirizine (XYZAL) 5 MG tablet      • levothyroxine (SYNTHROID, LEVOTHROID) 50 MCG tablet      • montelukast (SINGULAIR) 10 MG tablet        Current Facility-Administered Medications on File Prior to Visit   Medication Dose Route Frequency Provider Last Rate Last Admin   • cyanocobalamin injection 1,000 mcg  1,000 mcg Intramuscular Q28 Days Arnie Rucker MD   1,000 mcg at 09/14/21 1403   • diphenhydrAMINE (BENADRYL) capsule 25 mg  25 mg Oral Once Josselin Montes De Oca MD PhD       • famotidine (PEPCID) injection 20 mg  20 mg Intravenous Once Josselin Montes De Oca MD PhD       • immune globulin (human) (GAMMAGARD) 10 g infusion 100 mL  10 g Intravenous Once Josselin Montes De Oca MD PhD       • sodium chloride 0.9 % infusion 250 mL  250 mL Intravenous Once Josselin Montes De Oca MD PhD           Allergies   Allergen Reactions   • Cefuroxime Axetil Unknown - Low Severity   • Neosporin [Bacitracin-Polymyxin B] Unknown - Low Severity     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    • Central perforation of tympanic membrane of right ear    • Chronic mastoiditis    • COVID-19 vaccine series completed    • Heart murmur    • Hypertension    • Kabuki make-up syndrome    • Mitral valve prolapse    • Mixed conductive and sensorineural hearing loss of both ears    • Otorrhea, left    • Recurrent otitis media    • Skin disease      PSORIASIS/ECXEMA     Past Surgical History:   Procedure Laterality Date   • NO PAST SURGERIES       Social History     Socioeconomic History   • Marital status: Single     Spouse name: Not on file   • Number of children: Not on file   • Years of education: Not on file   • Highest education level: Not on file   Tobacco Use   • Smoking status: Current Every Day Smoker     Types: Cigarettes   • Smokeless tobacco: Never Used   Substance and Sexual Activity   • Alcohol use: Not Currently     Comment: DENIES   • Drug use: Never     Family History   Problem Relation Age of Onset   • Leukemia Father    • Heart disease Other      Immunization History   Administered Date(s) Administered   • COVID-19 (PFIZER) 04/04/2021, 04/25/2021   • Flu Vaccine Quad PF >18YRS 11/04/2013, 11/02/2015, 10/19/2017, 10/29/2019   • Flu Vaccine Quad PF >36MO 11/04/2013, 10/24/2014, 11/02/2015, 10/13/2016, 10/19/2017   • Hep B, Adolescent or Pediatric 11/24/1998, 01/14/1999, 06/02/1999   • Influenza LAIV (Nasal) 10/13/2016, 10/14/2020   • Influenza TIV (IM) 09/17/2009, 11/07/2018   • Influenza, Unspecified 10/14/2020   • MMR 08/19/1996   • Pneumococcal Polysaccharide (PPSV23) 10/04/2012, 04/10/2014   • influenza Split 10/02/2018       Objective   Physical Exam  Vitals and nursing note reviewed.   Constitutional:       Appearance: She is normal weight.   HENT:      Head: Normocephalic.      Right Ear: Tympanic membrane normal.      Nose: Nose normal.      Mouth/Throat:      Mouth: Mucous membranes are moist.      Pharynx: Oropharynx is clear.   Eyes:      Extraocular Movements: Extraocular movements intact.      Conjunctiva/sclera: Conjunctivae normal.      Pupils: Pupils are equal, round, and reactive to light.   Cardiovascular:      Rate and Rhythm: Normal rate and regular rhythm.      Pulses: Normal pulses.      Heart sounds: Normal heart sounds. No murmur heard.     Pulmonary:      Effort: Pulmonary effort is normal. No respiratory distress.       Breath sounds: Normal breath sounds.   Abdominal:      General: Bowel sounds are normal. There is no distension.      Palpations: Abdomen is soft.   Musculoskeletal:         General: No swelling or tenderness. Normal range of motion.      Cervical back: Normal range of motion and neck supple.   Skin:     General: Skin is warm and dry.      Capillary Refill: Capillary refill takes less than 2 seconds.   Neurological:      Mental Status: She is alert and oriented to person, place, and time.      Cranial Nerves: No cranial nerve deficit.   Psychiatric:         Mood and Affect: Mood normal.         Behavior: Behavior normal.         Thought Content: Thought content normal.         Judgment: Judgment normal.         Vitals:    09/28/21 1000   BP: 105/56   Pulse: 74   Resp: 18   Temp: 96.9 °F (36.1 °C)   SpO2: 100%   Weight: 62.1 kg (136 lb 14.5 oz)   PainSc: 0-No pain     ECOG score: 1         ECOG: (0) Fully Active - Able to Carry On All Pre-disease Performance Without Restriction  Fall Risk Assessment was completed, and patient is at low risk for falls.  PHQ-9 Total Score:         The patient is not  experiencing fatigue. Fatigue score: 0    PT/OT Functional Screening: PT fx screen: No needs identified  Speech Functional Screening: Speech fx screen: No needs identified  Rehab to be ordered: Rehab to be ordered: No needs identified        Result Review :   The following data was reviewed by: NO Nur on 09/28/2021:  Lab Results   Component Value Date    HGB 11.4 (L) 09/24/2021    HCT 34.2 09/24/2021    MCV 91.7 09/24/2021     09/24/2021    WBC 10.04 09/24/2021    NEUTROABS 7.56 (H) 09/24/2021    LYMPHSABS 1.82 09/24/2021    MONOSABS 0.56 09/24/2021    EOSABS 0.08 09/24/2021    BASOSABS 0.01 09/24/2021     Lab Results   Component Value Date    GLUCOSE 70 08/10/2021    BUN 12 08/10/2021    CREATININE 1.09 (H) 08/10/2021     08/10/2021    K 4.4 08/10/2021     08/10/2021    CO2 22.4  08/10/2021    CALCIUM 9.6 08/10/2021    PROTEINTOT 7.2 08/10/2021    ALBUMIN 4.20 08/10/2021    BILITOT 0.6 08/10/2021    ALKPHOS 54 08/10/2021    AST 17 08/10/2021    ALT 12 08/10/2021          Assessment and Plan    Diagnoses and all orders for this visit:    1. CVID (common variable immunodeficiency) (HCC) (Primary)          Follow up as scheduled for IVIG infusions every  4-6 weeks.     Patient Follow Up:   Patient was given instructions and counseling regarding her condition or for health maintenance advice. Please see specific information pulled into the AVS if appropriate.     Belia Saez, APRN    9/28/2021

## 2021-09-28 NOTE — PROGRESS NOTES
Chief Complaint  COMMON VARIABLE IMMUNODEFICIENCY (-TRT FU)    Arnie Rucker MD Watson, Andrea, MD    Records Obtained:  Records of the patients history including those obtained from  *** were reviewed and summarized in detail.    Reason for referral: ***    Subjective     {Problem List  Visit Diagnosis   Encounters  Notes  Medications  Labs  Result Review Imaging  Media :23}     Francine Preciado presents to [facility] HEMATOLOGY & ONCOLOGY for ***  History of Present Illness    Cancer Staging  No matching staging information was found for the patient.     Treatment intent: {curative/palliative:72226}    Oncology/Hematology History    No history exists.       Review of Systems   Constitutional: Positive for fatigue. Negative for appetite change, diaphoresis, fever, unexpected weight gain and unexpected weight loss.   HENT: Negative for hearing loss, sore throat and voice change.    Eyes: Negative for blurred vision, double vision, pain, redness and visual disturbance.   Respiratory: Negative for cough, shortness of breath and wheezing.    Cardiovascular: Negative for chest pain, palpitations and leg swelling.   Endocrine: Negative for cold intolerance, heat intolerance, polydipsia and polyuria.   Genitourinary: Negative for decreased urine volume, difficulty urinating, frequency and urinary incontinence.   Musculoskeletal: Negative for arthralgias, back pain, joint swelling and myalgias.   Skin: Negative for color change, rash, skin lesions and bruise.   Neurological: Negative for dizziness, seizures, numbness and headache.   Hematological: Negative for adenopathy. Does not bruise/bleed easily.   Psychiatric/Behavioral: Negative for depressed mood. The patient is not nervous/anxious.        Current Outpatient Medications on File Prior to Visit   Medication Sig Dispense Refill   • atenolol (TENORMIN) 25 MG tablet atenolol 25 mg oral tablet TAKE ONE TABLET BY MOUTH DAILY 4/13/2021  Active      • atorvastatin (LIPITOR) 10 MG tablet      • ciprofloxacin-dexamethasone (Ciprodex) 0.3-0.1 % otic suspension 4 drops.     • EPINEPHrine (EpiPen 2-Jaswinder) 0.3 MG/0.3ML solution auto-injector injection 0.3 mL.     • fenofibrate 160 MG tablet      • Junel 1/20 1-20 MG-MCG per tablet      • levocetirizine (XYZAL) 5 MG tablet      • levothyroxine (SYNTHROID, LEVOTHROID) 50 MCG tablet      • montelukast (SINGULAIR) 10 MG tablet        Current Facility-Administered Medications on File Prior to Visit   Medication Dose Route Frequency Provider Last Rate Last Admin   • cyanocobalamin injection 1,000 mcg  1,000 mcg Intramuscular Q28 Days Arnie Rucker MD   1,000 mcg at 09/14/21 1403       Allergies   Allergen Reactions   • Cefuroxime Axetil Unknown - Low Severity   • Neosporin [Bacitracin-Polymyxin B] Unknown - Low Severity     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    • Central perforation of tympanic membrane of right ear    • Chronic mastoiditis    • COVID-19 vaccine series completed    • Heart murmur    • Hypertension    • Kabuki make-up syndrome    • Mitral valve prolapse    • Mixed conductive and sensorineural hearing loss of both ears    • Otorrhea, left    • Recurrent otitis media    • Skin disease     PSORIASIS/ECXEMA     Past Surgical History:   Procedure Laterality Date   • NO PAST SURGERIES       Social History     Socioeconomic History   • Marital status: Single     Spouse name: Not on file   • Number of children: Not on file   • Years of education: Not on file   • Highest education level: Not on file   Tobacco Use   • Smoking status: Current Every Day Smoker     Types: Cigarettes   • Smokeless tobacco: Never Used   Substance and Sexual Activity   • Alcohol use: Not Currently     Comment: DENIES   • Drug use: Never     Family History   Problem Relation Age of Onset   • Leukemia Father    • Heart disease Other      Immunization History   Administered  Date(s) Administered   • COVID-19 (PFIZER) 04/04/2021, 04/25/2021   • Flu Vaccine Quad PF >18YRS 11/04/2013, 11/02/2015, 10/19/2017, 10/29/2019   • Flu Vaccine Quad PF >36MO 11/04/2013, 10/24/2014, 11/02/2015, 10/13/2016, 10/19/2017   • Hep B, Adolescent or Pediatric 11/24/1998, 01/14/1999, 06/02/1999   • Influenza LAIV (Nasal) 10/13/2016, 10/14/2020   • Influenza TIV (IM) 09/17/2009, 11/07/2018   • Influenza, Unspecified 10/14/2020   • MMR 08/19/1996   • Pneumococcal Polysaccharide (PPSV23) 10/04/2012, 04/10/2014   • influenza Split 10/02/2018       Objective   Physical Exam    There were no vitals filed for this visit.  ECOG score: 1         ECOG: {findings; ecog performance status:11326}  Fall Risk Assessment was completed, and patient is at {LOW/MODERATE/HIGH:12096} risk for falls.  PHQ-9 Total Score:         The patient {is/is not:81213}  experiencing fatigue. Fatigue score: {0-10:08109}    PT/OT Functional Screening: {PT fx screen (Optional):94561}  Speech Functional Screening: {Speech fx screen (Optional):10534}  Rehab to be ordered: {Rehab to be ordered (Optional):65495}        Result Review :{Labs  Result Review  Imaging  Med Tab  Media  Procedures :23}   The following data was reviewed by: Calista Mark on 09/28/2021:  Lab Results   Component Value Date    HGB 11.4 (L) 09/24/2021    HCT 34.2 09/24/2021    MCV 91.7 09/24/2021     09/24/2021    WBC 10.04 09/24/2021    NEUTROABS 7.56 (H) 09/24/2021    LYMPHSABS 1.82 09/24/2021    MONOSABS 0.56 09/24/2021    EOSABS 0.08 09/24/2021    BASOSABS 0.01 09/24/2021     Lab Results   Component Value Date    GLUCOSE 70 08/10/2021    BUN 12 08/10/2021    CREATININE 1.09 (H) 08/10/2021     08/10/2021    K 4.4 08/10/2021     08/10/2021    CO2 22.4 08/10/2021    CALCIUM 9.6 08/10/2021    PROTEINTOT 7.2 08/10/2021    ALBUMIN 4.20 08/10/2021    BILITOT 0.6 08/10/2021    ALKPHOS 54 08/10/2021    AST 17 08/10/2021    ALT 12 08/10/2021           Assessment and Plan {CC Problem List  Visit Diagnosis   ROS  Review (Popup)  Health Maintenance  Quality  BestPractice  Medications  SmartSets  SnapShot Encounters  Media :23}   There are no diagnoses linked to this encounter.    {Time Spent (Optional):97654}    Patient Follow Up: ***  Patient was given instructions and counseling regarding her condition or for health maintenance advice. Please see specific information pulled into the AVS if appropriate.     Calista Mark    9/28/2021

## 2021-10-04 ENCOUNTER — TELEPHONE (OUTPATIENT)
Dept: FAMILY MEDICINE CLINIC | Facility: CLINIC | Age: 31
End: 2021-10-04

## 2021-10-04 PROCEDURE — 87635 SARS-COV-2 COVID-19 AMP PRB: CPT | Performed by: FAMILY MEDICINE

## 2021-10-04 NOTE — TELEPHONE ENCOUNTER
Caller: HEATHER FELTON    Relationship to patient: Mother    Best call back number: [phone]    Chief complaint: SINUS ISSUE AND DRAINAGE CAUSING SORE THROAT, AND INNER EAR INFECTION. AUTOIMMUNE DEFIENCY.  PATIENT WAS EXPOSED TO COVID OUTDOORS ON Thursday, September 30    Type of visit: SAME DAY OR  OFFICE VISIT    Requested date: TODAY OR ASAP     If rescheduling, when is the original appointment: Wednesday, October 6 WITH ASIM FUNES DUE TO LIMITED AVAILABILITY     Additional notes:    HUB WAS UNABLE TO WARM TRANSFER TO THE CLINICAL NUMBER.

## 2021-10-06 ENCOUNTER — TELEPHONE (OUTPATIENT)
Dept: ONCOLOGY | Facility: HOSPITAL | Age: 31
End: 2021-10-06

## 2021-10-06 ENCOUNTER — OFFICE VISIT (OUTPATIENT)
Dept: FAMILY MEDICINE CLINIC | Facility: CLINIC | Age: 31
End: 2021-10-06

## 2021-10-06 ENCOUNTER — TELEPHONE (OUTPATIENT)
Dept: FAMILY MEDICINE CLINIC | Facility: CLINIC | Age: 31
End: 2021-10-06

## 2021-10-06 VITALS
DIASTOLIC BLOOD PRESSURE: 76 MMHG | HEART RATE: 127 BPM | SYSTOLIC BLOOD PRESSURE: 134 MMHG | OXYGEN SATURATION: 99 % | RESPIRATION RATE: 16 BRPM | WEIGHT: 133.2 LBS | HEIGHT: 55 IN | TEMPERATURE: 97.5 F | BODY MASS INDEX: 30.83 KG/M2

## 2021-10-06 DIAGNOSIS — U07.1 COVID-19: ICD-10-CM

## 2021-10-06 DIAGNOSIS — H65.06 RECURRENT ACUTE SEROUS OTITIS MEDIA OF BOTH EARS: Primary | ICD-10-CM

## 2021-10-06 PROCEDURE — 99213 OFFICE O/P EST LOW 20 MIN: CPT | Performed by: NURSE PRACTITIONER

## 2021-10-06 RX ORDER — PREDNISONE 20 MG/1
40 TABLET ORAL DAILY
Qty: 8 TABLET | Refills: 0 | Status: SHIPPED | OUTPATIENT
Start: 2021-10-06 | End: 2022-05-25

## 2021-10-06 RX ORDER — AZITHROMYCIN 250 MG/1
TABLET, FILM COATED ORAL
Qty: 6 TABLET | Refills: 0 | Status: SHIPPED | OUTPATIENT
Start: 2021-10-06 | End: 2022-05-04

## 2021-10-06 NOTE — PROGRESS NOTES
Chief Complaint  Nasal Congestion (covid positive) and Earache    Subjective        Francine Preciado presents to [facility]  Throat and having a runny nose.  White drainage.  States inner ear infection.  Has a history of TM perforation.  Tested positive for Covid yesterday.        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     Surgical History: has a past surgical history that includes No past surgeries.     Family History: family history includes Heart disease in an other family member; Leukemia in her father.     Social History: reports that she has never smoked. She has never used smokeless tobacco. She reports previous alcohol use. She reports that she does not use drugs.    Allergies: Cefuroxime axetil and Neosporin [bacitracin-polymyxin b]          Current Outpatient Medications:   •  atenolol (TENORMIN) 25 MG tablet, atenolol 25 mg oral tablet TAKE ONE TABLET BY MOUTH DAILY 4/13/2021  Active, Disp: , Rfl:   •  atorvastatin (LIPITOR) 10 MG tablet, , Disp: , Rfl:   •  ciprofloxacin-dexamethasone (Ciprodex) 0.3-0.1 % otic suspension, 4 drops., Disp: , Rfl:   •  EPINEPHrine (EpiPen 2-Jaswinder) 0.3 MG/0.3ML solution auto-injector injection, 0.3 mL., Disp: , Rfl:   •  fenofibrate 160 MG tablet, , Disp: , Rfl:   •  Junel 1/20 1-20 MG-MCG per tablet, , Disp: , Rfl:   •  levocetirizine (XYZAL) 5 MG tablet, , Disp: , Rfl:   •  levothyroxine (SYNTHROID, LEVOTHROID) 50 MCG tablet, , Disp: , Rfl:   •  montelukast (SINGULAIR) 10 MG tablet, , Disp: , Rfl:   •  azithromycin (Zithromax Z-Jaswinder) 250 MG tablet, Take 2 tablets  "by mouth on day 1, then 1 tablet daily on days 2-5, Disp: 6 tablet, Rfl: 0  •  predniSONE (DELTASONE) 20 MG tablet, Take 2 tablets by mouth Daily., Disp: 8 tablet, Rfl: 0    Current Facility-Administered Medications:   •  cyanocobalamin injection 1,000 mcg, 1,000 mcg, Intramuscular, Q28 Days, Arnie Rucker MD, 1,000 mcg at 09/14/21 1403    There are no discontinued medications.      Review of Systems   Constitutional: Negative for fever.   Respiratory: Positive for shortness of breath. Negative for cough.    Cardiovascular: Negative for chest pain, palpitations and leg swelling.   Neurological: Negative for dizziness, weakness, numbness and headache.        Objective         Vitals:    10/06/21 0944   BP: 134/76   BP Location: Right arm   Patient Position: Sitting   Cuff Size: Adult   Pulse: (!) 127   Resp: 16   Temp: 97.5 °F (36.4 °C)   TempSrc: Infrared   SpO2: 99%   Weight: 60.4 kg (133 lb 3.2 oz)   Height: 139.7 cm (55\")     Body mass index is 30.96 kg/m².         Physical Exam  Vitals reviewed.   Constitutional:       Appearance: Normal appearance. She is well-developed.   HENT:      Head: Normocephalic and atraumatic.      Right Ear: Tympanic membrane is perforated.      Left Ear: Tympanic membrane is perforated.      Ears:      Comments: Milky drainage from TM bilaterally.     Mouth/Throat:      Pharynx: No oropharyngeal exudate.   Eyes:      Conjunctiva/sclera: Conjunctivae normal.      Pupils: Pupils are equal, round, and reactive to light.   Cardiovascular:      Rate and Rhythm: Normal rate and regular rhythm.      Heart sounds: No murmur heard.   No friction rub. No gallop.    Pulmonary:      Effort: Pulmonary effort is normal.      Breath sounds: Normal breath sounds. No wheezing or rhonchi.   Skin:     General: Skin is warm and dry.   Neurological:      Mental Status: She is alert and oriented to person, place, and time.   Psychiatric:         Mood and Affect: Mood and affect normal.         Behavior: " Behavior normal.         Thought Content: Thought content normal.         Judgment: Judgment normal.             Result Review :               Assessment and Plan     Diagnoses and all orders for this visit:    1. Recurrent acute serous otitis media of both ears (Primary)  -     azithromycin (Zithromax Z-Jaswinder) 250 MG tablet; Take 2 tablets by mouth on day 1, then 1 tablet daily on days 2-5  Dispense: 6 tablet; Refill: 0    2. COVID-19  -     azithromycin (Zithromax Z-Jaswinder) 250 MG tablet; Take 2 tablets by mouth on day 1, then 1 tablet daily on days 2-5  Dispense: 6 tablet; Refill: 0  -     predniSONE (DELTASONE) 20 MG tablet; Take 2 tablets by mouth Daily.  Dispense: 8 tablet; Refill: 0      Quarantine for 10 days.        Follow Up     Return if symptoms worsen or fail to improve.    Patient was given instructions and counseling regarding her condition or for health maintenance advice. Please see specific information pulled into the AVS if appropriate.

## 2021-10-06 NOTE — TELEPHONE ENCOUNTER
Patient's mother reports patient tested positive for COVID. She received IVIG last week and wanted to make sure if she neded anything else. Sarai has low grade fever, ear ache, sore throat. She has been in contact with PCP who prescribed antibiotics and steroids. Informed her to stay in contact with PCP for any new symptoms and go to the ER for shortness of breath. Mom voiced understanding.

## 2021-10-06 NOTE — TELEPHONE ENCOUNTER
Tried to leave a message for pt in regards to positive COVID test. Was unsuccessful. Called both numbers on file. Will try again in the morning.

## 2021-10-18 ENCOUNTER — CLINICAL SUPPORT (OUTPATIENT)
Dept: FAMILY MEDICINE CLINIC | Facility: CLINIC | Age: 31
End: 2021-10-18

## 2021-10-18 DIAGNOSIS — E53.8 B12 DEFICIENCY: Primary | ICD-10-CM

## 2021-10-18 PROCEDURE — 96372 THER/PROPH/DIAG INJ SC/IM: CPT | Performed by: FAMILY MEDICINE

## 2021-10-18 RX ADMIN — CYANOCOBALAMIN 1000 MCG: 1000 INJECTION, SOLUTION INTRAMUSCULAR; SUBCUTANEOUS at 15:14

## 2021-10-20 RX ORDER — NORETHINDRONE ACETATE AND ETHINYL ESTRADIOL 1; 20 MG/1; UG/1
TABLET ORAL
Qty: 21 TABLET | OUTPATIENT
Start: 2021-10-20

## 2021-10-22 ENCOUNTER — OFFICE VISIT (OUTPATIENT)
Dept: OBSTETRICS AND GYNECOLOGY | Facility: CLINIC | Age: 31
End: 2021-10-22

## 2021-10-22 VITALS
SYSTOLIC BLOOD PRESSURE: 110 MMHG | DIASTOLIC BLOOD PRESSURE: 74 MMHG | HEART RATE: 67 BPM | HEIGHT: 56 IN | BODY MASS INDEX: 29.92 KG/M2 | WEIGHT: 133 LBS

## 2021-10-22 DIAGNOSIS — Z01.419 WELL WOMAN EXAM: Primary | ICD-10-CM

## 2021-10-22 PROCEDURE — 99395 PREV VISIT EST AGE 18-39: CPT | Performed by: NURSE PRACTITIONER

## 2021-10-22 PROCEDURE — 88175 CYTOPATH C/V AUTO FLUID REDO: CPT | Performed by: NURSE PRACTITIONER

## 2021-10-22 PROCEDURE — 2014F MENTAL STATUS ASSESS: CPT | Performed by: NURSE PRACTITIONER

## 2021-10-22 PROCEDURE — 3008F BODY MASS INDEX DOCD: CPT | Performed by: NURSE PRACTITIONER

## 2021-10-22 PROCEDURE — G0123 SCREEN CERV/VAG THIN LAYER: HCPCS | Performed by: NURSE PRACTITIONER

## 2021-10-22 RX ORDER — NORETHINDRONE ACETATE AND ETHINYL ESTRADIOL 1; 20 MG/1; UG/1
1 TABLET ORAL DAILY
Qty: 21 TABLET | Refills: 11 | Status: SHIPPED | OUTPATIENT
Start: 2021-10-22 | End: 2022-08-29

## 2021-10-22 NOTE — PROGRESS NOTES
"  HPI:   31 y.o..Presents for well woman exam. Contraception or HRT: OCP (estrogen/progesterone)  Menses:   q month, lasts 3 days, changes products q 4-5 hrs on heaviest days.   Pain:  Mild, OTC meds control discomfort, midol improves  Last pap never had PAP dt intolerance pelvic exam, hx kabuki makeup syndrome, intellectual disability, failed attempt per Dr. Duong in the past and could not tolerate d/t discomfort. Not sexually active and never has been.    Birth control desires rf lele, taking form regulation of menses.       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    • Central perforation of tympanic membrane of right ear    • Chronic mastoiditis    • COVID-19 vaccine series completed    • Heart murmur    • Hypertension    • Kabuki make-up syndrome    • Mitral valve prolapse    • Mixed conductive and sensorineural hearing loss of both ears    • Otorrhea, left    • Recurrent otitis media    • Skin disease     PSORIASIS/ECXEMA      Past Surgical History:   Procedure Laterality Date   • MULTIPLE TOOTH EXTRACTIONS     • NO PAST SURGERIES        Family History   Problem Relation Age of Onset   • Leukemia Father    • Heart disease Other         PCP: does manage PMHx and preventative labs, needs PCP      PHYSICAL EXAM:  /74   Pulse 67   Ht 142.2 cm (56\")   Wt 60.3 kg (133 lb)   BMI 29.82 kg/m²   General- NAD, alert and oriented, appropriate  Psych- Normal mood, good memory  Neck- No masses, no thyroid enlargement  Lymphatic- No palpable neck, axillary, or groin nodes  CV- Regular rhythm, no murnurs  Resp- CTA to bases, no wheezes  Abdomen- Soft, non distended, non tender, no masses  Breast left-  Bilaterally symmetrical, no masses, non tender, no nipple discharge  Breast right- Bilaterally symmetrical, no masses, non tender, no nipple discharge  External genitalia- Normal female, no lesions  Urethra/meatus- Normal, no masses, non tender, no " prolapse  Bladder- Normal, no masses, non tender, no prolapse  Vagina- Normal, no atrophy, no lesions, no discharge, no prolapse  Cvx- Normal, no lesions, no discharge, No cervical motion tenderness, use of pediatric speculum  Uterus- Normal size, shape & consistency.  Non tender, mobile, & no prolapse  Adnexa- No mass, non tender  Anus/Rectum/Perineum- Not performed  Ext- No edema, no cyanosis    Skin- No lesions, no rashes, no acanthosis nigricans        ASSESSMENT and PLAN:  WWE    Diagnoses and all orders for this visit:    1. Well woman exam (Primary)  -     IGP,rfx Aptima HPV All Pth    Other orders  -     Junel 1/20 1-20 MG-MCG per tablet; Take 1 tablet by mouth Daily.  Dispense: 21 tablet; Refill: 11        Counseling:     All BC Options R/B/A/SE/E of each reviewed  OCP/Hormone use risk RACHELLE    Preventative:   BREAST HEALTH- Monthly self breast exam importance and how to reviewed. MMG and/or MRI (prn) reviewed per society guidelines and her individual history. Screen: Updated today.  CERVICAL CANCER Screening- Reviewed current ASCCP guidelines for screening w and wo cotest HPV, age specific.  Screen: Updated today.  COLON CANCER Screening- Reviewed current medical society guidelines and options.  Screen:  Not medically needed.  Follow up PCP/Specialist PMHx and Labs  Myriad: Does not qualify.      She understands the importance of having any ordered tests to be performed in a timely fashion.  The risks of not performing them include, but are not limited to, advanced cancer stages, bone loss from osteoporosis and/or subsequent increase in morbidity and/or mortality.  She is encouraged to review her results online and/or contact or office if she has questions.     Follow Up:  Return as needed.      Julianna Willingham, APRN  10/22/2021

## 2021-10-28 LAB
CONV .: NORMAL
CYTOLOGIST CVX/VAG CYTO: NORMAL
CYTOLOGY CVX/VAG DOC CYTO: NORMAL
CYTOLOGY CVX/VAG DOC THIN PREP: NORMAL
DX ICD CODE: NORMAL
HIV 1 & 2 AB SER-IMP: NORMAL
OTHER STN SPEC: NORMAL
STAT OF ADQ CVX/VAG CYTO-IMP: NORMAL

## 2021-11-05 ENCOUNTER — HOSPITAL ENCOUNTER (OUTPATIENT)
Dept: ONCOLOGY | Facility: HOSPITAL | Age: 31
Setting detail: INFUSION SERIES
Discharge: HOME | End: 2021-11-05

## 2021-11-05 ENCOUNTER — OFFICE VISIT (OUTPATIENT)
Dept: OTOLARYNGOLOGY | Facility: CLINIC | Age: 31
End: 2021-11-05

## 2021-11-05 VITALS — WEIGHT: 134.8 LBS | HEIGHT: 56 IN | TEMPERATURE: 97.1 F | BODY MASS INDEX: 30.32 KG/M2

## 2021-11-05 DIAGNOSIS — H72.92 TYMPANIC MEMBRANE PERFORATION, LEFT: ICD-10-CM

## 2021-11-05 DIAGNOSIS — D83.9 CVID (COMMON VARIABLE IMMUNODEFICIENCY) (HCC): Primary | ICD-10-CM

## 2021-11-05 DIAGNOSIS — H90.6 MIXED HEARING LOSS, BILATERAL: Primary | ICD-10-CM

## 2021-11-05 DIAGNOSIS — Z45.2 ENCOUNTER FOR ADJUSTMENT OR MANAGEMENT OF VASCULAR ACCESS DEVICE: ICD-10-CM

## 2021-11-05 DIAGNOSIS — J30.9 ALLERGIC RHINITIS, UNSPECIFIED SEASONALITY, UNSPECIFIED TRIGGER: ICD-10-CM

## 2021-11-05 DIAGNOSIS — H70.11 CHRONIC MASTOIDITIS OF RIGHT SIDE: ICD-10-CM

## 2021-11-05 LAB
BASOPHILS # BLD AUTO: 0.06 10*3/MM3 (ref 0–0.2)
BASOPHILS NFR BLD AUTO: 0.7 % (ref 0–1.5)
DEPRECATED RDW RBC AUTO: 47.8 FL (ref 37–54)
EOSINOPHIL # BLD AUTO: 0.09 10*3/MM3 (ref 0–0.4)
EOSINOPHIL NFR BLD AUTO: 1 % (ref 0.3–6.2)
ERYTHROCYTE [DISTWIDTH] IN BLOOD BY AUTOMATED COUNT: 14.4 % (ref 12.3–15.4)
HCT VFR BLD AUTO: 34.6 % (ref 34–46.6)
HGB BLD-MCNC: 11.5 G/DL (ref 12–15.9)
IGA1 MFR SER: 20 MG/DL (ref 70–400)
IGG1 SER-MCNC: 514 MG/DL (ref 700–1600)
IGM SERPL-MCNC: 56 MG/DL (ref 40–230)
IMM GRANULOCYTES # BLD AUTO: 0.03 10*3/MM3 (ref 0–0.05)
IMM GRANULOCYTES NFR BLD AUTO: 0.3 % (ref 0–0.5)
LYMPHOCYTES # BLD AUTO: 2.57 10*3/MM3 (ref 0.7–3.1)
LYMPHOCYTES NFR BLD AUTO: 28.5 % (ref 19.6–45.3)
MCH RBC QN AUTO: 30 PG (ref 26.6–33)
MCHC RBC AUTO-ENTMCNC: 33.2 G/DL (ref 31.5–35.7)
MCV RBC AUTO: 90.3 FL (ref 79–97)
MONOCYTES # BLD AUTO: 0.68 10*3/MM3 (ref 0.1–0.9)
MONOCYTES NFR BLD AUTO: 7.5 % (ref 5–12)
NEUTROPHILS NFR BLD AUTO: 5.58 10*3/MM3 (ref 1.7–7)
NEUTROPHILS NFR BLD AUTO: 62 % (ref 42.7–76)
NRBC BLD AUTO-RTO: 0 /100 WBC (ref 0–0.2)
PLATELET # BLD AUTO: 291 10*3/MM3 (ref 140–450)
PMV BLD AUTO: 9.1 FL (ref 6–12)
RBC # BLD AUTO: 3.83 10*6/MM3 (ref 3.77–5.28)
WBC # BLD AUTO: 9.01 10*3/MM3 (ref 3.4–10.8)

## 2021-11-05 PROCEDURE — 69220 CLEAN OUT MASTOID CAVITY: CPT | Performed by: OTOLARYNGOLOGY

## 2021-11-05 PROCEDURE — 25010000002 HEPARIN LOCK FLUSH PER 10 UNITS: Performed by: INTERNAL MEDICINE

## 2021-11-05 PROCEDURE — 36591 DRAW BLOOD OFF VENOUS DEVICE: CPT

## 2021-11-05 PROCEDURE — 85025 COMPLETE CBC W/AUTO DIFF WBC: CPT | Performed by: INTERNAL MEDICINE

## 2021-11-05 PROCEDURE — 99213 OFFICE O/P EST LOW 20 MIN: CPT | Performed by: OTOLARYNGOLOGY

## 2021-11-05 PROCEDURE — 82784 ASSAY IGA/IGD/IGG/IGM EACH: CPT | Performed by: INTERNAL MEDICINE

## 2021-11-05 RX ORDER — FLUTICASONE PROPIONATE 50 MCG
2 SPRAY, SUSPENSION (ML) NASAL DAILY
Qty: 16 G | Refills: 11 | Status: SHIPPED | OUTPATIENT
Start: 2021-11-05

## 2021-11-05 RX ORDER — HEPARIN SODIUM (PORCINE) LOCK FLUSH IV SOLN 100 UNIT/ML 100 UNIT/ML
500 SOLUTION INTRAVENOUS AS NEEDED
Status: DISCONTINUED | OUTPATIENT
Start: 2021-11-05 | End: 2021-11-06 | Stop reason: HOSPADM

## 2021-11-05 RX ORDER — SODIUM CHLORIDE 0.9 % (FLUSH) 0.9 %
20 SYRINGE (ML) INJECTION AS NEEDED
Status: CANCELLED | OUTPATIENT
Start: 2021-11-09

## 2021-11-05 RX ORDER — SODIUM CHLORIDE 0.9 % (FLUSH) 0.9 %
20 SYRINGE (ML) INJECTION AS NEEDED
Status: DISCONTINUED | OUTPATIENT
Start: 2021-11-05 | End: 2021-11-06 | Stop reason: HOSPADM

## 2021-11-05 RX ORDER — HEPARIN SODIUM (PORCINE) LOCK FLUSH IV SOLN 100 UNIT/ML 100 UNIT/ML
500 SOLUTION INTRAVENOUS AS NEEDED
Status: CANCELLED | OUTPATIENT
Start: 2021-11-09

## 2021-11-05 RX ORDER — CIPROFLOXACIN AND DEXAMETHASONE 3; 1 MG/ML; MG/ML
4 SUSPENSION/ DROPS AURICULAR (OTIC) 2 TIMES DAILY
Qty: 7.5 ML | Refills: 2 | Status: SHIPPED | OUTPATIENT
Start: 2021-11-05 | End: 2021-11-12

## 2021-11-05 RX ADMIN — SODIUM CHLORIDE, PRESERVATIVE FREE 20 ML: 5 INJECTION INTRAVENOUS at 09:30

## 2021-11-05 RX ADMIN — HEPARIN SODIUM (PORCINE) LOCK FLUSH IV SOLN 100 UNIT/ML 500 UNITS: 100 SOLUTION at 09:30

## 2021-11-05 NOTE — PROGRESS NOTES
Patient Name: Francine Preciado   Visit Date: 11/05/2021   Patient ID: [id number]  Provider: Jose Elias Jorgensen MD    Sex: female  Location: [facility]   YOB: 1990  Location Address: [address] [place] [address], [address],?[place]?[id number]    Primary Care Provider Arnie Rucker MD  Location Phone: [phone]    Referring Provider: No ref. provider found        Chief Complaint  Ear Problem    History of Present Illness  Francine Preciado is a 31 y.o. 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 She underwent an audiogram on 5/11/17 which revealed bilateral moderate to profound mixed hearing loss. Speech discrimination was 100% bilaterally at 90 dB. Tympanograms were type B bilaterally.  She wears bilateral behind-the-ear hearing aids.    She returns today for routine follow-up.  She was last seen on 5/5/2021 at which time her right mastoid cavity was debrided on the left tympanic membrane perforation was stable.    Tells me that since her last visit she has experienced Covid infection but did fairly well.  She mentions that her right ear has been aching a bit recently.  She has not had any issues with otorrhea.  Her mom mentions that she has experienced nasal congestion and rhinorrhea lately which Xyzal and Singulair have not been helping.    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    • Central perforation of tympanic membrane of right ear    • Chronic mastoiditis    • COVID-19 vaccine series completed    • Heart murmur    • Hypertension    • Kabuki make-up syndrome    • Mitral valve prolapse    • Mixed conductive and sensorineural hearing loss of both ears    • Otorrhea, left    • Recurrent otitis media    • Skin disease     PSORIASIS/ECXEMA        Past Surgical History:   Procedure Laterality Date   • MULTIPLE TOOTH EXTRACTIONS     • NO PAST SURGERIES           Current Outpatient Medications:   •  atenolol (TENORMIN) 25 MG tablet, atenolol 25 mg oral tablet TAKE ONE TABLET BY MOUTH DAILY 4/13/2021  Active, Disp: , Rfl:   •  atorvastatin (LIPITOR) 10 MG tablet, , Disp: , Rfl:   •  ciprofloxacin-dexamethasone (Ciprodex) 0.3-0.1 % otic suspension, 4 drops., Disp: , Rfl:   •  EPINEPHrine (EpiPen 2-Jaswinder) 0.3 MG/0.3ML solution auto-injector injection, 0.3 mL., Disp: , Rfl:   •  fenofibrate 160 MG tablet, , Disp: , Rfl:   •  Junel 1/20 1-20 MG-MCG per tablet, Take 1 tablet by mouth Daily., Disp: 21 tablet, Rfl: 11  •  levocetirizine (XYZAL) 5 MG tablet, , Disp: , Rfl:   •  levothyroxine (SYNTHROID, LEVOTHROID) 50 MCG tablet, , Disp: , Rfl:   •  montelukast (SINGULAIR) 10 MG tablet, , Disp: , Rfl:   •  azithromycin (Zithromax Z-Jaswinder) 250 MG tablet, Take 2 tablets by mouth on day 1, then 1 tablet daily on days 2-5, Disp: 6 tablet, Rfl: 0  •  fluticasone (FLONASE) 50 MCG/ACT nasal spray, 2 sprays into the nostril(s) as directed by provider Daily., Disp: 16 g, Rfl: 11  •  predniSONE (DELTASONE) 20 MG tablet, Take 2 tablets by mouth Daily., Disp: 8 tablet, Rfl: 0  No current facility-administered medications for this visit.    Facility-Administered Medications Ordered in Other Visits:   •  heparin injection 500 Units, 500 Units, Intravenous, PRN, Josselin Montes De Oca MD PhD, 500 Units at 11/05/21 0930  •  sodium chloride 0.9 % flush 20 mL, 20 mL, Intravenous, PRNFalguni Natalie W, MD PhD, 20 mL at 11/05/21 0930     Allergies   Allergen Reactions   • Cefuroxime Axetil Unknown - Low Severity   • Neosporin [Bacitracin-Polymyxin B] Unknown - Low Severity       Social History     Tobacco Use   • Smoking status: Passive Smoke Exposure - Never Smoker   • Smokeless tobacco: Never Used   • Tobacco comment: Daily exposure to 2nd hand smoke   Vaping Use   • Vaping Use: Never  "used   Substance Use Topics   • Alcohol use: Not Currently     Comment: DENIES   • Drug use: Never        Objective     Vital Signs:   Temp 97.1 °F (36.2 °C) (Temporal)   Ht 142.2 cm (56\")   Wt 61.1 kg (134 lb 12.8 oz)   BMI 30.22 kg/m²       Physical Exam    General: Well developed, well nourished patient of stated age in no acute distress. Voice is strong and clear.   Head: Normocephalic and atraumatic.  Face: No lesions.  Bilateral parotid and submandibular glands are unremarkable.  Stensen's and Warthin's ducts are productive of clear saliva bilaterally.  House-Brackmann I/VI     bilaterally.   muscles and temporomandibular joint nontender to palpation.  No TMJ crepitus.  Eyes: PERRLA, sclerae anicteric, no conjunctival injection. Extra ocular movements are intact and full. No nystagmus.   Ears: Auricles are normal in appearance.  Left external auditory canal is unremarkable.  Left tympanic membrane with a stable dry perforation.  Right mastoid cavity with ceruminous and squamous debris present.  This was debrided using the operating microscope and a combination of #7 suction and curette.  Hearing normal to conversational voice.   Nose: External nose is normal in appearance. Bilateral nares are patent with normal appearing mucosa. Septum midline. Turbinates are unremarkable. No lesions.   Oral Cavity: Lips are normal in appearance. Oral mucosa is unremarkable. Gingiva is unremarkable.  Partial dentition for age. Tongue is unremarkable with good movement. Hard palate is unremarkable.   Oropharynx: Soft palate is unremarkable with full movement. Uvula is unremarkable. Bilateral tonsils are unremarkable. Posterior oropharynx is unremarkable.    Larynx and hypopharynx: Deferred secondary to gag reflex.  Neck: Supple.  No mass.  Nontender to palpation.  Trachea midline. Thyroid normal size and without nodules to palpation.   Lymphatic: No lymphadenopathy upon palpation.   Psychiatric: Appropriate affect, " cooperative   Neurologic: Oriented x 3, strength symmetric in all extremities, Cranial Nerves II-XII are grossly intact to confrontation   Skin: Warm and dry. No rashes.    Procedures           Result Review :               Assessment and Plan    Diagnoses and all orders for this visit:    1. Mixed hearing loss, bilateral (Primary)    2. Tympanic membrane perforation, left    3. Chronic mastoiditis of right side    4. Allergic rhinitis, unspecified seasonality, unspecified trigger  -     fluticasone (FLONASE) 50 MCG/ACT nasal spray; 2 sprays into the nostril(s) as directed by provider Daily.  Dispense: 16 g; Refill: 11      Impressions and findings were discussed.  Currently, she has been experiencing some right-sided otalgia and her mastoid cavity with some buildup of ceruminous and squamous debris on examination today.  This was debrided successfully.  Her left tympanic membrane perforation is dry.  She will be placed on Flonase for her rhinorrhea and I will send him any more drops to use on an as-needed basis for her ear.  She will follow up in 6 months or sooner if needed.      Follow Up   Return in about 6 months (around 5/5/2022).  Patient was given instructions and counseling regarding her condition or for health maintenance advice. Please see specific information pulled into the AVS if appropriate.

## 2021-11-08 RX ORDER — DIPHENHYDRAMINE HYDROCHLORIDE 50 MG/ML
50 INJECTION INTRAMUSCULAR; INTRAVENOUS AS NEEDED
Status: CANCELLED | OUTPATIENT
Start: 2021-11-09

## 2021-11-08 RX ORDER — FAMOTIDINE 10 MG/ML
20 INJECTION, SOLUTION INTRAVENOUS AS NEEDED
Status: CANCELLED | OUTPATIENT
Start: 2021-11-09

## 2021-11-08 RX ORDER — MEPERIDINE HYDROCHLORIDE 25 MG/ML
25 INJECTION INTRAMUSCULAR; INTRAVENOUS; SUBCUTANEOUS
Status: CANCELLED | OUTPATIENT
Start: 2021-11-09

## 2021-11-09 ENCOUNTER — OFFICE VISIT (OUTPATIENT)
Dept: ONCOLOGY | Facility: HOSPITAL | Age: 31
End: 2021-11-09

## 2021-11-09 ENCOUNTER — HOSPITAL ENCOUNTER (OUTPATIENT)
Dept: ONCOLOGY | Facility: HOSPITAL | Age: 31
Setting detail: INFUSION SERIES
Discharge: HOME | End: 2021-11-09

## 2021-11-09 ENCOUNTER — APPOINTMENT (OUTPATIENT)
Dept: ONCOLOGY | Facility: HOSPITAL | Age: 31
End: 2021-11-09

## 2021-11-09 VITALS
SYSTOLIC BLOOD PRESSURE: 123 MMHG | BODY MASS INDEX: 30 KG/M2 | RESPIRATION RATE: 18 BRPM | HEART RATE: 80 BPM | WEIGHT: 133.82 LBS | DIASTOLIC BLOOD PRESSURE: 62 MMHG | OXYGEN SATURATION: 100 % | TEMPERATURE: 98.7 F

## 2021-11-09 DIAGNOSIS — Z45.2 ENCOUNTER FOR ADJUSTMENT OR MANAGEMENT OF VASCULAR ACCESS DEVICE: Primary | ICD-10-CM

## 2021-11-09 DIAGNOSIS — D83.9 CVID (COMMON VARIABLE IMMUNODEFICIENCY) (HCC): Primary | ICD-10-CM

## 2021-11-09 DIAGNOSIS — D83.9 CVID (COMMON VARIABLE IMMUNODEFICIENCY) (HCC): ICD-10-CM

## 2021-11-09 PROCEDURE — 96365 THER/PROPH/DIAG IV INF INIT: CPT

## 2021-11-09 PROCEDURE — 96375 TX/PRO/DX INJ NEW DRUG ADDON: CPT

## 2021-11-09 PROCEDURE — 25010000002 HEPARIN LOCK FLUSH PER 10 UNITS: Performed by: INTERNAL MEDICINE

## 2021-11-09 PROCEDURE — 25010000002 IMMUNE GLOBULIN (HUMAN) 10 GM/100ML SOLUTION: Performed by: INTERNAL MEDICINE

## 2021-11-09 PROCEDURE — 63710000001 DIPHENHYDRAMINE PER 50 MG: Performed by: INTERNAL MEDICINE

## 2021-11-09 PROCEDURE — 99213 OFFICE O/P EST LOW 20 MIN: CPT | Performed by: INTERNAL MEDICINE

## 2021-11-09 RX ORDER — SODIUM CHLORIDE 9 MG/ML
250 INJECTION, SOLUTION INTRAVENOUS ONCE
Status: COMPLETED | OUTPATIENT
Start: 2021-11-09 | End: 2021-11-09

## 2021-11-09 RX ORDER — SODIUM CHLORIDE 0.9 % (FLUSH) 0.9 %
20 SYRINGE (ML) INJECTION AS NEEDED
Status: CANCELLED | OUTPATIENT
Start: 2021-12-17

## 2021-11-09 RX ORDER — HEPARIN SODIUM (PORCINE) LOCK FLUSH IV SOLN 100 UNIT/ML 100 UNIT/ML
500 SOLUTION INTRAVENOUS AS NEEDED
Status: DISCONTINUED | OUTPATIENT
Start: 2021-11-09 | End: 2021-11-10 | Stop reason: HOSPADM

## 2021-11-09 RX ORDER — FAMOTIDINE 10 MG/ML
20 INJECTION, SOLUTION INTRAVENOUS ONCE
Status: COMPLETED | OUTPATIENT
Start: 2021-11-09 | End: 2021-11-09

## 2021-11-09 RX ORDER — SODIUM CHLORIDE 0.9 % (FLUSH) 0.9 %
20 SYRINGE (ML) INJECTION AS NEEDED
Status: DISCONTINUED | OUTPATIENT
Start: 2021-11-09 | End: 2021-11-10 | Stop reason: HOSPADM

## 2021-11-09 RX ORDER — DIPHENHYDRAMINE HCL 25 MG
25 CAPSULE ORAL ONCE
Status: COMPLETED | OUTPATIENT
Start: 2021-11-09 | End: 2021-11-09

## 2021-11-09 RX ORDER — HEPARIN SODIUM (PORCINE) LOCK FLUSH IV SOLN 100 UNIT/ML 100 UNIT/ML
500 SOLUTION INTRAVENOUS AS NEEDED
Status: CANCELLED | OUTPATIENT
Start: 2021-12-17

## 2021-11-09 RX ADMIN — IMMUNE GLOBULIN INFUSION (HUMAN) 10 G: 100 INJECTION, SOLUTION INTRAVENOUS; SUBCUTANEOUS at 10:54

## 2021-11-09 RX ADMIN — FAMOTIDINE 20 MG: 10 INJECTION INTRAVENOUS at 10:42

## 2021-11-09 RX ADMIN — DIPHENHYDRAMINE HYDROCHLORIDE 25 MG: 25 CAPSULE ORAL at 10:42

## 2021-11-09 RX ADMIN — HEPARIN SODIUM (PORCINE) LOCK FLUSH IV SOLN 100 UNIT/ML 500 UNITS: 100 SOLUTION at 12:07

## 2021-11-09 RX ADMIN — SODIUM CHLORIDE, PRESERVATIVE FREE 20 ML: 5 INJECTION INTRAVENOUS at 12:07

## 2021-11-09 RX ADMIN — SODIUM CHLORIDE 250 ML: 9 INJECTION, SOLUTION INTRAVENOUS at 10:37

## 2021-11-11 NOTE — PROGRESS NOTES
Patient  Francine Preciado    Location  [facility]    Chief Complaint  No chief complaint on file.    Referring Provider: Arnie Rucker MD  PCP: Arnie Rucker MD    Subjective          Oncology/Hematology History Overview Note   CVID:   -Diagnosed by Immunologist 8/20/12  -presented with frequent/severe infections             History of Present Illness  Patient comes in today for her next cycle of IVIG.  She continues to tolerate it well and has no new complaints.  I noticed in the record that she was recently diagnosed with Covid.  The patient and her mother state she did very well.  She does not have any residual shortness of breath or other side effects.  She denies any other recent infections.    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    • Central perforation of tympanic membrane of right ear    • Chronic mastoiditis    • COVID-19 vaccine series completed    • Heart murmur    • Hypertension    • Kabuki make-up syndrome    • Mitral valve prolapse    • Mixed conductive and sensorineural hearing loss of both ears    • Otorrhea, left    • Recurrent otitis media    • Skin disease     PSORIASIS/ECXEMA     Past Surgical History:   Procedure Laterality Date   • MULTIPLE TOOTH EXTRACTIONS     • NO PAST SURGERIES       Social History     Socioeconomic History   • Marital status: Single   Tobacco Use   • Smoking status: Passive Smoke Exposure - Never Smoker   • Smokeless tobacco: Never Used   • Tobacco comment: Daily exposure to 2nd hand smoke   Vaping Use   • Vaping Use: Never used   Substance and Sexual Activity   • Alcohol use: Not Currently     Comment: DENIES   • Drug use: Never   • Sexual activity: Never     Family History   Problem Relation Age of Onset   • Leukemia Father    • Heart disease Other        Objective   Physical Exam  General: Alert, cooperative, no acute distress  Eyes: Anicteric  sclera, PERRLA  Respiratory: normal respiratory effort  Cardiovascular: no lower extremity edema  Skin: Normal tone, no rash, no lesions  Psychiatric: Appropriate affect, intact judgment  Neurologic: No focal sensory or motor deficits, normal cognition   Musculoskeletal: Normal muscle strength and tone  Extremities: No clubbing, cyanosis, or deformities    There were no vitals filed for this visit.            PHQ-9 Total Score:         Result Review :   The following data was reviewed by: Josselin Montes De Oca MD PhD on 11/09/2021:  Lab Results   Component Value Date    HGB 11.5 (L) 11/05/2021    HCT 34.6 11/05/2021    MCV 90.3 11/05/2021     11/05/2021    WBC 9.01 11/05/2021    NEUTROABS 5.58 11/05/2021    LYMPHSABS 2.57 11/05/2021    MONOSABS 0.68 11/05/2021    EOSABS 0.09 11/05/2021    BASOSABS 0.06 11/05/2021     Lab Results   Component Value Date    GLUCOSE 70 08/10/2021    BUN 12 08/10/2021    CREATININE 1.09 (H) 08/10/2021     08/10/2021    K 4.4 08/10/2021     08/10/2021    CO2 22.4 08/10/2021    CALCIUM 9.6 08/10/2021    PROTEINTOT 7.2 08/10/2021    ALBUMIN 4.20 08/10/2021    BILITOT 0.6 08/10/2021    ALKPHOS 54 08/10/2021    AST 17 08/10/2021    ALT 12 08/10/2021          Assessment and Plan    Diagnoses and all orders for this visit:    1. CVID (common variable immunodeficiency) (HCC) (Primary)      Patient's labs remained stable.  Continue IVIG as planned.    Patient was given instructions and counseling regarding her condition or for health maintenance advice. Please see specific information pulled into the AVS if appropriate.     Josselin Montes De Oca MD PhD    11/10/2021

## 2021-11-23 ENCOUNTER — CLINICAL SUPPORT (OUTPATIENT)
Dept: FAMILY MEDICINE CLINIC | Facility: CLINIC | Age: 31
End: 2021-11-23

## 2021-11-23 DIAGNOSIS — R53.83 FATIGUE, UNSPECIFIED TYPE: ICD-10-CM

## 2021-11-23 DIAGNOSIS — Z23 NEED FOR INFLUENZA VACCINATION: Primary | ICD-10-CM

## 2021-11-23 PROCEDURE — 96372 THER/PROPH/DIAG INJ SC/IM: CPT | Performed by: FAMILY MEDICINE

## 2021-11-23 PROCEDURE — G0008 ADMIN INFLUENZA VIRUS VAC: HCPCS | Performed by: FAMILY MEDICINE

## 2021-11-23 PROCEDURE — 90686 IIV4 VACC NO PRSV 0.5 ML IM: CPT | Performed by: FAMILY MEDICINE

## 2021-11-23 RX ORDER — CYANOCOBALAMIN 1000 UG/ML
1000 INJECTION, SOLUTION INTRAMUSCULAR; SUBCUTANEOUS
Status: SHIPPED | OUTPATIENT
Start: 2021-11-23

## 2021-11-23 RX ADMIN — CYANOCOBALAMIN 1000 MCG: 1000 INJECTION, SOLUTION INTRAMUSCULAR; SUBCUTANEOUS at 15:57

## 2021-12-17 ENCOUNTER — HOSPITAL ENCOUNTER (OUTPATIENT)
Dept: ONCOLOGY | Facility: HOSPITAL | Age: 31
Setting detail: INFUSION SERIES
Discharge: HOME | End: 2021-12-17

## 2021-12-17 DIAGNOSIS — Z45.2 ENCOUNTER FOR ADJUSTMENT OR MANAGEMENT OF VASCULAR ACCESS DEVICE: ICD-10-CM

## 2021-12-17 DIAGNOSIS — D83.9 CVID (COMMON VARIABLE IMMUNODEFICIENCY) (HCC): Primary | ICD-10-CM

## 2021-12-17 LAB
BASOPHILS # BLD AUTO: 0.02 10*3/MM3 (ref 0–0.2)
BASOPHILS NFR BLD AUTO: 0.2 % (ref 0–1.5)
DEPRECATED RDW RBC AUTO: 47.3 FL (ref 37–54)
EOSINOPHIL # BLD AUTO: 0.05 10*3/MM3 (ref 0–0.4)
EOSINOPHIL NFR BLD AUTO: 0.5 % (ref 0.3–6.2)
ERYTHROCYTE [DISTWIDTH] IN BLOOD BY AUTOMATED COUNT: 14 % (ref 12.3–15.4)
HCT VFR BLD AUTO: 39 % (ref 34–46.6)
HGB BLD-MCNC: 13.1 G/DL (ref 12–15.9)
IGA1 MFR SER: 17 MG/DL (ref 70–400)
IGG1 SER-MCNC: 557 MG/DL (ref 700–1600)
IGM SERPL-MCNC: 55 MG/DL (ref 40–230)
IMM GRANULOCYTES # BLD AUTO: 0.03 10*3/MM3 (ref 0–0.05)
IMM GRANULOCYTES NFR BLD AUTO: 0.3 % (ref 0–0.5)
LYMPHOCYTES # BLD AUTO: 2.49 10*3/MM3 (ref 0.7–3.1)
LYMPHOCYTES NFR BLD AUTO: 25.2 % (ref 19.6–45.3)
MCH RBC QN AUTO: 30.5 PG (ref 26.6–33)
MCHC RBC AUTO-ENTMCNC: 33.6 G/DL (ref 31.5–35.7)
MCV RBC AUTO: 90.9 FL (ref 79–97)
MONOCYTES # BLD AUTO: 0.61 10*3/MM3 (ref 0.1–0.9)
MONOCYTES NFR BLD AUTO: 6.2 % (ref 5–12)
NEUTROPHILS NFR BLD AUTO: 6.7 10*3/MM3 (ref 1.7–7)
NEUTROPHILS NFR BLD AUTO: 67.6 % (ref 42.7–76)
PLATELET # BLD AUTO: 307 10*3/MM3 (ref 140–450)
PMV BLD AUTO: 9 FL (ref 6–12)
RBC # BLD AUTO: 4.29 10*6/MM3 (ref 3.77–5.28)
WBC NRBC COR # BLD: 9.9 10*3/MM3 (ref 3.4–10.8)

## 2021-12-17 PROCEDURE — 85025 COMPLETE CBC W/AUTO DIFF WBC: CPT | Performed by: INTERNAL MEDICINE

## 2021-12-17 PROCEDURE — 82784 ASSAY IGA/IGD/IGG/IGM EACH: CPT | Performed by: INTERNAL MEDICINE

## 2021-12-17 PROCEDURE — 36591 DRAW BLOOD OFF VENOUS DEVICE: CPT

## 2021-12-17 PROCEDURE — 25010000002 HEPARIN LOCK FLUSH PER 10 UNITS: Performed by: INTERNAL MEDICINE

## 2021-12-17 RX ORDER — HEPARIN SODIUM (PORCINE) LOCK FLUSH IV SOLN 100 UNIT/ML 100 UNIT/ML
500 SOLUTION INTRAVENOUS AS NEEDED
Status: CANCELLED | OUTPATIENT
Start: 2021-12-17

## 2021-12-17 RX ORDER — SODIUM CHLORIDE 0.9 % (FLUSH) 0.9 %
20 SYRINGE (ML) INJECTION AS NEEDED
Status: CANCELLED | OUTPATIENT
Start: 2021-12-17

## 2021-12-17 RX ORDER — SODIUM CHLORIDE 0.9 % (FLUSH) 0.9 %
20 SYRINGE (ML) INJECTION AS NEEDED
Status: DISCONTINUED | OUTPATIENT
Start: 2021-12-17 | End: 2021-12-18 | Stop reason: HOSPADM

## 2021-12-17 RX ORDER — HEPARIN SODIUM (PORCINE) LOCK FLUSH IV SOLN 100 UNIT/ML 100 UNIT/ML
500 SOLUTION INTRAVENOUS AS NEEDED
Status: DISCONTINUED | OUTPATIENT
Start: 2021-12-17 | End: 2021-12-18 | Stop reason: HOSPADM

## 2021-12-17 RX ADMIN — HEPARIN SODIUM (PORCINE) LOCK FLUSH IV SOLN 100 UNIT/ML 500 UNITS: 100 SOLUTION at 09:39

## 2021-12-17 RX ADMIN — SODIUM CHLORIDE, PRESERVATIVE FREE 20 ML: 5 INJECTION INTRAVENOUS at 09:38

## 2021-12-21 ENCOUNTER — HOSPITAL ENCOUNTER (OUTPATIENT)
Dept: ONCOLOGY | Facility: HOSPITAL | Age: 31
Setting detail: INFUSION SERIES
Discharge: HOME | End: 2021-12-21

## 2021-12-21 ENCOUNTER — OFFICE VISIT (OUTPATIENT)
Dept: ONCOLOGY | Facility: HOSPITAL | Age: 31
End: 2021-12-21

## 2021-12-21 ENCOUNTER — CLINICAL SUPPORT (OUTPATIENT)
Dept: FAMILY MEDICINE CLINIC | Facility: CLINIC | Age: 31
End: 2021-12-21

## 2021-12-21 VITALS
WEIGHT: 133.82 LBS | SYSTOLIC BLOOD PRESSURE: 126 MMHG | OXYGEN SATURATION: 100 % | TEMPERATURE: 98 F | HEART RATE: 96 BPM | DIASTOLIC BLOOD PRESSURE: 70 MMHG | BODY MASS INDEX: 30.02 KG/M2 | RESPIRATION RATE: 18 BRPM

## 2021-12-21 VITALS
OXYGEN SATURATION: 100 % | WEIGHT: 133.82 LBS | DIASTOLIC BLOOD PRESSURE: 50 MMHG | HEART RATE: 69 BPM | BODY MASS INDEX: 30.1 KG/M2 | RESPIRATION RATE: 18 BRPM | HEIGHT: 56 IN | TEMPERATURE: 97.5 F | SYSTOLIC BLOOD PRESSURE: 105 MMHG

## 2021-12-21 DIAGNOSIS — Z45.2 ENCOUNTER FOR ADJUSTMENT OR MANAGEMENT OF VASCULAR ACCESS DEVICE: Primary | ICD-10-CM

## 2021-12-21 DIAGNOSIS — D83.9 CVID (COMMON VARIABLE IMMUNODEFICIENCY) (HCC): Primary | ICD-10-CM

## 2021-12-21 DIAGNOSIS — D83.9 CVID (COMMON VARIABLE IMMUNODEFICIENCY) (HCC): ICD-10-CM

## 2021-12-21 DIAGNOSIS — E53.8 B12 DEFICIENCY: ICD-10-CM

## 2021-12-21 PROCEDURE — 63710000001 DIPHENHYDRAMINE PER 50 MG: Performed by: INTERNAL MEDICINE

## 2021-12-21 PROCEDURE — 99213 OFFICE O/P EST LOW 20 MIN: CPT | Performed by: NURSE PRACTITIONER

## 2021-12-21 PROCEDURE — 25010000002 IMMUNE GLOBULIN (HUMAN) 10 GM/100ML SOLUTION: Performed by: INTERNAL MEDICINE

## 2021-12-21 PROCEDURE — G0463 HOSPITAL OUTPT CLINIC VISIT: HCPCS | Performed by: NURSE PRACTITIONER

## 2021-12-21 PROCEDURE — 25010000002 HEPARIN LOCK FLUSH PER 10 UNITS: Performed by: INTERNAL MEDICINE

## 2021-12-21 PROCEDURE — 96365 THER/PROPH/DIAG IV INF INIT: CPT

## 2021-12-21 PROCEDURE — 96375 TX/PRO/DX INJ NEW DRUG ADDON: CPT

## 2021-12-21 PROCEDURE — 96372 THER/PROPH/DIAG INJ SC/IM: CPT | Performed by: FAMILY MEDICINE

## 2021-12-21 RX ORDER — SODIUM CHLORIDE 0.9 % (FLUSH) 0.9 %
20 SYRINGE (ML) INJECTION AS NEEDED
Status: DISCONTINUED | OUTPATIENT
Start: 2021-12-21 | End: 2021-12-22 | Stop reason: HOSPADM

## 2021-12-21 RX ORDER — FAMOTIDINE 10 MG/ML
20 INJECTION, SOLUTION INTRAVENOUS AS NEEDED
Status: CANCELLED | OUTPATIENT
Start: 2021-12-21

## 2021-12-21 RX ORDER — FAMOTIDINE 10 MG/ML
20 INJECTION, SOLUTION INTRAVENOUS ONCE
Status: COMPLETED | OUTPATIENT
Start: 2021-12-21 | End: 2021-12-21

## 2021-12-21 RX ORDER — HEPARIN SODIUM (PORCINE) LOCK FLUSH IV SOLN 100 UNIT/ML 100 UNIT/ML
500 SOLUTION INTRAVENOUS AS NEEDED
Status: DISCONTINUED | OUTPATIENT
Start: 2021-12-21 | End: 2021-12-22 | Stop reason: HOSPADM

## 2021-12-21 RX ORDER — SODIUM CHLORIDE 9 MG/ML
250 INJECTION, SOLUTION INTRAVENOUS ONCE
Status: COMPLETED | OUTPATIENT
Start: 2021-12-21 | End: 2021-12-21

## 2021-12-21 RX ORDER — DIPHENHYDRAMINE HYDROCHLORIDE 50 MG/ML
50 INJECTION INTRAMUSCULAR; INTRAVENOUS AS NEEDED
Status: CANCELLED | OUTPATIENT
Start: 2021-12-21

## 2021-12-21 RX ORDER — SODIUM CHLORIDE 0.9 % (FLUSH) 0.9 %
20 SYRINGE (ML) INJECTION AS NEEDED
Status: CANCELLED | OUTPATIENT
Start: 2022-01-28

## 2021-12-21 RX ORDER — MEPERIDINE HYDROCHLORIDE 25 MG/ML
25 INJECTION INTRAMUSCULAR; INTRAVENOUS; SUBCUTANEOUS
Status: CANCELLED | OUTPATIENT
Start: 2021-12-21

## 2021-12-21 RX ORDER — HEPARIN SODIUM (PORCINE) LOCK FLUSH IV SOLN 100 UNIT/ML 100 UNIT/ML
500 SOLUTION INTRAVENOUS AS NEEDED
Status: CANCELLED | OUTPATIENT
Start: 2022-01-28

## 2021-12-21 RX ORDER — DIPHENHYDRAMINE HCL 25 MG
25 CAPSULE ORAL ONCE
Status: COMPLETED | OUTPATIENT
Start: 2021-12-21 | End: 2021-12-21

## 2021-12-21 RX ADMIN — CYANOCOBALAMIN 1000 MCG: 1000 INJECTION, SOLUTION INTRAMUSCULAR; SUBCUTANEOUS at 13:45

## 2021-12-21 RX ADMIN — HEPARIN SODIUM (PORCINE) LOCK FLUSH IV SOLN 100 UNIT/ML 500 UNITS: 100 SOLUTION at 12:59

## 2021-12-21 RX ADMIN — FAMOTIDINE 20 MG: 10 INJECTION INTRAVENOUS at 11:08

## 2021-12-21 RX ADMIN — SODIUM CHLORIDE, PRESERVATIVE FREE 20 ML: 5 INJECTION INTRAVENOUS at 12:59

## 2021-12-21 RX ADMIN — IMMUNE GLOBULIN INFUSION (HUMAN) 10 G: 100 INJECTION, SOLUTION INTRAVENOUS; SUBCUTANEOUS at 11:15

## 2021-12-21 RX ADMIN — DIPHENHYDRAMINE HYDROCHLORIDE 25 MG: 25 CAPSULE ORAL at 11:07

## 2021-12-21 RX ADMIN — SODIUM CHLORIDE 250 ML: 9 INJECTION, SOLUTION INTRAVENOUS at 11:07

## 2021-12-21 NOTE — PROGRESS NOTES
Chief Complaint  Common Variable Immunodeficiency (-trt fu)    Arnie Rucker MD Watson, Andrea, MD Subjective          Francine Preciado presents to [facility] HEMATOLOGY [facility] ONCOLOGY for IVIG infusion visit.     History of Present Illness   Ms. Francine Preciado presents with her mother for IVIG infusion visit.She receives IVIG for CVID.  Recent diagnosis of COVID infection in October of this year and has recuperated well from this.     She receives IVIG infusions monthly and is tolerating well. Labs are acceptable. CBC is normal. Immunoglobulin levels with the IgG are usually in the 500 range. Most recent level of 557.     Cancer Staging  No matching staging information was found for the patient.     Treatment intent: palliative    Oncology/Hematology History Overview Note   CVID:   -Diagnosed by Immunologist 8/20/12  -presented with frequent/severe infections             Review of Systems   Constitutional: Negative for appetite change, diaphoresis, fever, unexpected weight gain and unexpected weight loss.   HENT: Negative for hearing loss, sore throat and voice change.    Eyes: Negative for blurred vision, double vision, pain, redness and visual disturbance.   Respiratory: Negative for cough, shortness of breath and wheezing.    Cardiovascular: Negative for chest pain, palpitations and leg swelling.   Endocrine: Negative for cold intolerance, heat intolerance, polydipsia and polyuria.   Genitourinary: Negative for decreased urine volume, difficulty urinating, frequency and urinary incontinence.   Musculoskeletal: Negative for arthralgias, back pain, joint swelling and myalgias.   Skin: Negative for color change, rash, skin lesions and bruise.   Neurological: Negative for dizziness, seizures, numbness and headache.   Hematological: Negative for adenopathy. Does not bruise/bleed easily.   Psychiatric/Behavioral: Negative for depressed mood. The patient is not nervous/anxious.    All other systems  reviewed and are negative.      Current Outpatient Medications on File Prior to Visit   Medication Sig Dispense Refill   • atenolol (TENORMIN) 25 MG tablet atenolol 25 mg oral tablet TAKE ONE TABLET BY MOUTH DAILY 4/13/2021  Active     • atorvastatin (LIPITOR) 10 MG tablet      • azithromycin (Zithromax Z-Jaswinder) 250 MG tablet Take 2 tablets by mouth on day 1, then 1 tablet daily on days 2-5 6 tablet 0   • EPINEPHrine (EpiPen 2-Jaswinder) 0.3 MG/0.3ML solution auto-injector injection 0.3 mL.     • fenofibrate 160 MG tablet      • fluticasone (FLONASE) 50 MCG/ACT nasal spray 2 sprays into the nostril(s) as directed by provider Daily. 16 g 11   • Junel 1/20 1-20 MG-MCG per tablet Take 1 tablet by mouth Daily. 21 tablet 11   • levocetirizine (XYZAL) 5 MG tablet      • levothyroxine (SYNTHROID, LEVOTHROID) 50 MCG tablet      • montelukast (SINGULAIR) 10 MG tablet      • predniSONE (DELTASONE) 20 MG tablet Take 2 tablets by mouth Daily. 8 tablet 0     Current Facility-Administered Medications on File Prior to Visit   Medication Dose Route Frequency Provider Last Rate Last Admin   • cyanocobalamin injection 1,000 mcg  1,000 mcg Intramuscular Q28 Days Arnie Rucker MD   1,000 mcg at 11/23/21 1557   • [COMPLETED] diphenhydrAMINE (BENADRYL) capsule 25 mg  25 mg Oral Once Josselin Montes De Oca MD PhD   25 mg at 12/21/21 1107   • [COMPLETED] famotidine (PEPCID) injection 20 mg  20 mg Intravenous Once Josselin Montes De Oca MD PhD   20 mg at 12/21/21 1108   • heparin injection 500 Units  500 Units Intravenous PRN Josselin Montes De Oca MD PhD       • [COMPLETED] immune globulin (human) (GAMMAGARD) 10 g infusion 100 mL  10 g Intravenous Once Josselin Montes De Oca MD PhD   10 g at 12/21/21 1115   • sodium chloride 0.9 % flush 20 mL  20 mL Intravenous PRN Josselin Montes De Oca MD PhD       • [COMPLETED] sodium chloride 0.9 % infusion 250 mL  250 mL Intravenous Once Josselin Montes De Oca MD PhD 20 mL/hr at 12/21/21 1107 250 mL at 12/21/21 1107        Allergies   Allergen Reactions   • Cefuroxime Axetil Unknown - Low Severity   • Neosporin [Bacitracin-Polymyxin B] Unknown - Low Severity     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    • Central perforation of tympanic membrane of right ear    • Chronic mastoiditis    • COVID-19 vaccine series completed    • Heart murmur    • Hypertension    • Kabuki make-up syndrome    • Mitral valve prolapse    • Mixed conductive and sensorineural hearing loss of both ears    • Otorrhea, left    • Recurrent otitis media    • Skin disease     PSORIASIS/ECXEMA     Past Surgical History:   Procedure Laterality Date   • MULTIPLE TOOTH EXTRACTIONS     • NO PAST SURGERIES       Social History     Socioeconomic History   • Marital status: Single   Tobacco Use   • Smoking status: Passive Smoke Exposure - Never Smoker   • Smokeless tobacco: Never Used   • Tobacco comment: Daily exposure to 2nd hand smoke   Vaping Use   • Vaping Use: Never used   Substance and Sexual Activity   • Alcohol use: Not Currently     Comment: DENIES   • Drug use: Never   • Sexual activity: Never     Family History   Problem Relation Age of Onset   • Leukemia Father    • Heart disease Other      Immunization History   Administered Date(s) Administered   • COVID-19 (PFIZER) 04/04/2021, 04/25/2021   • Flu Vaccine Quad PF >18YRS 11/04/2013, 11/02/2015, 10/19/2017, 10/29/2019   • Flu Vaccine Quad PF >36MO 11/04/2013, 10/24/2014, 11/02/2015, 10/13/2016, 10/19/2017   • FluLaval/Fluarix/Fluzone >6 11/23/2021   • Hep B, Adolescent or Pediatric 11/24/1998, 01/14/1999, 06/02/1999   • Influenza LAIV (Nasal) 10/13/2016, 10/14/2020   • Influenza TIV (IM) 09/17/2009, 11/07/2018   • Influenza, Unspecified 10/14/2020   • MMR 08/19/1996   • Pneumococcal Polysaccharide (PPSV23) 10/04/2012, 04/10/2014   • influenza Split 10/02/2018       Objective   Physical Exam  Vitals and nursing note reviewed.   Constitutional:        Appearance: Normal appearance. She is normal weight.   HENT:      Head: Normocephalic.      Nose: Nose normal.      Mouth/Throat:      Mouth: Mucous membranes are moist.   Eyes:      Conjunctiva/sclera: Conjunctivae normal.      Pupils: Pupils are equal, round, and reactive to light.   Cardiovascular:      Rate and Rhythm: Normal rate and regular rhythm.      Pulses: Normal pulses.      Heart sounds: Normal heart sounds. No murmur heard.      Pulmonary:      Effort: Pulmonary effort is normal.      Breath sounds: Normal breath sounds.   Abdominal:      General: Bowel sounds are normal.      Palpations: Abdomen is soft.   Musculoskeletal:         General: Normal range of motion.      Cervical back: Normal range of motion.   Skin:     General: Skin is warm and dry.      Capillary Refill: Capillary refill takes less than 2 seconds.   Neurological:      General: No focal deficit present.      Mental Status: She is alert and oriented to person, place, and time.   Psychiatric:         Mood and Affect: Mood normal.         Behavior: Behavior normal.         Thought Content: Thought content normal.         Judgment: Judgment normal.         Vitals:    12/21/21 1046   BP: 126/70   Pulse: 96   Resp: 18   Temp: 98 °F (36.7 °C)   SpO2: 100%   Weight: 60.7 kg (133 lb 13.1 oz)   PainSc: 0-No pain     ECOG score: 1         ECOG: (0) Fully Active - Able to Carry On All Pre-disease Performance Without Restriction  Fall Risk Assessment was completed, and patient is at low risk for falls.  PHQ-9 Total Score: 0       The patient is not  experiencing fatigue. Fatigue score: 0    PT/OT Functional Screening: PT fx screen: No needs identified  Speech Functional Screening: Speech fx screen: No needs identified  Rehab to be ordered: Rehab to be ordered: No needs identified        Result Review :   The following data was reviewed by: NO Nur on 12/21/2021:  Lab Results   Component Value Date    HGB 13.1 12/17/2021    HCT  39.0 12/17/2021    MCV 90.9 12/17/2021     12/17/2021    WBC 9.90 12/17/2021    NEUTROABS 6.70 12/17/2021    LYMPHSABS 2.49 12/17/2021    MONOSABS 0.61 12/17/2021    EOSABS 0.05 12/17/2021    BASOSABS 0.02 12/17/2021     Lab Results   Component Value Date    GLUCOSE 70 08/10/2021    BUN 12 08/10/2021    CREATININE 1.09 (H) 08/10/2021     08/10/2021    K 4.4 08/10/2021     08/10/2021    CO2 22.4 08/10/2021    CALCIUM 9.6 08/10/2021    PROTEINTOT 7.2 08/10/2021    ALBUMIN 4.20 08/10/2021    BILITOT 0.6 08/10/2021    ALKPHOS 54 08/10/2021    AST 17 08/10/2021    ALT 12 08/10/2021          Assessment and Plan    Diagnoses and all orders for this visit:    1. CVID (common variable immunodeficiency) (HCC) (Primary)    Return monthly for IVIG infusions. Call for any signs or symptoms of infection.   Labs are reviewed and are acceptable for treatment.       Patient Follow Up: monthly and alternate visits with Dr. Montes De Oca.     Patient was given instructions and counseling regarding her condition or for health maintenance advice. Please see specific information pulled into the AVS if appropriate.     Belia Saez, APRN    12/21/2021

## 2022-01-18 ENCOUNTER — CLINICAL SUPPORT (OUTPATIENT)
Dept: FAMILY MEDICINE CLINIC | Facility: CLINIC | Age: 32
End: 2022-01-18

## 2022-01-18 DIAGNOSIS — E53.8 B12 DEFICIENCY: ICD-10-CM

## 2022-01-18 PROCEDURE — 96372 THER/PROPH/DIAG INJ SC/IM: CPT | Performed by: FAMILY MEDICINE

## 2022-01-18 RX ADMIN — CYANOCOBALAMIN 1000 MCG: 1000 INJECTION, SOLUTION INTRAMUSCULAR; SUBCUTANEOUS at 14:48

## 2022-01-28 ENCOUNTER — OFFICE VISIT (OUTPATIENT)
Dept: ONCOLOGY | Facility: HOSPITAL | Age: 32
End: 2022-01-28

## 2022-01-28 ENCOUNTER — HOSPITAL ENCOUNTER (OUTPATIENT)
Dept: ONCOLOGY | Facility: HOSPITAL | Age: 32
Setting detail: INFUSION SERIES
Discharge: HOME | End: 2022-01-28

## 2022-01-28 VITALS
WEIGHT: 134.92 LBS | OXYGEN SATURATION: 99 % | TEMPERATURE: 97.6 F | SYSTOLIC BLOOD PRESSURE: 114 MMHG | HEART RATE: 82 BPM | BODY MASS INDEX: 30.27 KG/M2 | DIASTOLIC BLOOD PRESSURE: 72 MMHG | RESPIRATION RATE: 16 BRPM

## 2022-01-28 DIAGNOSIS — D83.9 CVID (COMMON VARIABLE IMMUNODEFICIENCY): ICD-10-CM

## 2022-01-28 DIAGNOSIS — D83.9 CVID (COMMON VARIABLE IMMUNODEFICIENCY): Primary | ICD-10-CM

## 2022-01-28 DIAGNOSIS — Z45.2 ENCOUNTER FOR ADJUSTMENT OR MANAGEMENT OF VASCULAR ACCESS DEVICE: ICD-10-CM

## 2022-01-28 DIAGNOSIS — R53.83 FATIGUE, UNSPECIFIED TYPE: ICD-10-CM

## 2022-01-28 DIAGNOSIS — E78.2 MIXED HYPERLIPIDEMIA: ICD-10-CM

## 2022-01-28 DIAGNOSIS — E55.9 VITAMIN D DEFICIENCY: ICD-10-CM

## 2022-01-28 LAB
25(OH)D3 SERPL-MCNC: 42.1 NG/ML (ref 30–100)
BASOPHILS # BLD AUTO: 0.03 10*3/MM3 (ref 0–0.2)
BASOPHILS NFR BLD AUTO: 0.3 % (ref 0–1.5)
BILIRUB UR QL STRIP: NEGATIVE
CHOLEST SERPL-MCNC: 140 MG/DL (ref 0–200)
CLARITY UR: CLEAR
COLOR UR: YELLOW
DEPRECATED RDW RBC AUTO: 41.3 FL (ref 37–54)
EOSINOPHIL # BLD AUTO: 0.03 10*3/MM3 (ref 0–0.4)
EOSINOPHIL NFR BLD AUTO: 0.3 % (ref 0.3–6.2)
ERYTHROCYTE [DISTWIDTH] IN BLOOD BY AUTOMATED COUNT: 12.5 % (ref 12.3–15.4)
FOLATE SERPL-MCNC: 18.1 NG/ML (ref 4.78–24.2)
GLUCOSE UR STRIP-MCNC: NEGATIVE MG/DL
HCT VFR BLD AUTO: 36.7 % (ref 34–46.6)
HDLC SERPL-MCNC: 40 MG/DL (ref 40–60)
HGB BLD-MCNC: 12.3 G/DL (ref 12–15.9)
HGB UR QL STRIP.AUTO: NEGATIVE
IGA1 MFR SER: 17 MG/DL (ref 70–400)
IGG1 SER-MCNC: 531 MG/DL (ref 700–1600)
IGM SERPL-MCNC: 57 MG/DL (ref 40–230)
IMM GRANULOCYTES # BLD AUTO: 0.02 10*3/MM3 (ref 0–0.05)
IMM GRANULOCYTES NFR BLD AUTO: 0.2 % (ref 0–0.5)
KETONES UR QL STRIP: NEGATIVE
LDLC SERPL CALC-MCNC: 71 MG/DL (ref 0–100)
LDLC/HDLC SERPL: 1.64 {RATIO}
LEUKOCYTE ESTERASE UR QL STRIP.AUTO: NEGATIVE
LYMPHOCYTES # BLD AUTO: 2.35 10*3/MM3 (ref 0.7–3.1)
LYMPHOCYTES NFR BLD AUTO: 23.6 % (ref 19.6–45.3)
MCH RBC QN AUTO: 30.2 PG (ref 26.6–33)
MCHC RBC AUTO-ENTMCNC: 33.5 G/DL (ref 31.5–35.7)
MCV RBC AUTO: 90.2 FL (ref 79–97)
MONOCYTES # BLD AUTO: 0.57 10*3/MM3 (ref 0.1–0.9)
MONOCYTES NFR BLD AUTO: 5.7 % (ref 5–12)
NEUTROPHILS NFR BLD AUTO: 6.95 10*3/MM3 (ref 1.7–7)
NEUTROPHILS NFR BLD AUTO: 69.9 % (ref 42.7–76)
NITRITE UR QL STRIP: NEGATIVE
PH UR STRIP.AUTO: <=5 [PH] (ref 5–8)
PLATELET # BLD AUTO: 271 10*3/MM3 (ref 140–450)
PMV BLD AUTO: 9.1 FL (ref 6–12)
PROT UR QL STRIP: NEGATIVE
RBC # BLD AUTO: 4.07 10*6/MM3 (ref 3.77–5.28)
SP GR UR STRIP: 1.01 (ref 1–1.03)
TRIGL SERPL-MCNC: 173 MG/DL (ref 0–150)
TSH SERPL DL<=0.05 MIU/L-ACNC: 2.26 UIU/ML (ref 0.27–4.2)
UROBILINOGEN UR QL STRIP: NORMAL
VIT B12 BLD-MCNC: 889 PG/ML (ref 211–946)
VLDLC SERPL-MCNC: 29 MG/DL (ref 5–40)
WBC NRBC COR # BLD: 9.95 10*3/MM3 (ref 3.4–10.8)

## 2022-01-28 PROCEDURE — 82306 VITAMIN D 25 HYDROXY: CPT | Performed by: FAMILY MEDICINE

## 2022-01-28 PROCEDURE — 25010000002 HEPARIN LOCK FLUSH PER 10 UNITS: Performed by: INTERNAL MEDICINE

## 2022-01-28 PROCEDURE — 36591 DRAW BLOOD OFF VENOUS DEVICE: CPT

## 2022-01-28 PROCEDURE — 84443 ASSAY THYROID STIM HORMONE: CPT | Performed by: FAMILY MEDICINE

## 2022-01-28 PROCEDURE — 80061 LIPID PANEL: CPT | Performed by: FAMILY MEDICINE

## 2022-01-28 PROCEDURE — 81003 URINALYSIS AUTO W/O SCOPE: CPT | Performed by: FAMILY MEDICINE

## 2022-01-28 PROCEDURE — G0463 HOSPITAL OUTPT CLINIC VISIT: HCPCS | Performed by: NURSE PRACTITIONER

## 2022-01-28 PROCEDURE — 82784 ASSAY IGA/IGD/IGG/IGM EACH: CPT | Performed by: INTERNAL MEDICINE

## 2022-01-28 PROCEDURE — 82746 ASSAY OF FOLIC ACID SERUM: CPT | Performed by: FAMILY MEDICINE

## 2022-01-28 PROCEDURE — 99213 OFFICE O/P EST LOW 20 MIN: CPT | Performed by: NURSE PRACTITIONER

## 2022-01-28 PROCEDURE — 85025 COMPLETE CBC W/AUTO DIFF WBC: CPT | Performed by: INTERNAL MEDICINE

## 2022-01-28 PROCEDURE — 82607 VITAMIN B-12: CPT | Performed by: FAMILY MEDICINE

## 2022-01-28 RX ORDER — HEPARIN SODIUM (PORCINE) LOCK FLUSH IV SOLN 100 UNIT/ML 100 UNIT/ML
500 SOLUTION INTRAVENOUS AS NEEDED
Status: DISCONTINUED | OUTPATIENT
Start: 2022-01-28 | End: 2022-01-29 | Stop reason: HOSPADM

## 2022-01-28 RX ORDER — SODIUM CHLORIDE 0.9 % (FLUSH) 0.9 %
20 SYRINGE (ML) INJECTION AS NEEDED
Status: CANCELLED | OUTPATIENT
Start: 2022-01-28

## 2022-01-28 RX ORDER — SODIUM CHLORIDE 0.9 % (FLUSH) 0.9 %
20 SYRINGE (ML) INJECTION AS NEEDED
Status: DISCONTINUED | OUTPATIENT
Start: 2022-01-28 | End: 2022-01-29 | Stop reason: HOSPADM

## 2022-01-28 RX ORDER — HEPARIN SODIUM (PORCINE) LOCK FLUSH IV SOLN 100 UNIT/ML 100 UNIT/ML
500 SOLUTION INTRAVENOUS AS NEEDED
Status: CANCELLED | OUTPATIENT
Start: 2022-01-28

## 2022-01-28 RX ADMIN — SODIUM CHLORIDE, PRESERVATIVE FREE 20 ML: 5 INJECTION INTRAVENOUS at 09:45

## 2022-01-28 RX ADMIN — HEPARIN SODIUM (PORCINE) LOCK FLUSH IV SOLN 100 UNIT/ML 500 UNITS: 100 SOLUTION at 09:45

## 2022-01-28 NOTE — PROGRESS NOTES
Chief Complaint  Common Variable Immunodificiency (-trt fu (DC))    Arnie Rucker MD Watson, Andrea, MD        Subjective          Francine Preciado presents to [facility] HEMATOLOGY & ONCOLOGY for IVIG treatment for CVID.     History of Present Illness     Ms. Francine Preciado presents with her mother for monthly treatment visit for CVID. She receives monthly IVIG treatment. Last treatment on 12/18/21. She tolerates infusions well. Labs are stable. IgG level is adequate in the low to mid 500 range. She offers no complaints at this time. Denies any recent or chronic infections.     Next infusion is scheduled for 2/1/2022.       Oncology/Hematology History Overview Note   CVID:   -Diagnosed by Immunologist 8/20/12  -presented with frequent/severe infections             Review of Systems   Constitutional: Positive for fatigue. Negative for appetite change, diaphoresis, fever, unexpected weight gain and unexpected weight loss.   HENT: Negative for hearing loss, sore throat and voice change.    Eyes: Negative for blurred vision, double vision, pain, redness and visual disturbance.   Respiratory: Negative for cough, shortness of breath and wheezing.    Cardiovascular: Negative for chest pain, palpitations and leg swelling.   Endocrine: Negative for cold intolerance, heat intolerance, polydipsia and polyuria.   Genitourinary: Negative for decreased urine volume, difficulty urinating, frequency and urinary incontinence.   Musculoskeletal: Negative for arthralgias, back pain, joint swelling and myalgias.   Skin: Negative for color change, rash, skin lesions and wound.   Neurological: Negative for dizziness, seizures, numbness and headache.   Hematological: Negative for adenopathy. Does not bruise/bleed easily.   Psychiatric/Behavioral: Negative for depressed mood. The patient is not nervous/anxious.    All other systems reviewed and are negative.      Current Outpatient Medications on File Prior to Visit    Medication Sig Dispense Refill   • atenolol (TENORMIN) 25 MG tablet atenolol 25 mg oral tablet TAKE ONE TABLET BY MOUTH DAILY 4/13/2021  Active     • atorvastatin (LIPITOR) 10 MG tablet      • azithromycin (Zithromax Z-Jaswinder) 250 MG tablet Take 2 tablets by mouth on day 1, then 1 tablet daily on days 2-5 6 tablet 0   • EPINEPHrine (EpiPen 2-Jaswinder) 0.3 MG/0.3ML solution auto-injector injection 0.3 mL.     • fenofibrate 160 MG tablet      • fluticasone (FLONASE) 50 MCG/ACT nasal spray 2 sprays into the nostril(s) as directed by provider Daily. 16 g 11   • Junel 1/20 1-20 MG-MCG per tablet Take 1 tablet by mouth Daily. 21 tablet 11   • levocetirizine (XYZAL) 5 MG tablet      • levothyroxine (SYNTHROID, LEVOTHROID) 50 MCG tablet      • montelukast (SINGULAIR) 10 MG tablet      • predniSONE (DELTASONE) 20 MG tablet Take 2 tablets by mouth Daily. 8 tablet 0     Current Facility-Administered Medications on File Prior to Visit   Medication Dose Route Frequency Provider Last Rate Last Admin   • cyanocobalamin injection 1,000 mcg  1,000 mcg Intramuscular Q28 Days Arnie Rucker MD   1,000 mcg at 01/18/22 1448   • heparin injection 500 Units  500 Units Intravenous PRN Josselin Montes De Oca MD PhD   500 Units at 01/28/22 0945   • sodium chloride 0.9 % flush 20 mL  20 mL Intravenous PRN Josselin Montes De Oca MD PhD   20 mL at 01/28/22 0945       Allergies   Allergen Reactions   • Cefuroxime Axetil Unknown - Low Severity   • Neosporin [Bacitracin-Polymyxin B] Unknown - Low Severity     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    • Central perforation of tympanic membrane of right ear    • Chronic mastoiditis    • COVID-19 vaccine series completed    • Heart murmur    • Hypertension    • Kabuki make-up syndrome    • Mitral valve prolapse    • Mixed conductive and sensorineural hearing loss of both ears    • Otorrhea, left    • Recurrent otitis media    • Skin disease      PSORIASIS/ECXEMA     Past Surgical History:   Procedure Laterality Date   • MULTIPLE TOOTH EXTRACTIONS     • NO PAST SURGERIES       Social History     Socioeconomic History   • Marital status: Single   Tobacco Use   • Smoking status: Passive Smoke Exposure - Never Smoker   • Smokeless tobacco: Never Used   • Tobacco comment: Daily exposure to 2nd hand smoke   Vaping Use   • Vaping Use: Never used   Substance and Sexual Activity   • Alcohol use: Not Currently     Comment: DENIES   • Drug use: Never   • Sexual activity: Never     Family History   Problem Relation Age of Onset   • Leukemia Father    • Heart disease Other      Immunization History   Administered Date(s) Administered   • COVID-19 (PFIZER) PURPLE CAP 04/04/2021, 04/25/2021   • Flu Vaccine Quad PF >18YRS 11/04/2013, 11/02/2015, 10/19/2017, 10/29/2019   • Flu Vaccine Quad PF >36MO 11/04/2013, 10/24/2014, 11/02/2015, 10/13/2016, 10/19/2017   • FluLaval/Fluarix/Fluzone >6 11/23/2021   • Hep B, Adolescent or Pediatric 11/24/1998, 01/14/1999, 06/02/1999   • Influenza LAIV (Nasal) 10/13/2016, 10/14/2020   • Influenza TIV (IM) 09/17/2009, 11/07/2018   • Influenza, Unspecified 10/14/2020   • MMR 08/19/1996   • Pneumococcal Polysaccharide (PPSV23) 10/04/2012, 04/10/2014   • influenza Split 10/02/2018       Objective   Physical Exam  Vitals and nursing note reviewed.   Constitutional:       Appearance: Normal appearance.   HENT:      Head: Normocephalic.      Nose: Nose normal.      Mouth/Throat:      Mouth: Mucous membranes are moist.   Eyes:      Extraocular Movements: Extraocular movements intact.      Pupils: Pupils are equal, round, and reactive to light.   Cardiovascular:      Rate and Rhythm: Normal rate and regular rhythm.      Pulses: Normal pulses.      Heart sounds: Normal heart sounds.   Pulmonary:      Effort: Pulmonary effort is normal. No respiratory distress.      Breath sounds: Normal breath sounds. No wheezing, rhonchi or rales.   Abdominal:       General: Bowel sounds are normal.      Palpations: Abdomen is soft.   Musculoskeletal:         General: Normal range of motion.      Cervical back: Normal range of motion and neck supple.      Right lower leg: No edema.      Left lower leg: No edema.   Skin:     General: Skin is warm and dry.      Capillary Refill: Capillary refill takes less than 2 seconds.      Findings: No rash.   Neurological:      General: No focal deficit present.      Mental Status: She is alert and oriented to person, place, and time.   Psychiatric:         Mood and Affect: Mood normal.         Behavior: Behavior normal.         Thought Content: Thought content normal.         Judgment: Judgment normal.         Vitals:    01/28/22 1001   BP: 114/72   Pulse: 82   Resp: 16   Temp: 97.6 °F (36.4 °C)   SpO2: 99%   Weight: 61.2 kg (134 lb 14.7 oz)   PainSc: 0-No pain     ECOG score: 1         ECOG: (0) Fully Active - Able to Carry On All Pre-disease Performance Without Restriction  Fall Risk Assessment was completed, and patient is at low risk for falls.  PHQ-9 Total Score: 0       The patient is  experiencing fatigue. Fatigue score: 2    PT/OT Functional Screening: PT fx screen: No needs identified  Speech Functional Screening: Speech fx screen: No needs identified  Rehab to be ordered: Rehab to be ordered: No needs identified        Result Review :   The following data was reviewed by: NO Nur on 01/28/2022:  Lab Results   Component Value Date    HGB 12.3 01/28/2022    HCT 36.7 01/28/2022    MCV 90.2 01/28/2022     01/28/2022    WBC 9.95 01/28/2022    NEUTROABS 6.95 01/28/2022    LYMPHSABS 2.35 01/28/2022    MONOSABS 0.57 01/28/2022    EOSABS 0.03 01/28/2022    BASOSABS 0.03 01/28/2022     Lab Results   Component Value Date    GLUCOSE 70 08/10/2021    BUN 12 08/10/2021    CREATININE 1.09 (H) 08/10/2021     08/10/2021    K 4.4 08/10/2021     08/10/2021    CO2 22.4 08/10/2021    CALCIUM 9.6 08/10/2021     PROTEINTOT 7.2 08/10/2021    ALBUMIN 4.20 08/10/2021    BILITOT 0.6 08/10/2021    ALKPHOS 54 08/10/2021    AST 17 08/10/2021    ALT 12 08/10/2021          Assessment and Plan    Diagnoses and all orders for this visit:    1. CVID (common variable immunodeficiency) (HCC)    Proceed with next cycle of monthly IVIG infusion as scheduled for 2/1/2022. Labs are acceptable.     Call with any questions or concerns.     Return monthly with NP or MD.      Patient Follow Up: monthly with IVIG infusions.     Patient was given instructions and counseling regarding her condition or for health maintenance advice. Please see specific information pulled into the AVS if appropriate.     Belia Saez, APRN    1/28/2022

## 2022-01-31 RX ORDER — DIPHENHYDRAMINE HYDROCHLORIDE 50 MG/ML
50 INJECTION INTRAMUSCULAR; INTRAVENOUS AS NEEDED
Status: CANCELLED | OUTPATIENT
Start: 2022-03-15

## 2022-01-31 RX ORDER — MEPERIDINE HYDROCHLORIDE 25 MG/ML
25 INJECTION INTRAMUSCULAR; INTRAVENOUS; SUBCUTANEOUS
Status: CANCELLED | OUTPATIENT
Start: 2022-03-15

## 2022-01-31 RX ORDER — FAMOTIDINE 10 MG/ML
20 INJECTION, SOLUTION INTRAVENOUS AS NEEDED
Status: CANCELLED | OUTPATIENT
Start: 2022-02-01

## 2022-01-31 RX ORDER — MEPERIDINE HYDROCHLORIDE 25 MG/ML
25 INJECTION INTRAMUSCULAR; INTRAVENOUS; SUBCUTANEOUS
Status: CANCELLED | OUTPATIENT
Start: 2022-02-01

## 2022-01-31 RX ORDER — DIPHENHYDRAMINE HCL 25 MG
25 CAPSULE ORAL ONCE
Status: CANCELLED | OUTPATIENT
Start: 2022-03-15

## 2022-01-31 RX ORDER — FAMOTIDINE 10 MG/ML
20 INJECTION, SOLUTION INTRAVENOUS AS NEEDED
Status: CANCELLED | OUTPATIENT
Start: 2022-03-15

## 2022-01-31 RX ORDER — SODIUM CHLORIDE 9 MG/ML
250 INJECTION, SOLUTION INTRAVENOUS ONCE
Status: CANCELLED | OUTPATIENT
Start: 2022-03-15

## 2022-01-31 RX ORDER — DIPHENHYDRAMINE HYDROCHLORIDE 50 MG/ML
50 INJECTION INTRAMUSCULAR; INTRAVENOUS AS NEEDED
Status: CANCELLED | OUTPATIENT
Start: 2022-02-01

## 2022-01-31 RX ORDER — FAMOTIDINE 10 MG/ML
20 INJECTION, SOLUTION INTRAVENOUS ONCE
Status: CANCELLED | OUTPATIENT
Start: 2022-03-15

## 2022-02-01 ENCOUNTER — HOSPITAL ENCOUNTER (OUTPATIENT)
Dept: ONCOLOGY | Facility: HOSPITAL | Age: 32
Setting detail: INFUSION SERIES
Discharge: HOME | End: 2022-02-01

## 2022-02-01 VITALS
DIASTOLIC BLOOD PRESSURE: 66 MMHG | WEIGHT: 137.79 LBS | HEIGHT: 56 IN | TEMPERATURE: 98.2 F | BODY MASS INDEX: 31 KG/M2 | RESPIRATION RATE: 18 BRPM | OXYGEN SATURATION: 100 % | HEART RATE: 94 BPM | SYSTOLIC BLOOD PRESSURE: 137 MMHG

## 2022-02-01 DIAGNOSIS — Z45.2 ENCOUNTER FOR ADJUSTMENT OR MANAGEMENT OF VASCULAR ACCESS DEVICE: ICD-10-CM

## 2022-02-01 DIAGNOSIS — D83.9 CVID (COMMON VARIABLE IMMUNODEFICIENCY): Primary | ICD-10-CM

## 2022-02-01 PROCEDURE — 96365 THER/PROPH/DIAG IV INF INIT: CPT

## 2022-02-01 PROCEDURE — 25010000002 HEPARIN LOCK FLUSH PER 10 UNITS: Performed by: INTERNAL MEDICINE

## 2022-02-01 PROCEDURE — 96366 THER/PROPH/DIAG IV INF ADDON: CPT

## 2022-02-01 PROCEDURE — 25010000002 IMMUNE GLOBULIN (HUMAN) 10 GM/100ML SOLUTION: Performed by: INTERNAL MEDICINE

## 2022-02-01 PROCEDURE — 63710000001 DIPHENHYDRAMINE PER 50 MG: Performed by: INTERNAL MEDICINE

## 2022-02-01 PROCEDURE — 96375 TX/PRO/DX INJ NEW DRUG ADDON: CPT

## 2022-02-01 RX ORDER — HEPARIN SODIUM (PORCINE) LOCK FLUSH IV SOLN 100 UNIT/ML 100 UNIT/ML
500 SOLUTION INTRAVENOUS AS NEEDED
Status: DISCONTINUED | OUTPATIENT
Start: 2022-02-01 | End: 2022-02-03 | Stop reason: HOSPADM

## 2022-02-01 RX ORDER — DIPHENHYDRAMINE HCL 25 MG
25 CAPSULE ORAL ONCE
Status: COMPLETED | OUTPATIENT
Start: 2022-02-01 | End: 2022-02-01

## 2022-02-01 RX ORDER — HEPARIN SODIUM (PORCINE) LOCK FLUSH IV SOLN 100 UNIT/ML 100 UNIT/ML
500 SOLUTION INTRAVENOUS AS NEEDED
Status: CANCELLED | OUTPATIENT
Start: 2022-02-01

## 2022-02-01 RX ORDER — SODIUM CHLORIDE 0.9 % (FLUSH) 0.9 %
20 SYRINGE (ML) INJECTION AS NEEDED
Status: DISCONTINUED | OUTPATIENT
Start: 2022-02-01 | End: 2022-02-03 | Stop reason: HOSPADM

## 2022-02-01 RX ORDER — SODIUM CHLORIDE 0.9 % (FLUSH) 0.9 %
20 SYRINGE (ML) INJECTION AS NEEDED
Status: CANCELLED | OUTPATIENT
Start: 2022-02-01

## 2022-02-01 RX ORDER — FAMOTIDINE 10 MG/ML
20 INJECTION, SOLUTION INTRAVENOUS ONCE
Status: COMPLETED | OUTPATIENT
Start: 2022-02-01 | End: 2022-02-01

## 2022-02-01 RX ORDER — SODIUM CHLORIDE 9 MG/ML
250 INJECTION, SOLUTION INTRAVENOUS ONCE
Status: COMPLETED | OUTPATIENT
Start: 2022-02-01 | End: 2022-02-01

## 2022-02-01 RX ADMIN — SODIUM CHLORIDE 250 ML: 9 INJECTION, SOLUTION INTRAVENOUS at 10:06

## 2022-02-01 RX ADMIN — FAMOTIDINE 20 MG: 10 INJECTION INTRAVENOUS at 10:09

## 2022-02-01 RX ADMIN — IMMUNE GLOBULIN INFUSION (HUMAN) 10 G: 100 INJECTION, SOLUTION INTRAVENOUS; SUBCUTANEOUS at 10:26

## 2022-02-01 RX ADMIN — HEPARIN SODIUM (PORCINE) LOCK FLUSH IV SOLN 100 UNIT/ML 500 UNITS: 100 SOLUTION at 12:22

## 2022-02-01 RX ADMIN — SODIUM CHLORIDE, PRESERVATIVE FREE 20 ML: 5 INJECTION INTRAVENOUS at 12:21

## 2022-02-01 RX ADMIN — DIPHENHYDRAMINE HYDROCHLORIDE 25 MG: 25 CAPSULE ORAL at 10:08

## 2022-02-28 ENCOUNTER — CLINICAL SUPPORT (OUTPATIENT)
Dept: FAMILY MEDICINE CLINIC | Facility: CLINIC | Age: 32
End: 2022-02-28

## 2022-02-28 DIAGNOSIS — E53.8 B12 DEFICIENCY: ICD-10-CM

## 2022-02-28 PROCEDURE — 96372 THER/PROPH/DIAG INJ SC/IM: CPT | Performed by: FAMILY MEDICINE

## 2022-02-28 RX ADMIN — CYANOCOBALAMIN 1000 MCG: 1000 INJECTION, SOLUTION INTRAMUSCULAR; SUBCUTANEOUS at 15:10

## 2022-03-07 ENCOUNTER — OFFICE VISIT (OUTPATIENT)
Dept: FAMILY MEDICINE CLINIC | Facility: CLINIC | Age: 32
End: 2022-03-07

## 2022-03-07 VITALS
RESPIRATION RATE: 21 BRPM | HEART RATE: 66 BPM | SYSTOLIC BLOOD PRESSURE: 109 MMHG | TEMPERATURE: 97.8 F | HEIGHT: 56 IN | BODY MASS INDEX: 30.66 KG/M2 | DIASTOLIC BLOOD PRESSURE: 70 MMHG | OXYGEN SATURATION: 98 % | WEIGHT: 136.3 LBS

## 2022-03-07 DIAGNOSIS — E78.2 MIXED HYPERLIPIDEMIA: ICD-10-CM

## 2022-03-07 DIAGNOSIS — Z12.83 SKIN EXAM, SCREENING FOR CANCER: ICD-10-CM

## 2022-03-07 DIAGNOSIS — J30.9 ALLERGIC RHINITIS, UNSPECIFIED SEASONALITY, UNSPECIFIED TRIGGER: ICD-10-CM

## 2022-03-07 DIAGNOSIS — L98.9 SKIN LESION OF LEFT LEG: ICD-10-CM

## 2022-03-07 DIAGNOSIS — I10 ESSENTIAL (PRIMARY) HYPERTENSION: ICD-10-CM

## 2022-03-07 PROCEDURE — 96372 THER/PROPH/DIAG INJ SC/IM: CPT | Performed by: FAMILY MEDICINE

## 2022-03-07 PROCEDURE — 99213 OFFICE O/P EST LOW 20 MIN: CPT | Performed by: FAMILY MEDICINE

## 2022-03-07 RX ORDER — ATORVASTATIN CALCIUM 10 MG/1
10 TABLET, FILM COATED ORAL DAILY
Qty: 90 TABLET | Refills: 1 | Status: SHIPPED | OUTPATIENT
Start: 2022-03-07 | End: 2022-05-25 | Stop reason: SDUPTHER

## 2022-03-07 RX ORDER — LEVOTHYROXINE SODIUM 0.05 MG/1
50 TABLET ORAL DAILY
Qty: 90 TABLET | Refills: 1 | Status: SHIPPED | OUTPATIENT
Start: 2022-03-07 | End: 2022-05-25 | Stop reason: SDUPTHER

## 2022-03-07 RX ORDER — FENOFIBRATE 160 MG/1
160 TABLET ORAL DAILY
Qty: 90 TABLET | Refills: 1 | Status: SHIPPED | OUTPATIENT
Start: 2022-03-07 | End: 2022-05-25 | Stop reason: SDUPTHER

## 2022-03-07 RX ORDER — ATENOLOL 25 MG/1
25 TABLET ORAL DAILY
Qty: 90 TABLET | Refills: 1 | Status: SHIPPED | OUTPATIENT
Start: 2022-03-07 | End: 2022-05-25 | Stop reason: SDUPTHER

## 2022-03-07 RX ADMIN — CYANOCOBALAMIN 1000 MCG: 1000 INJECTION, SOLUTION INTRAMUSCULAR; SUBCUTANEOUS at 11:10

## 2022-03-07 NOTE — PROGRESS NOTES
"  Chief Complaint   Patient presents with   • Follow-up     Subjective   Francine Preciado is a 31 y.o. female who presents to the office for follow-up.     common variable immunodeficiency.  Sees hematologist on a regular basis.    osteopenia.  She is established with rheumatologist.      hypertension.  Controlled.      hyperlipidemia.  controlled.  On the atorvastatin.     hypertriglyceridemia.  Better controlled.  She is on fish oil     GERD controlled.      constipation controlled.      allergic rhinitis.  She does see a ENT.  On allergy shots.     chronic kidney disease.  Controlled or stable.      hypothyroidism controlled      The following portions of the patient's history were reviewed and updated as appropriate: allergies, current medications, past family history, past medical history, past social history, past surgical history and problem list.    Review of Systems   Constitutional: Negative for chills, fever and unexpected weight loss.   Respiratory: Negative for cough, chest tightness and shortness of breath.    Cardiovascular: Negative for chest pain and palpitations.   Gastrointestinal: Negative for abdominal pain, diarrhea, nausea and vomiting.        Objective   Vitals:    03/07/22 1010   BP: 109/70   Pulse: 66   Resp: 21   Temp: 97.8 °F (36.6 °C)   SpO2: 98%   Weight: 61.8 kg (136 lb 4.8 oz)   Height: 142.2 cm (55.98\")     Body mass index is 30.58 kg/m².  Physical Exam  Vitals reviewed.   Constitutional:       General: She is not in acute distress.     Appearance: Normal appearance. She is not ill-appearing.   HENT:      Head: Normocephalic.   Eyes:      Conjunctiva/sclera: Conjunctivae normal.   Cardiovascular:      Rate and Rhythm: Normal rate and regular rhythm.   Pulmonary:      Effort: Pulmonary effort is normal.      Breath sounds: Normal breath sounds.   Skin:     Findings: No rash.   Neurological:      Mental Status: She is alert and oriented to person, place, and time.   Psychiatric:        "  Mood and Affect: Mood normal.          Assessment/Plan   Diagnoses and all orders for this visit:    1. Essential (primary) hypertension  Comments:  controlled    2. Mixed hyperlipidemia  Comments:  controlled.    3. Allergic rhinitis, unspecified seasonality, unspecified trigger    4. Skin lesion of left leg  -     Ambulatory Referral to Dermatology    5. Skin exam, screening for cancer  -     Ambulatory Referral to Dermatology    Other orders  -     atenolol (TENORMIN) 25 MG tablet; Take 1 tablet by mouth Daily.  Dispense: 90 tablet; Refill: 1  -     atorvastatin (LIPITOR) 10 MG tablet; Take 1 tablet by mouth Daily.  Dispense: 90 tablet; Refill: 1  -     levothyroxine (SYNTHROID, LEVOTHROID) 50 MCG tablet; Take 1 tablet by mouth Daily.  Dispense: 90 tablet; Refill: 1  -     fenofibrate 160 MG tablet; Take 1 tablet by mouth Daily.  Dispense: 90 tablet; Refill: 1               No follow-ups on file.   No flowsheet data found.

## 2022-03-11 ENCOUNTER — HOSPITAL ENCOUNTER (OUTPATIENT)
Dept: ONCOLOGY | Facility: HOSPITAL | Age: 32
Setting detail: INFUSION SERIES
Discharge: HOME | End: 2022-03-11

## 2022-03-11 ENCOUNTER — OFFICE VISIT (OUTPATIENT)
Dept: ONCOLOGY | Facility: HOSPITAL | Age: 32
End: 2022-03-11

## 2022-03-11 VITALS
HEART RATE: 72 BPM | RESPIRATION RATE: 18 BRPM | OXYGEN SATURATION: 100 % | SYSTOLIC BLOOD PRESSURE: 101 MMHG | BODY MASS INDEX: 30.96 KG/M2 | DIASTOLIC BLOOD PRESSURE: 52 MMHG | WEIGHT: 138.01 LBS | TEMPERATURE: 97.9 F

## 2022-03-11 DIAGNOSIS — D83.9 CVID (COMMON VARIABLE IMMUNODEFICIENCY): Primary | ICD-10-CM

## 2022-03-11 DIAGNOSIS — Z45.2 ENCOUNTER FOR ADJUSTMENT OR MANAGEMENT OF VASCULAR ACCESS DEVICE: Primary | ICD-10-CM

## 2022-03-11 DIAGNOSIS — D64.9 ANEMIA, UNSPECIFIED TYPE: ICD-10-CM

## 2022-03-11 DIAGNOSIS — D83.9 CVID (COMMON VARIABLE IMMUNODEFICIENCY): ICD-10-CM

## 2022-03-11 LAB
BASOPHILS # BLD AUTO: 0.03 10*3/MM3 (ref 0–0.2)
BASOPHILS NFR BLD AUTO: 0.3 % (ref 0–1.5)
DEPRECATED RDW RBC AUTO: 44.7 FL (ref 37–54)
EOSINOPHIL # BLD AUTO: 0.05 10*3/MM3 (ref 0–0.4)
EOSINOPHIL NFR BLD AUTO: 0.5 % (ref 0.3–6.2)
ERYTHROCYTE [DISTWIDTH] IN BLOOD BY AUTOMATED COUNT: 13.5 % (ref 12.3–15.4)
HCT VFR BLD AUTO: 34.7 % (ref 34–46.6)
HGB BLD-MCNC: 11.6 G/DL (ref 12–15.9)
IGA1 MFR SER: 18 MG/DL (ref 70–400)
IGG1 SER-MCNC: 523 MG/DL (ref 700–1600)
IGM SERPL-MCNC: 56 MG/DL (ref 40–230)
IMM GRANULOCYTES # BLD AUTO: 0.02 10*3/MM3 (ref 0–0.05)
IMM GRANULOCYTES NFR BLD AUTO: 0.2 % (ref 0–0.5)
LYMPHOCYTES # BLD AUTO: 2.45 10*3/MM3 (ref 0.7–3.1)
LYMPHOCYTES NFR BLD AUTO: 25.8 % (ref 19.6–45.3)
MCH RBC QN AUTO: 29.8 PG (ref 26.6–33)
MCHC RBC AUTO-ENTMCNC: 33.4 G/DL (ref 31.5–35.7)
MCV RBC AUTO: 89.2 FL (ref 79–97)
MONOCYTES # BLD AUTO: 0.52 10*3/MM3 (ref 0.1–0.9)
MONOCYTES NFR BLD AUTO: 5.5 % (ref 5–12)
NEUTROPHILS NFR BLD AUTO: 6.43 10*3/MM3 (ref 1.7–7)
NEUTROPHILS NFR BLD AUTO: 67.7 % (ref 42.7–76)
PLATELET # BLD AUTO: 298 10*3/MM3 (ref 140–450)
PMV BLD AUTO: 9.1 FL (ref 6–12)
RBC # BLD AUTO: 3.89 10*6/MM3 (ref 3.77–5.28)
WBC NRBC COR # BLD: 9.5 10*3/MM3 (ref 3.4–10.8)

## 2022-03-11 PROCEDURE — 85025 COMPLETE CBC W/AUTO DIFF WBC: CPT | Performed by: INTERNAL MEDICINE

## 2022-03-11 PROCEDURE — 36591 DRAW BLOOD OFF VENOUS DEVICE: CPT

## 2022-03-11 PROCEDURE — 99214 OFFICE O/P EST MOD 30 MIN: CPT | Performed by: INTERNAL MEDICINE

## 2022-03-11 PROCEDURE — 82784 ASSAY IGA/IGD/IGG/IGM EACH: CPT | Performed by: INTERNAL MEDICINE

## 2022-03-11 PROCEDURE — 25010000002 HEPARIN LOCK FLUSH PER 10 UNITS: Performed by: INTERNAL MEDICINE

## 2022-03-11 RX ORDER — HEPARIN SODIUM (PORCINE) LOCK FLUSH IV SOLN 100 UNIT/ML 100 UNIT/ML
500 SOLUTION INTRAVENOUS AS NEEDED
Status: CANCELLED | OUTPATIENT
Start: 2022-03-11

## 2022-03-11 RX ORDER — HEPARIN SODIUM (PORCINE) LOCK FLUSH IV SOLN 100 UNIT/ML 100 UNIT/ML
500 SOLUTION INTRAVENOUS AS NEEDED
Status: DISCONTINUED | OUTPATIENT
Start: 2022-03-11 | End: 2022-03-12 | Stop reason: HOSPADM

## 2022-03-11 RX ORDER — SODIUM CHLORIDE 0.9 % (FLUSH) 0.9 %
20 SYRINGE (ML) INJECTION AS NEEDED
Status: CANCELLED | OUTPATIENT
Start: 2022-03-11

## 2022-03-11 RX ORDER — SODIUM CHLORIDE 0.9 % (FLUSH) 0.9 %
20 SYRINGE (ML) INJECTION AS NEEDED
Status: DISCONTINUED | OUTPATIENT
Start: 2022-03-11 | End: 2022-03-12 | Stop reason: HOSPADM

## 2022-03-11 RX ADMIN — Medication 500 UNITS: at 09:41

## 2022-03-11 RX ADMIN — Medication 20 ML: at 09:41

## 2022-03-15 ENCOUNTER — HOSPITAL ENCOUNTER (OUTPATIENT)
Dept: ONCOLOGY | Facility: HOSPITAL | Age: 32
Setting detail: INFUSION SERIES
Discharge: HOME | End: 2022-03-15

## 2022-03-15 VITALS
HEIGHT: 56 IN | OXYGEN SATURATION: 98 % | HEART RATE: 85 BPM | TEMPERATURE: 96.8 F | BODY MASS INDEX: 30.8 KG/M2 | RESPIRATION RATE: 18 BRPM | SYSTOLIC BLOOD PRESSURE: 111 MMHG | WEIGHT: 136.91 LBS | DIASTOLIC BLOOD PRESSURE: 59 MMHG

## 2022-03-15 DIAGNOSIS — D83.9 CVID (COMMON VARIABLE IMMUNODEFICIENCY): Primary | ICD-10-CM

## 2022-03-15 DIAGNOSIS — Z45.2 ENCOUNTER FOR ADJUSTMENT OR MANAGEMENT OF VASCULAR ACCESS DEVICE: ICD-10-CM

## 2022-03-15 PROCEDURE — 96366 THER/PROPH/DIAG IV INF ADDON: CPT

## 2022-03-15 PROCEDURE — 63710000001 DIPHENHYDRAMINE PER 50 MG: Performed by: INTERNAL MEDICINE

## 2022-03-15 PROCEDURE — 25010000002 IMMUNE GLOBULIN (HUMAN) 10 GM/100ML SOLUTION: Performed by: INTERNAL MEDICINE

## 2022-03-15 PROCEDURE — 25010000002 HEPARIN LOCK FLUSH PER 10 UNITS: Performed by: INTERNAL MEDICINE

## 2022-03-15 PROCEDURE — 96365 THER/PROPH/DIAG IV INF INIT: CPT

## 2022-03-15 PROCEDURE — 96375 TX/PRO/DX INJ NEW DRUG ADDON: CPT

## 2022-03-15 RX ORDER — HEPARIN SODIUM (PORCINE) LOCK FLUSH IV SOLN 100 UNIT/ML 100 UNIT/ML
500 SOLUTION INTRAVENOUS AS NEEDED
Status: DISCONTINUED | OUTPATIENT
Start: 2022-03-15 | End: 2022-03-16 | Stop reason: HOSPADM

## 2022-03-15 RX ORDER — FAMOTIDINE 10 MG/ML
20 INJECTION, SOLUTION INTRAVENOUS ONCE
Status: COMPLETED | OUTPATIENT
Start: 2022-03-15 | End: 2022-03-15

## 2022-03-15 RX ORDER — HEPARIN SODIUM (PORCINE) LOCK FLUSH IV SOLN 100 UNIT/ML 100 UNIT/ML
500 SOLUTION INTRAVENOUS AS NEEDED
Status: CANCELLED | OUTPATIENT
Start: 2022-04-22

## 2022-03-15 RX ORDER — SODIUM CHLORIDE 0.9 % (FLUSH) 0.9 %
20 SYRINGE (ML) INJECTION AS NEEDED
Status: CANCELLED | OUTPATIENT
Start: 2022-04-22

## 2022-03-15 RX ORDER — SODIUM CHLORIDE 0.9 % (FLUSH) 0.9 %
20 SYRINGE (ML) INJECTION AS NEEDED
Status: DISCONTINUED | OUTPATIENT
Start: 2022-03-15 | End: 2022-03-16 | Stop reason: HOSPADM

## 2022-03-15 RX ORDER — DIPHENHYDRAMINE HCL 25 MG
25 CAPSULE ORAL ONCE
Status: COMPLETED | OUTPATIENT
Start: 2022-03-15 | End: 2022-03-15

## 2022-03-15 RX ORDER — SODIUM CHLORIDE 9 MG/ML
250 INJECTION, SOLUTION INTRAVENOUS ONCE
Status: COMPLETED | OUTPATIENT
Start: 2022-03-15 | End: 2022-03-15

## 2022-03-15 RX ADMIN — HEPARIN SODIUM (PORCINE) LOCK FLUSH IV SOLN 100 UNIT/ML 500 UNITS: 100 SOLUTION at 12:13

## 2022-03-15 RX ADMIN — Medication 20 ML: at 12:12

## 2022-03-15 RX ADMIN — DIPHENHYDRAMINE HYDROCHLORIDE 25 MG: 25 CAPSULE ORAL at 10:07

## 2022-03-15 RX ADMIN — SODIUM CHLORIDE 250 ML: 9 INJECTION, SOLUTION INTRAVENOUS at 09:57

## 2022-03-15 RX ADMIN — FAMOTIDINE 20 MG: 10 INJECTION INTRAVENOUS at 10:08

## 2022-03-15 RX ADMIN — IMMUNE GLOBULIN INFUSION (HUMAN) 10 G: 100 INJECTION, SOLUTION INTRAVENOUS; SUBCUTANEOUS at 10:45

## 2022-04-18 PROBLEM — D64.9 ANEMIA: Status: ACTIVE | Noted: 2022-04-18

## 2022-04-20 RX ORDER — MEPERIDINE HYDROCHLORIDE 25 MG/ML
25 INJECTION INTRAMUSCULAR; INTRAVENOUS; SUBCUTANEOUS
Status: CANCELLED | OUTPATIENT
Start: 2022-04-26

## 2022-04-20 RX ORDER — SODIUM CHLORIDE 9 MG/ML
250 INJECTION, SOLUTION INTRAVENOUS ONCE
Status: CANCELLED | OUTPATIENT
Start: 2022-04-26

## 2022-04-20 RX ORDER — DIPHENHYDRAMINE HYDROCHLORIDE 50 MG/ML
50 INJECTION INTRAMUSCULAR; INTRAVENOUS AS NEEDED
Status: CANCELLED | OUTPATIENT
Start: 2022-04-26

## 2022-04-20 RX ORDER — FAMOTIDINE 10 MG/ML
20 INJECTION, SOLUTION INTRAVENOUS AS NEEDED
Status: CANCELLED | OUTPATIENT
Start: 2022-04-26

## 2022-04-20 RX ORDER — FAMOTIDINE 10 MG/ML
20 INJECTION, SOLUTION INTRAVENOUS ONCE
Status: CANCELLED | OUTPATIENT
Start: 2022-04-26

## 2022-04-20 RX ORDER — DIPHENHYDRAMINE HCL 25 MG
25 CAPSULE ORAL ONCE
Status: CANCELLED | OUTPATIENT
Start: 2022-04-26

## 2022-04-22 ENCOUNTER — HOSPITAL ENCOUNTER (OUTPATIENT)
Dept: ONCOLOGY | Facility: HOSPITAL | Age: 32
Setting detail: INFUSION SERIES
Discharge: HOME | End: 2022-04-22

## 2022-04-22 ENCOUNTER — OFFICE VISIT (OUTPATIENT)
Dept: ONCOLOGY | Facility: HOSPITAL | Age: 32
End: 2022-04-22

## 2022-04-22 VITALS
BODY MASS INDEX: 30.66 KG/M2 | SYSTOLIC BLOOD PRESSURE: 109 MMHG | TEMPERATURE: 97.1 F | HEART RATE: 54 BPM | WEIGHT: 136.69 LBS | OXYGEN SATURATION: 98 % | RESPIRATION RATE: 18 BRPM | DIASTOLIC BLOOD PRESSURE: 48 MMHG

## 2022-04-22 DIAGNOSIS — D83.9 CVID (COMMON VARIABLE IMMUNODEFICIENCY): ICD-10-CM

## 2022-04-22 DIAGNOSIS — Z45.2 ENCOUNTER FOR ADJUSTMENT OR MANAGEMENT OF VASCULAR ACCESS DEVICE: ICD-10-CM

## 2022-04-22 DIAGNOSIS — D83.9 CVID (COMMON VARIABLE IMMUNODEFICIENCY): Primary | ICD-10-CM

## 2022-04-22 LAB
BASOPHILS # BLD AUTO: 0.03 10*3/MM3 (ref 0–0.2)
BASOPHILS NFR BLD AUTO: 0.3 % (ref 0–1.5)
DEPRECATED RDW RBC AUTO: 47.7 FL (ref 37–54)
EOSINOPHIL # BLD AUTO: 0.05 10*3/MM3 (ref 0–0.4)
EOSINOPHIL NFR BLD AUTO: 0.5 % (ref 0.3–6.2)
ERYTHROCYTE [DISTWIDTH] IN BLOOD BY AUTOMATED COUNT: 14.1 % (ref 12.3–15.4)
HCT VFR BLD AUTO: 37.8 % (ref 34–46.6)
HGB BLD-MCNC: 12.5 G/DL (ref 12–15.9)
IGA1 MFR SER: 18 MG/DL (ref 70–400)
IGG1 SER-MCNC: 568 MG/DL (ref 700–1600)
IGM SERPL-MCNC: 56 MG/DL (ref 40–230)
IMM GRANULOCYTES # BLD AUTO: 0.02 10*3/MM3 (ref 0–0.05)
IMM GRANULOCYTES NFR BLD AUTO: 0.2 % (ref 0–0.5)
LYMPHOCYTES # BLD AUTO: 2 10*3/MM3 (ref 0.7–3.1)
LYMPHOCYTES NFR BLD AUTO: 20.4 % (ref 19.6–45.3)
MCH RBC QN AUTO: 29.9 PG (ref 26.6–33)
MCHC RBC AUTO-ENTMCNC: 33.1 G/DL (ref 31.5–35.7)
MCV RBC AUTO: 90.4 FL (ref 79–97)
MONOCYTES # BLD AUTO: 0.64 10*3/MM3 (ref 0.1–0.9)
MONOCYTES NFR BLD AUTO: 6.5 % (ref 5–12)
NEUTROPHILS NFR BLD AUTO: 7.06 10*3/MM3 (ref 1.7–7)
NEUTROPHILS NFR BLD AUTO: 72.1 % (ref 42.7–76)
PLATELET # BLD AUTO: 298 10*3/MM3 (ref 140–450)
PMV BLD AUTO: 9.1 FL (ref 6–12)
RBC # BLD AUTO: 4.18 10*6/MM3 (ref 3.77–5.28)
WBC NRBC COR # BLD: 9.8 10*3/MM3 (ref 3.4–10.8)

## 2022-04-22 PROCEDURE — 85025 COMPLETE CBC W/AUTO DIFF WBC: CPT | Performed by: INTERNAL MEDICINE

## 2022-04-22 PROCEDURE — 25010000002 HEPARIN LOCK FLUSH PER 10 UNITS: Performed by: INTERNAL MEDICINE

## 2022-04-22 PROCEDURE — 99214 OFFICE O/P EST MOD 30 MIN: CPT | Performed by: INTERNAL MEDICINE

## 2022-04-22 PROCEDURE — 82784 ASSAY IGA/IGD/IGG/IGM EACH: CPT | Performed by: INTERNAL MEDICINE

## 2022-04-22 PROCEDURE — 36591 DRAW BLOOD OFF VENOUS DEVICE: CPT

## 2022-04-22 RX ORDER — HEPARIN SODIUM (PORCINE) LOCK FLUSH IV SOLN 100 UNIT/ML 100 UNIT/ML
500 SOLUTION INTRAVENOUS AS NEEDED
Status: DISCONTINUED | OUTPATIENT
Start: 2022-04-22 | End: 2022-04-23 | Stop reason: HOSPADM

## 2022-04-22 RX ORDER — SODIUM CHLORIDE 0.9 % (FLUSH) 0.9 %
20 SYRINGE (ML) INJECTION AS NEEDED
Status: DISCONTINUED | OUTPATIENT
Start: 2022-04-22 | End: 2022-04-23 | Stop reason: HOSPADM

## 2022-04-22 RX ORDER — HEPARIN SODIUM (PORCINE) LOCK FLUSH IV SOLN 100 UNIT/ML 100 UNIT/ML
500 SOLUTION INTRAVENOUS AS NEEDED
Status: CANCELLED | OUTPATIENT
Start: 2022-04-22

## 2022-04-22 RX ORDER — SODIUM CHLORIDE 0.9 % (FLUSH) 0.9 %
20 SYRINGE (ML) INJECTION AS NEEDED
Status: CANCELLED | OUTPATIENT
Start: 2022-04-22

## 2022-04-22 RX ADMIN — Medication 20 ML: at 10:18

## 2022-04-22 RX ADMIN — HEPARIN SODIUM (PORCINE) LOCK FLUSH IV SOLN 100 UNIT/ML 500 UNITS: 100 SOLUTION at 10:18

## 2022-04-25 ENCOUNTER — TELEPHONE (OUTPATIENT)
Dept: FAMILY MEDICINE CLINIC | Facility: CLINIC | Age: 32
End: 2022-04-25

## 2022-04-25 NOTE — TELEPHONE ENCOUNTER
Caller: HEATHER FELTON    Relationship: Mother    Best call back number: [phone]    What is the best time to reach you: ANYTIME    Who are you requesting to speak with (clinical staff, provider,  specific staff member): CLINICAL FOR [facility]      What was the call regarding: PATIENT IS CURRENTLY SCHEDULED TO TRANSFER CARE TO [facility] ON 06/07/22. MOTHER IS CALLING TO SEE IF THERE IS ANYWAY THAT PATIENT COULD BE SEEN SOONER. MOTHER STATES THAT HER DAUGHTER IS BEHIND ON HER B12 SHOTS AND HAS BEEN TOLD THAT [facility] CANNOT GET THOSE AGAIN UNTIL THE APPOINTMENT HAPPENS    Do you require a callback: YES

## 2022-04-26 ENCOUNTER — HOSPITAL ENCOUNTER (OUTPATIENT)
Dept: ONCOLOGY | Facility: HOSPITAL | Age: 32
Setting detail: INFUSION SERIES
Discharge: HOME | End: 2022-04-26

## 2022-04-26 VITALS
SYSTOLIC BLOOD PRESSURE: 107 MMHG | BODY MASS INDEX: 31.05 KG/M2 | DIASTOLIC BLOOD PRESSURE: 55 MMHG | HEART RATE: 71 BPM | RESPIRATION RATE: 20 BRPM | OXYGEN SATURATION: 100 % | TEMPERATURE: 98 F | WEIGHT: 138.01 LBS | HEIGHT: 56 IN

## 2022-04-26 DIAGNOSIS — D83.9 CVID (COMMON VARIABLE IMMUNODEFICIENCY): Primary | ICD-10-CM

## 2022-04-26 DIAGNOSIS — Z45.2 ENCOUNTER FOR ADJUSTMENT OR MANAGEMENT OF VASCULAR ACCESS DEVICE: ICD-10-CM

## 2022-04-26 PROCEDURE — 25010000002 IMMUNE GLOBULIN (HUMAN) 10 GM/100ML SOLUTION: Performed by: INTERNAL MEDICINE

## 2022-04-26 PROCEDURE — 96375 TX/PRO/DX INJ NEW DRUG ADDON: CPT

## 2022-04-26 PROCEDURE — 25010000002 HEPARIN LOCK FLUSH PER 10 UNITS: Performed by: INTERNAL MEDICINE

## 2022-04-26 PROCEDURE — 63710000001 DIPHENHYDRAMINE PER 50 MG: Performed by: INTERNAL MEDICINE

## 2022-04-26 PROCEDURE — 96365 THER/PROPH/DIAG IV INF INIT: CPT

## 2022-04-26 RX ORDER — DIPHENHYDRAMINE HCL 25 MG
25 CAPSULE ORAL ONCE
Status: COMPLETED | OUTPATIENT
Start: 2022-04-26 | End: 2022-04-26

## 2022-04-26 RX ORDER — FAMOTIDINE 10 MG/ML
20 INJECTION, SOLUTION INTRAVENOUS ONCE
Status: COMPLETED | OUTPATIENT
Start: 2022-04-26 | End: 2022-04-26

## 2022-04-26 RX ORDER — SODIUM CHLORIDE 0.9 % (FLUSH) 0.9 %
20 SYRINGE (ML) INJECTION AS NEEDED
Status: CANCELLED | OUTPATIENT
Start: 2022-06-03

## 2022-04-26 RX ORDER — SODIUM CHLORIDE 0.9 % (FLUSH) 0.9 %
20 SYRINGE (ML) INJECTION AS NEEDED
Status: DISCONTINUED | OUTPATIENT
Start: 2022-04-26 | End: 2022-04-28 | Stop reason: HOSPADM

## 2022-04-26 RX ORDER — HEPARIN SODIUM (PORCINE) LOCK FLUSH IV SOLN 100 UNIT/ML 100 UNIT/ML
500 SOLUTION INTRAVENOUS AS NEEDED
Status: DISCONTINUED | OUTPATIENT
Start: 2022-04-26 | End: 2022-04-28 | Stop reason: HOSPADM

## 2022-04-26 RX ORDER — SODIUM CHLORIDE 9 MG/ML
250 INJECTION, SOLUTION INTRAVENOUS ONCE
Status: COMPLETED | OUTPATIENT
Start: 2022-04-26 | End: 2022-04-26

## 2022-04-26 RX ORDER — HEPARIN SODIUM (PORCINE) LOCK FLUSH IV SOLN 100 UNIT/ML 100 UNIT/ML
500 SOLUTION INTRAVENOUS AS NEEDED
Status: CANCELLED | OUTPATIENT
Start: 2022-06-03

## 2022-04-26 RX ADMIN — Medication 20 ML: at 11:43

## 2022-04-26 RX ADMIN — DIPHENHYDRAMINE HYDROCHLORIDE 25 MG: 25 CAPSULE ORAL at 09:44

## 2022-04-26 RX ADMIN — FAMOTIDINE 20 MG: 10 INJECTION INTRAVENOUS at 09:47

## 2022-04-26 RX ADMIN — IMMUNE GLOBULIN INFUSION (HUMAN) 10 G: 100 INJECTION, SOLUTION INTRAVENOUS; SUBCUTANEOUS at 10:18

## 2022-04-26 RX ADMIN — SODIUM CHLORIDE 250 ML: 9 INJECTION, SOLUTION INTRAVENOUS at 09:42

## 2022-04-26 RX ADMIN — HEPARIN SODIUM (PORCINE) LOCK FLUSH IV SOLN 100 UNIT/ML 500 UNITS: 100 SOLUTION at 11:43

## 2022-04-26 NOTE — TELEPHONE ENCOUNTER
Spoke with patient mother Toya Preciado. Patient is coming in 5/25. Advised mother that the patient can take a b12 supplement from over the counter, which they can get at [facility], or their local pharmacy. Mother voiced understanding.

## 2022-05-04 ENCOUNTER — OFFICE VISIT (OUTPATIENT)
Dept: OTOLARYNGOLOGY | Facility: CLINIC | Age: 32
End: 2022-05-04

## 2022-05-04 VITALS — WEIGHT: 138.2 LBS | HEIGHT: 56 IN | BODY MASS INDEX: 31.09 KG/M2 | TEMPERATURE: 97.3 F

## 2022-05-04 DIAGNOSIS — H72.92 TYMPANIC MEMBRANE PERFORATION, LEFT: Primary | ICD-10-CM

## 2022-05-04 DIAGNOSIS — H70.11 CHRONIC MASTOIDITIS OF RIGHT SIDE: ICD-10-CM

## 2022-05-04 DIAGNOSIS — H90.6 MIXED HEARING LOSS, BILATERAL: ICD-10-CM

## 2022-05-04 PROCEDURE — 99214 OFFICE O/P EST MOD 30 MIN: CPT | Performed by: OTOLARYNGOLOGY

## 2022-05-04 PROCEDURE — 69220 CLEAN OUT MASTOID CAVITY: CPT | Performed by: OTOLARYNGOLOGY

## 2022-05-04 RX ORDER — OFLOXACIN 3 MG/ML
4 SOLUTION/ DROPS OPHTHALMIC 2 TIMES DAILY
Qty: 10 ML | Refills: 3 | Status: SHIPPED | OUTPATIENT
Start: 2022-05-04 | End: 2022-08-15 | Stop reason: SDUPTHER

## 2022-05-04 RX ORDER — AZELASTINE 1 MG/ML
SPRAY, METERED NASAL
COMMUNITY
Start: 2022-04-25

## 2022-05-04 RX ORDER — FAMOTIDINE 10 MG
20 TABLET ORAL DAILY
COMMUNITY

## 2022-05-04 NOTE — PROGRESS NOTES
Patient Name: Francine Preciado   Visit Date: 05/04/2021   Patient ID: [id number]  Provider: Jose Elias Jorgensen MD    Sex: female  Location: [facility]   YOB: 1990  Location Address: [address] [place] [address], [address],?[place]?[id number]    Primary Care Provider Aggie Chance APRN  Location Phone: [phone]    Referring Provider: No ref. provider found        Chief Complaint  6 month follow up    History of Present Illness  Francine Preciado is a 31 y.o. 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 She underwent an audiogram on 5/11/17 which revealed bilateral moderate to profound mixed hearing loss. Speech discrimination was 100% bilaterally at 90 dB. Tympanograms were type B bilaterally.  She wears bilateral behind-the-ear hearing aids.    She returns today for routine follow-up.  She was last seen on 11/5/2021 at which time her right mastoid cavity was debrided on the left tympanic membrane perforation was stable.    She was placed on fluticasone after debriding her mastoid cavity. She tells me that her left ear drained purulence a week or two ago which has resolved with ofloxacin drops.  She tells me she is not having much difficulty with her right ear.  She is wearing her hearing aids daily.    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    • Central perforation of tympanic membrane of right ear    • Chronic mastoiditis    • COVID-19 vaccine series completed    • Heart murmur    • Hypertension    • Kabuki make-up syndrome    • Mitral valve prolapse    • Mixed conductive and sensorineural hearing loss of both ears    • Otorrhea, left    • Recurrent otitis media    • Skin disease     PSORIASIS/ECXEMA       Past Surgical History:    Procedure Laterality Date   • MULTIPLE TOOTH EXTRACTIONS     • NO PAST SURGERIES           Current Outpatient Medications:   •  atenolol (TENORMIN) 25 MG tablet, Take 1 tablet by mouth Daily., Disp: 90 tablet, Rfl: 1  •  atorvastatin (LIPITOR) 10 MG tablet, Take 1 tablet by mouth Daily., Disp: 90 tablet, Rfl: 1  •  azelastine (ASTELIN) 0.1 % nasal spray, , Disp: , Rfl:   •  EPINEPHrine (EPIPEN) 0.3 MG/0.3ML solution auto-injector injection, 0.3 mL., Disp: , Rfl:   •  famotidine (PEPCID) 10 MG tablet, Take 20 mg by mouth Daily., Disp: , Rfl:   •  fenofibrate 160 MG tablet, Take 1 tablet by mouth Daily., Disp: 90 tablet, Rfl: 1  •  fluticasone (FLONASE) 50 MCG/ACT nasal spray, 2 sprays into the nostril(s) as directed by provider Daily., Disp: 16 g, Rfl: 11  •  Junel 1/20 1-20 MG-MCG per tablet, Take 1 tablet by mouth Daily., Disp: 21 tablet, Rfl: 11  •  levocetirizine (XYZAL) 5 MG tablet, , Disp: , Rfl:   •  levothyroxine (SYNTHROID, LEVOTHROID) 50 MCG tablet, Take 1 tablet by mouth Daily., Disp: 90 tablet, Rfl: 1  •  montelukast (SINGULAIR) 10 MG tablet, , Disp: , Rfl:   •  ofloxacin (Ocuflox) 0.3 % ophthalmic solution, Administer 4 drops into ear(s) as directed by provider 2 (Two) Times a Day., Disp: 10 mL, Rfl: 3  •  predniSONE (DELTASONE) 20 MG tablet, Take 2 tablets by mouth Daily., Disp: 8 tablet, Rfl: 0    Current Facility-Administered Medications:   •  cyanocobalamin injection 1,000 mcg, 1,000 mcg, Intramuscular, Q28 Days, Arnie Rucker MD, 1,000 mcg at 03/07/22 1110     Allergies   Allergen Reactions   • Cefuroxime Axetil Unknown - Low Severity   • Neosporin [Bacitracin-Polymyxin B] Unknown - Low Severity       Social History     Tobacco Use   • Smoking status: Passive Smoke Exposure - Never Smoker   • Smokeless tobacco: Never Used   • Tobacco comment: Daily exposure to 2nd hand smoke   Vaping Use   • Vaping Use: Never used   Substance Use Topics   • Alcohol use: Not Currently     Comment: DENIES   • Drug  "use: Never        Objective     Vital Signs:   Temp 97.3 °F (36.3 °C) (Temporal)   Ht 142.2 cm (56\")   Wt 62.7 kg (138 lb 3.2 oz)   BMI 30.98 kg/m²       Physical Exam    General: Well developed, well nourished patient of stated age in no acute distress. Voice is strong and clear.   Head: Normocephalic and atraumatic.  Face: No lesions.  Bilateral parotid and submandibular glands are unremarkable.  Stensen's and Warthin's ducts are productive of clear saliva bilaterally.  House-Brackmann I/VI     bilaterally.   muscles and temporomandibular joint nontender to palpation.  No TMJ crepitus.  Eyes: PERRLA, sclerae anicteric, no conjunctival injection. Extra ocular movements are intact and full. No nystagmus.   Ears: Auricles are normal in appearance.  Left external auditory canal is unremarkable.  Left tympanic membrane with a stable dry perforation.  Right mastoid cavity with ceruminous and squamous debris present.  This was debrided using the operating microscope and alligator forceps.  Hearing normal to conversational voice.   Nose: External nose is normal in appearance. Bilateral nares are patent with normal appearing mucosa. Septum midline. Turbinates are unremarkable. No lesions.   Oral Cavity: Lips are normal in appearance. Oral mucosa is unremarkable. Gingiva is unremarkable.  Partial dentition for age. Tongue is unremarkable with good movement. Hard palate is unremarkable.   Oropharynx: Soft palate is unremarkable with full movement. Uvula is unremarkable. Bilateral tonsils are unremarkable. Posterior oropharynx is unremarkable.    Larynx and hypopharynx: Deferred secondary to gag reflex.  Neck: Supple.  No mass.  Nontender to palpation.  Trachea midline. Thyroid normal size and without nodules to palpation.   Lymphatic: No lymphadenopathy upon palpation.   Psychiatric: Appropriate affect, cooperative   Neurologic: Oriented x 3, strength symmetric in all extremities, Cranial Nerves II-XII are " grossly intact to confrontation   Skin: Warm and dry. No rashes.    Procedures           Result Review :               Assessment and Plan    Diagnoses and all orders for this visit:    1. Tympanic membrane perforation, left (Primary)  -     ofloxacin (Ocuflox) 0.3 % ophthalmic solution; Administer 4 drops into ear(s) as directed by provider 2 (Two) Times a Day.  Dispense: 10 mL; Refill: 3    2. Chronic mastoiditis of right side  -     ofloxacin (Ocuflox) 0.3 % ophthalmic solution; Administer 4 drops into ear(s) as directed by provider 2 (Two) Times a Day.  Dispense: 10 mL; Refill: 3    3. Mixed hearing loss, bilateral      Impressions and findings were discussed.  Currently, she has been experiencing some left-sided otorrhea which has resolved recently with ofloxacin drops.  Her right ear is doing fairly well but demonstrated significant buildup of squamous and ceruminous debris on examination today.  This was debrided.  She will be provided with more ofloxacin drops to use on an as-needed basis and follow-up in 6 months or sooner if needed.    Follow Up   No follow-ups on file.  Patient was given instructions and counseling regarding her condition or for health maintenance advice. Please see specific information pulled into the AVS if appropriate.

## 2022-05-25 ENCOUNTER — OFFICE VISIT (OUTPATIENT)
Dept: FAMILY MEDICINE CLINIC | Facility: CLINIC | Age: 32
End: 2022-05-25

## 2022-05-25 VITALS
SYSTOLIC BLOOD PRESSURE: 114 MMHG | RESPIRATION RATE: 19 BRPM | DIASTOLIC BLOOD PRESSURE: 70 MMHG | WEIGHT: 135.9 LBS | TEMPERATURE: 97.6 F | OXYGEN SATURATION: 96 % | BODY MASS INDEX: 30.57 KG/M2 | HEART RATE: 97 BPM | HEIGHT: 56 IN

## 2022-05-25 DIAGNOSIS — E78.2 MIXED HYPERLIPIDEMIA: ICD-10-CM

## 2022-05-25 DIAGNOSIS — D83.9 CVID (COMMON VARIABLE IMMUNODEFICIENCY): ICD-10-CM

## 2022-05-25 DIAGNOSIS — E53.8 B12 DEFICIENCY: ICD-10-CM

## 2022-05-25 DIAGNOSIS — Q89.8: Primary | ICD-10-CM

## 2022-05-25 DIAGNOSIS — Z01.89 ROUTINE LAB DRAW: ICD-10-CM

## 2022-05-25 DIAGNOSIS — J30.89 NON-SEASONAL ALLERGIC RHINITIS, UNSPECIFIED TRIGGER: ICD-10-CM

## 2022-05-25 DIAGNOSIS — E03.9 ACQUIRED HYPOTHYROIDISM: ICD-10-CM

## 2022-05-25 DIAGNOSIS — I34.1 MITRAL VALVE PROLAPSE: ICD-10-CM

## 2022-05-25 DIAGNOSIS — K58.1 IRRITABLE BOWEL SYNDROME WITH CONSTIPATION: ICD-10-CM

## 2022-05-25 PROBLEM — H66.90 RECURRENT OTITIS MEDIA: Status: RESOLVED | Noted: 2021-07-06 | Resolved: 2022-05-25

## 2022-05-25 PROBLEM — H92.12 OTORRHEA, LEFT: Status: RESOLVED | Noted: 2021-07-06 | Resolved: 2022-05-25

## 2022-05-25 PROCEDURE — 99214 OFFICE O/P EST MOD 30 MIN: CPT

## 2022-05-25 PROCEDURE — 96372 THER/PROPH/DIAG INJ SC/IM: CPT

## 2022-05-25 RX ORDER — FENOFIBRATE 160 MG/1
160 TABLET ORAL DAILY
Qty: 90 TABLET | Refills: 1 | Status: SHIPPED | OUTPATIENT
Start: 2022-05-25 | End: 2022-08-25 | Stop reason: SDUPTHER

## 2022-05-25 RX ORDER — ATENOLOL 25 MG/1
25 TABLET ORAL DAILY
Qty: 90 TABLET | Refills: 1 | Status: SHIPPED | OUTPATIENT
Start: 2022-05-25 | End: 2022-08-25 | Stop reason: SDUPTHER

## 2022-05-25 RX ORDER — ATORVASTATIN CALCIUM 10 MG/1
10 TABLET, FILM COATED ORAL DAILY
Qty: 90 TABLET | Refills: 1 | Status: SHIPPED | OUTPATIENT
Start: 2022-05-25 | End: 2022-08-25 | Stop reason: SDUPTHER

## 2022-05-25 RX ORDER — LEVOTHYROXINE SODIUM 0.05 MG/1
50 TABLET ORAL DAILY
Qty: 90 TABLET | Refills: 1 | Status: SHIPPED | OUTPATIENT
Start: 2022-05-25 | End: 2022-08-25 | Stop reason: SDUPTHER

## 2022-05-25 RX ADMIN — CYANOCOBALAMIN 1000 MCG: 1000 INJECTION, SOLUTION INTRAMUSCULAR; SUBCUTANEOUS at 07:52

## 2022-06-03 ENCOUNTER — HOSPITAL ENCOUNTER (OUTPATIENT)
Dept: ONCOLOGY | Facility: HOSPITAL | Age: 32
Setting detail: INFUSION SERIES
Discharge: HOME | End: 2022-06-03

## 2022-06-03 ENCOUNTER — OFFICE VISIT (OUTPATIENT)
Dept: ONCOLOGY | Facility: HOSPITAL | Age: 32
End: 2022-06-03

## 2022-06-03 VITALS
DIASTOLIC BLOOD PRESSURE: 80 MMHG | HEART RATE: 89 BPM | TEMPERATURE: 97.2 F | OXYGEN SATURATION: 98 % | RESPIRATION RATE: 16 BRPM | WEIGHT: 136.69 LBS | BODY MASS INDEX: 30.64 KG/M2 | SYSTOLIC BLOOD PRESSURE: 110 MMHG

## 2022-06-03 DIAGNOSIS — D83.9 CVID (COMMON VARIABLE IMMUNODEFICIENCY): Primary | ICD-10-CM

## 2022-06-03 DIAGNOSIS — E03.9 ACQUIRED HYPOTHYROIDISM: ICD-10-CM

## 2022-06-03 DIAGNOSIS — E53.8 B12 DEFICIENCY: ICD-10-CM

## 2022-06-03 DIAGNOSIS — Z45.2 ENCOUNTER FOR ADJUSTMENT OR MANAGEMENT OF VASCULAR ACCESS DEVICE: ICD-10-CM

## 2022-06-03 DIAGNOSIS — Z01.89 ROUTINE LAB DRAW: ICD-10-CM

## 2022-06-03 DIAGNOSIS — D64.9 ANEMIA, UNSPECIFIED TYPE: ICD-10-CM

## 2022-06-03 DIAGNOSIS — E78.2 MIXED HYPERLIPIDEMIA: ICD-10-CM

## 2022-06-03 DIAGNOSIS — Q89.8: ICD-10-CM

## 2022-06-03 DIAGNOSIS — D83.9 CVID (COMMON VARIABLE IMMUNODEFICIENCY): ICD-10-CM

## 2022-06-03 LAB
ALBUMIN SERPL-MCNC: 4.2 G/DL (ref 3.5–5.2)
ALBUMIN/GLOB SERPL: 1.5 G/DL
ALP SERPL-CCNC: 84 U/L (ref 39–117)
ALT SERPL W P-5'-P-CCNC: 18 U/L (ref 1–33)
ANION GAP SERPL CALCULATED.3IONS-SCNC: 12.8 MMOL/L (ref 5–15)
AST SERPL-CCNC: 16 U/L (ref 1–32)
BASOPHILS # BLD AUTO: 0.02 10*3/MM3 (ref 0–0.2)
BASOPHILS NFR BLD AUTO: 0.2 % (ref 0–1.5)
BILIRUB SERPL-MCNC: 0.5 MG/DL (ref 0–1.2)
BUN SERPL-MCNC: 17 MG/DL (ref 6–20)
BUN/CREAT SERPL: 18.3 (ref 7–25)
CALCIUM SPEC-SCNC: 9.6 MG/DL (ref 8.6–10.5)
CHLORIDE SERPL-SCNC: 106 MMOL/L (ref 98–107)
CHOLEST SERPL-MCNC: 136 MG/DL (ref 0–200)
CO2 SERPL-SCNC: 21.2 MMOL/L (ref 22–29)
CREAT SERPL-MCNC: 0.93 MG/DL (ref 0.57–1)
DEPRECATED RDW RBC AUTO: 44.1 FL (ref 37–54)
EGFRCR SERPLBLD CKD-EPI 2021: 85 ML/MIN/1.73
EOSINOPHIL # BLD AUTO: 0.06 10*3/MM3 (ref 0–0.4)
EOSINOPHIL NFR BLD AUTO: 0.6 % (ref 0.3–6.2)
ERYTHROCYTE [DISTWIDTH] IN BLOOD BY AUTOMATED COUNT: 13.5 % (ref 12.3–15.4)
FOLATE SERPL-MCNC: >20 NG/ML (ref 4.78–24.2)
GLOBULIN UR ELPH-MCNC: 2.8 GM/DL
GLUCOSE SERPL-MCNC: 77 MG/DL (ref 65–99)
HCT VFR BLD AUTO: 35.1 % (ref 34–46.6)
HDLC SERPL-MCNC: 36 MG/DL (ref 40–60)
HGB BLD-MCNC: 11.8 G/DL (ref 12–15.9)
IGA1 MFR SER: 18 MG/DL (ref 70–400)
IGG1 SER-MCNC: 519 MG/DL (ref 700–1600)
IGM SERPL-MCNC: 62 MG/DL (ref 40–230)
IMM GRANULOCYTES # BLD AUTO: 0.02 10*3/MM3 (ref 0–0.05)
IMM GRANULOCYTES NFR BLD AUTO: 0.2 % (ref 0–0.5)
LDLC SERPL CALC-MCNC: 71 MG/DL (ref 0–100)
LDLC/HDLC SERPL: 1.83 {RATIO}
LYMPHOCYTES # BLD AUTO: 2.46 10*3/MM3 (ref 0.7–3.1)
LYMPHOCYTES NFR BLD AUTO: 23.6 % (ref 19.6–45.3)
MCH RBC QN AUTO: 29.9 PG (ref 26.6–33)
MCHC RBC AUTO-ENTMCNC: 33.6 G/DL (ref 31.5–35.7)
MCV RBC AUTO: 88.9 FL (ref 79–97)
MONOCYTES # BLD AUTO: 0.69 10*3/MM3 (ref 0.1–0.9)
MONOCYTES NFR BLD AUTO: 6.6 % (ref 5–12)
NEUTROPHILS NFR BLD AUTO: 68.8 % (ref 42.7–76)
NEUTROPHILS NFR BLD AUTO: 7.19 10*3/MM3 (ref 1.7–7)
PLATELET # BLD AUTO: 281 10*3/MM3 (ref 140–450)
PMV BLD AUTO: 9.2 FL (ref 6–12)
POTASSIUM SERPL-SCNC: 3.7 MMOL/L (ref 3.5–5.2)
PROT SERPL-MCNC: 7 G/DL (ref 6–8.5)
RBC # BLD AUTO: 3.95 10*6/MM3 (ref 3.77–5.28)
SODIUM SERPL-SCNC: 140 MMOL/L (ref 136–145)
TRIGL SERPL-MCNC: 171 MG/DL (ref 0–150)
TSH SERPL DL<=0.05 MIU/L-ACNC: 2.85 UIU/ML (ref 0.27–4.2)
VIT B12 BLD-MCNC: 1052 PG/ML (ref 211–946)
VLDLC SERPL-MCNC: 29 MG/DL (ref 5–40)
WBC NRBC COR # BLD: 10.44 10*3/MM3 (ref 3.4–10.8)

## 2022-06-03 PROCEDURE — 25010000002 HEPARIN LOCK FLUSH PER 10 UNITS: Performed by: INTERNAL MEDICINE

## 2022-06-03 PROCEDURE — 85025 COMPLETE CBC W/AUTO DIFF WBC: CPT | Performed by: INTERNAL MEDICINE

## 2022-06-03 PROCEDURE — 82746 ASSAY OF FOLIC ACID SERUM: CPT

## 2022-06-03 PROCEDURE — 82784 ASSAY IGA/IGD/IGG/IGM EACH: CPT | Performed by: INTERNAL MEDICINE

## 2022-06-03 PROCEDURE — 99214 OFFICE O/P EST MOD 30 MIN: CPT | Performed by: NURSE PRACTITIONER

## 2022-06-03 PROCEDURE — 80053 COMPREHEN METABOLIC PANEL: CPT

## 2022-06-03 PROCEDURE — 80061 LIPID PANEL: CPT

## 2022-06-03 PROCEDURE — G0463 HOSPITAL OUTPT CLINIC VISIT: HCPCS | Performed by: NURSE PRACTITIONER

## 2022-06-03 PROCEDURE — 84443 ASSAY THYROID STIM HORMONE: CPT

## 2022-06-03 PROCEDURE — 82607 VITAMIN B-12: CPT

## 2022-06-03 PROCEDURE — 36591 DRAW BLOOD OFF VENOUS DEVICE: CPT

## 2022-06-03 RX ORDER — DIPHENHYDRAMINE HCL 25 MG
25 CAPSULE ORAL ONCE
Status: CANCELLED | OUTPATIENT
Start: 2022-06-07

## 2022-06-03 RX ORDER — SODIUM CHLORIDE 0.9 % (FLUSH) 0.9 %
20 SYRINGE (ML) INJECTION AS NEEDED
Status: DISCONTINUED | OUTPATIENT
Start: 2022-06-03 | End: 2022-06-04 | Stop reason: HOSPADM

## 2022-06-03 RX ORDER — FAMOTIDINE 10 MG/ML
20 INJECTION, SOLUTION INTRAVENOUS AS NEEDED
Status: CANCELLED | OUTPATIENT
Start: 2022-06-07

## 2022-06-03 RX ORDER — HEPARIN SODIUM (PORCINE) LOCK FLUSH IV SOLN 100 UNIT/ML 100 UNIT/ML
500 SOLUTION INTRAVENOUS AS NEEDED
Status: DISCONTINUED | OUTPATIENT
Start: 2022-06-03 | End: 2022-06-04 | Stop reason: HOSPADM

## 2022-06-03 RX ORDER — DIPHENHYDRAMINE HYDROCHLORIDE 50 MG/ML
50 INJECTION INTRAMUSCULAR; INTRAVENOUS AS NEEDED
Status: CANCELLED | OUTPATIENT
Start: 2022-06-07

## 2022-06-03 RX ORDER — MEPERIDINE HYDROCHLORIDE 25 MG/ML
25 INJECTION INTRAMUSCULAR; INTRAVENOUS; SUBCUTANEOUS
Status: CANCELLED | OUTPATIENT
Start: 2022-06-07

## 2022-06-03 RX ORDER — FAMOTIDINE 10 MG/ML
20 INJECTION, SOLUTION INTRAVENOUS ONCE
Status: CANCELLED | OUTPATIENT
Start: 2022-06-07

## 2022-06-03 RX ORDER — HEPARIN SODIUM (PORCINE) LOCK FLUSH IV SOLN 100 UNIT/ML 100 UNIT/ML
500 SOLUTION INTRAVENOUS AS NEEDED
Status: CANCELLED | OUTPATIENT
Start: 2022-06-03

## 2022-06-03 RX ORDER — SODIUM CHLORIDE 9 MG/ML
250 INJECTION, SOLUTION INTRAVENOUS ONCE
Status: CANCELLED | OUTPATIENT
Start: 2022-06-07

## 2022-06-03 RX ORDER — SODIUM CHLORIDE 0.9 % (FLUSH) 0.9 %
20 SYRINGE (ML) INJECTION AS NEEDED
Status: CANCELLED | OUTPATIENT
Start: 2022-06-03

## 2022-06-03 RX ADMIN — HEPARIN SODIUM (PORCINE) LOCK FLUSH IV SOLN 100 UNIT/ML 500 UNITS: 100 SOLUTION at 09:40

## 2022-06-03 RX ADMIN — Medication 20 ML: at 09:39

## 2022-06-03 NOTE — PROGRESS NOTES
Chief Complaint  Immunodeficiency    Arnie Rucker MD Kindervater, Kasey, APRN      Subjective          Francine Preciado presents to [facility] for CVID    History of Present Illness    Ms. Francine Preciado presents with her mother treatment visit for CVID.  She receives IVIG infusions monthly. She is tolerating therapy well with no adverse side effects. Denies any recent infections. Reports she saw her ENT recently and she is doing well.     Lab work today: CBC and CMP are acceptable and OK for treatment on 6/7/22.       Oncology/Hematology History Overview Note   CVID:   -Diagnosed by Immunologist 8/20/12  -presented with frequent/severe infections             Review of Systems   Constitutional: Positive for fatigue. Negative for appetite change, diaphoresis, fever, unexpected weight gain and unexpected weight loss.   HENT: Negative for hearing loss, sore throat and voice change.    Eyes: Negative for blurred vision, double vision, pain, redness and visual disturbance.   Respiratory: Negative for cough, shortness of breath and wheezing.    Cardiovascular: Negative for chest pain, palpitations and leg swelling.   Endocrine: Negative for cold intolerance, heat intolerance, polydipsia and polyuria.   Genitourinary: Negative for decreased urine volume, difficulty urinating, frequency and urinary incontinence.   Musculoskeletal: Negative for arthralgias, back pain, joint swelling and myalgias.   Skin: Negative for color change, rash, skin lesions and wound.   Neurological: Negative for dizziness, seizures, numbness and headache.   Hematological: Negative for adenopathy. Does not bruise/bleed easily.   Psychiatric/Behavioral: Negative for depressed mood. The patient is not nervous/anxious.    All other systems reviewed and are negative.      Current Outpatient Medications on File Prior to Visit   Medication Sig Dispense Refill   • atenolol (TENORMIN) 25 MG tablet Take 1 tablet by  mouth Daily. 90 tablet 1   • atorvastatin (LIPITOR) 10 MG tablet Take 1 tablet by mouth Daily. 90 tablet 1   • azelastine (ASTELIN) 0.1 % nasal spray      • EPINEPHrine (EPIPEN) 0.3 MG/0.3ML solution auto-injector injection 0.3 mL.     • famotidine (PEPCID) 10 MG tablet Take 20 mg by mouth Daily.     • fenofibrate 160 MG tablet Take 1 tablet by mouth Daily. 90 tablet 1   • fluticasone (FLONASE) 50 MCG/ACT nasal spray 2 sprays into the nostril(s) as directed by provider Daily. 16 g 11   • Junel 1/20 1-20 MG-MCG per tablet Take 1 tablet by mouth Daily. 21 tablet 11   • levocetirizine (XYZAL) 5 MG tablet      • levothyroxine (SYNTHROID, LEVOTHROID) 50 MCG tablet Take 1 tablet by mouth Daily. 90 tablet 1   • linaclotide (Linzess) 145 MCG capsule capsule Take 1 capsule by mouth Every Morning Before Breakfast. 90 capsule 1   • montelukast (SINGULAIR) 10 MG tablet      • ofloxacin (Ocuflox) 0.3 % ophthalmic solution Administer 4 drops into ear(s) as directed by provider 2 (Two) Times a Day. 10 mL 3     Current Facility-Administered Medications on File Prior to Visit   Medication Dose Route Frequency Provider Last Rate Last Admin   • cyanocobalamin injection 1,000 mcg  1,000 mcg Intramuscular Q28 Days Arnie Rucker MD   1,000 mcg at 05/25/22 0752   • heparin injection 500 Units  500 Units Intravenous PRN Josselin Montes De Oca MD PhD   500 Units at 06/03/22 0940   • sodium chloride 0.9 % flush 20 mL  20 mL Intravenous PRN Josselin Montes De Oca MD PhD   20 mL at 06/03/22 0939       Allergies   Allergen Reactions   • Cefuroxime Axetil Unknown - Low Severity   • Neosporin [Bacitracin-Polymyxin B] Unknown - Low Severity     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    • Central perforation of tympanic membrane of right ear    • Chronic mastoiditis    • COVID-19 vaccine series completed    • Heart murmur    • Hypertension    • Kabuki make-up syndrome    • Mitral valve prolapse     • Mixed conductive and sensorineural hearing loss of both ears    • Otorrhea, left    • Recurrent otitis media    • Skin disease     PSORIASIS/ECXEMA     Past Surgical History:   Procedure Laterality Date   • MULTIPLE TOOTH EXTRACTIONS     • NO PAST SURGERIES       Social History     Socioeconomic History   • Marital status: Single   Tobacco Use   • Smoking status: Passive Smoke Exposure - Never Smoker   • Smokeless tobacco: Never Used   • Tobacco comment: Daily exposure to 2nd hand smoke   Vaping Use   • Vaping Use: Never used   Substance and Sexual Activity   • Alcohol use: Not Currently     Comment: DENIES   • Drug use: Never   • Sexual activity: Never     Family History   Problem Relation Age of Onset   • Leukemia Father    • Heart disease Other      Immunization History   Administered Date(s) Administered   • COVID-19 (PFIZER) PURPLE CAP 04/04/2021, 04/25/2021   • Flu Vaccine Quad PF >36MO 11/04/2013, 10/24/2014, 11/02/2015, 10/13/2016, 10/19/2017   • FluLaval/Fluarix/Fluzone >6 11/23/2021   • Fluzone Split Quad (Multi-dose) 11/04/2013, 11/02/2015, 10/19/2017, 10/29/2019   • Hep B, Adolescent or Pediatric 11/24/1998, 01/14/1999, 06/02/1999   • Influenza LAIV (Nasal) 10/13/2016, 10/14/2020   • Influenza TIV (IM) 09/17/2009, 11/07/2018   • Influenza, Unspecified 10/14/2020   • MMR 08/19/1996   • Pneumococcal Polysaccharide (PPSV23) 10/04/2012, 04/10/2014   • influenza Split 10/02/2018       Objective   Physical Exam  Vitals and nursing note reviewed.   Constitutional:       Appearance: Normal appearance.   HENT:      Head: Normocephalic.      Nose: Nose normal.      Mouth/Throat:      Mouth: Mucous membranes are moist.   Eyes:      Pupils: Pupils are equal, round, and reactive to light.   Cardiovascular:      Rate and Rhythm: Normal rate and regular rhythm.      Pulses: Normal pulses.      Heart sounds: Normal heart sounds. No murmur heard.  Pulmonary:      Effort: Pulmonary effort is normal.      Breath  sounds: Normal breath sounds.   Abdominal:      General: Bowel sounds are normal.      Palpations: Abdomen is soft.   Musculoskeletal:         General: Normal range of motion.      Cervical back: Normal range of motion and neck supple.   Skin:     General: Skin is warm and dry.      Capillary Refill: Capillary refill takes less than 2 seconds.   Neurological:      General: No focal deficit present.      Mental Status: She is alert and oriented to person, place, and time.   Psychiatric:         Mood and Affect: Mood normal.         Behavior: Behavior normal.         Thought Content: Thought content normal.         Judgment: Judgment normal.         Vitals:    06/03/22 0950   BP: 110/80   Pulse: 89   Resp: 16   Temp: 97.2 °F (36.2 °C)   SpO2: 98%   Weight: 62 kg (136 lb 11 oz)   PainSc: 0-No pain     ECOG score: 0         ECOG: (0) Fully Active - Able to Carry On All Pre-disease Performance Without Restriction  Fall Risk Assessment was completed, and patient is at low risk for falls.  PHQ-9 Total Score: 0       The patient is  experiencing fatigue. Fatigue score: 2    PT/OT Functional Screening: PT fx screen: No needs identified  Speech Functional Screening: Speech fx screen: No needs identified  Rehab to be ordered: Rehab to be ordered: No needs identified        Result Review :   The following data was reviewed by: NO Nur on 06/03/2022:  Lab Results   Component Value Date    HGB 11.8 (L) 06/03/2022    HCT 35.1 06/03/2022    MCV 88.9 06/03/2022     06/03/2022    WBC 10.44 06/03/2022    NEUTROABS 7.19 (H) 06/03/2022    LYMPHSABS 2.46 06/03/2022    MONOSABS 0.69 06/03/2022    EOSABS 0.06 06/03/2022    BASOSABS 0.02 06/03/2022     Lab Results   Component Value Date    GLUCOSE 77 06/03/2022    BUN 17 06/03/2022    CREATININE 0.93 06/03/2022     06/03/2022    K 3.7 06/03/2022     06/03/2022    CO2 21.2 (L) 06/03/2022    CALCIUM 9.6 06/03/2022    PROTEINTOT 7.0 06/03/2022    ALBUMIN  4.20 06/03/2022    BILITOT 0.5 06/03/2022    ALKPHOS 84 06/03/2022    AST 16 06/03/2022    ALT 18 06/03/2022          Assessment and Plan    Diagnoses and all orders for this visit:    1. CVID (common variable immunodeficiency) (HCC) (Primary)    2. Anemia, unspecified type    She is on monthly IVIG infusions for CVID. Doing well in the interim. Denies any recent infections or fevers. IgG level have been well above 500.     Mild decrease in the hemoglobin which may be related to her monthly menses.     OK for IVIG on 6/7/22.     Follow up with Dr. Montes De Oca or NP at the next visit in 1 month.         Patient Follow Up: monthly   Patient was given instructions and counseling regarding her condition or for health maintenance advice. Please see specific information pulled into the AVS if appropriate.     Belia Saez, APRN    6/3/2022

## 2022-06-07 ENCOUNTER — HOSPITAL ENCOUNTER (OUTPATIENT)
Dept: ONCOLOGY | Facility: HOSPITAL | Age: 32
Setting detail: INFUSION SERIES
Discharge: HOME | End: 2022-06-07

## 2022-06-07 VITALS
WEIGHT: 137.35 LBS | HEIGHT: 56 IN | TEMPERATURE: 99.1 F | RESPIRATION RATE: 16 BRPM | OXYGEN SATURATION: 99 % | SYSTOLIC BLOOD PRESSURE: 136 MMHG | DIASTOLIC BLOOD PRESSURE: 87 MMHG | HEART RATE: 124 BPM | BODY MASS INDEX: 30.9 KG/M2

## 2022-06-07 DIAGNOSIS — D83.9 CVID (COMMON VARIABLE IMMUNODEFICIENCY): Primary | ICD-10-CM

## 2022-06-07 DIAGNOSIS — Z45.2 ENCOUNTER FOR ADJUSTMENT OR MANAGEMENT OF VASCULAR ACCESS DEVICE: ICD-10-CM

## 2022-06-07 PROCEDURE — 63710000001 DIPHENHYDRAMINE PER 50 MG: Performed by: NURSE PRACTITIONER

## 2022-06-07 PROCEDURE — 96375 TX/PRO/DX INJ NEW DRUG ADDON: CPT

## 2022-06-07 PROCEDURE — 25010000002 IMMUNE GLOBULIN (HUMAN) 10 GM/100ML SOLUTION: Performed by: NURSE PRACTITIONER

## 2022-06-07 PROCEDURE — 96365 THER/PROPH/DIAG IV INF INIT: CPT

## 2022-06-07 PROCEDURE — 25010000002 HEPARIN LOCK FLUSH PER 10 UNITS: Performed by: INTERNAL MEDICINE

## 2022-06-07 RX ORDER — SODIUM CHLORIDE 9 MG/ML
250 INJECTION, SOLUTION INTRAVENOUS ONCE
Status: COMPLETED | OUTPATIENT
Start: 2022-06-07 | End: 2022-06-07

## 2022-06-07 RX ORDER — DIPHENHYDRAMINE HCL 25 MG
25 CAPSULE ORAL ONCE
Status: COMPLETED | OUTPATIENT
Start: 2022-06-07 | End: 2022-06-07

## 2022-06-07 RX ORDER — SODIUM CHLORIDE 0.9 % (FLUSH) 0.9 %
20 SYRINGE (ML) INJECTION AS NEEDED
Status: CANCELLED | OUTPATIENT
Start: 2022-06-07

## 2022-06-07 RX ORDER — HEPARIN SODIUM (PORCINE) LOCK FLUSH IV SOLN 100 UNIT/ML 100 UNIT/ML
500 SOLUTION INTRAVENOUS AS NEEDED
Status: CANCELLED | OUTPATIENT
Start: 2022-06-07

## 2022-06-07 RX ORDER — SODIUM CHLORIDE 0.9 % (FLUSH) 0.9 %
20 SYRINGE (ML) INJECTION AS NEEDED
Status: DISCONTINUED | OUTPATIENT
Start: 2022-06-07 | End: 2022-06-08 | Stop reason: HOSPADM

## 2022-06-07 RX ORDER — HEPARIN SODIUM (PORCINE) LOCK FLUSH IV SOLN 100 UNIT/ML 100 UNIT/ML
500 SOLUTION INTRAVENOUS AS NEEDED
Status: DISCONTINUED | OUTPATIENT
Start: 2022-06-07 | End: 2022-06-08 | Stop reason: HOSPADM

## 2022-06-07 RX ORDER — FAMOTIDINE 10 MG/ML
20 INJECTION, SOLUTION INTRAVENOUS ONCE
Status: COMPLETED | OUTPATIENT
Start: 2022-06-07 | End: 2022-06-07

## 2022-06-07 RX ADMIN — IMMUNE GLOBULIN INFUSION (HUMAN) 10 G: 100 INJECTION, SOLUTION INTRAVENOUS; SUBCUTANEOUS at 10:38

## 2022-06-07 RX ADMIN — HEPARIN SODIUM (PORCINE) LOCK FLUSH IV SOLN 100 UNIT/ML 500 UNITS: 100 SOLUTION at 12:12

## 2022-06-07 RX ADMIN — Medication 20 ML: at 12:12

## 2022-06-07 RX ADMIN — FAMOTIDINE 20 MG: 10 INJECTION INTRAVENOUS at 10:11

## 2022-06-07 RX ADMIN — DIPHENHYDRAMINE HYDROCHLORIDE 25 MG: 25 CAPSULE ORAL at 10:11

## 2022-06-07 RX ADMIN — SODIUM CHLORIDE 250 ML: 9 INJECTION, SOLUTION INTRAVENOUS at 10:09

## 2022-06-24 ENCOUNTER — TELEPHONE (OUTPATIENT)
Dept: FAMILY MEDICINE CLINIC | Facility: CLINIC | Age: 32
End: 2022-06-24

## 2022-06-24 NOTE — TELEPHONE ENCOUNTER
Patient came into office asking for b12 injection. Clinical states that it is too soon and b12 levels are over 1000. Old orders for b12 injections are from Dr. Rucker.     Patients mother is asking to be contacted to inform her when she needs to get b12 injections and how often.

## 2022-06-27 NOTE — TELEPHONE ENCOUNTER
Informed mother we will do B12 shot every month for the next 6 months and will stall a few months to check levels then, she voiced understanding.

## 2022-06-28 ENCOUNTER — CLINICAL SUPPORT (OUTPATIENT)
Dept: FAMILY MEDICINE CLINIC | Facility: CLINIC | Age: 32
End: 2022-06-28

## 2022-06-28 DIAGNOSIS — E53.8 B12 DEFICIENCY: ICD-10-CM

## 2022-06-28 PROCEDURE — 96372 THER/PROPH/DIAG INJ SC/IM: CPT

## 2022-06-28 RX ADMIN — CYANOCOBALAMIN 1000 MCG: 1000 INJECTION, SOLUTION INTRAMUSCULAR; SUBCUTANEOUS at 13:43

## 2022-06-29 NOTE — PROGRESS NOTES
Patient stated that she is okay to wait until her august appointment, says that you guys can discuss other options at that appointment. Do you want her to come earlier than that concerning this issue?

## 2022-07-15 ENCOUNTER — HOSPITAL ENCOUNTER (OUTPATIENT)
Dept: ONCOLOGY | Facility: HOSPITAL | Age: 32
Setting detail: INFUSION SERIES
Discharge: HOME | End: 2022-07-15

## 2022-07-15 DIAGNOSIS — Z45.2 ENCOUNTER FOR ADJUSTMENT OR MANAGEMENT OF VASCULAR ACCESS DEVICE: ICD-10-CM

## 2022-07-15 DIAGNOSIS — D83.9 CVID (COMMON VARIABLE IMMUNODEFICIENCY): Primary | ICD-10-CM

## 2022-07-15 LAB
BASOPHILS # BLD AUTO: 0.03 10*3/MM3 (ref 0–0.2)
BASOPHILS NFR BLD AUTO: 0.3 % (ref 0–1.5)
DEPRECATED RDW RBC AUTO: 44.7 FL (ref 37–54)
EOSINOPHIL # BLD AUTO: 0.02 10*3/MM3 (ref 0–0.4)
EOSINOPHIL NFR BLD AUTO: 0.2 % (ref 0.3–6.2)
ERYTHROCYTE [DISTWIDTH] IN BLOOD BY AUTOMATED COUNT: 13.6 % (ref 12.3–15.4)
HCT VFR BLD AUTO: 34.2 % (ref 34–46.6)
HGB BLD-MCNC: 11.6 G/DL (ref 12–15.9)
IGA1 MFR SER: <50 MG/DL (ref 70–400)
IGG1 SER-MCNC: 549 MG/DL (ref 700–1600)
IGM SERPL-MCNC: 57 MG/DL (ref 40–230)
IMM GRANULOCYTES # BLD AUTO: 0.03 10*3/MM3 (ref 0–0.05)
IMM GRANULOCYTES NFR BLD AUTO: 0.3 % (ref 0–0.5)
LYMPHOCYTES # BLD AUTO: 2.17 10*3/MM3 (ref 0.7–3.1)
LYMPHOCYTES NFR BLD AUTO: 23 % (ref 19.6–45.3)
MCH RBC QN AUTO: 30.1 PG (ref 26.6–33)
MCHC RBC AUTO-ENTMCNC: 33.9 G/DL (ref 31.5–35.7)
MCV RBC AUTO: 88.6 FL (ref 79–97)
MONOCYTES # BLD AUTO: 0.48 10*3/MM3 (ref 0.1–0.9)
MONOCYTES NFR BLD AUTO: 5.1 % (ref 5–12)
NEUTROPHILS NFR BLD AUTO: 6.69 10*3/MM3 (ref 1.7–7)
NEUTROPHILS NFR BLD AUTO: 71.1 % (ref 42.7–76)
PLATELET # BLD AUTO: 268 10*3/MM3 (ref 140–450)
PMV BLD AUTO: 9.2 FL (ref 6–12)
RBC # BLD AUTO: 3.86 10*6/MM3 (ref 3.77–5.28)
WBC NRBC COR # BLD: 9.42 10*3/MM3 (ref 3.4–10.8)

## 2022-07-15 PROCEDURE — 82784 ASSAY IGA/IGD/IGG/IGM EACH: CPT | Performed by: INTERNAL MEDICINE

## 2022-07-15 PROCEDURE — 25010000002 HEPARIN LOCK FLUSH PER 10 UNITS: Performed by: INTERNAL MEDICINE

## 2022-07-15 PROCEDURE — 36591 DRAW BLOOD OFF VENOUS DEVICE: CPT

## 2022-07-15 PROCEDURE — 85025 COMPLETE CBC W/AUTO DIFF WBC: CPT | Performed by: INTERNAL MEDICINE

## 2022-07-15 RX ORDER — SODIUM CHLORIDE 0.9 % (FLUSH) 0.9 %
20 SYRINGE (ML) INJECTION AS NEEDED
Status: DISCONTINUED | OUTPATIENT
Start: 2022-07-15 | End: 2022-07-16 | Stop reason: HOSPADM

## 2022-07-15 RX ORDER — HEPARIN SODIUM (PORCINE) LOCK FLUSH IV SOLN 100 UNIT/ML 100 UNIT/ML
500 SOLUTION INTRAVENOUS AS NEEDED
Status: CANCELLED | OUTPATIENT
Start: 2022-07-15

## 2022-07-15 RX ORDER — HEPARIN SODIUM (PORCINE) LOCK FLUSH IV SOLN 100 UNIT/ML 100 UNIT/ML
500 SOLUTION INTRAVENOUS AS NEEDED
Status: DISCONTINUED | OUTPATIENT
Start: 2022-07-15 | End: 2022-07-16 | Stop reason: HOSPADM

## 2022-07-15 RX ORDER — SODIUM CHLORIDE 0.9 % (FLUSH) 0.9 %
20 SYRINGE (ML) INJECTION AS NEEDED
Status: CANCELLED | OUTPATIENT
Start: 2022-07-15

## 2022-07-15 RX ADMIN — HEPARIN SODIUM (PORCINE) LOCK FLUSH IV SOLN 100 UNIT/ML 500 UNITS: 100 SOLUTION at 10:49

## 2022-07-15 RX ADMIN — Medication 20 ML: at 10:49

## 2022-07-18 RX ORDER — FAMOTIDINE 10 MG/ML
20 INJECTION, SOLUTION INTRAVENOUS AS NEEDED
Status: CANCELLED | OUTPATIENT
Start: 2022-07-19

## 2022-07-18 RX ORDER — DIPHENHYDRAMINE HYDROCHLORIDE 50 MG/ML
50 INJECTION INTRAMUSCULAR; INTRAVENOUS AS NEEDED
Status: CANCELLED | OUTPATIENT
Start: 2022-07-19

## 2022-07-18 RX ORDER — MEPERIDINE HYDROCHLORIDE 25 MG/ML
25 INJECTION INTRAMUSCULAR; INTRAVENOUS; SUBCUTANEOUS
Status: CANCELLED | OUTPATIENT
Start: 2022-07-19

## 2022-07-19 ENCOUNTER — HOSPITAL ENCOUNTER (OUTPATIENT)
Dept: ONCOLOGY | Facility: HOSPITAL | Age: 32
Setting detail: INFUSION SERIES
Discharge: HOME | End: 2022-07-19

## 2022-07-19 ENCOUNTER — OFFICE VISIT (OUTPATIENT)
Dept: ONCOLOGY | Facility: HOSPITAL | Age: 32
End: 2022-07-19

## 2022-07-19 VITALS
HEIGHT: 56 IN | RESPIRATION RATE: 20 BRPM | WEIGHT: 138.01 LBS | DIASTOLIC BLOOD PRESSURE: 62 MMHG | TEMPERATURE: 98 F | BODY MASS INDEX: 31.05 KG/M2 | OXYGEN SATURATION: 98 % | SYSTOLIC BLOOD PRESSURE: 114 MMHG | HEART RATE: 72 BPM

## 2022-07-19 VITALS
BODY MASS INDEX: 30.96 KG/M2 | SYSTOLIC BLOOD PRESSURE: 135 MMHG | TEMPERATURE: 98 F | RESPIRATION RATE: 20 BRPM | WEIGHT: 138.01 LBS | HEART RATE: 91 BPM | OXYGEN SATURATION: 98 % | DIASTOLIC BLOOD PRESSURE: 55 MMHG

## 2022-07-19 DIAGNOSIS — D83.9 CVID (COMMON VARIABLE IMMUNODEFICIENCY): Primary | ICD-10-CM

## 2022-07-19 DIAGNOSIS — Z45.2 ENCOUNTER FOR ADJUSTMENT OR MANAGEMENT OF VASCULAR ACCESS DEVICE: ICD-10-CM

## 2022-07-19 DIAGNOSIS — D64.9 ANEMIA, UNSPECIFIED TYPE: ICD-10-CM

## 2022-07-19 PROCEDURE — 63710000001 DIPHENHYDRAMINE PER 50 MG: Performed by: INTERNAL MEDICINE

## 2022-07-19 PROCEDURE — 25010000002 IMMUNE GLOBULIN (HUMAN) 10 GM/100ML SOLUTION: Performed by: INTERNAL MEDICINE

## 2022-07-19 PROCEDURE — 96375 TX/PRO/DX INJ NEW DRUG ADDON: CPT

## 2022-07-19 PROCEDURE — 25010000002 HEPARIN LOCK FLUSH PER 10 UNITS: Performed by: INTERNAL MEDICINE

## 2022-07-19 PROCEDURE — 96365 THER/PROPH/DIAG IV INF INIT: CPT

## 2022-07-19 PROCEDURE — 99214 OFFICE O/P EST MOD 30 MIN: CPT | Performed by: INTERNAL MEDICINE

## 2022-07-19 RX ORDER — HEPARIN SODIUM (PORCINE) LOCK FLUSH IV SOLN 100 UNIT/ML 100 UNIT/ML
500 SOLUTION INTRAVENOUS AS NEEDED
Status: CANCELLED | OUTPATIENT
Start: 2022-07-19

## 2022-07-19 RX ORDER — DIPHENHYDRAMINE HCL 25 MG
25 CAPSULE ORAL ONCE
Status: COMPLETED | OUTPATIENT
Start: 2022-07-19 | End: 2022-07-19

## 2022-07-19 RX ORDER — SODIUM CHLORIDE 0.9 % (FLUSH) 0.9 %
20 SYRINGE (ML) INJECTION AS NEEDED
Status: CANCELLED | OUTPATIENT
Start: 2022-07-19

## 2022-07-19 RX ORDER — HEPARIN SODIUM (PORCINE) LOCK FLUSH IV SOLN 100 UNIT/ML 100 UNIT/ML
500 SOLUTION INTRAVENOUS AS NEEDED
Status: DISCONTINUED | OUTPATIENT
Start: 2022-07-19 | End: 2022-07-20 | Stop reason: HOSPADM

## 2022-07-19 RX ORDER — SODIUM CHLORIDE 0.9 % (FLUSH) 0.9 %
20 SYRINGE (ML) INJECTION AS NEEDED
Status: DISCONTINUED | OUTPATIENT
Start: 2022-07-19 | End: 2022-07-20 | Stop reason: HOSPADM

## 2022-07-19 RX ORDER — SODIUM CHLORIDE 9 MG/ML
250 INJECTION, SOLUTION INTRAVENOUS ONCE
Status: COMPLETED | OUTPATIENT
Start: 2022-07-19 | End: 2022-07-19

## 2022-07-19 RX ORDER — FAMOTIDINE 10 MG/ML
20 INJECTION, SOLUTION INTRAVENOUS ONCE
Status: COMPLETED | OUTPATIENT
Start: 2022-07-19 | End: 2022-07-19

## 2022-07-19 RX ADMIN — SODIUM CHLORIDE 250 ML: 9 INJECTION, SOLUTION INTRAVENOUS at 10:33

## 2022-07-19 RX ADMIN — HEPARIN SODIUM (PORCINE) LOCK FLUSH IV SOLN 100 UNIT/ML 500 UNITS: 100 SOLUTION at 12:20

## 2022-07-19 RX ADMIN — Medication 20 ML: at 12:20

## 2022-07-19 RX ADMIN — DIPHENHYDRAMINE HYDROCHLORIDE 25 MG: 25 CAPSULE ORAL at 10:33

## 2022-07-19 RX ADMIN — FAMOTIDINE 20 MG: 10 INJECTION INTRAVENOUS at 10:37

## 2022-07-19 RX ADMIN — IMMUNE GLOBULIN INFUSION (HUMAN) 10 G: 100 INJECTION, SOLUTION INTRAVENOUS; SUBCUTANEOUS at 10:44

## 2022-07-19 NOTE — PROGRESS NOTES
Patient  Francine Preciado    Location  [facility]    Chief Complaint  Common Variable Immunodefiency    Referring Provider: Arnie Rucker MD  PCP: Aggie Chance APRN    Subjective          Oncology/Hematology History Overview Note   CVID:   -Diagnosed by Immunologist 8/20/12  -presented with frequent/severe infections             HPI  Patient comes in today for her next dose of IVIG.  She reports no new symptoms of concern.  I reviewed her labs which shows mild anemia.  She tells me she is on oral contraceptive pills but still has menstrual periods.  She is also occasionally having spotting in between.  Her mother plans to take her to her primary care provider next week to discuss changing the pill dosage.    Review of Systems   Constitutional: Positive for fatigue. Negative for appetite change, diaphoresis, fever, unexpected weight gain and unexpected weight loss.   HENT: Negative for hearing loss, sore throat and voice change.    Eyes: Negative for blurred vision, double vision, pain, redness and visual disturbance.   Respiratory: Negative for cough, shortness of breath and wheezing.    Cardiovascular: Negative for chest pain, palpitations and leg swelling.   Endocrine: Negative for cold intolerance, heat intolerance, polydipsia and polyuria.   Genitourinary: Negative for decreased urine volume, difficulty urinating, frequency and urinary incontinence.   Musculoskeletal: Negative for arthralgias, back pain, joint swelling and myalgias.   Skin: Negative for color change, rash, skin lesions and wound.   Neurological: Negative for dizziness, seizures, numbness and headache.   Hematological: Negative for adenopathy. Does not bruise/bleed easily.   Psychiatric/Behavioral: Negative for depressed mood. The patient is not nervous/anxious.    All other systems reviewed and are negative.      Past Medical History:   Diagnosis Date   • Central perforation of tympanic membrane of  left ear    • Central perforation of tympanic membrane of right ear    • Chronic mastoiditis    • COVID-19 vaccine series completed    • Heart murmur    • Hypertension    • Kabuki make-up syndrome    • Mitral valve prolapse    • Mixed conductive and sensorineural hearing loss of both ears    • Otorrhea, left    • Recurrent otitis media    • Skin disease     PSORIASIS/ECXEMA     Past Surgical History:   Procedure Laterality Date   • MULTIPLE TOOTH EXTRACTIONS     • NO PAST SURGERIES       Social History     Socioeconomic History   • Marital status: Single   Tobacco Use   • Smoking status: Passive Smoke Exposure - Never Smoker   • Smokeless tobacco: Never Used   • Tobacco comment: Daily exposure to 2nd hand smoke   Vaping Use   • Vaping Use: Never used   Substance and Sexual Activity   • Alcohol use: Not Currently     Comment: DENIES   • Drug use: Never   • Sexual activity: Never     Family History   Problem Relation Age of Onset   • Leukemia Father    • Heart disease Other        Objective   Physical Exam  General: Alert, cooperative, no acute distress, well-appearing  Eyes: Anicteric sclera, PERRLA  Respiratory: normal respiratory effort  Cardiovascular: no lower extremity edema  Skin: Normal tone, no rash, no lesions  Psychiatric: Appropriate affect, intact judgment  Neurologic: No focal sensory or motor deficits, normal cognition   Musculoskeletal: Normal muscle strength and tone  Extremities: No clubbing, cyanosis, or deformities    Vitals:    07/19/22 0954   BP: 135/55   Pulse: 91   Resp: 20   Temp: 98 °F (36.7 °C)   SpO2: 98%   Weight: 62.6 kg (138 lb 0.1 oz)   PainSc: 0-No pain     ECOG score: 0         PHQ-9 Total Score:         Result Review :   The following data was reviewed by: Josselin Montes De Oca MD PhD on 07/19/2022:  Lab Results   Component Value Date    HGB 11.6 (L) 07/15/2022    HCT 34.2 07/15/2022    MCV 88.6 07/15/2022     07/15/2022    WBC 9.42 07/15/2022    NEUTROABS 6.69 07/15/2022     LYMPHSABS 2.17 07/15/2022    MONOSABS 0.48 07/15/2022    EOSABS 0.02 07/15/2022    BASOSABS 0.03 07/15/2022     Lab Results   Component Value Date    GLUCOSE 77 06/03/2022    BUN 17 06/03/2022    CREATININE 0.93 06/03/2022     06/03/2022    K 3.7 06/03/2022     06/03/2022    CO2 21.2 (L) 06/03/2022    CALCIUM 9.6 06/03/2022    PROTEINTOT 7.0 06/03/2022    ALBUMIN 4.20 06/03/2022    BILITOT 0.5 06/03/2022    ALKPHOS 84 06/03/2022    AST 16 06/03/2022    ALT 18 06/03/2022          Assessment and Plan    Diagnoses and all orders for this visit:    1. CVID (common variable immunodeficiency) (HCC) (Primary)    2. Anemia, unspecified type  -     Ferritin; Future  -     Iron Profile; Future        CVID: No evidence of toxicity with IVIG. No recent illnesses.  She will f/u with Kori for repeat toxicity check and labs prior to the next treatment.      Normocytic anemia: Hemoglobin is mildly reduced at 11.4.  I will add iron studies onto the labs she gets next visit and follow-up with her at the subsequent appointment.  If she is iron deficient I will start ferrous gluconate every other day.    Patient was given instructions and counseling regarding her condition or for health maintenance advice. Please see specific information pulled into the AVS if appropriate.

## 2022-07-25 ENCOUNTER — CLINICAL SUPPORT (OUTPATIENT)
Dept: FAMILY MEDICINE CLINIC | Facility: CLINIC | Age: 32
End: 2022-07-25

## 2022-07-25 DIAGNOSIS — E53.8 B12 DEFICIENCY: ICD-10-CM

## 2022-07-25 PROCEDURE — 96372 THER/PROPH/DIAG INJ SC/IM: CPT

## 2022-07-25 RX ADMIN — CYANOCOBALAMIN 1000 MCG: 1000 INJECTION, SOLUTION INTRAMUSCULAR; SUBCUTANEOUS at 14:18

## 2022-08-15 ENCOUNTER — OFFICE VISIT (OUTPATIENT)
Dept: OTOLARYNGOLOGY | Facility: CLINIC | Age: 32
End: 2022-08-15

## 2022-08-15 VITALS — BODY MASS INDEX: 31.57 KG/M2 | WEIGHT: 136.4 LBS | HEIGHT: 55 IN | TEMPERATURE: 97.1 F

## 2022-08-15 DIAGNOSIS — H90.6 MIXED HEARING LOSS, BILATERAL: ICD-10-CM

## 2022-08-15 DIAGNOSIS — H72.92 TYMPANIC MEMBRANE PERFORATION, LEFT: Primary | ICD-10-CM

## 2022-08-15 DIAGNOSIS — H92.13 OTORRHEA OF BOTH EARS: ICD-10-CM

## 2022-08-15 DIAGNOSIS — H70.11 CHRONIC MASTOIDITIS OF RIGHT SIDE: ICD-10-CM

## 2022-08-15 PROCEDURE — 99213 OFFICE O/P EST LOW 20 MIN: CPT | Performed by: OTOLARYNGOLOGY

## 2022-08-15 PROCEDURE — 69220 CLEAN OUT MASTOID CAVITY: CPT | Performed by: OTOLARYNGOLOGY

## 2022-08-15 RX ORDER — DEXAMETHASONE SODIUM PHOSPHATE 1 MG/ML
SOLUTION/ DROPS OPHTHALMIC
Qty: 5 ML | Refills: 3 | Status: SHIPPED | OUTPATIENT
Start: 2022-08-15 | End: 2022-11-07

## 2022-08-15 RX ORDER — OFLOXACIN 3 MG/ML
4 SOLUTION/ DROPS OPHTHALMIC 2 TIMES DAILY
Qty: 10 ML | Refills: 3 | Status: SHIPPED | OUTPATIENT
Start: 2022-08-15 | End: 2022-08-25

## 2022-08-15 NOTE — PROGRESS NOTES
Patient Name: Francine Preciado   Visit Date: 08/15/2022   Patient ID: [id number]  Provider: Jose Elias Jorgensen MD    Sex: female  Location: [facility]   YOB: 1990  Location Address: [address] [place] [address], [address],?[place]?[id number]    Primary Care Provider Aggie Chance APRN  Location Phone: [phone]    Referring Provider: No ref. provider found        Chief Complaint  No chief complaint on file.    History of Present Illness  Francine Preciado is a 32 y.o. 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 She underwent an audiogram on 5/11/17 which revealed bilateral moderate to profound mixed hearing loss. Speech discrimination was 100% bilaterally at 90 dB. Tympanograms were type B bilaterally.  She wears bilateral behind-the-ear hearing aids.    She returns today for routine follow-up.  She was last seen on 4/5/2022 at which time her right mastoid cavity was debrided. She has been oozing purulence and scant blood from her ears over the past week. They have been using ofloxacin without improvement.     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    • Central perforation of tympanic membrane of right ear    • Chronic mastoiditis    • COVID-19 vaccine series completed    • Heart murmur    • Hypertension    • Kabuki make-up syndrome    • Mitral valve prolapse    • Mixed conductive and sensorineural hearing loss of both ears    • Otorrhea, left    • Recurrent otitis media    • Skin disease     PSORIASIS/ECXEMA       Past Surgical History:   Procedure Laterality Date   • MULTIPLE TOOTH EXTRACTIONS     • NO PAST SURGERIES           Current Outpatient Medications:   •  atenolol (TENORMIN) 25 MG tablet, Take 1 tablet by mouth Daily., Disp: 90  tablet, Rfl: 1  •  atorvastatin (LIPITOR) 10 MG tablet, Take 1 tablet by mouth Daily., Disp: 90 tablet, Rfl: 1  •  azelastine (ASTELIN) 0.1 % nasal spray, , Disp: , Rfl:   •  EPINEPHrine (EPIPEN) 0.3 MG/0.3ML solution auto-injector injection, 0.3 mL., Disp: , Rfl:   •  famotidine (PEPCID) 10 MG tablet, Take 20 mg by mouth Daily., Disp: , Rfl:   •  fenofibrate 160 MG tablet, Take 1 tablet by mouth Daily., Disp: 90 tablet, Rfl: 1  •  fluticasone (FLONASE) 50 MCG/ACT nasal spray, 2 sprays into the nostril(s) as directed by provider Daily., Disp: 16 g, Rfl: 11  •  Junel 1/20 1-20 MG-MCG per tablet, Take 1 tablet by mouth Daily., Disp: 21 tablet, Rfl: 11  •  levocetirizine (XYZAL) 5 MG tablet, , Disp: , Rfl:   •  levothyroxine (SYNTHROID, LEVOTHROID) 50 MCG tablet, Take 1 tablet by mouth Daily., Disp: 90 tablet, Rfl: 1  •  linaclotide (Linzess) 145 MCG capsule capsule, Take 1 capsule by mouth Every Morning Before Breakfast., Disp: 90 capsule, Rfl: 1  •  montelukast (SINGULAIR) 10 MG tablet, , Disp: , Rfl:   •  ofloxacin (Ocuflox) 0.3 % ophthalmic solution, Administer 4 drops into ear(s) as directed by provider 2 (Two) Times a Day., Disp: 10 mL, Rfl: 3    Current Facility-Administered Medications:   •  cyanocobalamin injection 1,000 mcg, 1,000 mcg, Intramuscular, Q28 Days, Arnie Rucker MD, 1,000 mcg at 07/25/22 1418     Allergies   Allergen Reactions   • Cefuroxime Axetil Unknown - Low Severity   • Neosporin [Bacitracin-Polymyxin B] Unknown - Low Severity       Social History     Tobacco Use   • Smoking status: Passive Smoke Exposure - Never Smoker   • Smokeless tobacco: Never Used   • Tobacco comment: Daily exposure to 2nd hand smoke   Vaping Use   • Vaping Use: Never used   Substance Use Topics   • Alcohol use: Not Currently     Comment: DENIES   • Drug use: Never        Objective     Vital Signs:   There were no vitals taken for this visit.      Physical Exam    General: Well developed, well nourished patient of  stated age in no acute distress. Voice is strong and clear.   Head: Normocephalic and atraumatic.  Face: No lesions.  Bilateral parotid and submandibular glands are unremarkable.  Stensen's and Warthin's ducts are productive of clear saliva bilaterally.  House-Brackmann I/VI     bilaterally.   muscles and temporomandibular joint nontender to palpation.  No TMJ crepitus.  Eyes: PERRLA, sclerae anicteric, no conjunctival injection. Extra ocular movements are intact and full. No nystagmus.   Ears: Auricles are normal in appearance.  Left external auditory canal is unremarkable.  Left tympanic membrane with perforation that is draining purulence.  This was suctioned and Ciprodex drops instilled.  Right mastoid cavity with ceruminous, purulent, and squamous debris present.  This was debrided using the operating microscope and a #5 and 7 suction.  Ciprodex drops were instilled.  Hearing normal to conversational voice.   Nose: External nose is normal in appearance. Bilateral nares are patent with normal appearing mucosa. Septum midline. Turbinates are unremarkable. No lesions.   Oral Cavity: Lips are normal in appearance. Oral mucosa is unremarkable. Gingiva is unremarkable.  Edentulous. Tongue is unremarkable with good movement. Hard palate is unremarkable.   Oropharynx: Soft palate is unremarkable with full movement. Uvula is unremarkable. Bilateral tonsils are unremarkable. Posterior oropharynx is unremarkable.    Larynx and hypopharynx: Deferred secondary to gag reflex.  Neck: Supple.  No mass.  Nontender to palpation.  Trachea midline. Thyroid normal size and without nodules to palpation.   Lymphatic: No lymphadenopathy upon palpation.   Psychiatric: Appropriate affect, cooperative   Neurologic: Oriented x 3, strength symmetric in all extremities, Cranial Nerves II-XII are grossly intact to confrontation   Skin: Warm and dry. No rashes.    Procedures           Result Review :               Assessment and  Plan    There are no diagnoses linked to this encounter.  Impressions and findings were discussed.  Currently, her right mastoid cavity is inflamed and she has evidence of left-sided otitis media as her perforation is draining purulence.  Both sides were debrided successfully today in clinic and we will add dexamethasone to her regimen of ofloxacin.  We discussed increasing airflow into her ears by going without her hearing aids at times throughout the day.  They are familiar with water precautions and will follow-up as previously scheduled or sooner if needed.  Follow Up   No follow-ups on file.  Patient was given instructions and counseling regarding her condition or for health maintenance advice. Please see specific information pulled into the AVS if appropriate.

## 2022-08-25 ENCOUNTER — OFFICE VISIT (OUTPATIENT)
Dept: FAMILY MEDICINE CLINIC | Facility: CLINIC | Age: 32
End: 2022-08-25

## 2022-08-25 VITALS
RESPIRATION RATE: 18 BRPM | WEIGHT: 135.9 LBS | BODY MASS INDEX: 31.45 KG/M2 | SYSTOLIC BLOOD PRESSURE: 100 MMHG | TEMPERATURE: 98 F | OXYGEN SATURATION: 97 % | HEART RATE: 91 BPM | DIASTOLIC BLOOD PRESSURE: 66 MMHG | HEIGHT: 55 IN

## 2022-08-25 DIAGNOSIS — I10 PRIMARY HYPERTENSION: ICD-10-CM

## 2022-08-25 DIAGNOSIS — I34.1 MITRAL VALVE PROLAPSE: ICD-10-CM

## 2022-08-25 DIAGNOSIS — Q89.8: ICD-10-CM

## 2022-08-25 DIAGNOSIS — E78.2 MIXED HYPERLIPIDEMIA: ICD-10-CM

## 2022-08-25 DIAGNOSIS — N89.8 VAGINAL ITCHING: ICD-10-CM

## 2022-08-25 DIAGNOSIS — K58.1 IRRITABLE BOWEL SYNDROME WITH CONSTIPATION: ICD-10-CM

## 2022-08-25 DIAGNOSIS — B37.31 VAGINAL YEAST INFECTION: Primary | ICD-10-CM

## 2022-08-25 DIAGNOSIS — E03.9 ACQUIRED HYPOTHYROIDISM: ICD-10-CM

## 2022-08-25 DIAGNOSIS — E53.8 B12 DEFICIENCY: ICD-10-CM

## 2022-08-25 PROCEDURE — 99214 OFFICE O/P EST MOD 30 MIN: CPT

## 2022-08-25 PROCEDURE — 96372 THER/PROPH/DIAG INJ SC/IM: CPT

## 2022-08-25 RX ORDER — MULTIPLE VITAMINS W/ MINERALS TAB 9MG-400MCG
1 TAB ORAL DAILY
COMMUNITY

## 2022-08-25 RX ORDER — ATORVASTATIN CALCIUM 10 MG/1
10 TABLET, FILM COATED ORAL DAILY
Qty: 90 TABLET | Refills: 1 | Status: SHIPPED | OUTPATIENT
Start: 2022-08-25

## 2022-08-25 RX ORDER — FLUCONAZOLE 150 MG/1
150 TABLET ORAL
Qty: 2 TABLET | Refills: 0 | Status: SHIPPED | OUTPATIENT
Start: 2022-08-25

## 2022-08-25 RX ORDER — FENOFIBRATE 160 MG/1
160 TABLET ORAL DAILY
Qty: 90 TABLET | Refills: 1 | Status: SHIPPED | OUTPATIENT
Start: 2022-08-25

## 2022-08-25 RX ORDER — ATENOLOL 25 MG/1
25 TABLET ORAL DAILY
Qty: 90 TABLET | Refills: 1 | Status: SHIPPED | OUTPATIENT
Start: 2022-08-25

## 2022-08-25 RX ORDER — LEVOTHYROXINE SODIUM 0.05 MG/1
50 TABLET ORAL DAILY
Qty: 90 TABLET | Refills: 1 | Status: SHIPPED | OUTPATIENT
Start: 2022-08-25

## 2022-08-25 RX ADMIN — CYANOCOBALAMIN 1000 MCG: 1000 INJECTION, SOLUTION INTRAMUSCULAR; SUBCUTANEOUS at 16:20

## 2022-08-25 NOTE — PROGRESS NOTES
Francine Preciado presents to [facility] with complaints of vaginal itching and irritation, also some spotting.  Patient is also here for 3-month follow-up.      History of Present Illness  This is a 32-year-old female who presents to clinic for 3-month follow-up and also with complaints of vaginal itching, irritation, and also some spotting.    Hearing loss/persistent ear infection: Patient states that she is recently seen ENT, states that she went because she was having a lot of pus coming out of both of her ears, and then it then turned into blood, she had these thoroughly cleaned out.  They did find some excess skin that was causing a lot of issues, patient states has made a tremendous difference.  She states that her hearing is better, she no longer having the pain and irritation, overall doing really great.    Kabuki make-up syndrome: Does follow-up with hematology on regular basis, states that things are going really well.  Is on routine infusions, as well as routine blood work.  Did find that she was a little bit anemic, so they recommended for her to take a multivitamin over-the-counter which she has been doing.    Allergic rhinitis: Currently stable, has done really well this year and not had any acute issues.  Does remain on medications per allergist, is also been getting injections every 4 to 6 weeks.  No acute issues at this time.    Does have complaints of vaginal itching and irritation, also states that she is been spotting off and on between her menstrual cycle.  States that this is a recent pain, could have a possible infection, would like to also discuss possibly switching birth controls.  Patient states that she is been on this birth control for about a year, did have Pap smear last October, did not enjoy that.  Would rather not do a Pap smear again if able..    Patient has no other acute complaints today.  Up-to-date on preventative screenings.    The following  "portions of the patient's history were personally reviewed and updated as appropriate: allergies, current medications, past medical history, past surgical history, past family history, and past social history.       Objective   Vital Signs:   /66   Pulse 91   Temp 98 °F (36.7 °C)   Resp 18   Ht 139.7 cm (55\")   Wt 61.6 kg (135 lb 14.4 oz)   SpO2 97%   BMI 31.59 kg/m²     Body mass index is 31.59 kg/m².    All labs, imaging, test results, and specialty provider notes reviewed with patient.     Physical Exam  Vitals reviewed.   Constitutional:       Appearance: Normal appearance.   Cardiovascular:      Rate and Rhythm: Normal rate and regular rhythm.      Pulses: Normal pulses.      Heart sounds: Murmur heard.   Pulmonary:      Effort: Pulmonary effort is normal.      Breath sounds: Normal breath sounds.   Neurological:      General: No focal deficit present.      Mental Status: She is alert and oriented to person, place, and time.              Assessment and Plan:  Diagnoses and all orders for this visit:    1. Vaginal yeast infection (Primary)  Comments:  Treat with Diflucan, can consider switching birth control medication, but will treat for infection first.  Orders:  -     fluconazole (Diflucan) 150 MG tablet; Take 1 tablet by mouth Every 72 (Seventy-Two) Hours.  Dispense: 2 tablet; Refill: 0    2. B12 deficiency  -     Vitamin B12 & Folate; Future    3. Vaginal itching  -     fluconazole (Diflucan) 150 MG tablet; Take 1 tablet by mouth Every 72 (Seventy-Two) Hours.  Dispense: 2 tablet; Refill: 0    4. Mitral valve prolapse  Assessment & Plan:  Stable.  Continue atenolol at current dose, no acute issues at this time.    Orders:  -     atenolol (TENORMIN) 25 MG tablet; Take 1 tablet by mouth Daily.  Dispense: 90 tablet; Refill: 1    5. Mixed hyperlipidemia  Assessment & Plan:  Stable per last set of blood work, will repeat lipid panel in 6 months.  Continue atorvastatin and fenofibrate at current " dose.    Orders:  -     atorvastatin (LIPITOR) 10 MG tablet; Take 1 tablet by mouth Daily.  Dispense: 90 tablet; Refill: 1  -     fenofibrate 160 MG tablet; Take 1 tablet by mouth Daily.  Dispense: 90 tablet; Refill: 1  -     Lipid Panel; Future    6. Acquired hypothyroidism  Assessment & Plan:  Stable per last TSH, continue levothyroxine at current dose.  We will repeat TSH in 6 months.    Orders:  -     levothyroxine (SYNTHROID, LEVOTHROID) 50 MCG tablet; Take 1 tablet by mouth Daily.  Dispense: 90 tablet; Refill: 1  -     TSH Rfx On Abnormal To Free T4; Future    7. Irritable bowel syndrome with constipation  Assessment & Plan:  Currently stable, doing well with as needed Linzess.  No acute issues at this time.    Orders:  -     linaclotide (Linzess) 145 MCG capsule capsule; Take 1 capsule by mouth Every Morning Before Breakfast.  Dispense: 90 capsule; Refill: 1    8. Kabuki make-up syndrome  Assessment & Plan:  Continue follow-up closely with hematology.  No acute issues.      9. Primary hypertension  -     CBC Auto Differential; Future  -     Comprehensive Metabolic Panel; Future  -     Urinalysis With Culture If Indicated -; Future        Follow Up:  Return in about 6 months (around 2/25/2023).    Patient was given instructions and counseling regarding her condition or for health maintenance advice. Please see specific information pulled into the AVS if appropriate.

## 2022-08-25 NOTE — ASSESSMENT & PLAN NOTE
Stable per last set of blood work, will repeat lipid panel in 6 months.  Continue atorvastatin and fenofibrate at current dose.

## 2022-08-26 ENCOUNTER — OFFICE VISIT (OUTPATIENT)
Dept: ONCOLOGY | Facility: HOSPITAL | Age: 32
End: 2022-08-26

## 2022-08-26 ENCOUNTER — HOSPITAL ENCOUNTER (OUTPATIENT)
Dept: ONCOLOGY | Facility: HOSPITAL | Age: 32
Setting detail: INFUSION SERIES
Discharge: HOME | End: 2022-08-26

## 2022-08-26 VITALS
RESPIRATION RATE: 16 BRPM | OXYGEN SATURATION: 98 % | SYSTOLIC BLOOD PRESSURE: 106 MMHG | HEART RATE: 93 BPM | DIASTOLIC BLOOD PRESSURE: 74 MMHG | WEIGHT: 135.58 LBS | BODY MASS INDEX: 31.51 KG/M2 | TEMPERATURE: 97 F

## 2022-08-26 DIAGNOSIS — D83.9 CVID (COMMON VARIABLE IMMUNODEFICIENCY): ICD-10-CM

## 2022-08-26 DIAGNOSIS — D64.9 ANEMIA, UNSPECIFIED TYPE: ICD-10-CM

## 2022-08-26 DIAGNOSIS — D83.9 CVID (COMMON VARIABLE IMMUNODEFICIENCY): Primary | ICD-10-CM

## 2022-08-26 DIAGNOSIS — D64.9 ANEMIA, UNSPECIFIED TYPE: Primary | ICD-10-CM

## 2022-08-26 DIAGNOSIS — Z45.2 ENCOUNTER FOR ADJUSTMENT OR MANAGEMENT OF VASCULAR ACCESS DEVICE: ICD-10-CM

## 2022-08-26 LAB
BASOPHILS # BLD AUTO: 0.01 10*3/MM3 (ref 0–0.2)
BASOPHILS NFR BLD AUTO: 0.2 % (ref 0–1.5)
DEPRECATED RDW RBC AUTO: 46.6 FL (ref 37–54)
EOSINOPHIL # BLD AUTO: 0.03 10*3/MM3 (ref 0–0.4)
EOSINOPHIL NFR BLD AUTO: 0.5 % (ref 0.3–6.2)
ERYTHROCYTE [DISTWIDTH] IN BLOOD BY AUTOMATED COUNT: 14.1 % (ref 12.3–15.4)
FERRITIN SERPL-MCNC: 149.5 NG/ML (ref 13–150)
HCT VFR BLD AUTO: 34.6 % (ref 34–46.6)
HGB BLD-MCNC: 11.6 G/DL (ref 12–15.9)
IGA1 MFR SER: <50 MG/DL (ref 70–400)
IGG1 SER-MCNC: 506 MG/DL (ref 700–1600)
IGM SERPL-MCNC: 55 MG/DL (ref 40–230)
IMM GRANULOCYTES # BLD AUTO: 0.01 10*3/MM3 (ref 0–0.05)
IMM GRANULOCYTES NFR BLD AUTO: 0.2 % (ref 0–0.5)
IRON 24H UR-MRATE: 37 MCG/DL (ref 37–145)
IRON SATN MFR SERPL: 7 % (ref 20–50)
LYMPHOCYTES # BLD AUTO: 1.69 10*3/MM3 (ref 0.7–3.1)
LYMPHOCYTES NFR BLD AUTO: 25.6 % (ref 19.6–45.3)
MCH RBC QN AUTO: 30.1 PG (ref 26.6–33)
MCHC RBC AUTO-ENTMCNC: 33.5 G/DL (ref 31.5–35.7)
MCV RBC AUTO: 89.9 FL (ref 79–97)
MONOCYTES # BLD AUTO: 0.58 10*3/MM3 (ref 0.1–0.9)
MONOCYTES NFR BLD AUTO: 8.8 % (ref 5–12)
NEUTROPHILS NFR BLD AUTO: 4.28 10*3/MM3 (ref 1.7–7)
NEUTROPHILS NFR BLD AUTO: 64.7 % (ref 42.7–76)
PLATELET # BLD AUTO: 306 10*3/MM3 (ref 140–450)
PMV BLD AUTO: 9 FL (ref 6–12)
RBC # BLD AUTO: 3.85 10*6/MM3 (ref 3.77–5.28)
TIBC SERPL-MCNC: 539 MCG/DL (ref 298–536)
TRANSFERRIN SERPL-MCNC: 362 MG/DL (ref 200–360)
WBC NRBC COR # BLD: 6.6 10*3/MM3 (ref 3.4–10.8)

## 2022-08-26 PROCEDURE — 82728 ASSAY OF FERRITIN: CPT | Performed by: INTERNAL MEDICINE

## 2022-08-26 PROCEDURE — 36591 DRAW BLOOD OFF VENOUS DEVICE: CPT

## 2022-08-26 PROCEDURE — 85025 COMPLETE CBC W/AUTO DIFF WBC: CPT | Performed by: INTERNAL MEDICINE

## 2022-08-26 PROCEDURE — 83540 ASSAY OF IRON: CPT | Performed by: INTERNAL MEDICINE

## 2022-08-26 PROCEDURE — 82784 ASSAY IGA/IGD/IGG/IGM EACH: CPT | Performed by: INTERNAL MEDICINE

## 2022-08-26 PROCEDURE — G0463 HOSPITAL OUTPT CLINIC VISIT: HCPCS | Performed by: NURSE PRACTITIONER

## 2022-08-26 PROCEDURE — 25010000002 HEPARIN LOCK FLUSH PER 10 UNITS: Performed by: INTERNAL MEDICINE

## 2022-08-26 PROCEDURE — 84466 ASSAY OF TRANSFERRIN: CPT | Performed by: INTERNAL MEDICINE

## 2022-08-26 PROCEDURE — 99214 OFFICE O/P EST MOD 30 MIN: CPT | Performed by: NURSE PRACTITIONER

## 2022-08-26 RX ORDER — SODIUM CHLORIDE 0.9 % (FLUSH) 0.9 %
20 SYRINGE (ML) INJECTION AS NEEDED
Status: CANCELLED | OUTPATIENT
Start: 2022-08-26

## 2022-08-26 RX ORDER — HEPARIN SODIUM (PORCINE) LOCK FLUSH IV SOLN 100 UNIT/ML 100 UNIT/ML
500 SOLUTION INTRAVENOUS AS NEEDED
Status: CANCELLED | OUTPATIENT
Start: 2022-08-26

## 2022-08-26 RX ORDER — DIPHENHYDRAMINE HYDROCHLORIDE 50 MG/ML
50 INJECTION INTRAMUSCULAR; INTRAVENOUS AS NEEDED
Status: CANCELLED | OUTPATIENT
Start: 2022-08-30

## 2022-08-26 RX ORDER — SODIUM CHLORIDE 9 MG/ML
250 INJECTION, SOLUTION INTRAVENOUS ONCE
Status: CANCELLED | OUTPATIENT
Start: 2022-08-30

## 2022-08-26 RX ORDER — FAMOTIDINE 10 MG/ML
20 INJECTION, SOLUTION INTRAVENOUS ONCE
Status: CANCELLED | OUTPATIENT
Start: 2022-08-30

## 2022-08-26 RX ORDER — HEPARIN SODIUM (PORCINE) LOCK FLUSH IV SOLN 100 UNIT/ML 100 UNIT/ML
500 SOLUTION INTRAVENOUS AS NEEDED
Status: DISCONTINUED | OUTPATIENT
Start: 2022-08-26 | End: 2022-08-27 | Stop reason: HOSPADM

## 2022-08-26 RX ORDER — DIPHENHYDRAMINE HCL 25 MG
25 CAPSULE ORAL ONCE
Status: CANCELLED | OUTPATIENT
Start: 2022-08-30

## 2022-08-26 RX ORDER — FAMOTIDINE 10 MG/ML
20 INJECTION, SOLUTION INTRAVENOUS AS NEEDED
Status: CANCELLED | OUTPATIENT
Start: 2022-08-30

## 2022-08-26 RX ORDER — SODIUM CHLORIDE 0.9 % (FLUSH) 0.9 %
20 SYRINGE (ML) INJECTION AS NEEDED
Status: DISCONTINUED | OUTPATIENT
Start: 2022-08-26 | End: 2022-08-27 | Stop reason: HOSPADM

## 2022-08-26 RX ORDER — MEPERIDINE HYDROCHLORIDE 25 MG/ML
25 INJECTION INTRAMUSCULAR; INTRAVENOUS; SUBCUTANEOUS
Status: CANCELLED | OUTPATIENT
Start: 2022-08-30

## 2022-08-26 RX ADMIN — Medication 20 ML: at 11:09

## 2022-08-26 RX ADMIN — HEPARIN SODIUM (PORCINE) LOCK FLUSH IV SOLN 100 UNIT/ML 500 UNITS: 100 SOLUTION at 11:10

## 2022-08-26 NOTE — PROGRESS NOTES
Chief Complaint  Common Variable Immunodeficiency    Arnie Rucker MD Kindervater, Kasey, APRN      Subjective          Francine Preciado presents to [facility] for IVIG infusion and CVID.    History of Present Illness   Ms. Francine Preciado presents with her mother for every 6 week infusions for IVIG. She has CVID. Mom reports she recently saw her ENT and was found to have an inner ear infection. She is recovering from this, otherwise she feels well overall. Denies any fevers, chills. Weight is stable. Has good appetite.     Recent labs show: WBC 6.60, hemoglobin 11.6, platelet count of 306. She does have mild anemia on recent labs and an iron panel, ferritin was ordered at her last visit. Labs are not resulted yet.       Oncology/Hematology History Overview Note   CVID:   -Diagnosed by Immunologist 8/20/12  -presented with frequent/severe infections             Review of Systems   Constitutional: Negative.    HENT: Negative.    Eyes: Negative.    Respiratory: Negative.    Cardiovascular: Negative.    Gastrointestinal: Negative.    Endocrine: Negative.    Genitourinary: Negative.    Musculoskeletal: Negative.    Allergic/Immunologic: Negative.    Neurological: Negative.    Hematological: Negative.    Psychiatric/Behavioral: Negative.    All other systems reviewed and are negative.      Current Outpatient Medications on File Prior to Visit   Medication Sig Dispense Refill   • atenolol (TENORMIN) 25 MG tablet Take 1 tablet by mouth Daily. 90 tablet 1   • atorvastatin (LIPITOR) 10 MG tablet Take 1 tablet by mouth Daily. 90 tablet 1   • azelastine (ASTELIN) 0.1 % nasal spray      • dexamethasone (DECADRON) 0.1 % ophthalmic solution Place 5 drops into both ears twice daily x10 days. 5 mL 3   • EPINEPHrine (EPIPEN) 0.3 MG/0.3ML solution auto-injector injection 0.3 mL.     • famotidine (PEPCID) 10 MG tablet Take 20 mg by mouth Daily.     • fenofibrate 160 MG tablet Take 1 tablet by  mouth Daily. 90 tablet 1   • fluconazole (Diflucan) 150 MG tablet Take 1 tablet by mouth Every 72 (Seventy-Two) Hours. 2 tablet 0   • fluticasone (FLONASE) 50 MCG/ACT nasal spray 2 sprays into the nostril(s) as directed by provider Daily. 16 g 11   • Junel  1-20 MG-MCG per tablet Take 1 tablet by mouth Daily. 21 tablet 11   • levocetirizine (XYZAL) 5 MG tablet      • levothyroxine (SYNTHROID, LEVOTHROID) 50 MCG tablet Take 1 tablet by mouth Daily. 90 tablet 1   • linaclotide (Linzess) 145 MCG capsule capsule Take 1 capsule by mouth Every Morning Before Breakfast. 90 capsule 1   • montelukast (SINGULAIR) 10 MG tablet      • multivitamin with minerals tablet tablet Take 1 tablet by mouth Daily.     • [] ofloxacin (Ocuflox) 0.3 % ophthalmic solution Administer 4 drops into ear(s) as directed by provider 2 (Two) Times a Day for 10 days. 10 mL 3     Current Facility-Administered Medications on File Prior to Visit   Medication Dose Route Frequency Provider Last Rate Last Admin   • cyanocobalamin injection 1,000 mcg  1,000 mcg Intramuscular Q28 Days Arnie Rucker MD   1,000 mcg at 22 1620       Allergies   Allergen Reactions   • Cefuroxime Axetil Unknown - Low Severity   • Neosporin [Bacitracin-Polymyxin B] Unknown - Low Severity     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    • Central perforation of tympanic membrane of right ear    • Chronic mastoiditis    • COVID-19 vaccine series completed    • Heart murmur    • Hypertension    • Kabuki make-up syndrome    • Mitral valve prolapse    • Mixed conductive and sensorineural hearing loss of both ears    • Otorrhea, left    • Recurrent otitis media    • Skin disease     PSORIASIS/ECXEMA     Past Surgical History:   Procedure Laterality Date   • MULTIPLE TOOTH EXTRACTIONS     • NO PAST SURGERIES       Social History     Socioeconomic History   • Marital status: Single   Tobacco Use   • Smoking status:  Passive Smoke Exposure - Never Smoker   • Smokeless tobacco: Never Used   • Tobacco comment: Daily exposure to 2nd hand smoke   Vaping Use   • Vaping Use: Never used   Substance and Sexual Activity   • Alcohol use: Not Currently     Comment: DENIES   • Drug use: Never   • Sexual activity: Never     Family History   Problem Relation Age of Onset   • Leukemia Father    • Heart disease Other      Immunization History   Administered Date(s) Administered   • COVID-19 (PFIZER) PURPLE CAP 04/04/2021, 04/25/2021   • Flu Vaccine Quad PF >36MO 11/04/2013, 10/24/2014, 11/02/2015, 10/13/2016, 10/19/2017   • FluLaval/Fluzone >6mos 11/23/2021   • Fluzone Quad >6mos (Multi-dose) 11/04/2013, 11/02/2015, 10/19/2017, 10/29/2019   • Hep B, Adolescent or Pediatric 11/24/1998, 01/14/1999, 06/02/1999   • Influenza LAIV (Nasal) 10/13/2016, 10/14/2020   • Influenza TIV (IM) 09/17/2009, 11/07/2018   • Influenza, Unspecified 10/14/2020   • MMR 08/19/1996   • Pneumococcal Polysaccharide (PPSV23) 10/04/2012, 04/10/2014   • influenza Split 10/02/2018       Objective   Physical Exam  Vitals and nursing note reviewed.   Constitutional:       Appearance: She is obese.   HENT:      Head: Normocephalic.      Nose: Nose normal.      Mouth/Throat:      Mouth: Mucous membranes are moist.   Eyes:      Pupils: Pupils are equal, round, and reactive to light.   Cardiovascular:      Rate and Rhythm: Normal rate and regular rhythm.      Pulses: Normal pulses.      Heart sounds: Normal heart sounds.   Pulmonary:      Effort: Pulmonary effort is normal.      Breath sounds: Normal breath sounds.   Abdominal:      General: Bowel sounds are normal.      Palpations: Abdomen is soft.   Musculoskeletal:         General: Normal range of motion.      Cervical back: Normal range of motion and neck supple.   Skin:     General: Skin is dry.      Capillary Refill: Capillary refill takes less than 2 seconds.   Neurological:      General: No focal deficit present.       Mental Status: She is alert and oriented to person, place, and time.   Psychiatric:         Mood and Affect: Mood normal.         Behavior: Behavior normal.         Thought Content: Thought content normal.         Judgment: Judgment normal.         Vitals:    08/26/22 1123   BP: 106/74   Pulse: 93   Resp: 16   Temp: 97 °F (36.1 °C)   SpO2: 98%   Weight: 61.5 kg (135 lb 9.3 oz)   PainSc: 0-No pain               ECOG: (0) Fully Active - Able to Carry On All Pre-disease Performance Without Restriction  Fall Risk Assessment was completed, and patient is at low risk for falls.  PHQ-9 Total Score: 0       The patient is not  experiencing fatigue. Fatigue score: 0    PT/OT Functional Screening: PT fx screen: No needs identified  Speech Functional Screening:   Rehab to be ordered:         Result Review :   The following data was reviewed by: NO Nur on 08/26/2022:    Lab Results   Component Value Date    HGB 11.6 (L) 08/26/2022    HCT 34.6 08/26/2022    MCV 89.9 08/26/2022     08/26/2022    WBC 6.60 08/26/2022    NEUTROABS 4.28 08/26/2022    LYMPHSABS 1.69 08/26/2022    MONOSABS 0.58 08/26/2022    EOSABS 0.03 08/26/2022    BASOSABS 0.01 08/26/2022     Lab Results   Component Value Date    GLUCOSE 77 06/03/2022    BUN 17 06/03/2022    CREATININE 0.93 06/03/2022     06/03/2022    K 3.7 06/03/2022     06/03/2022    CO2 21.2 (L) 06/03/2022    CALCIUM 9.6 06/03/2022    PROTEINTOT 7.0 06/03/2022    ALBUMIN 4.20 06/03/2022    BILITOT 0.5 06/03/2022    ALKPHOS 84 06/03/2022    AST 16 06/03/2022    ALT 18 06/03/2022          Assessment and Plan    Diagnoses and all orders for this visit:    1. Anemia, unspecified type (Primary)    2. CVID (common variable immunodeficiency) (HCC)    CBC reviewed. WBC is normal. Mild anemia is noted. Iron panel and ferritin are pending. If low, will start ferrous gluconate.     OK for IVIG for 8/30/22. She receives IVIG every 6 weeks.     Follow up with   Falguni at next office visit.         Patient Follow Up: 6 weeks.     Patient was given instructions and counseling regarding her condition or for health maintenance advice. Please see specific information pulled into the AVS if appropriate.     Belia Saez, APRN    8/26/2022

## 2022-08-27 DIAGNOSIS — Z30.40 ENCOUNTER FOR REFILL OF PRESCRIPTION FOR CONTRACEPTION: Primary | ICD-10-CM

## 2022-08-29 RX ORDER — NORETHINDRONE ACETATE AND ETHINYL ESTRADIOL 1; 20 MG/1; UG/1
TABLET ORAL
Qty: 21 TABLET | Refills: 2 | Status: SHIPPED | OUTPATIENT
Start: 2022-08-29 | End: 2022-11-17

## 2022-08-30 ENCOUNTER — HOSPITAL ENCOUNTER (OUTPATIENT)
Dept: ONCOLOGY | Facility: HOSPITAL | Age: 32
Setting detail: INFUSION SERIES
Discharge: HOME | End: 2022-08-30

## 2022-08-30 VITALS
TEMPERATURE: 97.9 F | WEIGHT: 136.47 LBS | OXYGEN SATURATION: 100 % | HEIGHT: 55 IN | RESPIRATION RATE: 18 BRPM | DIASTOLIC BLOOD PRESSURE: 54 MMHG | SYSTOLIC BLOOD PRESSURE: 102 MMHG | BODY MASS INDEX: 31.58 KG/M2 | HEART RATE: 82 BPM

## 2022-08-30 DIAGNOSIS — Z45.2 ENCOUNTER FOR ADJUSTMENT OR MANAGEMENT OF VASCULAR ACCESS DEVICE: ICD-10-CM

## 2022-08-30 DIAGNOSIS — D83.9 CVID (COMMON VARIABLE IMMUNODEFICIENCY): Primary | ICD-10-CM

## 2022-08-30 PROCEDURE — 25010000002 IMMUNE GLOBULIN (HUMAN) 10 GM/100ML SOLUTION: Performed by: NURSE PRACTITIONER

## 2022-08-30 PROCEDURE — 96366 THER/PROPH/DIAG IV INF ADDON: CPT

## 2022-08-30 PROCEDURE — 63710000001 DIPHENHYDRAMINE PER 50 MG: Performed by: NURSE PRACTITIONER

## 2022-08-30 PROCEDURE — 96365 THER/PROPH/DIAG IV INF INIT: CPT

## 2022-08-30 PROCEDURE — 25010000002 HEPARIN LOCK FLUSH PER 10 UNITS: Performed by: INTERNAL MEDICINE

## 2022-08-30 PROCEDURE — 96375 TX/PRO/DX INJ NEW DRUG ADDON: CPT

## 2022-08-30 RX ORDER — SODIUM CHLORIDE 0.9 % (FLUSH) 0.9 %
20 SYRINGE (ML) INJECTION AS NEEDED
Status: CANCELLED | OUTPATIENT
Start: 2022-08-30

## 2022-08-30 RX ORDER — SODIUM CHLORIDE 9 MG/ML
250 INJECTION, SOLUTION INTRAVENOUS ONCE
Status: COMPLETED | OUTPATIENT
Start: 2022-08-30 | End: 2022-08-30

## 2022-08-30 RX ORDER — HEPARIN SODIUM (PORCINE) LOCK FLUSH IV SOLN 100 UNIT/ML 100 UNIT/ML
500 SOLUTION INTRAVENOUS AS NEEDED
Status: DISCONTINUED | OUTPATIENT
Start: 2022-08-30 | End: 2022-09-01 | Stop reason: HOSPADM

## 2022-08-30 RX ORDER — HEPARIN SODIUM (PORCINE) LOCK FLUSH IV SOLN 100 UNIT/ML 100 UNIT/ML
500 SOLUTION INTRAVENOUS AS NEEDED
Status: CANCELLED | OUTPATIENT
Start: 2022-08-30

## 2022-08-30 RX ORDER — DIPHENHYDRAMINE HCL 25 MG
25 CAPSULE ORAL ONCE
Status: COMPLETED | OUTPATIENT
Start: 2022-08-30 | End: 2022-08-30

## 2022-08-30 RX ORDER — SODIUM CHLORIDE 0.9 % (FLUSH) 0.9 %
20 SYRINGE (ML) INJECTION AS NEEDED
Status: DISCONTINUED | OUTPATIENT
Start: 2022-08-30 | End: 2022-09-01 | Stop reason: HOSPADM

## 2022-08-30 RX ORDER — FAMOTIDINE 10 MG/ML
20 INJECTION, SOLUTION INTRAVENOUS ONCE
Status: COMPLETED | OUTPATIENT
Start: 2022-08-30 | End: 2022-08-30

## 2022-08-30 RX ADMIN — IMMUNE GLOBULIN INFUSION (HUMAN) 10 G: 100 INJECTION, SOLUTION INTRAVENOUS; SUBCUTANEOUS at 10:36

## 2022-08-30 RX ADMIN — SODIUM CHLORIDE 250 ML: 9 INJECTION, SOLUTION INTRAVENOUS at 09:59

## 2022-08-30 RX ADMIN — DIPHENHYDRAMINE HYDROCHLORIDE 25 MG: 25 CAPSULE ORAL at 10:01

## 2022-08-30 RX ADMIN — FAMOTIDINE 20 MG: 10 INJECTION INTRAVENOUS at 10:03

## 2022-08-30 RX ADMIN — HEPARIN 500 UNITS: 100 SYRINGE at 12:16

## 2022-08-30 RX ADMIN — Medication 20 ML: at 12:16

## 2022-09-19 ENCOUNTER — CLINICAL SUPPORT (OUTPATIENT)
Dept: FAMILY MEDICINE CLINIC | Facility: CLINIC | Age: 32
End: 2022-09-19

## 2022-09-19 DIAGNOSIS — E53.8 B12 DEFICIENCY: Primary | ICD-10-CM

## 2022-09-19 PROCEDURE — 96372 THER/PROPH/DIAG INJ SC/IM: CPT

## 2022-09-19 RX ADMIN — CYANOCOBALAMIN 1000 MCG: 1000 INJECTION, SOLUTION INTRAMUSCULAR; SUBCUTANEOUS at 15:28

## 2022-10-07 ENCOUNTER — OFFICE VISIT (OUTPATIENT)
Dept: ONCOLOGY | Facility: HOSPITAL | Age: 32
End: 2022-10-07

## 2022-10-07 ENCOUNTER — HOSPITAL ENCOUNTER (OUTPATIENT)
Dept: ONCOLOGY | Facility: HOSPITAL | Age: 32
Setting detail: INFUSION SERIES
Discharge: HOME | End: 2022-10-07

## 2022-10-07 VITALS
RESPIRATION RATE: 18 BRPM | DIASTOLIC BLOOD PRESSURE: 55 MMHG | SYSTOLIC BLOOD PRESSURE: 115 MMHG | OXYGEN SATURATION: 100 % | BODY MASS INDEX: 32.18 KG/M2 | WEIGHT: 138.45 LBS | TEMPERATURE: 97.7 F | HEART RATE: 65 BPM

## 2022-10-07 DIAGNOSIS — Z45.2 ENCOUNTER FOR ADJUSTMENT OR MANAGEMENT OF VASCULAR ACCESS DEVICE: ICD-10-CM

## 2022-10-07 DIAGNOSIS — D83.9 CVID (COMMON VARIABLE IMMUNODEFICIENCY): ICD-10-CM

## 2022-10-07 DIAGNOSIS — D83.9 CVID (COMMON VARIABLE IMMUNODEFICIENCY): Primary | ICD-10-CM

## 2022-10-07 LAB
BASOPHILS # BLD AUTO: 0.02 10*3/MM3 (ref 0–0.2)
BASOPHILS NFR BLD AUTO: 0.2 % (ref 0–1.5)
DEPRECATED RDW RBC AUTO: 45.2 FL (ref 37–54)
EOSINOPHIL # BLD AUTO: 0.04 10*3/MM3 (ref 0–0.4)
EOSINOPHIL NFR BLD AUTO: 0.4 % (ref 0.3–6.2)
ERYTHROCYTE [DISTWIDTH] IN BLOOD BY AUTOMATED COUNT: 13.7 % (ref 12.3–15.4)
HCT VFR BLD AUTO: 34.5 % (ref 34–46.6)
HGB BLD-MCNC: 11.7 G/DL (ref 12–15.9)
IGA1 MFR SER: <50 MG/DL (ref 70–400)
IGG1 SER-MCNC: 513 MG/DL (ref 700–1600)
IGM SERPL-MCNC: 76 MG/DL (ref 40–230)
IMM GRANULOCYTES # BLD AUTO: 0.02 10*3/MM3 (ref 0–0.05)
IMM GRANULOCYTES NFR BLD AUTO: 0.2 % (ref 0–0.5)
LYMPHOCYTES # BLD AUTO: 2.1 10*3/MM3 (ref 0.7–3.1)
LYMPHOCYTES NFR BLD AUTO: 21.6 % (ref 19.6–45.3)
MCH RBC QN AUTO: 30.2 PG (ref 26.6–33)
MCHC RBC AUTO-ENTMCNC: 33.9 G/DL (ref 31.5–35.7)
MCV RBC AUTO: 89.1 FL (ref 79–97)
MONOCYTES # BLD AUTO: 0.54 10*3/MM3 (ref 0.1–0.9)
MONOCYTES NFR BLD AUTO: 5.6 % (ref 5–12)
NEUTROPHILS NFR BLD AUTO: 6.98 10*3/MM3 (ref 1.7–7)
NEUTROPHILS NFR BLD AUTO: 72 % (ref 42.7–76)
PLATELET # BLD AUTO: 292 10*3/MM3 (ref 140–450)
PMV BLD AUTO: 9 FL (ref 6–12)
RBC # BLD AUTO: 3.87 10*6/MM3 (ref 3.77–5.28)
WBC NRBC COR # BLD: 9.7 10*3/MM3 (ref 3.4–10.8)

## 2022-10-07 PROCEDURE — 99214 OFFICE O/P EST MOD 30 MIN: CPT | Performed by: INTERNAL MEDICINE

## 2022-10-07 PROCEDURE — 85025 COMPLETE CBC W/AUTO DIFF WBC: CPT | Performed by: INTERNAL MEDICINE

## 2022-10-07 PROCEDURE — 82784 ASSAY IGA/IGD/IGG/IGM EACH: CPT | Performed by: INTERNAL MEDICINE

## 2022-10-07 PROCEDURE — 96523 IRRIG DRUG DELIVERY DEVICE: CPT

## 2022-10-07 PROCEDURE — 25010000002 HEPARIN LOCK FLUSH PER 10 UNITS: Performed by: INTERNAL MEDICINE

## 2022-10-07 PROCEDURE — G0463 HOSPITAL OUTPT CLINIC VISIT: HCPCS | Performed by: INTERNAL MEDICINE

## 2022-10-07 PROCEDURE — 36591 DRAW BLOOD OFF VENOUS DEVICE: CPT

## 2022-10-07 RX ORDER — SODIUM CHLORIDE 0.9 % (FLUSH) 0.9 %
20 SYRINGE (ML) INJECTION AS NEEDED
Status: CANCELLED | OUTPATIENT
Start: 2022-10-07

## 2022-10-07 RX ORDER — HEPARIN SODIUM (PORCINE) LOCK FLUSH IV SOLN 100 UNIT/ML 100 UNIT/ML
500 SOLUTION INTRAVENOUS AS NEEDED
Status: DISCONTINUED | OUTPATIENT
Start: 2022-10-07 | End: 2022-10-09 | Stop reason: HOSPADM

## 2022-10-07 RX ORDER — HEPARIN SODIUM (PORCINE) LOCK FLUSH IV SOLN 100 UNIT/ML 100 UNIT/ML
500 SOLUTION INTRAVENOUS AS NEEDED
Status: CANCELLED | OUTPATIENT
Start: 2022-10-07

## 2022-10-07 RX ORDER — SODIUM CHLORIDE 0.9 % (FLUSH) 0.9 %
20 SYRINGE (ML) INJECTION AS NEEDED
Status: DISCONTINUED | OUTPATIENT
Start: 2022-10-07 | End: 2022-10-09 | Stop reason: HOSPADM

## 2022-10-07 RX ADMIN — HEPARIN 500 UNITS: 100 SYRINGE at 10:51

## 2022-10-07 RX ADMIN — Medication 20 ML: at 10:51

## 2022-10-10 RX ORDER — DIPHENHYDRAMINE HYDROCHLORIDE 50 MG/ML
50 INJECTION INTRAMUSCULAR; INTRAVENOUS AS NEEDED
Status: CANCELLED | OUTPATIENT
Start: 2022-10-11

## 2022-10-10 RX ORDER — MEPERIDINE HYDROCHLORIDE 25 MG/ML
25 INJECTION INTRAMUSCULAR; INTRAVENOUS; SUBCUTANEOUS
Status: CANCELLED | OUTPATIENT
Start: 2022-10-11

## 2022-10-10 RX ORDER — FAMOTIDINE 10 MG/ML
20 INJECTION, SOLUTION INTRAVENOUS AS NEEDED
Status: CANCELLED | OUTPATIENT
Start: 2022-10-11

## 2022-10-11 ENCOUNTER — HOSPITAL ENCOUNTER (OUTPATIENT)
Dept: ONCOLOGY | Facility: HOSPITAL | Age: 32
Setting detail: INFUSION SERIES
Discharge: HOME | End: 2022-10-11

## 2022-10-11 VITALS
TEMPERATURE: 98.4 F | RESPIRATION RATE: 20 BRPM | OXYGEN SATURATION: 100 % | DIASTOLIC BLOOD PRESSURE: 59 MMHG | HEART RATE: 82 BPM | SYSTOLIC BLOOD PRESSURE: 105 MMHG

## 2022-10-11 DIAGNOSIS — Z45.2 ENCOUNTER FOR ADJUSTMENT OR MANAGEMENT OF VASCULAR ACCESS DEVICE: ICD-10-CM

## 2022-10-11 DIAGNOSIS — D83.9 CVID (COMMON VARIABLE IMMUNODEFICIENCY): Primary | ICD-10-CM

## 2022-10-11 PROCEDURE — 96365 THER/PROPH/DIAG IV INF INIT: CPT

## 2022-10-11 PROCEDURE — 96366 THER/PROPH/DIAG IV INF ADDON: CPT

## 2022-10-11 PROCEDURE — 63710000001 DIPHENHYDRAMINE PER 50 MG: Performed by: INTERNAL MEDICINE

## 2022-10-11 PROCEDURE — 96375 TX/PRO/DX INJ NEW DRUG ADDON: CPT

## 2022-10-11 PROCEDURE — 25010000002 IMMUNE GLOBULIN (HUMAN) 10 GM/100ML SOLUTION: Performed by: INTERNAL MEDICINE

## 2022-10-11 PROCEDURE — 25010000002 HEPARIN LOCK FLUSH PER 10 UNITS: Performed by: INTERNAL MEDICINE

## 2022-10-11 RX ORDER — HEPARIN SODIUM (PORCINE) LOCK FLUSH IV SOLN 100 UNIT/ML 100 UNIT/ML
500 SOLUTION INTRAVENOUS AS NEEDED
Status: CANCELLED | OUTPATIENT
Start: 2022-10-11

## 2022-10-11 RX ORDER — DIPHENHYDRAMINE HCL 25 MG
25 CAPSULE ORAL ONCE
Status: COMPLETED | OUTPATIENT
Start: 2022-10-11 | End: 2022-10-11

## 2022-10-11 RX ORDER — SODIUM CHLORIDE 0.9 % (FLUSH) 0.9 %
20 SYRINGE (ML) INJECTION AS NEEDED
Status: DISCONTINUED | OUTPATIENT
Start: 2022-10-11 | End: 2022-10-12 | Stop reason: HOSPADM

## 2022-10-11 RX ORDER — FAMOTIDINE 10 MG/ML
20 INJECTION, SOLUTION INTRAVENOUS ONCE
Status: COMPLETED | OUTPATIENT
Start: 2022-10-11 | End: 2022-10-11

## 2022-10-11 RX ORDER — SODIUM CHLORIDE 9 MG/ML
250 INJECTION, SOLUTION INTRAVENOUS ONCE
Status: COMPLETED | OUTPATIENT
Start: 2022-10-11 | End: 2022-10-11

## 2022-10-11 RX ORDER — HEPARIN SODIUM (PORCINE) LOCK FLUSH IV SOLN 100 UNIT/ML 100 UNIT/ML
500 SOLUTION INTRAVENOUS AS NEEDED
Status: DISCONTINUED | OUTPATIENT
Start: 2022-10-11 | End: 2022-10-12 | Stop reason: HOSPADM

## 2022-10-11 RX ORDER — SODIUM CHLORIDE 0.9 % (FLUSH) 0.9 %
20 SYRINGE (ML) INJECTION AS NEEDED
Status: CANCELLED | OUTPATIENT
Start: 2022-10-11

## 2022-10-11 RX ADMIN — HEPARIN SODIUM (PORCINE) LOCK FLUSH IV SOLN 100 UNIT/ML 500 UNITS: 100 SOLUTION at 12:15

## 2022-10-11 RX ADMIN — FAMOTIDINE 20 MG: 10 INJECTION INTRAVENOUS at 10:17

## 2022-10-11 RX ADMIN — SODIUM CHLORIDE 250 ML: 9 INJECTION, SOLUTION INTRAVENOUS at 10:17

## 2022-10-11 RX ADMIN — Medication 20 ML: at 12:15

## 2022-10-11 RX ADMIN — DIPHENHYDRAMINE HYDROCHLORIDE 25 MG: 25 CAPSULE ORAL at 10:17

## 2022-10-11 RX ADMIN — IMMUNE GLOBULIN INFUSION (HUMAN) 10 G: 100 INJECTION, SOLUTION INTRAVENOUS; SUBCUTANEOUS at 10:32

## 2022-10-28 ENCOUNTER — CLINICAL SUPPORT (OUTPATIENT)
Dept: FAMILY MEDICINE CLINIC | Facility: CLINIC | Age: 32
End: 2022-10-28

## 2022-10-28 DIAGNOSIS — E53.8 B12 DEFICIENCY: Primary | ICD-10-CM

## 2022-10-28 PROCEDURE — 96372 THER/PROPH/DIAG INJ SC/IM: CPT | Performed by: STUDENT IN AN ORGANIZED HEALTH CARE EDUCATION/TRAINING PROGRAM

## 2022-10-28 RX ADMIN — CYANOCOBALAMIN 1000 MCG: 1000 INJECTION, SOLUTION INTRAMUSCULAR; SUBCUTANEOUS at 14:10

## 2022-11-07 ENCOUNTER — OFFICE VISIT (OUTPATIENT)
Dept: OTOLARYNGOLOGY | Facility: CLINIC | Age: 32
End: 2022-11-07

## 2022-11-07 VITALS — BODY MASS INDEX: 32.17 KG/M2 | WEIGHT: 139 LBS | HEIGHT: 55 IN | TEMPERATURE: 97.3 F

## 2022-11-07 DIAGNOSIS — H70.11 CHRONIC MASTOIDITIS OF RIGHT SIDE: ICD-10-CM

## 2022-11-07 DIAGNOSIS — H92.12 OTORRHEA OF LEFT EAR: ICD-10-CM

## 2022-11-07 DIAGNOSIS — H72.92 TYMPANIC MEMBRANE PERFORATION, LEFT: Primary | ICD-10-CM

## 2022-11-07 PROCEDURE — 99213 OFFICE O/P EST LOW 20 MIN: CPT | Performed by: OTOLARYNGOLOGY

## 2022-11-07 RX ORDER — TOBRAMYCIN / DEXAMETHASONE 3; .5 MG/ML; MG/ML
SUSPENSION/ DROPS OPHTHALMIC
Qty: 5 ML | Refills: 3 | Status: SHIPPED | OUTPATIENT
Start: 2022-11-07

## 2022-11-07 NOTE — PROGRESS NOTES
Patient Name: Francine Preciado   Visit Date: 11/07/2022   Patient ID: [id number]  Provider: Jose Elias Jorgensen MD    Sex: female  Location: [facility]   YOB: 1990  Location Address: [address] [place] [address], [address],?[place]?[id number]    Primary Care Provider Aggie Chance APRN  Location Phone: [phone]    Referring Provider: No ref. provider found        Chief Complaint  6 month follow up    History of Present Illness  Francine Preciado is a 32 y.o. 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 She underwent an audiogram on 5/11/17 which revealed bilateral moderate to profound mixed hearing loss. Speech discrimination was 100% bilaterally at 90 dB. Tympanograms were type B bilaterally.  She wears bilateral behind-the-ear hearing aids.    She returns today for routine follow-up.  She was last seen on 8/15/2022 at which time her right mastoid cavity was debrided and she had evidence of left-sided otitis media.  She and her mother tell me that she has done fairly well since her last appointment.  They have not had to use antibiotic drops in quite some time.  She continues to do well with her hearing aids.    L perf with a small amount of granulation,  R mastoid dry    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    • Central perforation of tympanic membrane of right ear    • Chronic mastoiditis    • COVID-19 vaccine series completed    • Heart murmur    • Hypertension    • Kabuki make-up syndrome    • Mitral valve prolapse    • Mixed conductive and sensorineural hearing loss of both ears    • Otorrhea, left    • Recurrent otitis media    • Skin disease     PSORIASIS/ECXEMA       Past Surgical History:   Procedure Laterality Date   •  MULTIPLE TOOTH EXTRACTIONS     • NO PAST SURGERIES           Current Outpatient Medications:   •  atenolol (TENORMIN) 25 MG tablet, Take 1 tablet by mouth Daily., Disp: 90 tablet, Rfl: 1  •  atorvastatin (LIPITOR) 10 MG tablet, Take 1 tablet by mouth Daily., Disp: 90 tablet, Rfl: 1  •  azelastine (ASTELIN) 0.1 % nasal spray, , Disp: , Rfl:   •  EPINEPHrine (EPIPEN) 0.3 MG/0.3ML solution auto-injector injection, 0.3 mL., Disp: , Rfl:   •  famotidine (PEPCID) 10 MG tablet, Take 20 mg by mouth Daily., Disp: , Rfl:   •  fenofibrate 160 MG tablet, Take 1 tablet by mouth Daily., Disp: 90 tablet, Rfl: 1  •  fluconazole (Diflucan) 150 MG tablet, Take 1 tablet by mouth Every 72 (Seventy-Two) Hours., Disp: 2 tablet, Rfl: 0  •  fluticasone (FLONASE) 50 MCG/ACT nasal spray, 2 sprays into the nostril(s) as directed by provider Daily., Disp: 16 g, Rfl: 11  •  Junel 1/20 1-20 MG-MCG per tablet, TAKE ONE TABLET BY MOUTH DAILY, Disp: 21 tablet, Rfl: 2  •  levocetirizine (XYZAL) 5 MG tablet, , Disp: , Rfl:   •  levothyroxine (SYNTHROID, LEVOTHROID) 50 MCG tablet, Take 1 tablet by mouth Daily., Disp: 90 tablet, Rfl: 1  •  linaclotide (Linzess) 145 MCG capsule capsule, Take 1 capsule by mouth Every Morning Before Breakfast., Disp: 90 capsule, Rfl: 1  •  montelukast (SINGULAIR) 10 MG tablet, , Disp: , Rfl:   •  multivitamin with minerals tablet tablet, Take 1 tablet by mouth Daily., Disp: , Rfl:   •  Tobramycin-Dexamethasone (TobraDex ST) 0.3-0.05 % suspension, 3-4 drops in left ear twice a day, Disp: 5 mL, Rfl: 3    Current Facility-Administered Medications:   •  cyanocobalamin injection 1,000 mcg, 1,000 mcg, Intramuscular, Q28 Days, Arnie Rucker MD, 1,000 mcg at 10/28/22 1410     Allergies   Allergen Reactions   • Cefuroxime Axetil Unknown - Low Severity   • Neosporin [Bacitracin-Polymyxin B] Unknown - Low Severity       Social History     Tobacco Use   • Smoking status: Passive Smoke Exposure - Never Smoker   • Smokeless tobacco:  "Never   • Tobacco comments:     Daily exposure to 2nd hand smoke   Vaping Use   • Vaping Use: Never used   Substance Use Topics   • Alcohol use: Not Currently     Comment: DENIES   • Drug use: Never        Objective     Vital Signs:   Temp 97.3 °F (36.3 °C) (Temporal)   Ht 139.7 cm (55\")   Wt 63 kg (139 lb)   BMI 32.31 kg/m²       Physical Exam    General: Well developed, well nourished patient of stated age in no acute distress. Voice is strong and clear.   Head: Normocephalic and atraumatic.  Face: No lesions.  Bilateral parotid and submandibular glands are unremarkable.  Stensen's and Warthin's ducts are productive of clear saliva bilaterally.  House-Brackmann I/VI     bilaterally.   muscles and temporomandibular joint nontender to palpation.  No TMJ crepitus.  Eyes: PERRLA, sclerae anicteric, no conjunctival injection. Extra ocular movements are intact and full. No nystagmus.   Ears: Auricles are normal in appearance.  Left external auditory canal is unremarkable.  Left tympanic membrane with perforation that is draining a small amount of purulence.  There appears to be a small amount of granulation tissue forming at the superior portion of the perforation.  Right mastoid cavity is dry with mild squamous debris. Hearing normal to conversational voice.   Nose: External nose is normal in appearance. Bilateral nares are patent with normal appearing mucosa. Septum midline. Turbinates are unremarkable. No lesions.   Oral Cavity: Lips are normal in appearance. Oral mucosa is unremarkable. Gingiva is unremarkable.  Partial dentition. Tongue is unremarkable with good movement. Hard palate is unremarkable.   Oropharynx: Soft palate is unremarkable with full movement. Uvula is unremarkable. Bilateral tonsils are unremarkable. Posterior oropharynx is unremarkable.    Larynx and hypopharynx: Deferred secondary to gag reflex.  Neck: Supple.  No mass.  Nontender to palpation.  Trachea midline. Thyroid normal size " and without nodules to palpation.   Lymphatic: No lymphadenopathy upon palpation.   Psychiatric: Appropriate affect, cooperative   Neurologic: Oriented x 3, strength symmetric in all extremities, Cranial Nerves II-XII are grossly intact to confrontation   Skin: Warm and dry. No rashes.    Procedures           Result Review :               Assessment and Plan    Diagnoses and all orders for this visit:    1. Tympanic membrane perforation, left (Primary)  -     Tobramycin-Dexamethasone (TobraDex ST) 0.3-0.05 % suspension; 3-4 drops in left ear twice a day  Dispense: 5 mL; Refill: 3    2. Chronic mastoiditis of right side    3. Otorrhea of left ear      Impressions and findings were discussed.  Currently, she returns today for follow-up of her ears.  Examination today reveals a small amount of purulence draining from the left tympanic membrane perforation as well as a small amount of granulation tissue forming superiorly.  Right mastoid cavity appears dry and healthy.  Options for further evaluation and management were discussed and she will be placed on a course of TobraDex drops.  They will follow-up in 6 months or sooner if needed.  Follow Up   Return in about 6 months (around 5/7/2023).  Patient was given instructions and counseling regarding her condition or for health maintenance advice. Please see specific information pulled into the AVS if appropriate.

## 2022-11-17 ENCOUNTER — TELEPHONE (OUTPATIENT)
Dept: FAMILY MEDICINE CLINIC | Facility: CLINIC | Age: 32
End: 2022-11-17

## 2022-11-17 DIAGNOSIS — Z30.41 ENCOUNTER FOR SURVEILLANCE OF CONTRACEPTIVE PILLS: Primary | ICD-10-CM

## 2022-11-17 RX ORDER — NORGESTIMATE AND ETHINYL ESTRADIOL 7DAYSX3 LO
1 KIT ORAL DAILY
Qty: 28 TABLET | Refills: 12 | Status: SHIPPED | OUTPATIENT
Start: 2022-11-17 | End: 2023-11-17

## 2022-11-17 NOTE — TELEPHONE ENCOUNTER
Caller: JOSE FRANCISCOHEATHER    Relationship: Mother    Best call back number: [phone]    What medication are you requesting: DIFFERENT BIRTH CONTROL MEDICATION    What are your current symptoms:N/A    How long have you been experiencing symptoms: N/A    Have you had these symptoms before:    [x] Yes  [] No    Have you been treated for these symptoms before:   [x] Yes  [] No    If a prescription is needed, what is your preferred pharmacy and phone number: [facility] [id number] - [place] - [address] AT [facility] [address] - [phone] [facility] [phone] FX     Additional notes:PATIENT'S MOM STATED THAT THE CURRENT BIRTH CONTROL MEDICATION SHE IS TAKING CAUSES HER TO HAVE HER PERIOD TWO WEEKS EARLY. SHE WOULD LIKE HAVE A DIFFERENT KIND OF MEDICATION FOR BIRTH CONTROL. PLEASE SUBMIT NEW PRESCRIPTION INTO PHARMACY ASAP.

## 2022-11-18 ENCOUNTER — HOSPITAL ENCOUNTER (OUTPATIENT)
Dept: ONCOLOGY | Facility: HOSPITAL | Age: 32
Setting detail: INFUSION SERIES
Discharge: HOME | End: 2022-11-18

## 2022-11-18 ENCOUNTER — OFFICE VISIT (OUTPATIENT)
Dept: ONCOLOGY | Facility: HOSPITAL | Age: 32
End: 2022-11-18

## 2022-11-18 VITALS
DIASTOLIC BLOOD PRESSURE: 58 MMHG | TEMPERATURE: 97.8 F | WEIGHT: 143.08 LBS | RESPIRATION RATE: 16 BRPM | OXYGEN SATURATION: 100 % | BODY MASS INDEX: 33.25 KG/M2 | HEART RATE: 90 BPM | SYSTOLIC BLOOD PRESSURE: 122 MMHG

## 2022-11-18 DIAGNOSIS — Z45.2 ENCOUNTER FOR ADJUSTMENT OR MANAGEMENT OF VASCULAR ACCESS DEVICE: ICD-10-CM

## 2022-11-18 DIAGNOSIS — D83.9 CVID (COMMON VARIABLE IMMUNODEFICIENCY): Primary | ICD-10-CM

## 2022-11-18 LAB
BASOPHILS # BLD AUTO: 0.02 10*3/MM3 (ref 0–0.2)
BASOPHILS NFR BLD AUTO: 0.2 % (ref 0–1.5)
DEPRECATED RDW RBC AUTO: 45.6 FL (ref 37–54)
EOSINOPHIL # BLD AUTO: 0.03 10*3/MM3 (ref 0–0.4)
EOSINOPHIL NFR BLD AUTO: 0.3 % (ref 0.3–6.2)
ERYTHROCYTE [DISTWIDTH] IN BLOOD BY AUTOMATED COUNT: 13.8 % (ref 12.3–15.4)
HCT VFR BLD AUTO: 35.6 % (ref 34–46.6)
HGB BLD-MCNC: 11.9 G/DL (ref 12–15.9)
IGA1 MFR SER: <50 MG/DL (ref 70–400)
IGG1 SER-MCNC: 525 MG/DL (ref 700–1600)
IGM SERPL-MCNC: 61 MG/DL (ref 40–230)
IMM GRANULOCYTES # BLD AUTO: 0.01 10*3/MM3 (ref 0–0.05)
IMM GRANULOCYTES NFR BLD AUTO: 0.1 % (ref 0–0.5)
LYMPHOCYTES # BLD AUTO: 1.84 10*3/MM3 (ref 0.7–3.1)
LYMPHOCYTES NFR BLD AUTO: 19.4 % (ref 19.6–45.3)
MCH RBC QN AUTO: 29.9 PG (ref 26.6–33)
MCHC RBC AUTO-ENTMCNC: 33.4 G/DL (ref 31.5–35.7)
MCV RBC AUTO: 89.4 FL (ref 79–97)
MONOCYTES # BLD AUTO: 0.58 10*3/MM3 (ref 0.1–0.9)
MONOCYTES NFR BLD AUTO: 6.1 % (ref 5–12)
NEUTROPHILS NFR BLD AUTO: 7 10*3/MM3 (ref 1.7–7)
NEUTROPHILS NFR BLD AUTO: 73.9 % (ref 42.7–76)
PLATELET # BLD AUTO: 280 10*3/MM3 (ref 140–450)
PMV BLD AUTO: 9.2 FL (ref 6–12)
RBC # BLD AUTO: 3.98 10*6/MM3 (ref 3.77–5.28)
WBC NRBC COR # BLD: 9.48 10*3/MM3 (ref 3.4–10.8)

## 2022-11-18 PROCEDURE — G0463 HOSPITAL OUTPT CLINIC VISIT: HCPCS | Performed by: NURSE PRACTITIONER

## 2022-11-18 PROCEDURE — 99213 OFFICE O/P EST LOW 20 MIN: CPT | Performed by: NURSE PRACTITIONER

## 2022-11-18 PROCEDURE — 36591 DRAW BLOOD OFF VENOUS DEVICE: CPT

## 2022-11-18 PROCEDURE — 25010000002 HEPARIN LOCK FLUSH PER 10 UNITS: Performed by: INTERNAL MEDICINE

## 2022-11-18 PROCEDURE — 85025 COMPLETE CBC W/AUTO DIFF WBC: CPT | Performed by: INTERNAL MEDICINE

## 2022-11-18 PROCEDURE — 82784 ASSAY IGA/IGD/IGG/IGM EACH: CPT | Performed by: INTERNAL MEDICINE

## 2022-11-18 RX ORDER — SODIUM CHLORIDE 0.9 % (FLUSH) 0.9 %
20 SYRINGE (ML) INJECTION AS NEEDED
Status: CANCELLED | OUTPATIENT
Start: 2022-11-18

## 2022-11-18 RX ORDER — SODIUM CHLORIDE 0.9 % (FLUSH) 0.9 %
20 SYRINGE (ML) INJECTION AS NEEDED
Status: DISCONTINUED | OUTPATIENT
Start: 2022-11-18 | End: 2022-11-19 | Stop reason: HOSPADM

## 2022-11-18 RX ORDER — HEPARIN SODIUM (PORCINE) LOCK FLUSH IV SOLN 100 UNIT/ML 100 UNIT/ML
500 SOLUTION INTRAVENOUS AS NEEDED
Status: CANCELLED | OUTPATIENT
Start: 2022-11-18

## 2022-11-18 RX ORDER — HEPARIN SODIUM (PORCINE) LOCK FLUSH IV SOLN 100 UNIT/ML 100 UNIT/ML
500 SOLUTION INTRAVENOUS AS NEEDED
Status: DISCONTINUED | OUTPATIENT
Start: 2022-11-18 | End: 2022-11-19 | Stop reason: HOSPADM

## 2022-11-18 RX ADMIN — Medication 20 ML: at 11:38

## 2022-11-18 RX ADMIN — HEPARIN SODIUM (PORCINE) LOCK FLUSH IV SOLN 100 UNIT/ML 500 UNITS: 100 SOLUTION at 11:38

## 2022-11-18 NOTE — PROGRESS NOTES
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 Francine Preciado presents for labs and toxicity check prior to her IVIG treatment scheduled for 11/22/22. She receives treatment every 42 days.  Reports she has been having an increase in menstual cramping recently. Reports was treated for ear infection about 2 weeks ago. Mom thinks it the cats. We discuss could be tobacco use in the home as well. Mom is trying to quit smoking. Mom reports Francine is taking oral iron therapy and their PCP is checking iron levels and will be following with them in the near future.     Labs were not back at time of visit today.       Oncology/Hematology History Overview Note   CVID:   -Diagnosed by Immunologist 8/20/12  -presented with frequent/severe infections             Review of Systems   Constitutional: Positive for fatigue. Negative for appetite change, diaphoresis, fever, unexpected weight gain and unexpected weight loss.   HENT: Negative for hearing loss, sore throat and voice change.    Eyes: Negative for blurred vision, double vision, pain, redness and visual disturbance.   Respiratory: Negative for cough, shortness of breath and wheezing.    Cardiovascular: Negative for chest pain, palpitations and leg swelling.   Endocrine: Negative for cold intolerance, heat intolerance, polydipsia and polyuria.   Genitourinary: Negative for decreased urine volume, difficulty urinating, frequency and urinary incontinence.   Musculoskeletal: Negative for arthralgias, back pain, joint swelling and myalgias.   Skin: Negative for color change, rash, skin lesions and wound.   Neurological: Negative for dizziness, seizures, numbness and headache.   Hematological: Negative for adenopathy. Does not bruise/bleed easily.   Psychiatric/Behavioral: Negative for depressed  mood. The patient is not nervous/anxious.        Current Outpatient Medications on File Prior to Visit   Medication Sig Dispense Refill   • atenolol (TENORMIN) 25 MG tablet Take 1 tablet by mouth Daily. 90 tablet 1   • atorvastatin (LIPITOR) 10 MG tablet Take 1 tablet by mouth Daily. 90 tablet 1   • azelastine (ASTELIN) 0.1 % nasal spray      • EPINEPHrine (EPIPEN) 0.3 MG/0.3ML solution auto-injector injection 0.3 mL.     • famotidine (PEPCID) 10 MG tablet Take 20 mg by mouth Daily.     • fenofibrate 160 MG tablet Take 1 tablet by mouth Daily. 90 tablet 1   • fluconazole (Diflucan) 150 MG tablet Take 1 tablet by mouth Every 72 (Seventy-Two) Hours. 2 tablet 0   • fluticasone (FLONASE) 50 MCG/ACT nasal spray 2 sprays into the nostril(s) as directed by provider Daily. 16 g 11   • levocetirizine (XYZAL) 5 MG tablet      • levothyroxine (SYNTHROID, LEVOTHROID) 50 MCG tablet Take 1 tablet by mouth Daily. 90 tablet 1   • linaclotide (Linzess) 145 MCG capsule capsule Take 1 capsule by mouth Every Morning Before Breakfast. 90 capsule 1   • montelukast (SINGULAIR) 10 MG tablet      • multivitamin with minerals tablet tablet Take 1 tablet by mouth Daily.     • norgestimate-ethinyl estradiol (Ortho Tri-Cyclen Lo) 0.18/0.215/0.25 MG-25 MCG per tablet Take 1 tablet by mouth Daily. 28 tablet 12   • Tobramycin-Dexamethasone (TobraDex ST) 0.3-0.05 % suspension 3-4 drops in left ear twice a day 5 mL 3     Current Facility-Administered Medications on File Prior to Visit   Medication Dose Route Frequency Provider Last Rate Last Admin   • cyanocobalamin injection 1,000 mcg  1,000 mcg Intramuscular Q28 Days Arnie Rucker MD   1,000 mcg at 10/28/22 1410   • heparin injection 500 Units  500 Units Intravenous PRN Josselin Montes De Oca MD PhD   500 Units at 11/18/22 1138   • sodium chloride 0.9 % flush 20 mL  20 mL Intravenous PRN Josselin Montes De Oca MD PhD   20 mL at 11/18/22 1138       Allergies   Allergen Reactions   • Cefuroxime Axetil  Unknown - Low Severity   • Neosporin [Bacitracin-Polymyxin B] Unknown - Low Severity     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    • Central perforation of tympanic membrane of right ear    • Chronic mastoiditis    • COVID-19 vaccine series completed    • Heart murmur    • Hypertension    • Kabuki make-up syndrome    • Mitral valve prolapse    • Mixed conductive and sensorineural hearing loss of both ears    • Otorrhea, left    • Recurrent otitis media    • Skin disease     PSORIASIS/ECXEMA     Past Surgical History:   Procedure Laterality Date   • MULTIPLE TOOTH EXTRACTIONS     • NO PAST SURGERIES       Social History     Socioeconomic History   • Marital status: Single   Tobacco Use   • Smoking status: Passive Smoke Exposure - Never Smoker   • Smokeless tobacco: Never   • Tobacco comments:     Daily exposure to 2nd hand smoke   Vaping Use   • Vaping Use: Never used   Substance and Sexual Activity   • Alcohol use: Not Currently     Comment: DENIES   • Drug use: Never   • Sexual activity: Never     Family History   Problem Relation Age of Onset   • Leukemia Father    • Heart disease Other      Immunization History   Administered Date(s) Administered   • COVID-19 (PFIZER) PURPLE CAP 04/04/2021, 04/25/2021   • Flu Vaccine Quad PF >36MO 11/04/2013, 10/24/2014, 11/02/2015, 10/13/2016, 10/19/2017   • FluLaval/Fluzone >6mos 11/23/2021   • Fluzone Quad >6mos (Multi-dose) 11/04/2013, 11/02/2015, 10/19/2017, 10/29/2019   • Hep B, Adolescent or Pediatric 11/24/1998, 01/14/1999, 06/02/1999   • Influenza LAIV (Nasal) 10/13/2016, 10/14/2020   • Influenza TIV (IM) 09/17/2009, 11/07/2018   • Influenza, Unspecified 10/14/2020, 10/11/2022   • MMR 08/19/1996   • Pneumococcal Polysaccharide (PPSV23) 10/04/2012, 04/10/2014   • influenza Split 10/02/2018       Objective   Physical Exam  Vitals and nursing note reviewed.   Constitutional:       Appearance: Normal appearance.    HENT:      Head: Normocephalic.      Nose: Nose normal.      Mouth/Throat:      Mouth: Mucous membranes are moist.   Eyes:      Pupils: Pupils are equal, round, and reactive to light.   Cardiovascular:      Rate and Rhythm: Normal rate and regular rhythm.      Pulses: Normal pulses.      Heart sounds: Normal heart sounds.   Pulmonary:      Effort: Pulmonary effort is normal.      Breath sounds: Normal breath sounds.   Abdominal:      Palpations: Abdomen is soft.   Musculoskeletal:         General: Normal range of motion.      Cervical back: Normal range of motion and neck supple.   Skin:     General: Skin is warm and dry.      Capillary Refill: Capillary refill takes less than 2 seconds.   Neurological:      General: No focal deficit present.      Mental Status: She is alert and oriented to person, place, and time.   Psychiatric:         Mood and Affect: Mood normal.         Behavior: Behavior normal.         Thought Content: Thought content normal.         Judgment: Judgment normal.         Vitals:    11/18/22 1110   BP: 122/58   Pulse: 90   Resp: 16   Temp: 97.8 °F (36.6 °C)   SpO2: 100%   Weight: 64.9 kg (143 lb 1.3 oz)   PainSc: 0-No pain     ECOG score: 0         ECOG: (0) Fully Active - Able to Carry On All Pre-disease Performance Without Restriction  Fall Risk Assessment was completed, and patient is at low risk for falls.  PHQ-9 Total Score:         The patient is  experiencing fatigue. Fatigue score: 1    PT/OT Functional Screening: PT fx screen: No needs identified  Speech Functional Screening: Speech fx screen: No needs identified  Rehab to be ordered: Rehab to be ordered: No needs identified        Result Review :   The following data was reviewed by: NO Nur on 11/18/2022:  Lab Results   Component Value Date    HGB 11.9 (L) 11/18/2022    HCT 35.6 11/18/2022    MCV 89.4 11/18/2022     11/18/2022    WBC 9.48 11/18/2022    NEUTROABS 7.00 11/18/2022    LYMPHSABS 1.84 11/18/2022     MONOSABS 0.58 11/18/2022    EOSABS 0.03 11/18/2022    BASOSABS 0.02 11/18/2022     Lab Results   Component Value Date    GLUCOSE 77 06/03/2022    BUN 17 06/03/2022    CREATININE 0.93 06/03/2022     06/03/2022    K 3.7 06/03/2022     06/03/2022    CO2 21.2 (L) 06/03/2022    CALCIUM 9.6 06/03/2022    PROTEINTOT 7.0 06/03/2022    ALBUMIN 4.20 06/03/2022    BILITOT 0.5 06/03/2022    ALKPHOS 84 06/03/2022    AST 16 06/03/2022    ALT 18 06/03/2022          Assessment and Plan    Diagnoses and all orders for this visit:    1. CVID (common variable immunodeficiency) (HCC) (Primary)  -     Comprehensive Metabolic Panel; Future    Labs reviewed. CBC is within acceptable limits. Hemoglobin stable but mild low of 11.9 up form 11.7 in October. Will order a CMP for the next visit since she has not had once since June.     OK for treatment on 11/22/22.       Patient Follow Up: Dr. Montes De Oca at the next visit in 42 days with next treatment.     Patient was given instructions and counseling regarding her condition or for health maintenance advice. Please see specific information pulled into the AVS if appropriate.     Belia Saez, APRN    11/18/2022

## 2022-11-21 RX ORDER — DIPHENHYDRAMINE HYDROCHLORIDE 50 MG/ML
50 INJECTION INTRAMUSCULAR; INTRAVENOUS AS NEEDED
Status: CANCELLED | OUTPATIENT
Start: 2022-11-22

## 2022-11-21 RX ORDER — MEPERIDINE HYDROCHLORIDE 25 MG/ML
25 INJECTION INTRAMUSCULAR; INTRAVENOUS; SUBCUTANEOUS
Status: CANCELLED | OUTPATIENT
Start: 2022-11-22

## 2022-11-21 RX ORDER — FAMOTIDINE 10 MG/ML
20 INJECTION, SOLUTION INTRAVENOUS AS NEEDED
Status: CANCELLED | OUTPATIENT
Start: 2022-11-22

## 2022-11-22 ENCOUNTER — HOSPITAL ENCOUNTER (OUTPATIENT)
Dept: ONCOLOGY | Facility: HOSPITAL | Age: 32
Setting detail: INFUSION SERIES
Discharge: HOME | End: 2022-11-22

## 2022-11-22 VITALS
RESPIRATION RATE: 20 BRPM | HEART RATE: 72 BPM | OXYGEN SATURATION: 97 % | BODY MASS INDEX: 32.76 KG/M2 | HEIGHT: 55 IN | WEIGHT: 141.54 LBS | DIASTOLIC BLOOD PRESSURE: 56 MMHG | TEMPERATURE: 98.7 F | SYSTOLIC BLOOD PRESSURE: 113 MMHG

## 2022-11-22 DIAGNOSIS — Z45.2 ENCOUNTER FOR ADJUSTMENT OR MANAGEMENT OF VASCULAR ACCESS DEVICE: ICD-10-CM

## 2022-11-22 DIAGNOSIS — D83.9 CVID (COMMON VARIABLE IMMUNODEFICIENCY): Primary | ICD-10-CM

## 2022-11-22 PROCEDURE — 25010000002 IMMUNE GLOBULIN (HUMAN) 10 GM/100ML SOLUTION: Performed by: INTERNAL MEDICINE

## 2022-11-22 PROCEDURE — 63710000001 DIPHENHYDRAMINE PER 50 MG: Performed by: INTERNAL MEDICINE

## 2022-11-22 PROCEDURE — 96375 TX/PRO/DX INJ NEW DRUG ADDON: CPT

## 2022-11-22 PROCEDURE — 96365 THER/PROPH/DIAG IV INF INIT: CPT

## 2022-11-22 PROCEDURE — 25010000002 HEPARIN LOCK FLUSH PER 10 UNITS: Performed by: INTERNAL MEDICINE

## 2022-11-22 RX ORDER — FAMOTIDINE 10 MG/ML
20 INJECTION, SOLUTION INTRAVENOUS ONCE
Status: COMPLETED | OUTPATIENT
Start: 2022-11-22 | End: 2022-11-22

## 2022-11-22 RX ORDER — HEPARIN SODIUM (PORCINE) LOCK FLUSH IV SOLN 100 UNIT/ML 100 UNIT/ML
500 SOLUTION INTRAVENOUS AS NEEDED
Status: CANCELLED | OUTPATIENT
Start: 2022-11-22

## 2022-11-22 RX ORDER — SODIUM CHLORIDE 9 MG/ML
250 INJECTION, SOLUTION INTRAVENOUS ONCE
Status: COMPLETED | OUTPATIENT
Start: 2022-11-22 | End: 2022-11-22

## 2022-11-22 RX ORDER — SODIUM CHLORIDE 0.9 % (FLUSH) 0.9 %
20 SYRINGE (ML) INJECTION AS NEEDED
Status: DISCONTINUED | OUTPATIENT
Start: 2022-11-22 | End: 2022-11-23 | Stop reason: HOSPADM

## 2022-11-22 RX ORDER — HEPARIN SODIUM (PORCINE) LOCK FLUSH IV SOLN 100 UNIT/ML 100 UNIT/ML
500 SOLUTION INTRAVENOUS AS NEEDED
Status: DISCONTINUED | OUTPATIENT
Start: 2022-11-22 | End: 2022-11-23 | Stop reason: HOSPADM

## 2022-11-22 RX ORDER — DIPHENHYDRAMINE HCL 25 MG
25 CAPSULE ORAL ONCE
Status: COMPLETED | OUTPATIENT
Start: 2022-11-22 | End: 2022-11-22

## 2022-11-22 RX ORDER — SODIUM CHLORIDE 0.9 % (FLUSH) 0.9 %
20 SYRINGE (ML) INJECTION AS NEEDED
Status: CANCELLED | OUTPATIENT
Start: 2022-11-22

## 2022-11-22 RX ADMIN — Medication 20 ML: at 11:47

## 2022-11-22 RX ADMIN — HEPARIN SODIUM (PORCINE) LOCK FLUSH IV SOLN 100 UNIT/ML 500 UNITS: 100 SOLUTION at 11:47

## 2022-11-22 RX ADMIN — DIPHENHYDRAMINE HYDROCHLORIDE 25 MG: 25 CAPSULE ORAL at 09:52

## 2022-11-22 RX ADMIN — FAMOTIDINE 20 MG: 10 INJECTION INTRAVENOUS at 09:53

## 2022-11-22 RX ADMIN — IMMUNE GLOBULIN INFUSION (HUMAN) 10 G: 100 INJECTION, SOLUTION INTRAVENOUS; SUBCUTANEOUS at 10:09

## 2022-11-22 RX ADMIN — SODIUM CHLORIDE 250 ML: 9 INJECTION, SOLUTION INTRAVENOUS at 09:52

## 2022-11-28 ENCOUNTER — CLINICAL SUPPORT (OUTPATIENT)
Dept: FAMILY MEDICINE CLINIC | Facility: CLINIC | Age: 32
End: 2022-11-28

## 2022-11-28 DIAGNOSIS — E53.8 B12 DEFICIENCY: Primary | ICD-10-CM

## 2022-11-28 PROCEDURE — 96372 THER/PROPH/DIAG INJ SC/IM: CPT

## 2022-11-28 RX ADMIN — CYANOCOBALAMIN 1000 MCG: 1000 INJECTION, SOLUTION INTRAMUSCULAR; SUBCUTANEOUS at 15:04

## 2022-12-30 ENCOUNTER — OFFICE VISIT (OUTPATIENT)
Dept: ONCOLOGY | Facility: HOSPITAL | Age: 32
End: 2022-12-30

## 2022-12-30 ENCOUNTER — HOSPITAL ENCOUNTER (OUTPATIENT)
Dept: ONCOLOGY | Facility: HOSPITAL | Age: 32
Setting detail: INFUSION SERIES
Discharge: HOME | End: 2022-12-30

## 2022-12-30 VITALS
RESPIRATION RATE: 18 BRPM | OXYGEN SATURATION: 99 % | HEART RATE: 80 BPM | DIASTOLIC BLOOD PRESSURE: 62 MMHG | HEIGHT: 55 IN | BODY MASS INDEX: 32.24 KG/M2 | SYSTOLIC BLOOD PRESSURE: 117 MMHG | WEIGHT: 139.33 LBS | TEMPERATURE: 98.7 F

## 2022-12-30 DIAGNOSIS — D83.9 CVID (COMMON VARIABLE IMMUNODEFICIENCY): Primary | ICD-10-CM

## 2022-12-30 DIAGNOSIS — Z45.2 ENCOUNTER FOR ADJUSTMENT OR MANAGEMENT OF VASCULAR ACCESS DEVICE: ICD-10-CM

## 2022-12-30 LAB
ALBUMIN SERPL-MCNC: 4.2 G/DL (ref 3.5–5.2)
ALBUMIN/GLOB SERPL: 1.8 G/DL
ALP SERPL-CCNC: 86 U/L (ref 39–117)
ALT SERPL W P-5'-P-CCNC: 21 U/L (ref 1–33)
ANION GAP SERPL CALCULATED.3IONS-SCNC: 12.2 MMOL/L (ref 5–15)
AST SERPL-CCNC: 23 U/L (ref 1–32)
BASOPHILS # BLD AUTO: 0.01 10*3/MM3 (ref 0–0.2)
BASOPHILS NFR BLD AUTO: 0.1 % (ref 0–1.5)
BILIRUB SERPL-MCNC: 0.6 MG/DL (ref 0–1.2)
BUN SERPL-MCNC: 11 MG/DL (ref 6–20)
BUN/CREAT SERPL: 11.2 (ref 7–25)
CALCIUM SPEC-SCNC: 9.3 MG/DL (ref 8.6–10.5)
CHLORIDE SERPL-SCNC: 105 MMOL/L (ref 98–107)
CO2 SERPL-SCNC: 21.8 MMOL/L (ref 22–29)
CREAT SERPL-MCNC: 0.98 MG/DL (ref 0.57–1)
DEPRECATED RDW RBC AUTO: 43.9 FL (ref 37–54)
EGFRCR SERPLBLD CKD-EPI 2021: 79.3 ML/MIN/1.73
EOSINOPHIL # BLD AUTO: 0.05 10*3/MM3 (ref 0–0.4)
EOSINOPHIL NFR BLD AUTO: 0.6 % (ref 0.3–6.2)
ERYTHROCYTE [DISTWIDTH] IN BLOOD BY AUTOMATED COUNT: 13.3 % (ref 12.3–15.4)
GLOBULIN UR ELPH-MCNC: 2.4 GM/DL
GLUCOSE SERPL-MCNC: 96 MG/DL (ref 65–99)
HCT VFR BLD AUTO: 36 % (ref 34–46.6)
HGB BLD-MCNC: 12 G/DL (ref 12–15.9)
IGA1 MFR SER: <50 MG/DL (ref 70–400)
IGG1 SER-MCNC: 591 MG/DL (ref 700–1600)
IGM SERPL-MCNC: 77 MG/DL (ref 40–230)
IMM GRANULOCYTES # BLD AUTO: 0.01 10*3/MM3 (ref 0–0.05)
IMM GRANULOCYTES NFR BLD AUTO: 0.1 % (ref 0–0.5)
LYMPHOCYTES # BLD AUTO: 1.83 10*3/MM3 (ref 0.7–3.1)
LYMPHOCYTES NFR BLD AUTO: 21.5 % (ref 19.6–45.3)
MCH RBC QN AUTO: 29.6 PG (ref 26.6–33)
MCHC RBC AUTO-ENTMCNC: 33.3 G/DL (ref 31.5–35.7)
MCV RBC AUTO: 88.7 FL (ref 79–97)
MONOCYTES # BLD AUTO: 0.43 10*3/MM3 (ref 0.1–0.9)
MONOCYTES NFR BLD AUTO: 5 % (ref 5–12)
NEUTROPHILS NFR BLD AUTO: 6.2 10*3/MM3 (ref 1.7–7)
NEUTROPHILS NFR BLD AUTO: 72.7 % (ref 42.7–76)
PLATELET # BLD AUTO: 243 10*3/MM3 (ref 140–450)
PMV BLD AUTO: 9.1 FL (ref 6–12)
POTASSIUM SERPL-SCNC: 3.6 MMOL/L (ref 3.5–5.2)
PROT SERPL-MCNC: 6.6 G/DL (ref 6–8.5)
RBC # BLD AUTO: 4.06 10*6/MM3 (ref 3.77–5.28)
SODIUM SERPL-SCNC: 139 MMOL/L (ref 136–145)
WBC NRBC COR # BLD: 8.53 10*3/MM3 (ref 3.4–10.8)

## 2022-12-30 PROCEDURE — 82784 ASSAY IGA/IGD/IGG/IGM EACH: CPT | Performed by: INTERNAL MEDICINE

## 2022-12-30 PROCEDURE — G0463 HOSPITAL OUTPT CLINIC VISIT: HCPCS

## 2022-12-30 PROCEDURE — 36591 DRAW BLOOD OFF VENOUS DEVICE: CPT

## 2022-12-30 PROCEDURE — 25010000002 HEPARIN LOCK FLUSH PER 10 UNITS: Performed by: INTERNAL MEDICINE

## 2022-12-30 PROCEDURE — 99214 OFFICE O/P EST MOD 30 MIN: CPT | Performed by: INTERNAL MEDICINE

## 2022-12-30 PROCEDURE — 80053 COMPREHEN METABOLIC PANEL: CPT | Performed by: NURSE PRACTITIONER

## 2022-12-30 PROCEDURE — 85025 COMPLETE CBC W/AUTO DIFF WBC: CPT | Performed by: INTERNAL MEDICINE

## 2022-12-30 RX ORDER — SODIUM CHLORIDE 0.9 % (FLUSH) 0.9 %
20 SYRINGE (ML) INJECTION AS NEEDED
Status: CANCELLED | OUTPATIENT
Start: 2022-12-30

## 2022-12-30 RX ORDER — SODIUM CHLORIDE 0.9 % (FLUSH) 0.9 %
20 SYRINGE (ML) INJECTION AS NEEDED
Status: DISCONTINUED | OUTPATIENT
Start: 2022-12-30 | End: 2022-12-31 | Stop reason: HOSPADM

## 2022-12-30 RX ORDER — HEPARIN SODIUM (PORCINE) LOCK FLUSH IV SOLN 100 UNIT/ML 100 UNIT/ML
500 SOLUTION INTRAVENOUS AS NEEDED
Status: DISCONTINUED | OUTPATIENT
Start: 2022-12-30 | End: 2022-12-31 | Stop reason: HOSPADM

## 2022-12-30 RX ORDER — MEPERIDINE HYDROCHLORIDE 25 MG/ML
25 INJECTION INTRAMUSCULAR; INTRAVENOUS; SUBCUTANEOUS
Status: CANCELLED | OUTPATIENT
Start: 2023-01-03

## 2022-12-30 RX ORDER — DIPHENHYDRAMINE HCL 25 MG
25 CAPSULE ORAL ONCE
Status: CANCELLED | OUTPATIENT
Start: 2023-01-03

## 2022-12-30 RX ORDER — FAMOTIDINE 10 MG/ML
20 INJECTION, SOLUTION INTRAVENOUS AS NEEDED
Status: CANCELLED | OUTPATIENT
Start: 2023-01-03

## 2022-12-30 RX ORDER — HEPARIN SODIUM (PORCINE) LOCK FLUSH IV SOLN 100 UNIT/ML 100 UNIT/ML
500 SOLUTION INTRAVENOUS AS NEEDED
Status: CANCELLED | OUTPATIENT
Start: 2022-12-30

## 2022-12-30 RX ORDER — DIPHENHYDRAMINE HYDROCHLORIDE 50 MG/ML
50 INJECTION INTRAMUSCULAR; INTRAVENOUS AS NEEDED
Status: CANCELLED | OUTPATIENT
Start: 2023-01-03

## 2022-12-30 RX ORDER — FAMOTIDINE 10 MG/ML
20 INJECTION, SOLUTION INTRAVENOUS ONCE
Status: CANCELLED | OUTPATIENT
Start: 2023-01-03

## 2022-12-30 RX ORDER — SODIUM CHLORIDE 9 MG/ML
250 INJECTION, SOLUTION INTRAVENOUS ONCE
Status: CANCELLED | OUTPATIENT
Start: 2023-01-03

## 2022-12-30 RX ADMIN — Medication 20 ML: at 10:05

## 2022-12-30 RX ADMIN — HEPARIN 500 UNITS: 100 SYRINGE at 10:06

## 2022-12-30 NOTE — PROGRESS NOTES
Chief Complaint  CVID (common variable immunodeficiency) ([facility])    Arnie Rucker MD Kindervater, Kasey, APRN      Subjective          Francine Preciado presents to [facility] for hypogammaglobinemia    History of Present Illness   Ms. Francine Preciado presents for labs and toxicity check prior to her IVIG treatment scheduled for 1/3/23. She receives treatment every 42 days. She is doing well today without any complaints. She states that she had flu around Thanksgiving but has since recovered. No recent infections. Denies fevers, chills. Appetite and energy is good. No issues with bowel movements or urination.        Oncology/Hematology History Overview Note   CVID:   -Diagnosed by Immunologist 8/20/12  -presented with frequent/severe infections             Review of Systems   Constitutional: Negative for appetite change, diaphoresis, fatigue, fever, unexpected weight gain and unexpected weight loss.   HENT: Negative for hearing loss, sore throat and voice change.    Eyes: Negative for blurred vision, double vision, pain, redness and visual disturbance.   Respiratory: Negative for cough, shortness of breath and wheezing.    Cardiovascular: Negative for chest pain, palpitations and leg swelling.   Gastrointestinal: Positive for abdominal pain (5/10) and constipation.   Endocrine: Negative for cold intolerance, heat intolerance, polydipsia and polyuria.   Genitourinary: Negative for decreased urine volume, difficulty urinating, frequency and urinary incontinence.   Musculoskeletal: Negative for arthralgias, back pain, joint swelling and myalgias.   Skin: Negative for color change, rash, skin lesions and wound.   Neurological: Negative for dizziness, seizures, numbness and headache.   Hematological: Negative for adenopathy. Does not bruise/bleed easily.   Psychiatric/Behavioral: Negative for depressed mood. The patient is not nervous/anxious.        Current Outpatient Medications on  File Prior to Visit   Medication Sig Dispense Refill   • atenolol (TENORMIN) 25 MG tablet Take 1 tablet by mouth Daily. 90 tablet 1   • atorvastatin (LIPITOR) 10 MG tablet Take 1 tablet by mouth Daily. 90 tablet 1   • azelastine (ASTELIN) 0.1 % nasal spray      • EPINEPHrine (EPIPEN) 0.3 MG/0.3ML solution auto-injector injection 0.3 mL.     • famotidine (PEPCID) 10 MG tablet Take 20 mg by mouth Daily.     • fenofibrate 160 MG tablet Take 1 tablet by mouth Daily. 90 tablet 1   • fluconazole (Diflucan) 150 MG tablet Take 1 tablet by mouth Every 72 (Seventy-Two) Hours. 2 tablet 0   • fluticasone (FLONASE) 50 MCG/ACT nasal spray 2 sprays into the nostril(s) as directed by provider Daily. 16 g 11   • levocetirizine (XYZAL) 5 MG tablet      • levothyroxine (SYNTHROID, LEVOTHROID) 50 MCG tablet Take 1 tablet by mouth Daily. 90 tablet 1   • linaclotide (Linzess) 145 MCG capsule capsule Take 1 capsule by mouth Every Morning Before Breakfast. 90 capsule 1   • montelukast (SINGULAIR) 10 MG tablet      • multivitamin with minerals tablet tablet Take 1 tablet by mouth Daily.     • norgestimate-ethinyl estradiol (Ortho Tri-Cyclen Lo) 0.18/0.215/0.25 MG-25 MCG per tablet Take 1 tablet by mouth Daily. 28 tablet 12   • Tobramycin-Dexamethasone (TobraDex ST) 0.3-0.05 % suspension 3-4 drops in left ear twice a day 5 mL 3     Current Facility-Administered Medications on File Prior to Visit   Medication Dose Route Frequency Provider Last Rate Last Admin   • cyanocobalamin injection 1,000 mcg  1,000 mcg Intramuscular Q28 Days Arnie Rucker MD   1,000 mcg at 11/28/22 1504   • heparin injection 500 Units  500 Units Intravenous PRN Josselin Montes De Oca MD PhD   500 Units at 12/30/22 1006   • sodium chloride 0.9 % flush 20 mL  20 mL Intravenous PRN Josselin Montes De Oca MD PhD   20 mL at 12/30/22 1005       Allergies   Allergen Reactions   • Cefuroxime Axetil Unknown - Low Severity   • Neosporin [Bacitracin-Polymyxin B] Unknown - Low Severity      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    • Central perforation of tympanic membrane of right ear    • Chronic mastoiditis    • COVID-19 vaccine series completed    • Heart murmur    • Hypertension    • Kabuki make-up syndrome    • Mitral valve prolapse    • Mixed conductive and sensorineural hearing loss of both ears    • Otorrhea, left    • Recurrent otitis media    • Skin disease     PSORIASIS/ECXEMA     Past Surgical History:   Procedure Laterality Date   • MULTIPLE TOOTH EXTRACTIONS     • NO PAST SURGERIES       Social History     Socioeconomic History   • Marital status: Single   Tobacco Use   • Smoking status: Passive Smoke Exposure - Never Smoker   • Smokeless tobacco: Never   • Tobacco comments:     Daily exposure to 2nd hand smoke   Vaping Use   • Vaping Use: Never used   Substance and Sexual Activity   • Alcohol use: Not Currently     Comment: DENIES   • Drug use: Never   • Sexual activity: Never     Family History   Problem Relation Age of Onset   • Leukemia Father    • Heart disease Other      Immunization History   Administered Date(s) Administered   • COVID-19 (PFIZER) PURPLE CAP 04/04/2021, 04/25/2021   • Flu Vaccine Quad PF >36MO 11/04/2013, 10/24/2014, 11/02/2015, 10/13/2016, 10/19/2017   • FluLaval/Fluzone >6mos 11/23/2021   • Fluzone Quad >6mos (Multi-dose) 11/04/2013, 11/02/2015, 10/19/2017, 10/29/2019   • Hep B, Adolescent or Pediatric 11/24/1998, 01/14/1999, 06/02/1999   • Influenza LAIV (Nasal) 10/13/2016, 10/14/2020   • Influenza TIV (IM) 09/17/2009, 11/07/2018   • Influenza, Unspecified 10/14/2020, 10/11/2022   • MMR 08/19/1996   • Pneumococcal Polysaccharide (PPSV23) 10/04/2012, 04/10/2014   • influenza Split 10/02/2018       Objective   Physical Exam  Vitals and nursing note reviewed.   Constitutional:       Appearance: Normal appearance.   HENT:      Head: Normocephalic.      Nose: Nose normal.      Mouth/Throat:      Mouth:  "Mucous membranes are moist.   Pulmonary:      Effort: Pulmonary effort is normal.   Musculoskeletal:         General: Normal range of motion.   Neurological:      General: No focal deficit present.      Mental Status: She is alert and oriented to person, place, and time.   Psychiatric:         Mood and Affect: Mood normal.         Behavior: Behavior normal.         Thought Content: Thought content normal.         Judgment: Judgment normal.         Vitals:    12/30/22 1016   Resp: 18   Weight: 63.2 kg (139 lb 5.3 oz)   Height: 139.7 cm (55\")   PainSc:   5   PainLoc: Abdomen     ECOG score: 0         ECOG: (0) Fully Active - Able to Carry On All Pre-disease Performance Without Restriction  Fall Risk Assessment was completed, and patient is at low risk for falls.  PHQ-9 Total Score: 0       The patient is  experiencing fatigue. Fatigue score: 1    PT/OT Functional Screening: PT fx screen: No needs identified  Speech Functional Screening: Speech fx screen: No needs identified  Rehab to be ordered: Rehab to be ordered: No needs identified        Result Review :   The following data was reviewed by: Kayden Leal MD on 12/30/2022:  Lab Results   Component Value Date    HGB 12.0 12/30/2022    HCT 36.0 12/30/2022    MCV 88.7 12/30/2022     12/30/2022    WBC 8.53 12/30/2022    NEUTROABS 6.20 12/30/2022    LYMPHSABS 1.83 12/30/2022    MONOSABS 0.43 12/30/2022    EOSABS 0.05 12/30/2022    BASOSABS 0.01 12/30/2022     Lab Results   Component Value Date    GLUCOSE 96 12/30/2022    BUN 11 12/30/2022    CREATININE 0.98 12/30/2022     12/30/2022    K 3.6 12/30/2022     12/30/2022    CO2 21.8 (L) 12/30/2022    CALCIUM 9.3 12/30/2022    PROTEINTOT 6.6 12/30/2022    ALBUMIN 4.2 12/30/2022    BILITOT 0.6 12/30/2022    ALKPHOS 86 12/30/2022    AST 23 12/30/2022    ALT 21 12/30/2022     Labs personally reviewed. Anemia resolved. Creatinine and LFTs normal.        Assessment and Plan    Diagnoses and all orders " for this visit:    1. CVID (common variable immunodeficiency) (HCC) (Primary)    Other orders  -     sodium chloride 0.9 % infusion 250 mL  -     famotidine (PEPCID) injection 20 mg  -     diphenhydrAMINE (BENADRYL) capsule 25 mg  -     immune globulin (human) (GAMMAGARD) 10 g infusion 100 mL  -     hydrocortisone sodium succinate (Solu-CORTEF) injection 100 mg  -     diphenhydrAMINE (BENADRYL) injection 50 mg  -     famotidine (PEPCID) injection 20 mg  -     meperidine (DEMEROL) injection 25 mg    Labs reviewed. CBC and CMP within acceptable limits.     OK for treatment on 1/3/23.    Patient Follow Up: 42 days with next treatment.     Patient was given instructions and counseling regarding her condition or for health maintenance advice. Please see specific information pulled into the AVS if appropriate.     Kayden Leal MD    12/30/2022

## 2023-01-03 ENCOUNTER — HOSPITAL ENCOUNTER (OUTPATIENT)
Dept: ONCOLOGY | Facility: HOSPITAL | Age: 33
Setting detail: INFUSION SERIES
Discharge: HOME | End: 2023-01-03
Payer: MEDICARE

## 2023-01-03 VITALS
BODY MASS INDEX: 32.4 KG/M2 | HEIGHT: 55 IN | SYSTOLIC BLOOD PRESSURE: 116 MMHG | RESPIRATION RATE: 16 BRPM | TEMPERATURE: 98 F | HEART RATE: 74 BPM | WEIGHT: 139.99 LBS | OXYGEN SATURATION: 99 % | DIASTOLIC BLOOD PRESSURE: 66 MMHG

## 2023-01-03 DIAGNOSIS — D83.9 CVID (COMMON VARIABLE IMMUNODEFICIENCY): Primary | ICD-10-CM

## 2023-01-03 DIAGNOSIS — Z45.2 ENCOUNTER FOR ADJUSTMENT OR MANAGEMENT OF VASCULAR ACCESS DEVICE: ICD-10-CM

## 2023-01-03 PROCEDURE — 25010000002 IMMUNE GLOBULIN (HUMAN) 10 GM/100ML SOLUTION: Performed by: INTERNAL MEDICINE

## 2023-01-03 PROCEDURE — 63710000001 DIPHENHYDRAMINE PER 50 MG: Performed by: INTERNAL MEDICINE

## 2023-01-03 PROCEDURE — 96365 THER/PROPH/DIAG IV INF INIT: CPT

## 2023-01-03 PROCEDURE — 96375 TX/PRO/DX INJ NEW DRUG ADDON: CPT

## 2023-01-03 PROCEDURE — 25010000002 HEPARIN LOCK FLUSH PER 10 UNITS: Performed by: INTERNAL MEDICINE

## 2023-01-03 RX ORDER — HEPARIN SODIUM (PORCINE) LOCK FLUSH IV SOLN 100 UNIT/ML 100 UNIT/ML
500 SOLUTION INTRAVENOUS AS NEEDED
Status: DISCONTINUED | OUTPATIENT
Start: 2023-01-03 | End: 2023-01-04 | Stop reason: HOSPADM

## 2023-01-03 RX ORDER — SODIUM CHLORIDE 9 MG/ML
250 INJECTION, SOLUTION INTRAVENOUS ONCE
Status: COMPLETED | OUTPATIENT
Start: 2023-01-03 | End: 2023-01-03

## 2023-01-03 RX ORDER — SODIUM CHLORIDE 0.9 % (FLUSH) 0.9 %
20 SYRINGE (ML) INJECTION AS NEEDED
Status: CANCELLED | OUTPATIENT
Start: 2023-01-03

## 2023-01-03 RX ORDER — DIPHENHYDRAMINE HCL 25 MG
25 CAPSULE ORAL ONCE
Status: COMPLETED | OUTPATIENT
Start: 2023-01-03 | End: 2023-01-03

## 2023-01-03 RX ORDER — HEPARIN SODIUM (PORCINE) LOCK FLUSH IV SOLN 100 UNIT/ML 100 UNIT/ML
500 SOLUTION INTRAVENOUS AS NEEDED
Status: CANCELLED | OUTPATIENT
Start: 2023-01-03

## 2023-01-03 RX ORDER — FAMOTIDINE 10 MG/ML
20 INJECTION, SOLUTION INTRAVENOUS ONCE
Status: COMPLETED | OUTPATIENT
Start: 2023-01-03 | End: 2023-01-03

## 2023-01-03 RX ORDER — SODIUM CHLORIDE 0.9 % (FLUSH) 0.9 %
20 SYRINGE (ML) INJECTION AS NEEDED
Status: DISCONTINUED | OUTPATIENT
Start: 2023-01-03 | End: 2023-01-04 | Stop reason: HOSPADM

## 2023-01-03 RX ADMIN — DIPHENHYDRAMINE HYDROCHLORIDE 25 MG: 25 CAPSULE ORAL at 09:54

## 2023-01-03 RX ADMIN — SODIUM CHLORIDE 250 ML: 9 INJECTION, SOLUTION INTRAVENOUS at 09:54

## 2023-01-03 RX ADMIN — FAMOTIDINE 20 MG: 10 INJECTION INTRAVENOUS at 09:55

## 2023-01-03 RX ADMIN — Medication 20 ML: at 11:40

## 2023-01-03 RX ADMIN — HEPARIN SODIUM (PORCINE) LOCK FLUSH IV SOLN 100 UNIT/ML 500 UNITS: 100 SOLUTION at 11:40

## 2023-01-03 RX ADMIN — IMMUNE GLOBULIN INFUSION (HUMAN) 10 G: 100 INJECTION, SOLUTION INTRAVENOUS; SUBCUTANEOUS at 10:10

## 2023-01-09 ENCOUNTER — CLINICAL SUPPORT (OUTPATIENT)
Dept: FAMILY MEDICINE CLINIC | Facility: CLINIC | Age: 33
End: 2023-01-09
Payer: MEDICARE

## 2023-01-09 DIAGNOSIS — D64.9 ANEMIA, UNSPECIFIED TYPE: ICD-10-CM

## 2023-01-09 PROCEDURE — 96372 THER/PROPH/DIAG INJ SC/IM: CPT

## 2023-01-09 RX ADMIN — CYANOCOBALAMIN 1000 MCG: 1000 INJECTION, SOLUTION INTRAMUSCULAR; SUBCUTANEOUS at 14:45

## 2023-02-10 ENCOUNTER — HOSPITAL ENCOUNTER (OUTPATIENT)
Dept: ONCOLOGY | Facility: HOSPITAL | Age: 33
Discharge: HOME | End: 2023-02-10
Admitting: INTERNAL MEDICINE
Payer: MEDICARE

## 2023-02-10 ENCOUNTER — CLINICAL SUPPORT (OUTPATIENT)
Dept: FAMILY MEDICINE CLINIC | Facility: CLINIC | Age: 33
End: 2023-02-10
Payer: MEDICARE

## 2023-02-10 ENCOUNTER — OFFICE VISIT (OUTPATIENT)
Dept: ONCOLOGY | Facility: HOSPITAL | Age: 33
End: 2023-02-10
Payer: MEDICARE

## 2023-02-10 VITALS
TEMPERATURE: 96.9 F | RESPIRATION RATE: 18 BRPM | OXYGEN SATURATION: 97 % | BODY MASS INDEX: 32.4 KG/M2 | WEIGHT: 139.99 LBS | SYSTOLIC BLOOD PRESSURE: 126 MMHG | DIASTOLIC BLOOD PRESSURE: 53 MMHG | HEIGHT: 55 IN | HEART RATE: 83 BPM

## 2023-02-10 DIAGNOSIS — D83.9 CVID (COMMON VARIABLE IMMUNODEFICIENCY): ICD-10-CM

## 2023-02-10 DIAGNOSIS — E78.2 MIXED HYPERLIPIDEMIA: ICD-10-CM

## 2023-02-10 DIAGNOSIS — Z45.2 ENCOUNTER FOR ADJUSTMENT OR MANAGEMENT OF VASCULAR ACCESS DEVICE: ICD-10-CM

## 2023-02-10 DIAGNOSIS — I10 PRIMARY HYPERTENSION: ICD-10-CM

## 2023-02-10 DIAGNOSIS — E03.9 ACQUIRED HYPOTHYROIDISM: ICD-10-CM

## 2023-02-10 DIAGNOSIS — E53.8 B12 DEFICIENCY: Primary | ICD-10-CM

## 2023-02-10 DIAGNOSIS — D83.9 CVID (COMMON VARIABLE IMMUNODEFICIENCY): Primary | ICD-10-CM

## 2023-02-10 DIAGNOSIS — E53.8 B12 DEFICIENCY: ICD-10-CM

## 2023-02-10 LAB
ALBUMIN SERPL-MCNC: 4.1 G/DL (ref 3.5–5.2)
ALBUMIN/GLOB SERPL: 1.4 G/DL
ALP SERPL-CCNC: 82 U/L (ref 39–117)
ALT SERPL W P-5'-P-CCNC: 12 U/L (ref 1–33)
ANION GAP SERPL CALCULATED.3IONS-SCNC: 11.4 MMOL/L (ref 5–15)
AST SERPL-CCNC: 16 U/L (ref 1–32)
BASOPHILS # BLD AUTO: 0.02 10*3/MM3 (ref 0–0.2)
BASOPHILS NFR BLD AUTO: 0.2 % (ref 0–1.5)
BILIRUB SERPL-MCNC: 0.5 MG/DL (ref 0–1.2)
BILIRUB UR QL STRIP: NEGATIVE
BUN SERPL-MCNC: 14 MG/DL (ref 6–20)
BUN/CREAT SERPL: 13.9 (ref 7–25)
CALCIUM SPEC-SCNC: 9.4 MG/DL (ref 8.6–10.5)
CHLORIDE SERPL-SCNC: 106 MMOL/L (ref 98–107)
CHOLEST SERPL-MCNC: 148 MG/DL (ref 0–200)
CLARITY UR: CLEAR
CO2 SERPL-SCNC: 22.6 MMOL/L (ref 22–29)
COLOR UR: YELLOW
CREAT SERPL-MCNC: 1.01 MG/DL (ref 0.57–1)
DEPRECATED RDW RBC AUTO: 45.7 FL (ref 37–54)
EGFRCR SERPLBLD CKD-EPI 2021: 76.5 ML/MIN/1.73
EOSINOPHIL # BLD AUTO: 0.05 10*3/MM3 (ref 0–0.4)
EOSINOPHIL NFR BLD AUTO: 0.5 % (ref 0.3–6.2)
ERYTHROCYTE [DISTWIDTH] IN BLOOD BY AUTOMATED COUNT: 13.5 % (ref 12.3–15.4)
FOLATE SERPL-MCNC: >20 NG/ML (ref 4.78–24.2)
GLOBULIN UR ELPH-MCNC: 3 GM/DL
GLUCOSE SERPL-MCNC: 72 MG/DL (ref 65–99)
GLUCOSE UR STRIP-MCNC: NEGATIVE MG/DL
HCT VFR BLD AUTO: 35.8 % (ref 34–46.6)
HDLC SERPL-MCNC: 39 MG/DL (ref 40–60)
HGB BLD-MCNC: 11.8 G/DL (ref 12–15.9)
HGB UR QL STRIP.AUTO: NEGATIVE
IGA1 MFR SER: <50 MG/DL (ref 70–400)
IGG1 SER-MCNC: 582 MG/DL (ref 700–1600)
IGM SERPL-MCNC: 74 MG/DL (ref 40–230)
IMM GRANULOCYTES # BLD AUTO: 0.02 10*3/MM3 (ref 0–0.05)
IMM GRANULOCYTES NFR BLD AUTO: 0.2 % (ref 0–0.5)
KETONES UR QL STRIP: NEGATIVE
LDLC SERPL CALC-MCNC: 77 MG/DL (ref 0–100)
LDLC/HDLC SERPL: 1.84 {RATIO}
LEUKOCYTE ESTERASE UR QL STRIP.AUTO: NEGATIVE
LYMPHOCYTES # BLD AUTO: 2.52 10*3/MM3 (ref 0.7–3.1)
LYMPHOCYTES NFR BLD AUTO: 26.7 % (ref 19.6–45.3)
MCH RBC QN AUTO: 29.7 PG (ref 26.6–33)
MCHC RBC AUTO-ENTMCNC: 33 G/DL (ref 31.5–35.7)
MCV RBC AUTO: 90.2 FL (ref 79–97)
MONOCYTES # BLD AUTO: 0.62 10*3/MM3 (ref 0.1–0.9)
MONOCYTES NFR BLD AUTO: 6.6 % (ref 5–12)
NEUTROPHILS NFR BLD AUTO: 6.22 10*3/MM3 (ref 1.7–7)
NEUTROPHILS NFR BLD AUTO: 65.8 % (ref 42.7–76)
NITRITE UR QL STRIP: NEGATIVE
PH UR STRIP.AUTO: 7 [PH] (ref 5–8)
PLATELET # BLD AUTO: 275 10*3/MM3 (ref 140–450)
PMV BLD AUTO: 8.9 FL (ref 6–12)
POTASSIUM SERPL-SCNC: 3.9 MMOL/L (ref 3.5–5.2)
PROT SERPL-MCNC: 7.1 G/DL (ref 6–8.5)
PROT UR QL STRIP: NEGATIVE
RBC # BLD AUTO: 3.97 10*6/MM3 (ref 3.77–5.28)
SODIUM SERPL-SCNC: 140 MMOL/L (ref 136–145)
SP GR UR STRIP: 1.02 (ref 1–1.03)
TRIGL SERPL-MCNC: 187 MG/DL (ref 0–150)
TSH SERPL DL<=0.05 MIU/L-ACNC: 3.26 UIU/ML (ref 0.27–4.2)
UROBILINOGEN UR QL STRIP: NORMAL
VIT B12 BLD-MCNC: 722 PG/ML (ref 211–946)
VLDLC SERPL-MCNC: 32 MG/DL (ref 5–40)
WBC NRBC COR # BLD: 9.45 10*3/MM3 (ref 3.4–10.8)

## 2023-02-10 PROCEDURE — 82607 VITAMIN B-12: CPT

## 2023-02-10 PROCEDURE — 82784 ASSAY IGA/IGD/IGG/IGM EACH: CPT | Performed by: INTERNAL MEDICINE

## 2023-02-10 PROCEDURE — 81003 URINALYSIS AUTO W/O SCOPE: CPT

## 2023-02-10 PROCEDURE — 85025 COMPLETE CBC W/AUTO DIFF WBC: CPT | Performed by: INTERNAL MEDICINE

## 2023-02-10 PROCEDURE — 80061 LIPID PANEL: CPT

## 2023-02-10 PROCEDURE — 82746 ASSAY OF FOLIC ACID SERUM: CPT

## 2023-02-10 PROCEDURE — 80053 COMPREHEN METABOLIC PANEL: CPT

## 2023-02-10 PROCEDURE — 99214 OFFICE O/P EST MOD 30 MIN: CPT | Performed by: INTERNAL MEDICINE

## 2023-02-10 PROCEDURE — 84443 ASSAY THYROID STIM HORMONE: CPT

## 2023-02-10 PROCEDURE — 25010000002 HEPARIN LOCK FLUSH PER 10 UNITS: Performed by: INTERNAL MEDICINE

## 2023-02-10 PROCEDURE — G0463 HOSPITAL OUTPT CLINIC VISIT: HCPCS | Performed by: INTERNAL MEDICINE

## 2023-02-10 PROCEDURE — 96372 THER/PROPH/DIAG INJ SC/IM: CPT | Performed by: STUDENT IN AN ORGANIZED HEALTH CARE EDUCATION/TRAINING PROGRAM

## 2023-02-10 PROCEDURE — 36591 DRAW BLOOD OFF VENOUS DEVICE: CPT

## 2023-02-10 RX ORDER — FAMOTIDINE 10 MG/ML
20 INJECTION, SOLUTION INTRAVENOUS ONCE
Status: CANCELLED | OUTPATIENT
Start: 2023-02-14

## 2023-02-10 RX ORDER — SODIUM CHLORIDE 9 MG/ML
250 INJECTION, SOLUTION INTRAVENOUS ONCE
Status: CANCELLED | OUTPATIENT
Start: 2023-02-14

## 2023-02-10 RX ORDER — SODIUM CHLORIDE 0.9 % (FLUSH) 0.9 %
20 SYRINGE (ML) INJECTION AS NEEDED
Status: CANCELLED | OUTPATIENT
Start: 2023-02-10

## 2023-02-10 RX ORDER — SODIUM CHLORIDE 0.9 % (FLUSH) 0.9 %
20 SYRINGE (ML) INJECTION AS NEEDED
Status: DISCONTINUED | OUTPATIENT
Start: 2023-02-10 | End: 2023-02-11 | Stop reason: HOSPADM

## 2023-02-10 RX ORDER — DIPHENHYDRAMINE HCL 25 MG
25 CAPSULE ORAL ONCE
Status: CANCELLED | OUTPATIENT
Start: 2023-02-14

## 2023-02-10 RX ORDER — FAMOTIDINE 10 MG/ML
20 INJECTION, SOLUTION INTRAVENOUS AS NEEDED
Status: CANCELLED | OUTPATIENT
Start: 2023-02-14

## 2023-02-10 RX ORDER — DIPHENHYDRAMINE HYDROCHLORIDE 50 MG/ML
50 INJECTION INTRAMUSCULAR; INTRAVENOUS AS NEEDED
Status: CANCELLED | OUTPATIENT
Start: 2023-02-14

## 2023-02-10 RX ORDER — HEPARIN SODIUM (PORCINE) LOCK FLUSH IV SOLN 100 UNIT/ML 100 UNIT/ML
500 SOLUTION INTRAVENOUS AS NEEDED
Status: CANCELLED | OUTPATIENT
Start: 2023-02-10

## 2023-02-10 RX ORDER — HEPARIN SODIUM (PORCINE) LOCK FLUSH IV SOLN 100 UNIT/ML 100 UNIT/ML
500 SOLUTION INTRAVENOUS AS NEEDED
Status: DISCONTINUED | OUTPATIENT
Start: 2023-02-10 | End: 2023-02-11 | Stop reason: HOSPADM

## 2023-02-10 RX ORDER — MEPERIDINE HYDROCHLORIDE 25 MG/ML
25 INJECTION INTRAMUSCULAR; INTRAVENOUS; SUBCUTANEOUS
Status: CANCELLED | OUTPATIENT
Start: 2023-02-14

## 2023-02-10 RX ADMIN — HEPARIN SODIUM (PORCINE) LOCK FLUSH IV SOLN 100 UNIT/ML 500 UNITS: 100 SOLUTION at 09:35

## 2023-02-10 RX ADMIN — CYANOCOBALAMIN 1000 MCG: 1000 INJECTION, SOLUTION INTRAMUSCULAR; SUBCUTANEOUS at 10:42

## 2023-02-10 RX ADMIN — Medication 20 ML: at 09:35

## 2023-02-10 NOTE — PROGRESS NOTES
Chief Complaint  CVID (common variable immunodeficiency) ([facility])     Arnie Rucker MD Kindervater, Kasey, APRN      Subjective          Francine Preciado presents to [facility] for hypogammaglobinemia    History of Present Illness   Ms. Francine Preciado presents for labs and toxicity check prior to her IVIG treatment scheduled for 1/3/23. She receives treatment every 42 days. She is doing well today. She is having some pelvic cramps because she is about to start her period. She is on new birth control as of 2 to 3 months ago and it has not completely eliminated her periods. Otherwise, she is doing well. No fevers, chills, recent infections. Eating and drinking well. Energy is stable.        Oncology/Hematology History Overview Note   CVID:   -Diagnosed by Immunologist 8/20/12  -presented with frequent/severe infections             Review of Systems   Constitutional: Negative for appetite change, diaphoresis, fatigue, fever, unexpected weight gain and unexpected weight loss.   HENT: Negative for hearing loss, sore throat and voice change.    Eyes: Negative for blurred vision, double vision, pain, redness and visual disturbance.   Respiratory: Negative for cough, shortness of breath and wheezing.    Cardiovascular: Negative for chest pain, palpitations and leg swelling.   Gastrointestinal: Positive for abdominal pain (5/10). Negative for constipation.   Endocrine: Negative for cold intolerance, heat intolerance, polydipsia and polyuria.   Genitourinary: Negative for decreased urine volume, difficulty urinating, frequency and urinary incontinence.   Musculoskeletal: Negative for arthralgias, back pain, joint swelling and myalgias.   Skin: Negative for color change, rash, skin lesions and wound.   Neurological: Negative for dizziness, seizures, numbness and headache.   Hematological: Negative for adenopathy. Does not bruise/bleed easily.   Psychiatric/Behavioral: Negative for depressed  mood. The patient is not nervous/anxious.    All other systems reviewed and are negative.      Current Outpatient Medications on File Prior to Visit   Medication Sig Dispense Refill   • atenolol (TENORMIN) 25 MG tablet Take 1 tablet by mouth Daily. 90 tablet 1   • atorvastatin (LIPITOR) 10 MG tablet Take 1 tablet by mouth Daily. 90 tablet 1   • azelastine (ASTELIN) 0.1 % nasal spray      • EPINEPHrine (EPIPEN) 0.3 MG/0.3ML solution auto-injector injection 0.3 mL.     • famotidine (PEPCID) 10 MG tablet Take 20 mg by mouth Daily.     • fenofibrate 160 MG tablet Take 1 tablet by mouth Daily. 90 tablet 1   • fluconazole (Diflucan) 150 MG tablet Take 1 tablet by mouth Every 72 (Seventy-Two) Hours. 2 tablet 0   • fluticasone (FLONASE) 50 MCG/ACT nasal spray 2 sprays into the nostril(s) as directed by provider Daily. 16 g 11   • levocetirizine (XYZAL) 5 MG tablet      • levothyroxine (SYNTHROID, LEVOTHROID) 50 MCG tablet Take 1 tablet by mouth Daily. 90 tablet 1   • linaclotide (Linzess) 145 MCG capsule capsule Take 1 capsule by mouth Every Morning Before Breakfast. 90 capsule 1   • montelukast (SINGULAIR) 10 MG tablet      • multivitamin with minerals tablet tablet Take 1 tablet by mouth Daily.     • norgestimate-ethinyl estradiol (Ortho Tri-Cyclen Lo) 0.18/0.215/0.25 MG-25 MCG per tablet Take 1 tablet by mouth Daily. 28 tablet 12   • Tobramycin-Dexamethasone (TobraDex ST) 0.3-0.05 % suspension 3-4 drops in left ear twice a day 5 mL 3     Current Facility-Administered Medications on File Prior to Visit   Medication Dose Route Frequency Provider Last Rate Last Admin   • cyanocobalamin injection 1,000 mcg  1,000 mcg Intramuscular Q28 Days Arnie Rucker MD   1,000 mcg at 01/09/23 1445       Allergies   Allergen Reactions   • Cefuroxime Axetil Unknown - Low Severity   • Neosporin [Bacitracin-Polymyxin B] Unknown - Low Severity     Past Medical History:   Diagnosis Date   • Central perforation of tympanic membrane of left  ear    • Central perforation of tympanic membrane of right ear    • Chronic mastoiditis    • COVID-19 vaccine series completed    • Heart murmur    • Hypertension    • Kabuki make-up syndrome    • Mitral valve prolapse    • Mixed conductive and sensorineural hearing loss of both ears    • Otorrhea, left    • Recurrent otitis media    • Skin disease     PSORIASIS/ECXEMA     Past Surgical History:   Procedure Laterality Date   • MULTIPLE TOOTH EXTRACTIONS     • NO PAST SURGERIES       Social History     Socioeconomic History   • Marital status: Single   Tobacco Use   • Smoking status: Passive Smoke Exposure - Never Smoker   • Smokeless tobacco: Never   • Tobacco comments:     Daily exposure to 2nd hand smoke   Vaping Use   • Vaping Use: Never used   Substance and Sexual Activity   • Alcohol use: Not Currently     Comment: DENIES   • Drug use: Never   • Sexual activity: Never     Family History   Problem Relation Age of Onset   • Leukemia Father    • Heart disease Other      Immunization History   Administered Date(s) Administered   • COVID-19 (PFIZER) PURPLE CAP 04/04/2021, 04/25/2021   • Flu Vaccine Quad PF >36MO 11/04/2013, 10/24/2014, 11/02/2015, 10/13/2016, 10/19/2017   • FluLaval/Fluzone >6mos 11/23/2021   • Fluzone Quad >6mos (Multi-dose) 11/04/2013, 11/02/2015, 10/19/2017, 10/29/2019   • Hep B, Adolescent or Pediatric 11/24/1998, 01/14/1999, 06/02/1999   • Influenza LAIV (Nasal) 10/13/2016, 10/14/2020   • Influenza TIV (IM) 09/17/2009, 11/07/2018   • Influenza, Unspecified 10/14/2020, 10/11/2022   • MMR 08/19/1996   • Pneumococcal Polysaccharide (PPSV23) 10/04/2012, 04/10/2014   • influenza Split 10/02/2018       Objective   Physical Exam  Vitals and nursing note reviewed.   Constitutional:       Appearance: Normal appearance.   HENT:      Head: Normocephalic.      Nose: Nose normal.      Mouth/Throat:      Mouth: Mucous membranes are moist.   Pulmonary:      Effort: Pulmonary effort is normal.  "  Musculoskeletal:         General: Normal range of motion.   Neurological:      General: No focal deficit present.      Mental Status: She is alert and oriented to person, place, and time.   Psychiatric:         Mood and Affect: Mood normal.         Behavior: Behavior normal.         Thought Content: Thought content normal.         Vitals:    02/10/23 0952   BP: 126/53   Pulse: 83   Resp: 18   Temp: 96.9 °F (36.1 °C)   SpO2: 97%   Weight: 63.5 kg (139 lb 15.9 oz)   Height: 139.7 cm (55\")   PainSc:   5   PainLoc: Abdomen               ECOG: (0) Fully Active - Able to Carry On All Pre-disease Performance Without Restriction  Fall Risk Assessment was completed, and patient is at low risk for falls.  PHQ-9 Total Score:         The patient is  experiencing fatigue. Fatigue score: 1    PT/OT Functional Screening: PT fx screen: No needs identified  Speech Functional Screening: Speech fx screen: No needs identified  Rehab to be ordered: Rehab to be ordered: No needs identified        Result Review :   The following data was reviewed by: Kayden Leal MD on 02/10/23:  Lab Results   Component Value Date    HGB 11.8 (L) 02/10/2023    HCT 35.8 02/10/2023    MCV 90.2 02/10/2023     02/10/2023    WBC 9.45 02/10/2023    NEUTROABS 6.22 02/10/2023    LYMPHSABS 2.52 02/10/2023    MONOSABS 0.62 02/10/2023    EOSABS 0.05 02/10/2023    BASOSABS 0.02 02/10/2023     Lab Results   Component Value Date    GLUCOSE 72 02/10/2023    BUN 14 02/10/2023    CREATININE 1.01 (H) 02/10/2023     02/10/2023    K 3.9 02/10/2023     02/10/2023    CO2 22.6 02/10/2023    CALCIUM 9.4 02/10/2023    PROTEINTOT 7.1 02/10/2023    ALBUMIN 4.1 02/10/2023    BILITOT 0.5 02/10/2023    ALKPHOS 82 02/10/2023    AST 16 02/10/2023    ALT 12 02/10/2023     Labs personally reviewed. Creatinine and LFTs normal.        Assessment and Plan    Diagnoses and all orders for this visit:    1. CVID (common variable immunodeficiency) (HCC)    Other " orders  -     sodium chloride 0.9 % infusion 250 mL  -     famotidine (PEPCID) injection 20 mg  -     diphenhydrAMINE (BENADRYL) capsule 25 mg  -     immune globulin (human) (GAMMAGARD) 10 g infusion 100 mL  -     hydrocortisone sodium succinate (Solu-CORTEF) injection 100 mg  -     diphenhydrAMINE (BENADRYL) injection 50 mg  -     famotidine (PEPCID) injection 20 mg  -     meperidine (DEMEROL) injection 25 mg    Labs reviewed. CBC and CMP within acceptable limits.     OK for treatment on 2/14/23.    Patient Follow Up: 42 days with next treatment.     Patient was given instructions and counseling regarding her condition or for health maintenance advice. Please see specific information pulled into the AVS if appropriate.     Kayden Leal MD    2/10/2023

## 2023-02-14 ENCOUNTER — HOSPITAL ENCOUNTER (OUTPATIENT)
Dept: ONCOLOGY | Facility: HOSPITAL | Age: 33
Discharge: HOME | End: 2023-02-14
Admitting: INTERNAL MEDICINE
Payer: MEDICARE

## 2023-02-14 VITALS
BODY MASS INDEX: 32.4 KG/M2 | TEMPERATURE: 97.4 F | DIASTOLIC BLOOD PRESSURE: 57 MMHG | OXYGEN SATURATION: 100 % | RESPIRATION RATE: 16 BRPM | HEART RATE: 78 BPM | WEIGHT: 139.99 LBS | HEIGHT: 55 IN | SYSTOLIC BLOOD PRESSURE: 114 MMHG

## 2023-02-14 DIAGNOSIS — D83.9 CVID (COMMON VARIABLE IMMUNODEFICIENCY): Primary | ICD-10-CM

## 2023-02-14 DIAGNOSIS — Z45.2 ENCOUNTER FOR ADJUSTMENT OR MANAGEMENT OF VASCULAR ACCESS DEVICE: ICD-10-CM

## 2023-02-14 PROCEDURE — A9270 NON-COVERED ITEM OR SERVICE: HCPCS | Performed by: INTERNAL MEDICINE

## 2023-02-14 PROCEDURE — 96365 THER/PROPH/DIAG IV INF INIT: CPT

## 2023-02-14 PROCEDURE — 25010000002 IMMUNE GLOBULIN (HUMAN) 10 GM/100ML SOLUTION: Performed by: INTERNAL MEDICINE

## 2023-02-14 PROCEDURE — 25010000002 HEPARIN LOCK FLUSH PER 10 UNITS: Performed by: INTERNAL MEDICINE

## 2023-02-14 PROCEDURE — 63710000001 DIPHENHYDRAMINE PER 50 MG: Performed by: INTERNAL MEDICINE

## 2023-02-14 PROCEDURE — 96375 TX/PRO/DX INJ NEW DRUG ADDON: CPT

## 2023-02-14 RX ORDER — SODIUM CHLORIDE 9 MG/ML
250 INJECTION, SOLUTION INTRAVENOUS ONCE
Status: COMPLETED | OUTPATIENT
Start: 2023-02-14 | End: 2023-02-14

## 2023-02-14 RX ORDER — SODIUM CHLORIDE 0.9 % (FLUSH) 0.9 %
20 SYRINGE (ML) INJECTION AS NEEDED
Status: DISCONTINUED | OUTPATIENT
Start: 2023-02-14 | End: 2023-02-15 | Stop reason: HOSPADM

## 2023-02-14 RX ORDER — HEPARIN SODIUM (PORCINE) LOCK FLUSH IV SOLN 100 UNIT/ML 100 UNIT/ML
500 SOLUTION INTRAVENOUS AS NEEDED
Status: CANCELLED | OUTPATIENT
Start: 2023-02-14

## 2023-02-14 RX ORDER — DIPHENHYDRAMINE HCL 25 MG
25 CAPSULE ORAL ONCE
Status: COMPLETED | OUTPATIENT
Start: 2023-02-14 | End: 2023-02-14

## 2023-02-14 RX ORDER — HEPARIN SODIUM (PORCINE) LOCK FLUSH IV SOLN 100 UNIT/ML 100 UNIT/ML
500 SOLUTION INTRAVENOUS AS NEEDED
Status: DISCONTINUED | OUTPATIENT
Start: 2023-02-14 | End: 2023-02-15 | Stop reason: HOSPADM

## 2023-02-14 RX ORDER — FAMOTIDINE 10 MG/ML
20 INJECTION, SOLUTION INTRAVENOUS ONCE
Status: COMPLETED | OUTPATIENT
Start: 2023-02-14 | End: 2023-02-14

## 2023-02-14 RX ORDER — SODIUM CHLORIDE 0.9 % (FLUSH) 0.9 %
20 SYRINGE (ML) INJECTION AS NEEDED
Status: CANCELLED | OUTPATIENT
Start: 2023-02-14

## 2023-02-14 RX ADMIN — DIPHENHYDRAMINE HYDROCHLORIDE 25 MG: 25 CAPSULE ORAL at 10:40

## 2023-02-14 RX ADMIN — SODIUM CHLORIDE 250 ML: 9 INJECTION, SOLUTION INTRAVENOUS at 10:40

## 2023-02-14 RX ADMIN — IMMUNE GLOBULIN INFUSION (HUMAN) 10 G: 100 INJECTION, SOLUTION INTRAVENOUS; SUBCUTANEOUS at 10:55

## 2023-02-14 RX ADMIN — HEPARIN SODIUM (PORCINE) LOCK FLUSH IV SOLN 100 UNIT/ML 500 UNITS: 100 SOLUTION at 12:29

## 2023-02-14 RX ADMIN — Medication 20 ML: at 12:29

## 2023-02-14 RX ADMIN — FAMOTIDINE 20 MG: 10 INJECTION INTRAVENOUS at 10:41

## 2023-02-27 ENCOUNTER — OFFICE VISIT (OUTPATIENT)
Dept: FAMILY MEDICINE CLINIC | Facility: CLINIC | Age: 33
End: 2023-02-27
Payer: MEDICARE

## 2023-02-27 VITALS
RESPIRATION RATE: 16 BRPM | DIASTOLIC BLOOD PRESSURE: 76 MMHG | OXYGEN SATURATION: 94 % | WEIGHT: 139.6 LBS | BODY MASS INDEX: 32.31 KG/M2 | HEIGHT: 55 IN | SYSTOLIC BLOOD PRESSURE: 110 MMHG | HEART RATE: 62 BPM

## 2023-02-27 DIAGNOSIS — E66.9 OBESITY (BMI 30.0-34.9): ICD-10-CM

## 2023-02-27 DIAGNOSIS — Q89.8: ICD-10-CM

## 2023-02-27 DIAGNOSIS — Z00.00 MEDICARE ANNUAL WELLNESS VISIT, SUBSEQUENT: Primary | ICD-10-CM

## 2023-02-27 DIAGNOSIS — D83.9 CVID (COMMON VARIABLE IMMUNODEFICIENCY): ICD-10-CM

## 2023-02-27 DIAGNOSIS — E78.2 MIXED HYPERLIPIDEMIA: ICD-10-CM

## 2023-02-27 DIAGNOSIS — E53.8 B12 DEFICIENCY: ICD-10-CM

## 2023-02-27 DIAGNOSIS — E03.9 ACQUIRED HYPOTHYROIDISM: ICD-10-CM

## 2023-02-27 PROCEDURE — 1159F MED LIST DOCD IN RCRD: CPT

## 2023-02-27 PROCEDURE — 99213 OFFICE O/P EST LOW 20 MIN: CPT

## 2023-02-27 PROCEDURE — G0439 PPPS, SUBSEQ VISIT: HCPCS

## 2023-02-27 PROCEDURE — 1170F FXNL STATUS ASSESSED: CPT

## 2023-02-27 NOTE — ASSESSMENT & PLAN NOTE
Dr. Guerita Campos Still elevated triglycerides, discussed with patient that diet changes are the most important with this, discussed low carb/sugar diet, increasing exercise as able.  We will continue on atorvastatin at current dose, other part of cholesterol panel looks great.  Continue on fenofibrate as well.

## 2023-02-27 NOTE — PROGRESS NOTES
Subsequent Medicare Wellness Visit   The ABC's of the Annual Wellness Visit    No chief complaint on file.      HPI:  Francine Preciado, -1990, is a 32 y.o. female who presents for a Subsequent Medicare Wellness Visit.    Recent Hospitalizations:  No hospitalization(s) within the last year..    Current Medical Providers:  Patient Care Team:  Aggie Chance APRN as PCP - General (Nurse Practitioner)  Josselin Montes De Oca MD PhD as Consulting Physician (Hematology and Oncology)    Health Habits and Functional and Cognitive Screening and Depression Screening:  Functional & Cognitive Status 2023   Do you have difficulty preparing food and eating? No   Do you have difficulty bathing yourself, getting dressed or grooming yourself? No   Do you have difficulty using the toilet? No   Do you have difficulty moving around from place to place? No   Do you have trouble with steps or getting out of a bed or a chair? No   Current Diet Limited Junk Food   Dental Exam Not up to date   Eye Exam Not up to date   Exercise (times per week) 0 times per week   Current Exercises Include No Regular Exercise   Do you need help using the phone?  Yes   Are you deaf or do you have serious difficulty hearing?  No   Do you need help with transportation? No   Do you need help shopping? Yes   Do you need help preparing meals?  Yes   Do you need help with housework?  Yes   Do you need help with laundry? Yes   Do you need help taking your medications? Yes   Do you need help managing money? No   Do you ever drive or ride in a car without wearing a seat belt? No   Have you felt unusual stress, anger or loneliness in the last month? No   Who do you live with? Other   If you need help, do you have trouble finding someone available to you? No   Have you been bothered in the last four weeks by sexual problems? No   Do you have difficulty concentrating, remembering or making decisions? Yes       Compared to one year ago, the patient feels her  physical health is the same and her mental health is the same.    Depression Screen:  PHQ-2/PHQ-9 Depression Screening 2/27/2023   Retired PHQ-9 Total Score -   Retired Total Score -   Little Interest or Pleasure in Doing Things 0-->not at all   Feeling Down, Depressed or Hopeless 0-->not at all   PHQ-9: Brief Depression Severity Measure Score 0         Past Medical/Family/Social History:  The following portions of the patient's history were reviewed and updated as appropriate: allergies, current medications, past family history, past medical history, past social history, past surgical history and problem list.      Aspirin use counseling: Does not need ASA (and currently is not on it)    Current medication list contains no high risk medications.  No harmful drug interactions have been identified.     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    • Central perforation of tympanic membrane of right ear    • Chronic mastoiditis    • COVID-19 vaccine series completed    • Heart murmur    • Hypertension    • Kabuki make-up syndrome    • Mitral valve prolapse    • Mixed conductive and sensorineural hearing loss of both ears    • Otorrhea, left    • Recurrent otitis media    • Skin disease     PSORIASIS/ECXEMA       Past Surgical History:   Procedure Laterality Date   • MULTIPLE TOOTH EXTRACTIONS     • NO PAST SURGERIES         Family History   Problem Relation Age of Onset   • Leukemia Father    • Heart disease Other        Social History     Tobacco Use   • Smoking status: Never     Passive exposure: Yes   • Smokeless tobacco: Never   • Tobacco comments:     Daily exposure to 2nd hand smoke   Substance Use Topics   • Alcohol use: Not Currently     Comment: DENIES       Patient Active Problem List   Diagnosis   • CVID (common variable immunodeficiency) ([facility])   • Encounter for adjustment or management of vascular access device   • CVID (common variable immunodeficiency)  "([facility])   • Encounter for adjustment or management of vascular access device   • Psoriasis-eczema overlap condition   • Central perforation of tympanic membrane   • Chronic mastoiditis   • COVID-19 vaccine series completed   • Heart murmur   • Hypertension   • Kabuki make-up syndrome   • Mitral valve prolapse   • Mixed conductive and sensorineural hearing loss of both ears   • Anemia   • Mixed hyperlipidemia   • Acquired hypothyroidism   • Irritable bowel syndrome with constipation       Review of Systems   Constitutional: Negative.    HENT: Negative.    Eyes: Negative.    Respiratory: Negative.    Cardiovascular: Negative.    Gastrointestinal: Negative.    Endocrine: Negative.    Genitourinary: Negative.    Musculoskeletal: Negative.    Skin: Negative.    Allergic/Immunologic: Negative.    Neurological: Negative.    Hematological: Negative.    Psychiatric/Behavioral: Negative.        Objective     Vitals:    02/27/23 1418   BP: 110/76   Pulse: 62   Resp: 16   SpO2: 94%   Weight: 63.3 kg (139 lb 9.6 oz)   Height: 139.7 cm (55\")       BMI is >= 30 and <35. (Class 1 Obesity). The following options were offered after discussion;: exercise counseling/recommendations and nutrition counseling/recommendations      No results found.    The patient has no evidence of cognitve impairment.       Physical Exam:  Physical Exam  Vitals reviewed.   Constitutional:       Appearance: Normal appearance.   Cardiovascular:      Rate and Rhythm: Normal rate and regular rhythm.      Pulses: Normal pulses.      Heart sounds: Normal heart sounds.   Pulmonary:      Effort: Pulmonary effort is normal.      Breath sounds: Normal breath sounds.   Neurological:      General: No focal deficit present.      Mental Status: She is alert and oriented to person, place, and time.           Recent Lab Results:     Lab Results   Component Value Date    CHOL 148 02/10/2023    TRIG 187 (H) 02/10/2023    HDL 39 (L) 02/10/2023    VLDL 32 02/10/2023    LDLHDL " 1.84 02/10/2023         Assessment & Plan   Diagnoses and all orders for this visit:    1. Medicare annual wellness visit, subsequent (Primary)    2. CVID (common variable immunodeficiency) (HCC)  Assessment & Plan:  Continue via hematology, further work-up/management per them as well.      3. Kabuki make-up syndrome  Assessment & Plan:  Stable.  Filled out paperwork for patient.      4. Mixed hyperlipidemia  Assessment & Plan:  Still elevated triglycerides, discussed with patient that diet changes are the most important with this, discussed low carb/sugar diet, increasing exercise as able.  We will continue on atorvastatin at current dose, other part of cholesterol panel looks great.  Continue on fenofibrate as well.      5. Acquired hypothyroidism  Assessment & Plan:  Stable per last TSH, will continue levothyroxine at current dose.      6. B12 deficiency    7. Obesity (BMI 30.0-34.9)        Age-appropriate Screening Schedule:  Refer to the list below for future screening recommendations based on patient's age, sex and/or medical conditions.        Health Maintenance   Topic Date Due   • Pneumococcal Vaccine 0-64 (3 - PCV) 05/25/2023 (Originally 4/10/2015)   • TDAP/TD VACCINES (1 - Tdap) 05/25/2023 (Originally 7/9/2010)   • COVID-19 Vaccine (3 - Pfizer risk series) 06/13/2023 (Originally 5/23/2021)   • LIPID PANEL  02/10/2024   • ANNUAL WELLNESS VISIT  02/27/2024   • PAP SMEAR  10/22/2024   • HEPATITIS C SCREENING  Completed   • INFLUENZA VACCINE  Completed         Medicare Risks and Personalized Health Plan:  CMS-Preventive Services Quick Reference  Advance Directive Discussion  Diabetic Lab Screening   Inactivity/Sedentary  Obesity/Overweight     Advance Care Planning:  ACP discussion was held with the patient during this visit. Patient has an advance directive in EMR which is still valid.       Follow Up:  Return in about 6 months (around 8/27/2023).     Encouraged patient to maintain a heart healthy diet/DASH  diet, exercise as tolerated, limit sodium intake to no more than 1,500mg/day, limit alcohol intake, maintain medication compliance and practice relaxation techniques to reduce stress.    An After Visit Summary and PPPS with all of these plans were given to the patient.    ------------------------------------------------------------------------------------------------------------------------------------------------------------------------------------------------------------------------------------    Evaluation and Management:    Francine Preciado presents to [facility] who presents to clinic for 6-month follow-up and Medicare annual wellness.      History of Present Illness  This is a 32-year-old female who presents to clinic for 6-month follow-up and Medicare annual wellness.    CVID: Does continue to follow-up with hematology, doing well from the standpoint, does continue to get infusions via them as well.  No acute issues.  Has had some recent blood work, but no major change or abnormality shown.    IBS: States that she does pretty well, does take Linzess on a routine basis, states that it really does help control her irritable bowel syndrome symptoms, does get some nausea periodically, was wondering if she could have Zofran for an as-needed basis, states that it does not happen super often, but when it does she would like to have something on hand if needed.  Denies any other symptoms, no blood in stool, no vomiting or other symptoms noted.    Allergic rhinitis: Currently stable, does remain on Xyzal daily, has not had any acute issues.  Also takes Singulair at night and uses her Flonase inhaler.  Doing well.    Did review most recent blood work with patient as well, did show some high triglycerides, discussed diet changes.    Is up-to-date on preventative screenings.  Does not wish to have Tdap or COVID booster.    The following portions of the patient's history were personally  reviewed and updated as appropriate: allergies, current medications, past medical history, past surgical history, past family history, and past social history.           Assessment and Plan:  Diagnoses and all orders for this visit:    1. Medicare annual wellness visit, subsequent (Primary)    2. CVID (common variable immunodeficiency) ([facility])  Assessment & Plan:  Continue via hematology, further work-up/management per them as well.      3. Kabuki make-up syndrome  Assessment & Plan:  Stable.  Filled out paperwork for patient.      4. Mixed hyperlipidemia  Assessment & Plan:  Still elevated triglycerides, discussed with patient that diet changes are the most important with this, discussed low carb/sugar diet, increasing exercise as able.  We will continue on atorvastatin at current dose, other part of cholesterol panel looks great.  Continue on fenofibrate as well.      5. Acquired hypothyroidism  Assessment & Plan:  Stable per last TSH, will continue levothyroxine at current dose.      6. B12 deficiency    7. Obesity (BMI 30.0-34.9)        Follow Up:  Return in about 6 months (around 8/27/2023).    Patient was given instructions and counseling regarding her condition or for health maintenance advice. Please see specific information pulled into the AVS if appropriate.

## 2023-03-02 ENCOUNTER — TELEPHONE (OUTPATIENT)
Dept: FAMILY MEDICINE CLINIC | Facility: CLINIC | Age: 33
End: 2023-03-02
Payer: MEDICARE

## 2023-03-02 DIAGNOSIS — K58.1 IRRITABLE BOWEL SYNDROME WITH CONSTIPATION: ICD-10-CM

## 2023-03-02 RX ORDER — ONDANSETRON 4 MG/1
4 TABLET, FILM COATED ORAL EVERY 8 HOURS PRN
Qty: 90 TABLET | Refills: 1 | Status: SHIPPED | OUTPATIENT
Start: 2023-03-02

## 2023-03-02 NOTE — TELEPHONE ENCOUNTER
Looked back at encounter and patient was interesedt in Zofran, and is already on Linzess just needs a refill on this.

## 2023-03-02 NOTE — TELEPHONE ENCOUNTER
Caller: HEATHER FELTON    Relationship: Mother    Best call back number: [phone]    What is the best time to reach you: ANY    Who are you requesting to speak with (clinical staff, provider,  specific staff member): CLINICAL    What was the call regarding: PATIENT'S MOTHER CALLED TO REPORT THAT HER PRESCRIPTION FOR HER STOMACH HAS NOT BEEN SENT TO THE PHARMACY. PLEASE CALL AND ADVISE.     Do you require a callback: YES

## 2023-03-10 ENCOUNTER — CLINICAL SUPPORT (OUTPATIENT)
Dept: FAMILY MEDICINE CLINIC | Facility: CLINIC | Age: 33
End: 2023-03-10
Payer: MEDICARE

## 2023-03-10 DIAGNOSIS — E53.8 B12 DEFICIENCY: ICD-10-CM

## 2023-03-10 PROCEDURE — 96372 THER/PROPH/DIAG INJ SC/IM: CPT | Performed by: STUDENT IN AN ORGANIZED HEALTH CARE EDUCATION/TRAINING PROGRAM

## 2023-03-10 RX ADMIN — CYANOCOBALAMIN 1000 MCG: 1000 INJECTION, SOLUTION INTRAMUSCULAR; SUBCUTANEOUS at 14:26

## 2023-03-24 ENCOUNTER — HOSPITAL ENCOUNTER (OUTPATIENT)
Dept: ONCOLOGY | Facility: HOSPITAL | Age: 33
Discharge: HOME | End: 2023-03-24
Admitting: INTERNAL MEDICINE
Payer: MEDICARE

## 2023-03-24 ENCOUNTER — OFFICE VISIT (OUTPATIENT)
Dept: ONCOLOGY | Facility: HOSPITAL | Age: 33
End: 2023-03-24
Payer: MEDICARE

## 2023-03-24 VITALS
RESPIRATION RATE: 18 BRPM | HEART RATE: 72 BPM | WEIGHT: 141.54 LBS | TEMPERATURE: 96.8 F | OXYGEN SATURATION: 100 % | BODY MASS INDEX: 32.76 KG/M2 | HEIGHT: 55 IN | DIASTOLIC BLOOD PRESSURE: 67 MMHG | SYSTOLIC BLOOD PRESSURE: 121 MMHG

## 2023-03-24 DIAGNOSIS — D83.9 CVID (COMMON VARIABLE IMMUNODEFICIENCY): Primary | ICD-10-CM

## 2023-03-24 DIAGNOSIS — Z45.2 ENCOUNTER FOR ADJUSTMENT OR MANAGEMENT OF VASCULAR ACCESS DEVICE: ICD-10-CM

## 2023-03-24 LAB
BASOPHILS # BLD AUTO: 0.02 10*3/MM3 (ref 0–0.2)
BASOPHILS NFR BLD AUTO: 0.2 % (ref 0–1.5)
DEPRECATED RDW RBC AUTO: 43.6 FL (ref 37–54)
EOSINOPHIL # BLD AUTO: 0.05 10*3/MM3 (ref 0–0.4)
EOSINOPHIL NFR BLD AUTO: 0.5 % (ref 0.3–6.2)
ERYTHROCYTE [DISTWIDTH] IN BLOOD BY AUTOMATED COUNT: 13.1 % (ref 12.3–15.4)
HCT VFR BLD AUTO: 36.7 % (ref 34–46.6)
HGB BLD-MCNC: 12.4 G/DL (ref 12–15.9)
IGA1 MFR SER: <50 MG/DL (ref 70–400)
IGG1 SER-MCNC: 576 MG/DL (ref 700–1600)
IGM SERPL-MCNC: 72 MG/DL (ref 40–230)
IMM GRANULOCYTES # BLD AUTO: 0.02 10*3/MM3 (ref 0–0.05)
IMM GRANULOCYTES NFR BLD AUTO: 0.2 % (ref 0–0.5)
LYMPHOCYTES # BLD AUTO: 2.07 10*3/MM3 (ref 0.7–3.1)
LYMPHOCYTES NFR BLD AUTO: 20.4 % (ref 19.6–45.3)
MCH RBC QN AUTO: 30.2 PG (ref 26.6–33)
MCHC RBC AUTO-ENTMCNC: 33.8 G/DL (ref 31.5–35.7)
MCV RBC AUTO: 89.3 FL (ref 79–97)
MONOCYTES # BLD AUTO: 0.57 10*3/MM3 (ref 0.1–0.9)
MONOCYTES NFR BLD AUTO: 5.6 % (ref 5–12)
NEUTROPHILS NFR BLD AUTO: 7.44 10*3/MM3 (ref 1.7–7)
NEUTROPHILS NFR BLD AUTO: 73.1 % (ref 42.7–76)
PLATELET # BLD AUTO: 287 10*3/MM3 (ref 140–450)
PMV BLD AUTO: 8.7 FL (ref 6–12)
RBC # BLD AUTO: 4.11 10*6/MM3 (ref 3.77–5.28)
WBC NRBC COR # BLD: 10.17 10*3/MM3 (ref 3.4–10.8)

## 2023-03-24 PROCEDURE — 96523 IRRIG DRUG DELIVERY DEVICE: CPT

## 2023-03-24 PROCEDURE — 1126F AMNT PAIN NOTED NONE PRSNT: CPT | Performed by: INTERNAL MEDICINE

## 2023-03-24 PROCEDURE — 99214 OFFICE O/P EST MOD 30 MIN: CPT | Performed by: INTERNAL MEDICINE

## 2023-03-24 PROCEDURE — 36415 COLL VENOUS BLD VENIPUNCTURE: CPT

## 2023-03-24 PROCEDURE — 25010000002 HEPARIN LOCK FLUSH PER 10 UNITS: Performed by: INTERNAL MEDICINE

## 2023-03-24 PROCEDURE — 3074F SYST BP LT 130 MM HG: CPT | Performed by: INTERNAL MEDICINE

## 2023-03-24 PROCEDURE — 82784 ASSAY IGA/IGD/IGG/IGM EACH: CPT | Performed by: INTERNAL MEDICINE

## 2023-03-24 PROCEDURE — 85025 COMPLETE CBC W/AUTO DIFF WBC: CPT | Performed by: INTERNAL MEDICINE

## 2023-03-24 PROCEDURE — 3078F DIAST BP <80 MM HG: CPT | Performed by: INTERNAL MEDICINE

## 2023-03-24 RX ORDER — SODIUM CHLORIDE 0.9 % (FLUSH) 0.9 %
20 SYRINGE (ML) INJECTION AS NEEDED
Status: DISCONTINUED | OUTPATIENT
Start: 2023-03-24 | End: 2023-03-25 | Stop reason: HOSPADM

## 2023-03-24 RX ORDER — HEPARIN SODIUM (PORCINE) LOCK FLUSH IV SOLN 100 UNIT/ML 100 UNIT/ML
500 SOLUTION INTRAVENOUS AS NEEDED
Status: CANCELLED | OUTPATIENT
Start: 2023-03-24

## 2023-03-24 RX ORDER — SODIUM CHLORIDE 0.9 % (FLUSH) 0.9 %
20 SYRINGE (ML) INJECTION AS NEEDED
Status: CANCELLED | OUTPATIENT
Start: 2023-03-24

## 2023-03-24 RX ORDER — FAMOTIDINE 10 MG/ML
20 INJECTION, SOLUTION INTRAVENOUS AS NEEDED
Status: CANCELLED | OUTPATIENT
Start: 2023-03-28

## 2023-03-24 RX ORDER — MEPERIDINE HYDROCHLORIDE 25 MG/ML
25 INJECTION INTRAMUSCULAR; INTRAVENOUS; SUBCUTANEOUS
Status: CANCELLED | OUTPATIENT
Start: 2023-03-28

## 2023-03-24 RX ORDER — HEPARIN SODIUM (PORCINE) LOCK FLUSH IV SOLN 100 UNIT/ML 100 UNIT/ML
500 SOLUTION INTRAVENOUS AS NEEDED
Status: DISCONTINUED | OUTPATIENT
Start: 2023-03-24 | End: 2023-03-25 | Stop reason: HOSPADM

## 2023-03-24 RX ORDER — DIPHENHYDRAMINE HYDROCHLORIDE 50 MG/ML
50 INJECTION INTRAMUSCULAR; INTRAVENOUS AS NEEDED
Status: CANCELLED | OUTPATIENT
Start: 2023-03-28

## 2023-03-24 RX ADMIN — HEPARIN SODIUM (PORCINE) LOCK FLUSH IV SOLN 100 UNIT/ML 500 UNITS: 100 SOLUTION at 11:11

## 2023-03-24 RX ADMIN — Medication 20 ML: at 11:11

## 2023-03-28 ENCOUNTER — HOSPITAL ENCOUNTER (OUTPATIENT)
Dept: ONCOLOGY | Facility: HOSPITAL | Age: 33
Discharge: HOME | End: 2023-03-28
Admitting: INTERNAL MEDICINE
Payer: MEDICARE

## 2023-03-28 VITALS
SYSTOLIC BLOOD PRESSURE: 107 MMHG | HEART RATE: 89 BPM | OXYGEN SATURATION: 100 % | BODY MASS INDEX: 33.05 KG/M2 | TEMPERATURE: 97.3 F | DIASTOLIC BLOOD PRESSURE: 58 MMHG | RESPIRATION RATE: 18 BRPM | WEIGHT: 142.2 LBS

## 2023-03-28 DIAGNOSIS — Z45.2 ENCOUNTER FOR ADJUSTMENT OR MANAGEMENT OF VASCULAR ACCESS DEVICE: ICD-10-CM

## 2023-03-28 DIAGNOSIS — D83.9 CVID (COMMON VARIABLE IMMUNODEFICIENCY): Primary | ICD-10-CM

## 2023-03-28 PROCEDURE — 25010000002 IMMUNE GLOBULIN (HUMAN) 10 GM/100ML SOLUTION: Performed by: INTERNAL MEDICINE

## 2023-03-28 PROCEDURE — 96375 TX/PRO/DX INJ NEW DRUG ADDON: CPT

## 2023-03-28 PROCEDURE — A9270 NON-COVERED ITEM OR SERVICE: HCPCS | Performed by: INTERNAL MEDICINE

## 2023-03-28 PROCEDURE — 63710000001 DIPHENHYDRAMINE PER 50 MG: Performed by: INTERNAL MEDICINE

## 2023-03-28 PROCEDURE — 25010000002 HEPARIN LOCK FLUSH PER 10 UNITS: Performed by: INTERNAL MEDICINE

## 2023-03-28 PROCEDURE — 96365 THER/PROPH/DIAG IV INF INIT: CPT

## 2023-03-28 RX ORDER — SODIUM CHLORIDE 0.9 % (FLUSH) 0.9 %
20 SYRINGE (ML) INJECTION AS NEEDED
OUTPATIENT
Start: 2023-03-28

## 2023-03-28 RX ORDER — SODIUM CHLORIDE 0.9 % (FLUSH) 0.9 %
20 SYRINGE (ML) INJECTION AS NEEDED
Status: DISCONTINUED | OUTPATIENT
Start: 2023-03-28 | End: 2023-03-30 | Stop reason: HOSPADM

## 2023-03-28 RX ORDER — HEPARIN SODIUM (PORCINE) LOCK FLUSH IV SOLN 100 UNIT/ML 100 UNIT/ML
500 SOLUTION INTRAVENOUS AS NEEDED
OUTPATIENT
Start: 2023-03-28

## 2023-03-28 RX ORDER — SODIUM CHLORIDE 9 MG/ML
250 INJECTION, SOLUTION INTRAVENOUS ONCE
Status: COMPLETED | OUTPATIENT
Start: 2023-03-28 | End: 2023-03-28

## 2023-03-28 RX ORDER — HEPARIN SODIUM (PORCINE) LOCK FLUSH IV SOLN 100 UNIT/ML 100 UNIT/ML
500 SOLUTION INTRAVENOUS AS NEEDED
Status: DISCONTINUED | OUTPATIENT
Start: 2023-03-28 | End: 2023-03-30 | Stop reason: HOSPADM

## 2023-03-28 RX ORDER — FAMOTIDINE 10 MG/ML
20 INJECTION, SOLUTION INTRAVENOUS ONCE
Status: COMPLETED | OUTPATIENT
Start: 2023-03-28 | End: 2023-03-28

## 2023-03-28 RX ORDER — DIPHENHYDRAMINE HCL 25 MG
25 CAPSULE ORAL ONCE
Status: COMPLETED | OUTPATIENT
Start: 2023-03-28 | End: 2023-03-28

## 2023-03-28 RX ADMIN — FAMOTIDINE 20 MG: 10 INJECTION INTRAVENOUS at 10:47

## 2023-03-28 RX ADMIN — HEPARIN SODIUM (PORCINE) LOCK FLUSH IV SOLN 100 UNIT/ML 500 UNITS: 100 SOLUTION at 12:17

## 2023-03-28 RX ADMIN — Medication 20 ML: at 12:17

## 2023-03-28 RX ADMIN — DIPHENHYDRAMINE HYDROCHLORIDE 25 MG: 25 CAPSULE ORAL at 10:46

## 2023-03-28 RX ADMIN — SODIUM CHLORIDE 250 ML: 9 INJECTION, SOLUTION INTRAVENOUS at 10:44

## 2023-03-28 RX ADMIN — IMMUNE GLOBULIN INFUSION (HUMAN) 10 G: 100 INJECTION, SOLUTION INTRAVENOUS; SUBCUTANEOUS at 10:53

## 2023-04-04 ENCOUNTER — HOSPITAL ENCOUNTER (OUTPATIENT)
Dept: INTERVENTIONAL RADIOLOGY/VASCULAR | Facility: HOSPITAL | Age: 33
Discharge: HOME | End: 2023-04-04
Admitting: PHYSICIAN ASSISTANT
Payer: MEDICARE

## 2023-04-04 DIAGNOSIS — Z45.2 ENCOUNTER FOR ADJUSTMENT OR MANAGEMENT OF VASCULAR ACCESS DEVICE: ICD-10-CM

## 2023-04-04 PROCEDURE — 36598 INJ W/FLUOR EVAL CV DEVICE: CPT

## 2023-04-04 PROCEDURE — 25510000001 IOPAMIDOL PER 1 ML: Performed by: INTERNAL MEDICINE

## 2023-04-04 RX ORDER — HEPARIN SODIUM (PORCINE) LOCK FLUSH IV SOLN 100 UNIT/ML 100 UNIT/ML
SOLUTION INTRAVENOUS
Status: DISCONTINUED
Start: 2023-04-04 | End: 2023-04-05 | Stop reason: HOSPADM

## 2023-04-04 RX ADMIN — IOPAMIDOL 6 ML: 755 INJECTION, SOLUTION INTRAVENOUS at 13:40

## 2023-04-11 ENCOUNTER — CLINICAL SUPPORT (OUTPATIENT)
Dept: FAMILY MEDICINE CLINIC | Facility: CLINIC | Age: 33
End: 2023-04-11
Payer: MEDICARE

## 2023-04-11 DIAGNOSIS — E53.8 VITAMIN B12 DEFICIENCY: ICD-10-CM

## 2023-04-11 RX ADMIN — CYANOCOBALAMIN 1000 MCG: 1000 INJECTION, SOLUTION INTRAMUSCULAR; SUBCUTANEOUS at 14:19

## 2023-04-11 NOTE — PROGRESS NOTES
Patient  Francine Preciado    Location  [facility]    Chief Complaint  No chief complaint on file.    Referring Provider: Arnie Rucker MD  PCP: Aggie Chance APRN    Subjective          Oncology/Hematology History Overview Note   CVID:   -Diagnosed by Immunologist 8/20/12  -presented with frequent/severe infections             History of Present Illness  Patient comes in today for toxicity check prior to her next cycle of treatment.  She is doing well in general but her port was not working well today.    Review of Systems   Constitutional: Negative for appetite change, diaphoresis, fatigue, fever, unexpected weight gain and unexpected weight loss.   HENT: Negative for hearing loss, sore throat, swollen glands, trouble swallowing and voice change.    Eyes: Negative for blurred vision.   Respiratory: Negative for cough, shortness of breath and wheezing.    Cardiovascular: Negative for chest pain and palpitations.   Gastrointestinal: Negative for blood in stool, constipation, diarrhea, nausea and vomiting.   Endocrine: Negative for cold intolerance and heat intolerance.   Genitourinary: Negative for difficulty urinating and urinary incontinence.   Musculoskeletal: Negative for arthralgias, back pain and myalgias.   Skin: Negative for rash, skin lesions and wound.   Neurological: Negative for dizziness, seizures, numbness and headache.   Hematological: Does not bruise/bleed easily.   Psychiatric/Behavioral: Negative for depressed mood. The patient is not nervous/anxious.        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    • Central perforation of tympanic membrane of right ear    • Chronic mastoiditis    • COVID-19 vaccine series completed    • Heart murmur    • Hypertension    • Kabuki make-up syndrome    • Mitral valve prolapse    • Mixed conductive and sensorineural hearing loss of both ears    • Otorrhea, left    •  "Recurrent otitis media    • Skin disease     PSORIASIS/ECXEMA     Past Surgical History:   Procedure Laterality Date   • MULTIPLE TOOTH EXTRACTIONS     • NO PAST SURGERIES       Social History     Socioeconomic History   • Marital status: Single   Tobacco Use   • Smoking status: Never     Passive exposure: Yes   • Smokeless tobacco: Never   • Tobacco comments:     Daily exposure to 2nd hand smoke   Vaping Use   • Vaping Use: Never used   Substance and Sexual Activity   • Alcohol use: Not Currently     Comment: DENIES   • Drug use: Never   • Sexual activity: Never     Family History   Problem Relation Age of Onset   • Leukemia Father    • Heart disease Other        Objective   Physical Exam  General: Alert, cooperative, no acute distress  Neck: No swelling around or above the port site  Eyes: Anicteric sclera, PERRLA  Respiratory: normal respiratory effort  Cardiovascular: no lower extremity edema  Skin: Normal tone, no rash, no lesions  Psychiatric: Appropriate affect, intact judgment  Neurologic: No focal sensory or motor deficits, normal cognition   Musculoskeletal: Normal muscle strength and tone  Extremities: No clubbing, cyanosis, or deformities    Vitals:    03/24/23 1144   BP: 121/67   Pulse: 72   Resp: 18   Temp: 96.8 °F (36 °C)   SpO2: 100%   Weight: 64.2 kg (141 lb 8.6 oz)   Height: 139.7 cm (55\")   PainSc: 0-No pain     ECOG score: 0         PHQ-9 Total Score:         Result Review :   The following data was reviewed by: Josselin Montes De Oca MD PhD on 03/24/2023:  Lab Results   Component Value Date    HGB 12.4 03/24/2023    HCT 36.7 03/24/2023    MCV 89.3 03/24/2023     03/24/2023    WBC 10.17 03/24/2023    NEUTROABS 7.44 (H) 03/24/2023    LYMPHSABS 2.07 03/24/2023    MONOSABS 0.57 03/24/2023    EOSABS 0.05 03/24/2023    BASOSABS 0.02 03/24/2023     Lab Results   Component Value Date    GLUCOSE 72 02/10/2023    BUN 14 02/10/2023    CREATININE 1.01 (H) 02/10/2023     02/10/2023    K 3.9 " 02/10/2023     02/10/2023    CO2 22.6 02/10/2023    CALCIUM 9.4 02/10/2023    PROTEINTOT 7.1 02/10/2023    ALBUMIN 4.1 02/10/2023    BILITOT 0.5 02/10/2023    ALKPHOS 82 02/10/2023    AST 16 02/10/2023    ALT 12 02/10/2023     Lab Results   Component Value Date    IRON 37 08/26/2022    LABIRON 7 (L) 08/26/2022    TRANSFERRIN 362 (H) 08/26/2022    TIBC 539 (H) 08/26/2022     Lab Results   Component Value Date     08/12/2019    FERRITIN 149.50 08/26/2022    XTDWMWPF27 722 02/10/2023    FOLATE >20.00 02/10/2023     No results found for: PSA, CEA, AFP, ,        Assessment and Plan    Diagnoses and all orders for this visit:    1. CVID (common variable immunodeficiency) (Primary)    2. Encounter for adjustment or management of vascular access device  -     IR Port Study Including Fluoro; Future        CVID: No evidence of toxicity with IVIG.  I will send her for a program today and refer her back to surgery for possible placement.  She will continue to follow-up with me every other visit for labs and toxicity check.      Patient was given instructions and counseling regarding her condition or for health maintenance advice. Please see specific information pulled into the AVS if appropriate.     Josselin Montes De Oca MD PhD    4/10/2023

## 2023-05-02 RX ORDER — DIPHENHYDRAMINE HYDROCHLORIDE 50 MG/ML
50 INJECTION INTRAMUSCULAR; INTRAVENOUS AS NEEDED
Status: CANCELLED | OUTPATIENT
Start: 2023-05-09

## 2023-05-02 RX ORDER — MEPERIDINE HYDROCHLORIDE 25 MG/ML
25 INJECTION INTRAMUSCULAR; INTRAVENOUS; SUBCUTANEOUS
Status: CANCELLED | OUTPATIENT
Start: 2023-05-09

## 2023-05-02 RX ORDER — FAMOTIDINE 10 MG/ML
20 INJECTION, SOLUTION INTRAVENOUS AS NEEDED
Status: CANCELLED | OUTPATIENT
Start: 2023-05-09

## 2023-05-05 ENCOUNTER — HOSPITAL ENCOUNTER (OUTPATIENT)
Dept: ONCOLOGY | Facility: HOSPITAL | Age: 33
Discharge: HOME | End: 2023-05-05
Payer: MEDICARE

## 2023-05-05 ENCOUNTER — OFFICE VISIT (OUTPATIENT)
Dept: ONCOLOGY | Facility: HOSPITAL | Age: 33
End: 2023-05-05
Payer: MEDICARE

## 2023-05-05 VITALS
HEART RATE: 88 BPM | TEMPERATURE: 98.3 F | BODY MASS INDEX: 32.7 KG/M2 | SYSTOLIC BLOOD PRESSURE: 117 MMHG | WEIGHT: 141.31 LBS | RESPIRATION RATE: 18 BRPM | DIASTOLIC BLOOD PRESSURE: 61 MMHG | HEIGHT: 55 IN | OXYGEN SATURATION: 100 %

## 2023-05-05 DIAGNOSIS — D83.9 CVID (COMMON VARIABLE IMMUNODEFICIENCY): ICD-10-CM

## 2023-05-05 LAB
BASOPHILS # BLD AUTO: 0.02 10*3/MM3 (ref 0–0.2)
BASOPHILS NFR BLD AUTO: 0.2 % (ref 0–1.5)
DEPRECATED RDW RBC AUTO: 44.4 FL (ref 37–54)
EOSINOPHIL # BLD AUTO: 0.16 10*3/MM3 (ref 0–0.4)
EOSINOPHIL NFR BLD AUTO: 1.6 % (ref 0.3–6.2)
ERYTHROCYTE [DISTWIDTH] IN BLOOD BY AUTOMATED COUNT: 13.4 % (ref 12.3–15.4)
HCT VFR BLD AUTO: 36 % (ref 34–46.6)
HGB BLD-MCNC: 12.4 G/DL (ref 12–15.9)
IGA1 MFR SER: <50 MG/DL (ref 70–400)
IGG1 SER-MCNC: 544 MG/DL (ref 700–1600)
IGM SERPL-MCNC: 66 MG/DL (ref 40–230)
IMM GRANULOCYTES # BLD AUTO: 0.02 10*3/MM3 (ref 0–0.05)
IMM GRANULOCYTES NFR BLD AUTO: 0.2 % (ref 0–0.5)
LYMPHOCYTES # BLD AUTO: 1.96 10*3/MM3 (ref 0.7–3.1)
LYMPHOCYTES NFR BLD AUTO: 19.6 % (ref 19.6–45.3)
MCH RBC QN AUTO: 30.8 PG (ref 26.6–33)
MCHC RBC AUTO-ENTMCNC: 34.4 G/DL (ref 31.5–35.7)
MCV RBC AUTO: 89.6 FL (ref 79–97)
MONOCYTES # BLD AUTO: 0.59 10*3/MM3 (ref 0.1–0.9)
MONOCYTES NFR BLD AUTO: 5.9 % (ref 5–12)
NEUTROPHILS NFR BLD AUTO: 7.25 10*3/MM3 (ref 1.7–7)
NEUTROPHILS NFR BLD AUTO: 72.5 % (ref 42.7–76)
PLATELET # BLD AUTO: 276 10*3/MM3 (ref 140–450)
PMV BLD AUTO: 9.2 FL (ref 6–12)
RBC # BLD AUTO: 4.02 10*6/MM3 (ref 3.77–5.28)
WBC NRBC COR # BLD: 10 10*3/MM3 (ref 3.4–10.8)

## 2023-05-05 PROCEDURE — 82784 ASSAY IGA/IGD/IGG/IGM EACH: CPT | Performed by: INTERNAL MEDICINE

## 2023-05-05 PROCEDURE — 36415 COLL VENOUS BLD VENIPUNCTURE: CPT

## 2023-05-05 PROCEDURE — 85025 COMPLETE CBC W/AUTO DIFF WBC: CPT | Performed by: INTERNAL MEDICINE

## 2023-05-05 PROCEDURE — G0463 HOSPITAL OUTPT CLINIC VISIT: HCPCS | Performed by: NURSE PRACTITIONER

## 2023-05-05 NOTE — PROGRESS NOTES
Chief Complaint/Reason for Referral:  CVID (common variable immunodeficiency)    Arnie Rucker MD Kindervater, Kasey, APRN      Subjective    History of Present Illness     Ms. Francine Preciado presents with her mother for labs today for IVIG infusion on 5/9/2023. Reports she had a port study done recently and was told she had a fibrin sheath. Reports it flushes well, but does not give any blood return. IR did not Activase the port. She received labs today by peripheral stick.     Reviewed labs. CBC is normal and can receive IVIG next week. Denies any recent acute or chronic infections. Denies any antibotic use. Vitals are stable.       Oncology/Hematology History Overview Note   CVID:   -Diagnosed by Immunologist 8/20/12  -presented with frequent/severe infections             Review of Systems   Constitutional: Negative for appetite change, diaphoresis, fatigue, fever, unexpected weight gain and unexpected weight loss.   HENT: Negative for hearing loss, mouth sores, sore throat, swollen glands, trouble swallowing and voice change.    Eyes: Negative for blurred vision.   Respiratory: Negative for cough, shortness of breath and wheezing.    Cardiovascular: Negative for chest pain and palpitations.   Gastrointestinal: Negative for abdominal pain, blood in stool, constipation, diarrhea, nausea and vomiting.   Endocrine: Negative for cold intolerance and heat intolerance.   Genitourinary: Negative for difficulty urinating, dysuria, frequency, hematuria and urinary incontinence.   Musculoskeletal: Negative for arthralgias, back pain and myalgias.   Skin: Negative for rash, skin lesions and wound.   Neurological: Negative for dizziness, seizures, weakness, numbness and headache.   Hematological: Does not bruise/bleed easily.   Psychiatric/Behavioral: Negative for depressed mood. The patient is not nervous/anxious.    All other systems reviewed and are negative.      Current Outpatient Medications on File Prior to Visit    Medication Sig Dispense Refill   • atenolol (TENORMIN) 25 MG tablet Take 1 tablet by mouth Daily. 90 tablet 1   • atorvastatin (LIPITOR) 10 MG tablet Take 1 tablet by mouth Daily. 90 tablet 1   • azelastine (ASTELIN) 0.1 % nasal spray      • EPINEPHrine (EPIPEN) 0.3 MG/0.3ML solution auto-injector injection 0.3 mL.     • famotidine (PEPCID) 10 MG tablet Take 2 tablets by mouth Daily.     • fenofibrate 160 MG tablet Take 1 tablet by mouth Daily. 90 tablet 1   • fluconazole (Diflucan) 150 MG tablet Take 1 tablet by mouth Every 72 (Seventy-Two) Hours. 2 tablet 0   • fluticasone (FLONASE) 50 MCG/ACT nasal spray 2 sprays into the nostril(s) as directed by provider Daily. 16 g 11   • levocetirizine (XYZAL) 5 MG tablet      • levothyroxine (SYNTHROID, LEVOTHROID) 50 MCG tablet Take 1 tablet by mouth Daily. 90 tablet 1   • linaclotide (Linzess) 145 MCG capsule capsule Take 1 capsule by mouth Every Morning Before Breakfast. 90 capsule 1   • montelukast (SINGULAIR) 10 MG tablet      • multivitamin with minerals tablet tablet Take 1 tablet by mouth Daily.     • norgestimate-ethinyl estradiol (Ortho Tri-Cyclen Lo) 0.18/0.215/0.25 MG-25 MCG per tablet Take 1 tablet by mouth Daily. 28 tablet 12   • ondansetron (Zofran) 4 MG tablet Take 1 tablet by mouth Every 8 (Eight) Hours As Needed for Nausea or Vomiting. 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    • Central  perforation of tympanic membrane of right ear    • Chronic mastoiditis    • COVID-19 vaccine series completed    • Heart murmur    • Hypertension    • Kabuki make-up syndrome    • Mitral valve prolapse    • Mixed conductive and sensorineural hearing loss of both ears    • Otorrhea, left    • Recurrent otitis media    • Skin disease     PSORIASIS/ECXEMA     Past Surgical History:   Procedure Laterality Date   • MULTIPLE TOOTH EXTRACTIONS     • NO PAST SURGERIES       Social History     Socioeconomic History   • Marital status: Single   Tobacco Use   • Smoking status: Never     Passive exposure: Yes   • Smokeless tobacco: Never   • Tobacco comments:     Daily exposure to 2nd hand smoke   Vaping Use   • Vaping Use: Never used   Substance and Sexual Activity   • Alcohol use: Not Currently     Comment: DENIES   • Drug use: Never   • Sexual activity: Never     Family History   Problem Relation Age of Onset   • Leukemia Father    • Heart disease Other      Immunization History   Administered Date(s) Administered   • COVID-19 (PFIZER) Purple Cap Monovalent 04/04/2021, 04/25/2021   • Flu Vaccine Quad PF >36MO 11/04/2013, 10/24/2014, 11/02/2015, 10/13/2016, 10/19/2017   • FluLaval/Fluzone >6mos 11/04/2013, 10/24/2014, 11/02/2015, 10/13/2016, 10/19/2017, 11/23/2021, 10/11/2022   • Fluzone Quad >6mos (Multi-dose) 11/04/2013, 11/02/2015, 10/19/2017, 10/29/2019   • Hep B, Adolescent or Pediatric 11/24/1998, 01/14/1999, 06/02/1999   • Influenza LAIV (Nasal) 10/13/2016, 10/14/2020   • Influenza TIV (IM) 09/17/2009, 11/07/2018   • Influenza, Unspecified 10/14/2020, 10/11/2022   • MMR 08/19/1996   • Pneumococcal Polysaccharide (PPSV23) 10/04/2012, 04/10/2014   • influenza Split 10/02/2018       Tobacco Use: Medium Risk   • Smoking Tobacco Use: Never   • Smokeless Tobacco Use: Never   • Passive Exposure: Yes       Objective     Physical Exam  Vitals and nursing note reviewed.   Constitutional:       Appearance: She is obese.   HENT:  "     Head: Normocephalic.      Nose: Nose normal.      Mouth/Throat:      Mouth: Mucous membranes are moist.   Eyes:      Pupils: Pupils are equal, round, and reactive to light.   Cardiovascular:      Rate and Rhythm: Normal rate and regular rhythm.      Pulses: Normal pulses.      Heart sounds: Normal heart sounds.   Pulmonary:      Effort: Pulmonary effort is normal. No respiratory distress.      Breath sounds: Normal breath sounds. No wheezing, rhonchi or rales.   Abdominal:      Palpations: Abdomen is soft.   Musculoskeletal:         General: Normal range of motion.      Cervical back: Normal range of motion.   Skin:     General: Skin is warm.      Capillary Refill: Capillary refill takes less than 2 seconds.   Neurological:      General: No focal deficit present.      Mental Status: She is alert and oriented to person, place, and time.   Psychiatric:         Mood and Affect: Mood normal.         Behavior: Behavior normal.         Thought Content: Thought content normal.         Judgment: Judgment normal.         Vitals:    05/05/23 1010   BP: 117/61   Pulse: 88   Resp: 18   Temp: 98.3 °F (36.8 °C)   SpO2: 100%   Weight: 64.1 kg (141 lb 5 oz)   Height: 139.7 cm (55\")   PainSc: 0-No pain       Wt Readings from Last 3 Encounters:   05/05/23 64.1 kg (141 lb 5 oz)   03/28/23 64.5 kg (142 lb 3.2 oz)   03/24/23 64.2 kg (141 lb 8.6 oz)        ECOG score: 0         ECOG: (0) Fully Active - Able to Carry On All Pre-disease Performance Without Restriction  Fall Risk Assessment was completed, and patient is at low risk for falls.  PHQ-9 Total Score: 0       The patient is not  experiencing fatigue. Fatigue score: 0    PT/OT Functional Screening: PT fx screen: No needs identified  Speech Functional Screening: Speech fx screen: No needs identified  Rehab to be ordered: Rehab to be ordered: No needs identified        Result Review :  The following data was reviewed by: NO Nur on 05/05/2023:  Lab Results "   Component Value Date    HGB 12.4 05/05/2023    HCT 36.0 05/05/2023    MCV 89.6 05/05/2023     05/05/2023    WBC 10.00 05/05/2023    NEUTROABS 7.25 (H) 05/05/2023    LYMPHSABS 1.96 05/05/2023    MONOSABS 0.59 05/05/2023    EOSABS 0.16 05/05/2023    BASOSABS 0.02 05/05/2023     Lab Results   Component Value Date    GLUCOSE 72 02/10/2023    BUN 14 02/10/2023    CREATININE 1.01 (H) 02/10/2023     02/10/2023    K 3.9 02/10/2023     02/10/2023    CO2 22.6 02/10/2023    CALCIUM 9.4 02/10/2023    PROTEINTOT 7.1 02/10/2023    ALBUMIN 4.1 02/10/2023    BILITOT 0.5 02/10/2023    ALKPHOS 82 02/10/2023    AST 16 02/10/2023    ALT 12 02/10/2023     Lab Results   Component Value Date     08/12/2019    FERRITIN 149.50 08/26/2022    [id number] 722 02/10/2023    FOLATE >20.00 02/10/2023     Lab Results   Component Value Date    IRON 37 08/26/2022    LABIRON 7 (L) 08/26/2022    TRANSFERRIN 362 (H) 08/26/2022    TIBC 539 (H) 08/26/2022     Lab Results   Component Value Date     08/12/2019    FERRITIN 149.50 08/26/2022    [id number] 722 02/10/2023    FOLATE >20.00 02/10/2023     No results found for: PSA, CEA, AFP, ,     IR Port Study Including Fluoro    Result Date: 4/4/2023    1. Left subclavian port terminates in the distal SVC. 2. The proximal catheter just distal to the port, forms a loop.  This was not seen on the prior x-ray dated 5/27/2021. 3. No extravasation of contrast from the port or tubing. 4. There is likely a fibrin sheath at the tip of the catheter extending for at least 1 centimeter with retrograde flow followed by antegrade flow of contrast.  Small thrombus thought less likely.      ROGER LAUGHLIN MD       Electronically Signed and Approved By: ROGER LAUGHLIN MD on 4/04/2023 at 15:19              Assessment and Plan:  Diagnoses and all orders for this visit:    1. CVID (common variable immunodeficiency)    OK for IVIG on 5/9/23. CBC is acceptable for treatment. IgG  levels more than adequate at last lab check. Receives IVIG every 6 weeks.     She will need activase for CVAD occlusion to the port when she comes on Tuesday.         Patient Follow Up: follow up with Dr. Montes De Oca in 6 weeks for next IVIG treatment.     Patient was given instructions and counseling regarding her condition or for health maintenance advice. Please see specific information pulled into the AVS if appropriate.     Belia Saez, APRN    5/5/2023    .tob

## 2023-05-05 NOTE — PROGRESS NOTES
Patient  Francine Preciado    Location  [facility]    Chief Complaint  CVID (common variable immunodeficiency)    Referring Provider: Arnie Rucker MD  PCP: Aggie Chance APRN    Subjective     {Problem List  Visit Diagnosis   Encounters  Notes  Medications  Labs  Result Review Imaging  Media :23}     Oncology/Hematology History Overview Note   CVID:   -Diagnosed by Immunologist 8/20/12  -presented with frequent/severe infections             History of Present Illness  ***    Review of Systems   Constitutional: Negative for appetite change, diaphoresis, fatigue, fever, unexpected weight gain and unexpected weight loss.   HENT: Negative for hearing loss, mouth sores, sore throat, swollen glands, trouble swallowing and voice change.    Eyes: Negative for blurred vision.   Respiratory: Negative for cough, shortness of breath and wheezing.    Cardiovascular: Negative for chest pain and palpitations.   Gastrointestinal: Negative for abdominal pain, blood in stool, constipation, diarrhea, nausea and vomiting.   Endocrine: Negative for cold intolerance and heat intolerance.   Genitourinary: Negative for difficulty urinating, dysuria, frequency, hematuria and urinary incontinence.   Musculoskeletal: Negative for arthralgias, back pain and myalgias.   Skin: Negative for rash, skin lesions and wound.   Neurological: Negative for dizziness, seizures, weakness, numbness and headache.   Hematological: Does not bruise/bleed easily.   Psychiatric/Behavioral: Negative for depressed mood. The patient is not nervous/anxious.    All other systems reviewed and are negative.      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    • Central perforation of tympanic membrane of right ear    • Chronic mastoiditis    • COVID-19 vaccine series completed    • Heart murmur    • Hypertension    • Kabuki make-up syndrome    • Mitral valve  "prolapse    • Mixed conductive and sensorineural hearing loss of both ears    • Otorrhea, left    • Recurrent otitis media    • Skin disease     PSORIASIS/ECXEMA     Past Surgical History:   Procedure Laterality Date   • MULTIPLE TOOTH EXTRACTIONS     • NO PAST SURGERIES       Social History     Socioeconomic History   • Marital status: Single   Tobacco Use   • Smoking status: Never     Passive exposure: Yes   • Smokeless tobacco: Never   • Tobacco comments:     Daily exposure to 2nd hand smoke   Vaping Use   • Vaping Use: Never used   Substance and Sexual Activity   • Alcohol use: Not Currently     Comment: DENIES   • Drug use: Never   • Sexual activity: Never     Family History   Problem Relation Age of Onset   • Leukemia Father    • Heart disease Other        Objective   Physical Exam  ***    Vitals:    05/05/23 1010   Resp: 18   Weight: 64.1 kg (141 lb 5 oz)   Height: 139.7 cm (55\")   PainSc: 0-No pain     ECOG score: 0         PHQ-9 Total Score:         Result Review :{Labs  Result Review  Imaging  Med Tab  Media  Procedures :23}   The following data was reviewed by: Francia Willingham MA on 05/05/2023:  Lab Results   Component Value Date    HGB 12.4 03/24/2023    HCT 36.7 03/24/2023    MCV 89.3 03/24/2023     03/24/2023    WBC 10.17 03/24/2023    NEUTROABS 7.44 (H) 03/24/2023    LYMPHSABS 2.07 03/24/2023    MONOSABS 0.57 03/24/2023    EOSABS 0.05 03/24/2023    BASOSABS 0.02 03/24/2023     Lab Results   Component Value Date    GLUCOSE 72 02/10/2023    BUN 14 02/10/2023    CREATININE 1.01 (H) 02/10/2023     02/10/2023    K 3.9 02/10/2023     02/10/2023    CO2 22.6 02/10/2023    CALCIUM 9.4 02/10/2023    PROTEINTOT 7.1 02/10/2023    ALBUMIN 4.1 02/10/2023    BILITOT 0.5 02/10/2023    ALKPHOS 82 02/10/2023    AST 16 02/10/2023    ALT 12 02/10/2023     Lab Results   Component Value Date    IRON 37 08/26/2022    LABIRON 7 (L) 08/26/2022    TRANSFERRIN 362 (H) 08/26/2022     (H) " 08/26/2022     Lab Results   Component Value Date     08/12/2019    FERRITIN 149.50 08/26/2022    [id number] 722 02/10/2023    FOLATE >20.00 02/10/2023     No results found for: PSA, CEA, AFP, ,        Assessment and Plan {CC Problem List  Visit Diagnosis   ROS  Review (Popup)  Health Maintenance  Quality  BestPractice  Medications  SmartSets  SnapShot Encounters  Media :23}   There are no diagnoses linked to this encounter.      Patient was given instructions and counseling regarding her condition or for health maintenance advice. Please see specific information pulled into the AVS if appropriate.     Francia Willingham MA    5/5/2023

## 2023-05-08 ENCOUNTER — OFFICE VISIT (OUTPATIENT)
Dept: OTOLARYNGOLOGY | Facility: CLINIC | Age: 33
End: 2023-05-08
Payer: MEDICARE

## 2023-05-08 VITALS — TEMPERATURE: 97.9 F | BODY MASS INDEX: 33.37 KG/M2 | WEIGHT: 144.2 LBS | HEIGHT: 55 IN

## 2023-05-08 DIAGNOSIS — H90.6 MIXED HEARING LOSS, BILATERAL: ICD-10-CM

## 2023-05-08 DIAGNOSIS — H72.92 TYMPANIC MEMBRANE PERFORATION, LEFT: ICD-10-CM

## 2023-05-08 DIAGNOSIS — H70.11 CHRONIC MASTOIDITIS OF RIGHT SIDE: Primary | ICD-10-CM

## 2023-05-08 PROCEDURE — 69220 CLEAN OUT MASTOID CAVITY: CPT | Performed by: OTOLARYNGOLOGY

## 2023-05-08 PROCEDURE — 1160F RVW MEDS BY RX/DR IN RCRD: CPT | Performed by: OTOLARYNGOLOGY

## 2023-05-08 PROCEDURE — 99212 OFFICE O/P EST SF 10 MIN: CPT | Performed by: OTOLARYNGOLOGY

## 2023-05-08 PROCEDURE — 1159F MED LIST DOCD IN RCRD: CPT | Performed by: OTOLARYNGOLOGY

## 2023-05-08 NOTE — PROGRESS NOTES
Patient Name: Francine Preciado   Visit Date: 05/08/2023   Patient ID: [id number]  Provider: Jose Elias Jorgensen MD    Sex: female  Location: [facility]   YOB: 1990  Location Address: [address] [place] [address], [address],?[place]?[id number]    Primary Care Provider Aggie Chance APRN  Location Phone: [phone]    Referring Provider: No ref. provider found        Chief Complaint  6 month follow up    History of Present Illness  Francine Preciado is a 32 y.o. 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 She underwent an audiogram on 5/11/17 which revealed bilateral moderate to profound mixed hearing loss. Speech discrimination was 100% bilaterally at 90 dB. Tympanograms were type B bilaterally.  She wears bilateral behind-the-ear hearing aids.    She returns today for routine follow-up.  She was last seen on 11/7/2022 at which time there was a small amount of purulence draining from the left tympanic membrane perforation as well as a small amount of granulation tissue and her right mastoid cavity was dry and healthy appearing.  She was placed on TobraDex drops.  She and her mother tells me that she has done fairly well since her last appointment.  They have not noticed any drainage from her left ear but do report a small amount from the right.  She continues to do well wearing her hearing aids but is bothered by her maxillary dentures and does not wear them.  Her hearing seems stable.      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    • Central perforation of tympanic membrane of right ear    • Chronic mastoiditis    • COVID-19 vaccine series completed    • Heart murmur    • Hypertension    • Kabuki make-up syndrome    • Mitral valve prolapse     • Mixed conductive and sensorineural hearing loss of both ears    • Otorrhea, left    • Recurrent otitis media    • Skin disease     PSORIASIS/ECXEMA       Past Surgical History:   Procedure Laterality Date   • MULTIPLE TOOTH EXTRACTIONS     • NO PAST SURGERIES           Current Outpatient Medications:   •  atenolol (TENORMIN) 25 MG tablet, Take 1 tablet by mouth Daily., Disp: 90 tablet, Rfl: 1  •  atorvastatin (LIPITOR) 10 MG tablet, Take 1 tablet by mouth Daily., Disp: 90 tablet, Rfl: 1  •  azelastine (ASTELIN) 0.1 % nasal spray, , Disp: , Rfl:   •  EPINEPHrine (EPIPEN) 0.3 MG/0.3ML solution auto-injector injection, 0.3 mL., Disp: , Rfl:   •  famotidine (PEPCID) 10 MG tablet, Take 2 tablets by mouth Daily., Disp: , Rfl:   •  fenofibrate 160 MG tablet, Take 1 tablet by mouth Daily., Disp: 90 tablet, Rfl: 1  •  fluconazole (Diflucan) 150 MG tablet, Take 1 tablet by mouth Every 72 (Seventy-Two) Hours., Disp: 2 tablet, Rfl: 0  •  fluticasone (FLONASE) 50 MCG/ACT nasal spray, 2 sprays into the nostril(s) as directed by provider Daily., Disp: 16 g, Rfl: 11  •  levocetirizine (XYZAL) 5 MG tablet, , Disp: , Rfl:   •  levothyroxine (SYNTHROID, LEVOTHROID) 50 MCG tablet, Take 1 tablet by mouth Daily., Disp: 90 tablet, Rfl: 1  •  linaclotide (Linzess) 145 MCG capsule capsule, Take 1 capsule by mouth Every Morning Before Breakfast., Disp: 90 capsule, Rfl: 1  •  montelukast (SINGULAIR) 10 MG tablet, , Disp: , Rfl:   •  multivitamin with minerals tablet tablet, Take 1 tablet by mouth Daily., Disp: , Rfl:   •  norgestimate-ethinyl estradiol (Ortho Tri-Cyclen Lo) 0.18/0.215/0.25 MG-25 MCG per tablet, Take 1 tablet by mouth Daily., Disp: 28 tablet, Rfl: 12  •  ondansetron (Zofran) 4 MG tablet, Take 1 tablet by mouth Every 8 (Eight) Hours As Needed for Nausea or Vomiting., Disp: 90 tablet, Rfl: 1  •  Tobramycin-Dexamethasone (TobraDex ST) 0.3-0.05 % suspension, 3-4 drops in left ear twice a day, Disp: 5 mL, Rfl: 3    Current  "Facility-Administered Medications:   •  cyanocobalamin injection 1,000 mcg, 1,000 mcg, Intramuscular, Q28 Days, Arnie Rucker MD, 1,000 mcg at 04/11/23 1419     Allergies   Allergen Reactions   • Cefuroxime Axetil Unknown - Low Severity   • Neosporin [Bacitracin-Polymyxin B] Unknown - Low Severity       Social History     Tobacco Use   • Smoking status: Never     Passive exposure: Yes   • Smokeless tobacco: Never   • Tobacco comments:     Daily exposure to 2nd hand smoke   Vaping Use   • Vaping Use: Never used   Substance Use Topics   • Alcohol use: Not Currently     Comment: DENIES   • Drug use: Never        Objective     Vital Signs:   Temp 97.9 °F (36.6 °C)   Ht 139.7 cm (55\")   Wt 65.4 kg (144 lb 3.2 oz)   BMI 33.52 kg/m²       Physical Exam    General: Well developed, well nourished patient of stated age in no acute distress. Voice is strong and clear.   Head: Normocephalic and atraumatic.  Face: No lesions.  Bilateral parotid and submandibular glands are unremarkable.  Stensen's and Warthin's ducts are productive of clear saliva bilaterally.  House-Brackmann I/VI     bilaterally.   muscles and temporomandibular joint nontender to palpation.  No TMJ crepitus.  Eyes: PERRLA, sclerae anicteric, no conjunctival injection. Extra ocular movements are intact and full. No nystagmus.   Ears: Auricles are normal in appearance.  Left external auditory canal is unremarkable.  Left tympanic membrane with perforation that is dry.  Right mastoid cavity is dry with moderate buildup of squamous debris which was removed using the operating microscope and alligator forceps revealing healthy appearing skin.  Hearing normal to conversational voice.   Nose: External nose is normal in appearance. Bilateral nares are patent with normal appearing mucosa. Septum midline. Turbinates are unremarkable. No lesions.   Oral Cavity: Lips are normal in appearance. Oral mucosa is unremarkable. Gingiva is unremarkable.  Partial " dentition. Tongue is unremarkable with good movement. Hard palate is unremarkable.   Oropharynx: Soft palate is unremarkable with full movement. Uvula is unremarkable. Bilateral tonsils are unremarkable. Posterior oropharynx is unremarkable.    Larynx and hypopharynx: Deferred secondary to gag reflex.  Neck: Supple.  No mass.  Nontender to palpation.  Trachea midline. Thyroid normal size and without nodules to palpation.   Lymphatic: No lymphadenopathy upon palpation.   Psychiatric: Appropriate affect, cooperative   Neurologic: Oriented x 3, strength symmetric in all extremities, Cranial Nerves II-XII are grossly intact to confrontation   Skin: Warm and dry. No rashes.    Procedures           Result Review :               Assessment and Plan    Diagnoses and all orders for this visit:    1. Chronic mastoiditis of right side (Primary)    2. Tympanic membrane perforation, left    3. Mixed hearing loss, bilateral      Impressions and findings were discussed.  Currently, she returns today for follow-up of her ears.  Examination today reveals moderate buildup of squamous debris in the right mastoid cavity which was removed using the operating microscope and alligator forceps.  Her left tympanic membranes perforation is stable and dry.  She was given ample time to ask questions, all of which were answered to her satisfaction.  She will follow-up in 6 months or sooner if needed.  Follow Up   Return in about 6 months (around 11/8/2023).  Patient was given instructions and counseling regarding her condition or for health maintenance advice. Please see specific information pulled into the AVS if appropriate.

## 2023-05-09 ENCOUNTER — HOSPITAL ENCOUNTER (OUTPATIENT)
Dept: ONCOLOGY | Facility: HOSPITAL | Age: 33
Discharge: HOME | End: 2023-05-09
Admitting: INTERNAL MEDICINE
Payer: MEDICARE

## 2023-05-09 ENCOUNTER — CLINICAL SUPPORT (OUTPATIENT)
Dept: FAMILY MEDICINE CLINIC | Facility: CLINIC | Age: 33
End: 2023-05-09
Payer: MEDICARE

## 2023-05-09 VITALS
HEIGHT: 55 IN | WEIGHT: 143.96 LBS | TEMPERATURE: 97.6 F | DIASTOLIC BLOOD PRESSURE: 58 MMHG | RESPIRATION RATE: 16 BRPM | SYSTOLIC BLOOD PRESSURE: 105 MMHG | BODY MASS INDEX: 33.32 KG/M2 | HEART RATE: 72 BPM | OXYGEN SATURATION: 96 %

## 2023-05-09 DIAGNOSIS — D83.9 CVID (COMMON VARIABLE IMMUNODEFICIENCY): Primary | ICD-10-CM

## 2023-05-09 DIAGNOSIS — Z45.2 ENCOUNTER FOR ADJUSTMENT OR MANAGEMENT OF VASCULAR ACCESS DEVICE: ICD-10-CM

## 2023-05-09 PROCEDURE — 25010000002 IMMUNE GLOBULIN (HUMAN) 10 GM/100ML SOLUTION: Performed by: INTERNAL MEDICINE

## 2023-05-09 PROCEDURE — 96365 THER/PROPH/DIAG IV INF INIT: CPT

## 2023-05-09 PROCEDURE — A9270 NON-COVERED ITEM OR SERVICE: HCPCS | Performed by: INTERNAL MEDICINE

## 2023-05-09 PROCEDURE — 96375 TX/PRO/DX INJ NEW DRUG ADDON: CPT

## 2023-05-09 PROCEDURE — 63710000001 DIPHENHYDRAMINE PER 50 MG: Performed by: INTERNAL MEDICINE

## 2023-05-09 PROCEDURE — 25010000002 HEPARIN LOCK FLUSH PER 10 UNITS: Performed by: INTERNAL MEDICINE

## 2023-05-09 RX ORDER — FAMOTIDINE 10 MG/ML
20 INJECTION, SOLUTION INTRAVENOUS ONCE
Status: COMPLETED | OUTPATIENT
Start: 2023-05-09 | End: 2023-05-09

## 2023-05-09 RX ORDER — HEPARIN SODIUM (PORCINE) LOCK FLUSH IV SOLN 100 UNIT/ML 100 UNIT/ML
500 SOLUTION INTRAVENOUS AS NEEDED
Status: DISCONTINUED | OUTPATIENT
Start: 2023-05-09 | End: 2023-05-10 | Stop reason: HOSPADM

## 2023-05-09 RX ORDER — SODIUM CHLORIDE 9 MG/ML
250 INJECTION, SOLUTION INTRAVENOUS ONCE
Status: COMPLETED | OUTPATIENT
Start: 2023-05-09 | End: 2023-05-09

## 2023-05-09 RX ORDER — HEPARIN SODIUM (PORCINE) LOCK FLUSH IV SOLN 100 UNIT/ML 100 UNIT/ML
500 SOLUTION INTRAVENOUS AS NEEDED
OUTPATIENT
Start: 2023-05-09

## 2023-05-09 RX ORDER — SODIUM CHLORIDE 0.9 % (FLUSH) 0.9 %
20 SYRINGE (ML) INJECTION AS NEEDED
Status: DISCONTINUED | OUTPATIENT
Start: 2023-05-09 | End: 2023-05-10 | Stop reason: HOSPADM

## 2023-05-09 RX ORDER — SODIUM CHLORIDE 0.9 % (FLUSH) 0.9 %
20 SYRINGE (ML) INJECTION AS NEEDED
OUTPATIENT
Start: 2023-05-09

## 2023-05-09 RX ORDER — DIPHENHYDRAMINE HCL 25 MG
25 CAPSULE ORAL ONCE
Status: COMPLETED | OUTPATIENT
Start: 2023-05-09 | End: 2023-05-09

## 2023-05-09 RX ADMIN — FAMOTIDINE 20 MG: 10 INJECTION INTRAVENOUS at 11:21

## 2023-05-09 RX ADMIN — Medication 20 ML: at 13:45

## 2023-05-09 RX ADMIN — DIPHENHYDRAMINE HYDROCHLORIDE 25 MG: 25 CAPSULE ORAL at 11:20

## 2023-05-09 RX ADMIN — HEPARIN SODIUM (PORCINE) LOCK FLUSH IV SOLN 100 UNIT/ML 500 UNITS: 100 SOLUTION at 13:45

## 2023-05-09 RX ADMIN — SODIUM CHLORIDE 250 ML: 9 INJECTION, SOLUTION INTRAVENOUS at 11:20

## 2023-05-09 RX ADMIN — IMMUNE GLOBULIN INFUSION (HUMAN) 10 G: 100 INJECTION, SOLUTION INTRAVENOUS; SUBCUTANEOUS at 12:11

## 2023-05-09 RX ADMIN — CYANOCOBALAMIN 1000 MCG: 1000 INJECTION, SOLUTION INTRAMUSCULAR; SUBCUTANEOUS at 14:32

## 2023-05-20 DIAGNOSIS — E03.9 ACQUIRED HYPOTHYROIDISM: ICD-10-CM

## 2023-05-20 DIAGNOSIS — E78.2 MIXED HYPERLIPIDEMIA: ICD-10-CM

## 2023-05-22 RX ORDER — LEVOTHYROXINE SODIUM 0.05 MG/1
TABLET ORAL
Qty: 90 TABLET | Refills: 1 | Status: SHIPPED | OUTPATIENT
Start: 2023-05-22

## 2023-05-22 RX ORDER — ATORVASTATIN CALCIUM 10 MG/1
TABLET, FILM COATED ORAL
Qty: 90 TABLET | Refills: 1 | Status: SHIPPED | OUTPATIENT
Start: 2023-05-22

## 2023-05-22 RX ORDER — FENOFIBRATE 160 MG/1
TABLET ORAL
Qty: 90 TABLET | Refills: 1 | Status: SHIPPED | OUTPATIENT
Start: 2023-05-22

## 2023-06-08 ENCOUNTER — CLINICAL SUPPORT (OUTPATIENT)
Dept: FAMILY MEDICINE CLINIC | Facility: CLINIC | Age: 33
End: 2023-06-08
Payer: MEDICARE

## 2023-06-08 DIAGNOSIS — E53.8 VITAMIN B12 DEFICIENCY: Primary | ICD-10-CM

## 2023-06-08 RX ADMIN — CYANOCOBALAMIN 1000 MCG: 1000 INJECTION, SOLUTION INTRAMUSCULAR; SUBCUTANEOUS at 13:46

## 2023-06-16 ENCOUNTER — OFFICE VISIT (OUTPATIENT)
Dept: ONCOLOGY | Facility: HOSPITAL | Age: 33
End: 2023-06-16
Payer: MEDICARE

## 2023-06-16 ENCOUNTER — HOSPITAL ENCOUNTER (OUTPATIENT)
Dept: ONCOLOGY | Facility: HOSPITAL | Age: 33
Discharge: HOME | End: 2023-06-16
Payer: MEDICARE

## 2023-06-16 VITALS
WEIGHT: 142 LBS | OXYGEN SATURATION: 98 % | SYSTOLIC BLOOD PRESSURE: 112 MMHG | BODY MASS INDEX: 33 KG/M2 | HEART RATE: 90 BPM | TEMPERATURE: 97.7 F | DIASTOLIC BLOOD PRESSURE: 66 MMHG | RESPIRATION RATE: 16 BRPM

## 2023-06-16 DIAGNOSIS — Z45.2 ENCOUNTER FOR ADJUSTMENT OR MANAGEMENT OF VASCULAR ACCESS DEVICE: ICD-10-CM

## 2023-06-16 DIAGNOSIS — D83.9 CVID (COMMON VARIABLE IMMUNODEFICIENCY): Primary | ICD-10-CM

## 2023-06-16 LAB
BASOPHILS # BLD AUTO: 0.01 10*3/MM3 (ref 0–0.2)
BASOPHILS NFR BLD AUTO: 0.1 % (ref 0–1.5)
DEPRECATED RDW RBC AUTO: 44.2 FL (ref 37–54)
EOSINOPHIL # BLD AUTO: 0.07 10*3/MM3 (ref 0–0.4)
EOSINOPHIL NFR BLD AUTO: 0.7 % (ref 0.3–6.2)
ERYTHROCYTE [DISTWIDTH] IN BLOOD BY AUTOMATED COUNT: 13.1 % (ref 12.3–15.4)
HCT VFR BLD AUTO: 36.2 % (ref 34–46.6)
HGB BLD-MCNC: 11.9 G/DL (ref 12–15.9)
IGA1 MFR SER: <50 MG/DL (ref 70–400)
IGG1 SER-MCNC: 562 MG/DL (ref 700–1600)
IGM SERPL-MCNC: 76 MG/DL (ref 40–230)
IMM GRANULOCYTES # BLD AUTO: 0.02 10*3/MM3 (ref 0–0.05)
IMM GRANULOCYTES NFR BLD AUTO: 0.2 % (ref 0–0.5)
LYMPHOCYTES # BLD AUTO: 1.98 10*3/MM3 (ref 0.7–3.1)
LYMPHOCYTES NFR BLD AUTO: 20.2 % (ref 19.6–45.3)
MCH RBC QN AUTO: 29.6 PG (ref 26.6–33)
MCHC RBC AUTO-ENTMCNC: 32.9 G/DL (ref 31.5–35.7)
MCV RBC AUTO: 90 FL (ref 79–97)
MONOCYTES # BLD AUTO: 0.43 10*3/MM3 (ref 0.1–0.9)
MONOCYTES NFR BLD AUTO: 4.4 % (ref 5–12)
NEUTROPHILS NFR BLD AUTO: 7.28 10*3/MM3 (ref 1.7–7)
NEUTROPHILS NFR BLD AUTO: 74.4 % (ref 42.7–76)
PLATELET # BLD AUTO: 235 10*3/MM3 (ref 140–450)
PMV BLD AUTO: 9.2 FL (ref 6–12)
RBC # BLD AUTO: 4.02 10*6/MM3 (ref 3.77–5.28)
WBC NRBC COR # BLD: 9.79 10*3/MM3 (ref 3.4–10.8)

## 2023-06-16 PROCEDURE — 82784 ASSAY IGA/IGD/IGG/IGM EACH: CPT | Performed by: INTERNAL MEDICINE

## 2023-06-16 PROCEDURE — 85025 COMPLETE CBC W/AUTO DIFF WBC: CPT | Performed by: INTERNAL MEDICINE

## 2023-06-16 PROCEDURE — 36591 DRAW BLOOD OFF VENOUS DEVICE: CPT

## 2023-06-16 PROCEDURE — 25010000002 HEPARIN LOCK FLUSH PER 10 UNITS: Performed by: INTERNAL MEDICINE

## 2023-06-16 RX ORDER — SODIUM CHLORIDE 0.9 % (FLUSH) 0.9 %
20 SYRINGE (ML) INJECTION AS NEEDED
Status: DISCONTINUED | OUTPATIENT
Start: 2023-06-16 | End: 2023-06-17 | Stop reason: HOSPADM

## 2023-06-16 RX ORDER — HEPARIN SODIUM (PORCINE) LOCK FLUSH IV SOLN 100 UNIT/ML 100 UNIT/ML
500 SOLUTION INTRAVENOUS AS NEEDED
OUTPATIENT
Start: 2023-06-16

## 2023-06-16 RX ORDER — SODIUM CHLORIDE 0.9 % (FLUSH) 0.9 %
20 SYRINGE (ML) INJECTION AS NEEDED
OUTPATIENT
Start: 2023-06-16

## 2023-06-16 RX ORDER — HEPARIN SODIUM (PORCINE) LOCK FLUSH IV SOLN 100 UNIT/ML 100 UNIT/ML
500 SOLUTION INTRAVENOUS AS NEEDED
Status: DISCONTINUED | OUTPATIENT
Start: 2023-06-16 | End: 2023-06-17 | Stop reason: HOSPADM

## 2023-06-16 RX ADMIN — Medication 20 ML: at 10:17

## 2023-06-16 RX ADMIN — HEPARIN SODIUM (PORCINE) LOCK FLUSH IV SOLN 100 UNIT/ML 500 UNITS: 100 SOLUTION at 10:18

## 2023-06-16 NOTE — PROGRESS NOTES
"Chief Complaint/Reason for Referral:  IVIG treatment     Arnie Rucker MD Kindervater, Kasey, APRN      Subjective    History of Present Illness    Ms. Francine Preciado presents for treatment visit with her mother today for IVIG. She is receiving treatment every 42 days. Denies any fevers, chills or recent infections. She does report when she gets \"chilled\" she has noticed her port site has pain.     Tolerating IVIG well.     Labs reviewed: CBC is normal except for hemoglobin with trace anemia with level of 11.9. Last IgG level of 544.     Reports she has a [age] next month.       Oncology/Hematology History Overview Note   CVID:   -Diagnosed by Immunologist 8/20/12  -presented with frequent/severe infections             Review of Systems   Constitutional:  Negative for appetite change, diaphoresis, fatigue, fever, unexpected weight gain and unexpected weight loss.   HENT:  Negative for hearing loss, mouth sores, sore throat, swollen glands, trouble swallowing and voice change.    Eyes:  Negative for blurred vision.   Respiratory:  Negative for cough, shortness of breath and wheezing.    Cardiovascular:  Negative for chest pain and palpitations.   Gastrointestinal:  Negative for abdominal pain, blood in stool, constipation, diarrhea, nausea and vomiting.   Endocrine: Negative for cold intolerance and heat intolerance.   Genitourinary:  Negative for difficulty urinating, dysuria, frequency, hematuria and urinary incontinence.   Musculoskeletal:  Negative for arthralgias, back pain and myalgias.   Skin:  Negative for rash, skin lesions and wound.   Neurological:  Negative for dizziness, seizures, weakness, numbness and headache.   Hematological:  Does not bruise/bleed easily.   Psychiatric/Behavioral:  Negative for depressed mood. The patient is not nervous/anxious.      Current Outpatient Medications on File Prior to Visit   Medication Sig Dispense Refill    atenolol (TENORMIN) 25 MG tablet Take 1 tablet by mouth " Daily. 90 tablet 1    atorvastatin (LIPITOR) 10 MG tablet TAKE ONE TABLET BY MOUTH DAILY 90 tablet 1    azelastine (ASTELIN) 0.1 % nasal spray       EPINEPHrine (EPIPEN) 0.3 MG/0.3ML solution auto-injector injection 0.3 mL.      famotidine (PEPCID) 10 MG tablet Take 2 tablets by mouth Daily.      fenofibrate 160 MG tablet TAKE ONE TABLET BY MOUTH DAILY 90 tablet 1    fluconazole (Diflucan) 150 MG tablet Take 1 tablet by mouth Every 72 (Seventy-Two) Hours. 2 tablet 0    fluticasone (FLONASE) 50 MCG/ACT nasal spray 2 sprays into the nostril(s) as directed by provider Daily. 16 g 11    levocetirizine (XYZAL) 5 MG tablet       levothyroxine (SYNTHROID, LEVOTHROID) 50 MCG tablet TAKE ONE TABLET BY MOUTH DAILY 90 tablet 1    linaclotide (Linzess) 145 MCG capsule capsule Take 1 capsule by mouth Every Morning Before Breakfast. 90 capsule 1    montelukast (SINGULAIR) 10 MG tablet       multivitamin with minerals tablet tablet Take 1 tablet by mouth Daily.      norgestimate-ethinyl estradiol (Ortho Tri-Cyclen Lo) 0.18/0.215/0.25 MG-25 MCG per tablet Take 1 tablet by mouth Daily. 28 tablet 12    ondansetron (Zofran) 4 MG tablet Take 1 tablet by mouth Every 8 (Eight) Hours As Needed for Nausea or Vomiting. 90 tablet 1    Tobramycin-Dexamethasone (TobraDex ST) 0.3-0.05 % suspension 3-4 drops in left ear twice a day 5 mL 3     Current Facility-Administered Medications on File Prior to Visit   Medication Dose Route Frequency Provider Last Rate Last Admin    cyanocobalamin injection 1,000 mcg  1,000 mcg Intramuscular Q28 Days Arnie Rucker MD   1,000 mcg at 06/08/23 1346    heparin injection 500 Units  500 Units Intravenous PRN Josselin Montes De Oca MD PhD   500 Units at 06/16/23 1018    sodium chloride 0.9 % flush 20 mL  20 mL Intravenous PRN Josselin Montes De Oca MD PhD   20 mL at 06/16/23 1017       Allergies   Allergen Reactions    Cefuroxime Axetil Unknown - Low Severity    Neosporin [Bacitracin-Polymyxin B] Unknown - Low Severity      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     Central perforation of tympanic membrane of right ear     Chronic mastoiditis     COVID-19 vaccine series completed     Heart murmur     Hypertension     Kabuki make-up syndrome     Mitral valve prolapse     Mixed conductive and sensorineural hearing loss of both ears     Otorrhea, left     Recurrent otitis media     Skin disease     PSORIASIS/ECXEMA     Past Surgical History:   Procedure Laterality Date    MULTIPLE TOOTH EXTRACTIONS      NO PAST SURGERIES       Social History     Socioeconomic History    Marital status: Single   Tobacco Use    Smoking status: Never     Passive exposure: Yes    Smokeless tobacco: Never    Tobacco comments:     Daily exposure to 2nd hand smoke   Vaping Use    Vaping Use: Never used   Substance and Sexual Activity    Alcohol use: Not Currently     Comment: DENIES    Drug use: Never    Sexual activity: Never     Family History   Problem Relation Age of Onset    Leukemia Father     Heart disease Other      Immunization History   Administered Date(s) Administered    COVID-19 (PFIZER) Purple Cap Monovalent 04/04/2021, 04/25/2021    Flu Vaccine Quad PF >36MO 11/04/2013, 10/24/2014, 11/02/2015, 10/13/2016, 10/19/2017    FluLaval/Fluzone >6mos 11/04/2013, 10/24/2014, 11/02/2015, 10/13/2016, 10/19/2017, 11/23/2021, 10/11/2022    Fluzone Quad >6mos (Multi-dose) 11/04/2013, 11/02/2015, 10/19/2017, 10/29/2019    Hep B, Adolescent or Pediatric 11/24/1998, 01/14/1999, 06/02/1999    Influenza LAIV (Nasal) 10/13/2016, 10/14/2020    Influenza TIV (IM) 09/17/2009, 11/07/2018    Influenza, Unspecified 10/14/2020, 10/11/2022    MMR 08/19/1996    Pneumococcal Polysaccharide (PPSV23) 10/04/2012, 04/10/2014    influenza Split 10/02/2018       Tobacco Use: Medium Risk    Smoking Tobacco Use: Never    Smokeless Tobacco Use: Never    Passive Exposure: Yes       Objective     Physical Exam  Vitals and nursing  note reviewed.   Constitutional:       Appearance: Normal appearance.   HENT:      Head: Normocephalic.      Nose: Nose normal.      Mouth/Throat:      Mouth: Mucous membranes are moist.   Eyes:      Pupils: Pupils are equal, round, and reactive to light.   Cardiovascular:      Rate and Rhythm: Normal rate and regular rhythm.      Pulses: Normal pulses.      Heart sounds: Normal heart sounds.   Pulmonary:      Effort: Pulmonary effort is normal. No respiratory distress.      Breath sounds: Normal breath sounds.   Abdominal:      General: Bowel sounds are normal.      Palpations: Abdomen is soft.   Musculoskeletal:         General: Normal range of motion.      Cervical back: Normal range of motion and neck supple.   Skin:     General: Skin is warm and dry.      Capillary Refill: Capillary refill takes less than 2 seconds.   Neurological:      General: No focal deficit present.      Mental Status: She is alert and oriented to person, place, and time.   Psychiatric:         Mood and Affect: Mood normal.         Behavior: Behavior normal.         Thought Content: Thought content normal.         Judgment: Judgment normal.       Vitals:    06/16/23 1051   BP: 112/66   Pulse: 90   Resp: 16   Temp: 97.7 °F (36.5 °C)   TempSrc: Temporal   SpO2: 98%   Weight: 64.4 kg (142 lb)   PainSc: 0-No pain       Wt Readings from Last 3 Encounters:   06/16/23 64.4 kg (142 lb)   05/09/23 65.3 kg (143 lb 15.4 oz)   05/08/23 65.4 kg (144 lb 3.2 oz)                 ECOG score: 0         ECOG: (0) Fully Active - Able to Carry On All Pre-disease Performance Without Restriction  Fall Risk Assessment was completed, and patient is at low risk for falls.  PHQ-9 Total Score:         The patient is  experiencing fatigue. Fatigue score: 1    PT/OT Functional Screening: PT fx screen : No needs identified  Speech Functional Screening: Speech fx screen : No needs identified  Rehab to be ordered: Rehab to be ordered : No needs identified        Result  Review :  The following data was reviewed by: NO Nur on 06/16/2023:  Lab Results   Component Value Date    HGB 11.9 (L) 06/16/2023    HCT 36.2 06/16/2023    MCV 90.0 06/16/2023     06/16/2023    WBC 9.79 06/16/2023    NEUTROABS 7.28 (H) 06/16/2023    LYMPHSABS 1.98 06/16/2023    MONOSABS 0.43 06/16/2023    EOSABS 0.07 06/16/2023    BASOSABS 0.01 06/16/2023     Lab Results   Component Value Date    GLUCOSE 72 02/10/2023    BUN 14 02/10/2023    CREATININE 1.01 (H) 02/10/2023     02/10/2023    K 3.9 02/10/2023     02/10/2023    CO2 22.6 02/10/2023    CALCIUM 9.4 02/10/2023    PROTEINTOT 7.1 02/10/2023    ALBUMIN 4.1 02/10/2023    BILITOT 0.5 02/10/2023    ALKPHOS 82 02/10/2023    AST 16 02/10/2023    ALT 12 02/10/2023     Lab Results   Component Value Date     08/12/2019    FERRITIN 149.50 08/26/2022    [id number] 722 02/10/2023    FOLATE >20.00 02/10/2023     Lab Results   Component Value Date    IRON 37 08/26/2022    LABIRON 7 (L) 08/26/2022    TRANSFERRIN 362 (H) 08/26/2022    TIBC 539 (H) 08/26/2022     Lab Results   Component Value Date     08/12/2019    FERRITIN 149.50 08/26/2022    [id number] 722 02/10/2023    FOLATE >20.00 02/10/2023     No results found for: PSA, CEA, AFP, ,     IR Port Study Including Fluoro    Result Date: 4/4/2023    1. Left subclavian port terminates in the distal SVC. 2. The proximal catheter just distal to the port, forms a loop.  This was not seen on the prior x-ray dated 5/27/2021. 3. No extravasation of contrast from the port or tubing. 4. There is likely a fibrin sheath at the tip of the catheter extending for at least 1 centimeter with retrograde flow followed by antegrade flow of contrast.  Small thrombus thought less likely.      ROGER LAUGHLIN MD       Electronically Signed and Approved By: ROGER LAUGHLIN MD on 4/04/2023 at 15:19                    Assessment and Plan:  Diagnoses and all orders for this  visit:    1. CVID (common variable immunodeficiency) (Primary)    CVID and tolerating IVIG treatment well every 6 weeks. CBC reviewed. OK for treatment on 6/20/2023 as scheduled. Last IgG level is above 500.     Follow up in 6 weeks with next treatment visit with Dr. Montes De Oca.       I spent 20 minutes caring for Francine on this date of service. This time includes time spent by me in the following activities:preparing for the visit, reviewing tests, performing a medically appropriate examination and/or evaluation , counseling and educating the patient/family/caregiver, referring and communicating with other health care professionals , documenting information in the medical record, and independently interpreting results and communicating that information with the patient/family/caregiver    Patient Follow Up: 6 weeks with dr. Montes De Oca and IVIG    Patient was given instructions and counseling regarding her condition or for health maintenance advice. Please see specific information pulled into the AVS if appropriate.     Belia Saez, APRN    6/16/2023    .tob

## 2023-06-16 NOTE — ADDENDUM NOTE
Encounter addended by: Rema Pena RN on: 6/16/2023 11:10 AM   Actions taken: Charge Capture section accepted

## 2023-07-25 RX ORDER — DIPHENHYDRAMINE HYDROCHLORIDE 50 MG/ML
50 INJECTION INTRAMUSCULAR; INTRAVENOUS AS NEEDED
Status: CANCELLED | OUTPATIENT
Start: 2023-08-01

## 2023-07-25 RX ORDER — DIPHENHYDRAMINE HCL 25 MG
25 CAPSULE ORAL ONCE
Status: CANCELLED | OUTPATIENT
Start: 2023-08-01

## 2023-07-25 RX ORDER — MEPERIDINE HYDROCHLORIDE 25 MG/ML
25 INJECTION INTRAMUSCULAR; INTRAVENOUS; SUBCUTANEOUS
Status: CANCELLED | OUTPATIENT
Start: 2023-08-01

## 2023-07-25 RX ORDER — FAMOTIDINE 10 MG/ML
20 INJECTION, SOLUTION INTRAVENOUS AS NEEDED
Status: CANCELLED | OUTPATIENT
Start: 2023-08-01

## 2023-07-25 RX ORDER — FAMOTIDINE 10 MG/ML
20 INJECTION, SOLUTION INTRAVENOUS ONCE
Status: CANCELLED | OUTPATIENT
Start: 2023-08-01

## 2023-07-28 ENCOUNTER — OFFICE VISIT (OUTPATIENT)
Dept: ONCOLOGY | Facility: HOSPITAL | Age: 33
End: 2023-07-28
Payer: MEDICARE

## 2023-07-28 ENCOUNTER — HOSPITAL ENCOUNTER (OUTPATIENT)
Dept: ONCOLOGY | Facility: HOSPITAL | Age: 33
Discharge: HOME | End: 2023-07-28
Payer: MEDICARE

## 2023-07-28 VITALS
TEMPERATURE: 97.3 F | HEIGHT: 55 IN | WEIGHT: 144.84 LBS | HEART RATE: 75 BPM | BODY MASS INDEX: 33.52 KG/M2 | OXYGEN SATURATION: 100 % | DIASTOLIC BLOOD PRESSURE: 59 MMHG | RESPIRATION RATE: 18 BRPM | SYSTOLIC BLOOD PRESSURE: 103 MMHG

## 2023-07-28 DIAGNOSIS — D83.9 CVID (COMMON VARIABLE IMMUNODEFICIENCY): Primary | ICD-10-CM

## 2023-07-28 DIAGNOSIS — Z45.2 ENCOUNTER FOR ADJUSTMENT OR MANAGEMENT OF VASCULAR ACCESS DEVICE: ICD-10-CM

## 2023-07-28 LAB
BASOPHILS # BLD AUTO: 0 10*3/MM3 (ref 0–0.2)
BASOPHILS NFR BLD AUTO: 0 % (ref 0–1.5)
DEPRECATED RDW RBC AUTO: 44.4 FL (ref 37–54)
EOSINOPHIL # BLD AUTO: 0.04 10*3/MM3 (ref 0–0.4)
EOSINOPHIL NFR BLD AUTO: 0.4 % (ref 0.3–6.2)
ERYTHROCYTE [DISTWIDTH] IN BLOOD BY AUTOMATED COUNT: 13.3 % (ref 12.3–15.4)
HCT VFR BLD AUTO: 34.2 % (ref 34–46.6)
HGB BLD-MCNC: 11.4 G/DL (ref 12–15.9)
IGA1 MFR SER: <50 MG/DL (ref 70–400)
IGG1 SER-MCNC: 526 MG/DL (ref 700–1600)
IGM SERPL-MCNC: 69 MG/DL (ref 40–230)
IMM GRANULOCYTES # BLD AUTO: 0.03 10*3/MM3 (ref 0–0.05)
IMM GRANULOCYTES NFR BLD AUTO: 0.3 % (ref 0–0.5)
LYMPHOCYTES # BLD AUTO: 2.4 10*3/MM3 (ref 0.7–3.1)
LYMPHOCYTES NFR BLD AUTO: 22.5 % (ref 19.6–45.3)
MCH RBC QN AUTO: 30.1 PG (ref 26.6–33)
MCHC RBC AUTO-ENTMCNC: 33.3 G/DL (ref 31.5–35.7)
MCV RBC AUTO: 90.2 FL (ref 79–97)
MONOCYTES # BLD AUTO: 0.52 10*3/MM3 (ref 0.1–0.9)
MONOCYTES NFR BLD AUTO: 4.9 % (ref 5–12)
NEUTROPHILS NFR BLD AUTO: 7.66 10*3/MM3 (ref 1.7–7)
NEUTROPHILS NFR BLD AUTO: 71.9 % (ref 42.7–76)
PLATELET # BLD AUTO: 266 10*3/MM3 (ref 140–450)
PMV BLD AUTO: 9.2 FL (ref 6–12)
RBC # BLD AUTO: 3.79 10*6/MM3 (ref 3.77–5.28)
WBC NRBC COR # BLD: 10.65 10*3/MM3 (ref 3.4–10.8)

## 2023-07-28 PROCEDURE — 85025 COMPLETE CBC W/AUTO DIFF WBC: CPT | Performed by: INTERNAL MEDICINE

## 2023-07-28 PROCEDURE — 25010000002 HEPARIN LOCK FLUSH PER 10 UNITS: Performed by: INTERNAL MEDICINE

## 2023-07-28 PROCEDURE — 82784 ASSAY IGA/IGD/IGG/IGM EACH: CPT | Performed by: INTERNAL MEDICINE

## 2023-07-28 PROCEDURE — G0463 HOSPITAL OUTPT CLINIC VISIT: HCPCS | Performed by: NURSE PRACTITIONER

## 2023-07-28 PROCEDURE — 36591 DRAW BLOOD OFF VENOUS DEVICE: CPT

## 2023-07-28 RX ORDER — HEPARIN SODIUM (PORCINE) LOCK FLUSH IV SOLN 100 UNIT/ML 100 UNIT/ML
500 SOLUTION INTRAVENOUS AS NEEDED
Status: DISCONTINUED | OUTPATIENT
Start: 2023-07-28 | End: 2023-07-29 | Stop reason: HOSPADM

## 2023-07-28 RX ORDER — SODIUM CHLORIDE 0.9 % (FLUSH) 0.9 %
20 SYRINGE (ML) INJECTION AS NEEDED
Status: DISCONTINUED | OUTPATIENT
Start: 2023-07-28 | End: 2023-07-29 | Stop reason: HOSPADM

## 2023-07-28 RX ORDER — SODIUM CHLORIDE 0.9 % (FLUSH) 0.9 %
20 SYRINGE (ML) INJECTION AS NEEDED
OUTPATIENT
Start: 2023-07-28

## 2023-07-28 RX ORDER — HEPARIN SODIUM (PORCINE) LOCK FLUSH IV SOLN 100 UNIT/ML 100 UNIT/ML
500 SOLUTION INTRAVENOUS AS NEEDED
OUTPATIENT
Start: 2023-07-28

## 2023-07-28 RX ADMIN — Medication 20 ML: at 09:43

## 2023-07-28 RX ADMIN — HEPARIN SODIUM (PORCINE) LOCK FLUSH IV SOLN 100 UNIT/ML 500 UNITS: 100 SOLUTION at 09:43

## 2023-07-28 NOTE — PROGRESS NOTES
Chief Complaint/Reason for Referral:  CVID (common variable immunodeficiency)    Arnie Rucker MD Kindervater, Kasey, APRN    Subjective    History of Present Illness    Ms. Francine Preciado presents with her mother for IVIG decoupled visit. She has CVID. Mom reports daughter has been having issues with the right ear and using ear drops and is doing better.     Receives IVIG every 42 days. She is scheduled for treatment on 8/1/23.   Lab work shows CBC with mild anemia noted on hemoglobin. WBC is normal at 10.65, platelet count normal.     Denies any fevers, chills or cough.       Oncology/Hematology History Overview Note   CVID:   -Diagnosed by Immunologist 8/20/12  -presented with frequent/severe infections             Review of Systems   Constitutional:  Negative for appetite change, diaphoresis, fatigue, fever, unexpected weight gain and unexpected weight loss.   HENT:  Negative for hearing loss, mouth sores, sore throat, swollen glands, trouble swallowing and voice change.    Eyes:  Negative for blurred vision.   Respiratory:  Negative for cough, shortness of breath and wheezing.    Cardiovascular:  Negative for chest pain and palpitations.   Gastrointestinal:  Negative for abdominal pain, blood in stool, constipation, diarrhea, nausea and vomiting.   Endocrine: Negative for cold intolerance and heat intolerance.   Genitourinary:  Negative for difficulty urinating, dysuria, frequency, hematuria and urinary incontinence.   Musculoskeletal:  Negative for arthralgias, back pain and myalgias.   Skin:  Negative for rash, skin lesions and wound.   Neurological:  Negative for dizziness, seizures, weakness, numbness and headache.   Hematological:  Does not bruise/bleed easily.   Psychiatric/Behavioral:  Negative for depressed mood. The patient is not nervous/anxious.    All other systems reviewed and are negative.    Current Outpatient Medications on File Prior to Visit   Medication Sig Dispense Refill    atenolol  (TENORMIN) 25 MG tablet TAKE ONE TABLET BY MOUTH DAILY 90 tablet 1    atorvastatin (LIPITOR) 10 MG tablet TAKE ONE TABLET BY MOUTH DAILY 90 tablet 1    azelastine (ASTELIN) 0.1 % nasal spray       EPINEPHrine (EPIPEN) 0.3 MG/0.3ML solution auto-injector injection 0.3 mL.      famotidine (PEPCID) 10 MG tablet Take 2 tablets by mouth Daily.      fenofibrate 160 MG tablet TAKE ONE TABLET BY MOUTH DAILY 90 tablet 1    fluconazole (Diflucan) 150 MG tablet Take 1 tablet by mouth Every 72 (Seventy-Two) Hours. 2 tablet 0    fluticasone (FLONASE) 50 MCG/ACT nasal spray 2 sprays into the nostril(s) as directed by provider Daily. 16 g 11    levocetirizine (XYZAL) 5 MG tablet       levothyroxine (SYNTHROID, LEVOTHROID) 50 MCG tablet TAKE ONE TABLET BY MOUTH DAILY 90 tablet 1    linaclotide (Linzess) 145 MCG capsule capsule Take 1 capsule by mouth Every Morning Before Breakfast. 90 capsule 1    montelukast (SINGULAIR) 10 MG tablet       multivitamin with minerals tablet tablet Take 1 tablet by mouth Daily.      norgestimate-ethinyl estradiol (Ortho Tri-Cyclen Lo) 0.18/0.215/0.25 MG-25 MCG per tablet Take 1 tablet by mouth Daily. 28 tablet 12    ondansetron (Zofran) 4 MG tablet Take 1 tablet by mouth Every 8 (Eight) Hours As Needed for Nausea or Vomiting. 90 tablet 1    Tobramycin-Dexamethasone (TobraDex ST) 0.3-0.05 % suspension 3-4 drops in left ear twice a day 5 mL 3     Current Facility-Administered Medications on File Prior to Visit   Medication Dose Route Frequency Provider Last Rate Last Admin    cyanocobalamin injection 1,000 mcg  1,000 mcg Intramuscular Q28 Days Arnie Rucker MD   1,000 mcg at 07/14/23 1401    heparin injection 500 Units  500 Units Intravenous PRN Josselin Montes De Oca MD PhD   500 Units at 07/28/23 0943    sodium chloride 0.9 % flush 20 mL  20 mL Intravenous PRN Josselin Montes De Oca MD PhD   20 mL at 07/28/23 0943       Allergies   Allergen Reactions    Cefuroxime Axetil Unknown - Low Severity    Neosporin  [Bacitracin-Polymyxin B] Unknown - Low Severity     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     Central perforation of tympanic membrane of right ear     Chronic mastoiditis     COVID-19 vaccine series completed     Heart murmur     Hypertension     Kabuki make-up syndrome     Mitral valve prolapse     Mixed conductive and sensorineural hearing loss of both ears     Otorrhea, left     Recurrent otitis media     Skin disease     PSORIASIS/ECXEMA     Past Surgical History:   Procedure Laterality Date    MULTIPLE TOOTH EXTRACTIONS      NO PAST SURGERIES       Social History     Socioeconomic History    Marital status: Single   Tobacco Use    Smoking status: Never     Passive exposure: Yes    Smokeless tobacco: Never    Tobacco comments:     Daily exposure to 2nd hand smoke   Vaping Use    Vaping Use: Never used   Substance and Sexual Activity    Alcohol use: Not Currently     Comment: DENIES    Drug use: Never    Sexual activity: Never     Family History   Problem Relation Age of Onset    Leukemia Father     Heart disease Other      Immunization History   Administered Date(s) Administered    COVID-19 (PFIZER) Purple Cap Monovalent 04/04/2021, 04/25/2021    Flu Vaccine Quad PF >36MO 11/04/2013, 10/24/2014, 11/02/2015, 10/13/2016, 10/19/2017    FluLaval/Fluzone >6mos 11/04/2013, 10/24/2014, 11/02/2015, 10/13/2016, 10/19/2017, 11/23/2021, 10/11/2022    Fluzone Quad >6mos (Multi-dose) 11/04/2013, 11/02/2015, 10/19/2017, 10/29/2019    Hep B, Adolescent or Pediatric 11/24/1998, 01/14/1999, 06/02/1999    Influenza LAIV (Nasal) 10/13/2016, 10/14/2020    Influenza TIV (IM) 09/17/2009, 11/07/2018    Influenza, Unspecified 10/14/2020, 10/11/2022    MMR 08/19/1996    Pneumococcal Polysaccharide (PPSV23) 10/04/2012, 04/10/2014    influenza Split 10/02/2018       Tobacco Use: Medium Risk    Smoking Tobacco Use: Never    Smokeless Tobacco Use: Never    Passive Exposure: Yes  "      Objective     Physical Exam  Vitals and nursing note reviewed.   Constitutional:       Appearance: She is obese.   HENT:      Head: Normocephalic.      Nose: Nose normal.      Mouth/Throat:      Mouth: Mucous membranes are moist.   Eyes:      Pupils: Pupils are equal, round, and reactive to light.   Cardiovascular:      Rate and Rhythm: Normal rate and regular rhythm.      Pulses: Normal pulses.      Heart sounds: Normal heart sounds.   Pulmonary:      Effort: Pulmonary effort is normal.      Breath sounds: Normal breath sounds.   Abdominal:      General: Bowel sounds are normal.      Palpations: Abdomen is soft.   Musculoskeletal:         General: Normal range of motion.      Cervical back: Normal range of motion and neck supple.   Skin:     General: Skin is warm and dry.      Capillary Refill: Capillary refill takes less than 2 seconds.   Neurological:      General: No focal deficit present.      Mental Status: She is alert and oriented to person, place, and time.   Psychiatric:         Mood and Affect: Mood normal.         Behavior: Behavior normal.         Thought Content: Thought content normal.         Judgment: Judgment normal.       Vitals:    07/28/23 1014   BP: 103/59   Pulse: 75   Resp: 18   Temp: 97.3 °F (36.3 °C)   SpO2: 100%   Weight: 65.7 kg (144 lb 13.5 oz)   Height: 139.7 cm (55\")   PainSc: 0-No pain       Wt Readings from Last 3 Encounters:   07/28/23 65.7 kg (144 lb 13.5 oz)   06/20/23 65 kg (143 lb 4.8 oz)   06/16/23 64.4 kg (142 lb)                 ECOG score: 0         ECOG: (0) Fully Active - Able to Carry On All Pre-disease Performance Without Restriction  Fall Risk Assessment was completed, and patient is at low risk for falls.  PHQ-9 Total Score:         The patient is not  experiencing fatigue. Fatigue score: 0    PT/OT Functional Screening: PT fx screen : No needs identified  Speech Functional Screening: Speech fx screen : No needs identified  Rehab to be ordered: Rehab to be " ordered : No needs identified        Result Review :  The following data was reviewed by: NO Nur on 07/28/2023:  Lab Results   Component Value Date    HGB 11.4 (L) 07/28/2023    HCT 34.2 07/28/2023    MCV 90.2 07/28/2023     07/28/2023    WBC 10.65 07/28/2023    NEUTROABS 7.66 (H) 07/28/2023    LYMPHSABS 2.40 07/28/2023    MONOSABS 0.52 07/28/2023    EOSABS 0.04 07/28/2023    BASOSABS 0.00 07/28/2023     Lab Results   Component Value Date    GLUCOSE 72 02/10/2023    BUN 14 02/10/2023    CREATININE 1.01 (H) 02/10/2023     02/10/2023    K 3.9 02/10/2023     02/10/2023    CO2 22.6 02/10/2023    CALCIUM 9.4 02/10/2023    PROTEINTOT 7.1 02/10/2023    ALBUMIN 4.1 02/10/2023    BILITOT 0.5 02/10/2023    ALKPHOS 82 02/10/2023    AST 16 02/10/2023    ALT 12 02/10/2023     Lab Results   Component Value Date     08/12/2019    FERRITIN 149.50 08/26/2022    [id number] 722 02/10/2023    FOLATE >20.00 02/10/2023     Lab Results   Component Value Date    IRON 37 08/26/2022    LABIRON 7 (L) 08/26/2022    TRANSFERRIN 362 (H) 08/26/2022    TIBC 539 (H) 08/26/2022     Lab Results   Component Value Date     08/12/2019    FERRITIN 149.50 08/26/2022    [id number] 722 02/10/2023    FOLATE >20.00 02/10/2023     No results found for: PSA, CEA, AFP, ,     IR Port Study Including Fluoro    Result Date: 4/4/2023    1. Left subclavian port terminates in the distal SVC. 2. The proximal catheter just distal to the port, forms a loop.  This was not seen on the prior x-ray dated 5/27/2021. 3. No extravasation of contrast from the port or tubing. 4. There is likely a fibrin sheath at the tip of the catheter extending for at least 1 centimeter with retrograde flow followed by antegrade flow of contrast.  Small thrombus thought less likely.      ROGER LAUGHLIN MD       Electronically Signed and Approved By: ROGER LAUGHLIN MD on 4/04/2023 at 15:19                    Assessment and  Plan:  Diagnoses and all orders for this visit:    1. CVID (common variable immunodeficiency) [D83.9 (ICD-10-CM)] (Primary)      CVID history and receives IVIG every 42 days. IgG levels have been above 500 consistently and adequate with treatment.     Denies any fevers, chills currently.     CBC reviewed and OK for treatment on 8/1/2023.     Return in 6 weeks for next scheduled cycle and follow up with MD / NP.         Patient Follow Up: every 6 weeks for IVIG treatment.     Patient was given instructions and counseling regarding her condition or for health maintenance advice. Please see specific information pulled into the AVS if appropriate.     Belia Saez, APRN    7/28/2023    .tob

## 2023-08-01 ENCOUNTER — HOSPITAL ENCOUNTER (OUTPATIENT)
Dept: ONCOLOGY | Facility: HOSPITAL | Age: 33
Discharge: HOME | End: 2023-08-01
Admitting: INTERNAL MEDICINE
Payer: MEDICARE

## 2023-08-01 VITALS
BODY MASS INDEX: 33.2 KG/M2 | HEART RATE: 75 BPM | DIASTOLIC BLOOD PRESSURE: 57 MMHG | RESPIRATION RATE: 16 BRPM | TEMPERATURE: 98.1 F | WEIGHT: 142.86 LBS | SYSTOLIC BLOOD PRESSURE: 123 MMHG | OXYGEN SATURATION: 100 %

## 2023-08-01 DIAGNOSIS — Z45.2 ENCOUNTER FOR ADJUSTMENT OR MANAGEMENT OF VASCULAR ACCESS DEVICE: Primary | ICD-10-CM

## 2023-08-01 DIAGNOSIS — D83.9 CVID (COMMON VARIABLE IMMUNODEFICIENCY): ICD-10-CM

## 2023-08-01 PROCEDURE — 96366 THER/PROPH/DIAG IV INF ADDON: CPT

## 2023-08-01 PROCEDURE — 25010000002 IMMUNE GLOBULIN (HUMAN) 10 GM/100ML SOLUTION: Performed by: INTERNAL MEDICINE

## 2023-08-01 PROCEDURE — 25010000002 HEPARIN LOCK FLUSH PER 10 UNITS: Performed by: INTERNAL MEDICINE

## 2023-08-01 PROCEDURE — 96365 THER/PROPH/DIAG IV INF INIT: CPT

## 2023-08-01 RX ORDER — SODIUM CHLORIDE 9 MG/ML
250 INJECTION, SOLUTION INTRAVENOUS ONCE
Status: COMPLETED | OUTPATIENT
Start: 2023-08-01 | End: 2023-08-01

## 2023-08-01 RX ORDER — SODIUM CHLORIDE 0.9 % (FLUSH) 0.9 %
20 SYRINGE (ML) INJECTION AS NEEDED
OUTPATIENT
Start: 2023-08-01

## 2023-08-01 RX ORDER — SODIUM CHLORIDE 0.9 % (FLUSH) 0.9 %
20 SYRINGE (ML) INJECTION AS NEEDED
Status: DISCONTINUED | OUTPATIENT
Start: 2023-08-01 | End: 2023-08-02 | Stop reason: HOSPADM

## 2023-08-01 RX ORDER — HEPARIN SODIUM (PORCINE) LOCK FLUSH IV SOLN 100 UNIT/ML 100 UNIT/ML
500 SOLUTION INTRAVENOUS AS NEEDED
Status: DISCONTINUED | OUTPATIENT
Start: 2023-08-01 | End: 2023-08-02 | Stop reason: HOSPADM

## 2023-08-01 RX ORDER — HEPARIN SODIUM (PORCINE) LOCK FLUSH IV SOLN 100 UNIT/ML 100 UNIT/ML
500 SOLUTION INTRAVENOUS AS NEEDED
OUTPATIENT
Start: 2023-08-01

## 2023-08-01 RX ADMIN — IMMUNE GLOBULIN INFUSION (HUMAN) 10 G: 100 INJECTION, SOLUTION INTRAVENOUS; SUBCUTANEOUS at 10:08

## 2023-08-01 RX ADMIN — HEPARIN SODIUM (PORCINE) LOCK FLUSH IV SOLN 100 UNIT/ML 500 UNITS: 100 SOLUTION at 11:56

## 2023-08-01 RX ADMIN — SODIUM CHLORIDE 250 ML: 9 INJECTION, SOLUTION INTRAVENOUS at 10:00

## 2023-08-01 RX ADMIN — Medication 20 ML: at 11:56

## 2023-08-14 ENCOUNTER — CLINICAL SUPPORT (OUTPATIENT)
Dept: FAMILY MEDICINE CLINIC | Facility: CLINIC | Age: 33
End: 2023-08-14
Payer: MEDICARE

## 2023-08-14 DIAGNOSIS — E53.8 B12 DEFICIENCY: Primary | ICD-10-CM

## 2023-08-14 RX ADMIN — CYANOCOBALAMIN 1000 MCG: 1000 INJECTION, SOLUTION INTRAMUSCULAR; SUBCUTANEOUS at 15:46

## 2023-08-28 ENCOUNTER — OFFICE VISIT (OUTPATIENT)
Dept: FAMILY MEDICINE CLINIC | Facility: CLINIC | Age: 33
End: 2023-08-28
Payer: MEDICARE

## 2023-08-28 VITALS
DIASTOLIC BLOOD PRESSURE: 68 MMHG | HEIGHT: 55 IN | BODY MASS INDEX: 33.28 KG/M2 | TEMPERATURE: 98.1 F | SYSTOLIC BLOOD PRESSURE: 102 MMHG | OXYGEN SATURATION: 99 % | HEART RATE: 83 BPM | WEIGHT: 143.8 LBS

## 2023-08-28 DIAGNOSIS — Z01.89 ROUTINE LAB DRAW: ICD-10-CM

## 2023-08-28 DIAGNOSIS — R53.83 FATIGUE, UNSPECIFIED TYPE: ICD-10-CM

## 2023-08-28 DIAGNOSIS — H65.112 NON-RECURRENT ACUTE ALLERGIC OTITIS MEDIA OF LEFT EAR: Primary | ICD-10-CM

## 2023-08-28 DIAGNOSIS — E53.8 VITAMIN B12 DEFICIENCY: ICD-10-CM

## 2023-08-28 DIAGNOSIS — E03.9 ACQUIRED HYPOTHYROIDISM: ICD-10-CM

## 2023-08-28 DIAGNOSIS — Q89.8: ICD-10-CM

## 2023-08-28 DIAGNOSIS — E78.2 MIXED HYPERLIPIDEMIA: ICD-10-CM

## 2023-08-28 RX ORDER — ATORVASTATIN CALCIUM 10 MG/1
10 TABLET, FILM COATED ORAL DAILY
Qty: 90 TABLET | Refills: 1 | Status: SHIPPED | OUTPATIENT
Start: 2023-08-28

## 2023-08-28 RX ORDER — AMOXICILLIN 500 MG/1
1000 CAPSULE ORAL 2 TIMES DAILY
Qty: 28 CAPSULE | Refills: 0 | Status: SHIPPED | OUTPATIENT
Start: 2023-08-28

## 2023-08-28 RX ORDER — FENOFIBRATE 160 MG/1
160 TABLET ORAL DAILY
Qty: 90 TABLET | Refills: 1 | Status: SHIPPED | OUTPATIENT
Start: 2023-08-28

## 2023-08-28 NOTE — PROGRESS NOTES
"Francine Preciado presents to [facility] who presents to the clinic for 6-month follow-up.      History of Present Illness  This is a 33-year-old female who presents to the clinic for 6-month follow-up.    No acute complaints, does continue to receive IV infusions for her C VID.  Gets these monthly.  No issues with these.    Hypothyroidism: Is due for blood work, remains on levothyroxine.    Does have some issues with some worsening left ear pain.  States that it started a few days ago, she does have a lot of issues with sinuses, and states that those have been worse recently as well.  Denies any fever/chills/body aches.  Denies any other symptoms.    Is due for repeat blood work, will order prior to next visit.  Otherwise no other acute complaints today.    Is due for pneumonia vaccine, would like to hold off and wait for the Prevnar 20.    The following portions of the patient's history were personally reviewed and updated as appropriate: allergies, current medications, past medical history, past surgical history, past family history, and past social history.       Objective   Vital Signs:   /68 (BP Location: Left arm, Patient Position: Sitting, Cuff Size: Adult)   Pulse 83   Temp 98.1 øF (36.7 øC) (Temporal)   Ht 139.7 cm (55\")   Wt 65.2 kg (143 lb 12.8 oz)   SpO2 99%   BMI 33.42 kg/mý     Body mass index is 33.42 kg/mý.    All labs, imaging, test results, and specialty provider notes reviewed with patient.     Physical Exam  Vitals reviewed.   Constitutional:       Appearance: Normal appearance.   Cardiovascular:      Rate and Rhythm: Normal rate and regular rhythm.      Pulses: Normal pulses.      Heart sounds: Normal heart sounds.   Pulmonary:      Effort: Pulmonary effort is normal.      Breath sounds: Normal breath sounds.   Neurological:      General: No focal deficit present.      Mental Status: She is alert and oriented to person, place, and time.    "       Assessment and Plan:  Diagnoses and all orders for this visit:    1. Non-recurrent acute allergic otitis media of left ear (Primary)  Comments:  Treat with amoxicillin.  Stay on allergy medications.  Orders:  -     amoxicillin (AMOXIL) 500 MG capsule; Take 2 capsules by mouth 2 (Two) Times a Day.  Dispense: 28 capsule; Refill: 0    2. Mixed hyperlipidemia  Comments:  Repeat blood work.  Continue on atorvastatin and fenofibrate.  Orders:  -     atorvastatin (LIPITOR) 10 MG tablet; Take 1 tablet by mouth Daily.  Dispense: 90 tablet; Refill: 1  -     fenofibrate 160 MG tablet; Take 1 tablet by mouth Daily.  Dispense: 90 tablet; Refill: 1  -     Lipid Panel; Future    3. Kabuki make-up syndrome    4. Acquired hypothyroidism  Comments:  Repeat TSH, continue on levothyroxine at current dose.  Orders:  -     TSH Rfx On Abnormal To Free T4; Future    5. Vitamin B12 deficiency  Comments:  Continue B12 injections, on for life, repeat B12 level.  Orders:  -     Vitamin B12 & Folate; Future    6. Routine lab draw  -     CBC Auto Differential; Future  -     Comprehensive Metabolic Panel; Future  -     Urinalysis With Culture If Indicated -; Future    7. Fatigue, unspecified type  -     CBC Auto Differential; Future          Follow Up:  Return in about 6 months (around 2/28/2024) for Medicare Wellness.    Patient was given instructions and counseling regarding her condition or for health maintenance advice. Please see specific information pulled into the AVS if appropriate.

## 2023-09-08 ENCOUNTER — HOSPITAL ENCOUNTER (OUTPATIENT)
Dept: ONCOLOGY | Facility: HOSPITAL | Age: 33
Discharge: HOME | End: 2023-09-08
Payer: MEDICARE

## 2023-09-08 ENCOUNTER — OFFICE VISIT (OUTPATIENT)
Dept: ONCOLOGY | Facility: HOSPITAL | Age: 33
End: 2023-09-08
Payer: MEDICARE

## 2023-09-08 VITALS
TEMPERATURE: 98.7 F | SYSTOLIC BLOOD PRESSURE: 121 MMHG | BODY MASS INDEX: 33.56 KG/M2 | OXYGEN SATURATION: 100 % | HEART RATE: 85 BPM | WEIGHT: 144.4 LBS | RESPIRATION RATE: 18 BRPM | DIASTOLIC BLOOD PRESSURE: 55 MMHG

## 2023-09-08 DIAGNOSIS — D83.9 CVID (COMMON VARIABLE IMMUNODEFICIENCY): Primary | ICD-10-CM

## 2023-09-08 DIAGNOSIS — Z45.2 ENCOUNTER FOR ADJUSTMENT OR MANAGEMENT OF VASCULAR ACCESS DEVICE: ICD-10-CM

## 2023-09-08 LAB
BASOPHILS # BLD AUTO: 0.02 10*3/MM3 (ref 0–0.2)
BASOPHILS NFR BLD AUTO: 0.2 % (ref 0–1.5)
DEPRECATED RDW RBC AUTO: 43.9 FL (ref 37–54)
EOSINOPHIL # BLD AUTO: 0.1 10*3/MM3 (ref 0–0.4)
EOSINOPHIL NFR BLD AUTO: 0.9 % (ref 0.3–6.2)
ERYTHROCYTE [DISTWIDTH] IN BLOOD BY AUTOMATED COUNT: 13.1 % (ref 12.3–15.4)
HCT VFR BLD AUTO: 36.1 % (ref 34–46.6)
HGB BLD-MCNC: 11.9 G/DL (ref 12–15.9)
IGA1 MFR SER: <50 MG/DL (ref 70–400)
IGG1 SER-MCNC: 544 MG/DL (ref 700–1600)
IGM SERPL-MCNC: 76 MG/DL (ref 40–230)
IMM GRANULOCYTES # BLD AUTO: 0.03 10*3/MM3 (ref 0–0.05)
IMM GRANULOCYTES NFR BLD AUTO: 0.3 % (ref 0–0.5)
LYMPHOCYTES # BLD AUTO: 2.6 10*3/MM3 (ref 0.7–3.1)
LYMPHOCYTES NFR BLD AUTO: 22.9 % (ref 19.6–45.3)
MCH RBC QN AUTO: 29.6 PG (ref 26.6–33)
MCHC RBC AUTO-ENTMCNC: 33 G/DL (ref 31.5–35.7)
MCV RBC AUTO: 89.8 FL (ref 79–97)
MONOCYTES # BLD AUTO: 0.63 10*3/MM3 (ref 0.1–0.9)
MONOCYTES NFR BLD AUTO: 5.6 % (ref 5–12)
NEUTROPHILS NFR BLD AUTO: 7.96 10*3/MM3 (ref 1.7–7)
NEUTROPHILS NFR BLD AUTO: 70.1 % (ref 42.7–76)
PLATELET # BLD AUTO: 250 10*3/MM3 (ref 140–450)
PMV BLD AUTO: 9.3 FL (ref 6–12)
RBC # BLD AUTO: 4.02 10*6/MM3 (ref 3.77–5.28)
WBC NRBC COR # BLD: 11.34 10*3/MM3 (ref 3.4–10.8)

## 2023-09-08 PROCEDURE — 36591 DRAW BLOOD OFF VENOUS DEVICE: CPT

## 2023-09-08 PROCEDURE — 82784 ASSAY IGA/IGD/IGG/IGM EACH: CPT | Performed by: INTERNAL MEDICINE

## 2023-09-08 PROCEDURE — 85025 COMPLETE CBC W/AUTO DIFF WBC: CPT | Performed by: INTERNAL MEDICINE

## 2023-09-08 PROCEDURE — 25010000002 HEPARIN LOCK FLUSH PER 10 UNITS: Performed by: INTERNAL MEDICINE

## 2023-09-08 PROCEDURE — G0463 HOSPITAL OUTPT CLINIC VISIT: HCPCS | Performed by: INTERNAL MEDICINE

## 2023-09-08 RX ORDER — HEPARIN SODIUM (PORCINE) LOCK FLUSH IV SOLN 100 UNIT/ML 100 UNIT/ML
500 SOLUTION INTRAVENOUS AS NEEDED
Status: DISCONTINUED | OUTPATIENT
Start: 2023-09-08 | End: 2023-09-09 | Stop reason: HOSPADM

## 2023-09-08 RX ORDER — SODIUM CHLORIDE 0.9 % (FLUSH) 0.9 %
20 SYRINGE (ML) INJECTION AS NEEDED
OUTPATIENT
Start: 2023-09-08

## 2023-09-08 RX ORDER — SODIUM CHLORIDE 0.9 % (FLUSH) 0.9 %
20 SYRINGE (ML) INJECTION AS NEEDED
Status: DISCONTINUED | OUTPATIENT
Start: 2023-09-08 | End: 2023-09-09 | Stop reason: HOSPADM

## 2023-09-08 RX ORDER — HEPARIN SODIUM (PORCINE) LOCK FLUSH IV SOLN 100 UNIT/ML 100 UNIT/ML
500 SOLUTION INTRAVENOUS AS NEEDED
OUTPATIENT
Start: 2023-09-08

## 2023-09-08 RX ADMIN — Medication 20 ML: at 09:45

## 2023-09-08 RX ADMIN — HEPARIN SODIUM (PORCINE) LOCK FLUSH IV SOLN 100 UNIT/ML 500 UNITS: 100 SOLUTION at 09:45

## 2023-09-08 NOTE — PROGRESS NOTES
Patient  Francine Preciado    Location  [facility]    Chief Complaint  common variable immunodefiency    Referring Provider: Arnie Rucker MD  PCP: Aggie Chance APRN    Subjective          Oncology/Hematology History Overview Note   CVID:   -Diagnosed by Immunologist 8/20/12  -presented with frequent/severe infections             History of Present Illness  Patient comes in today for her next dose of IVIG.  She reports no toxicity.  She did have a recent ear infection.  This is the first in a long time.  She has had no other infections.    Review of Systems   Constitutional:  Positive for fatigue. Negative for appetite change, diaphoresis, fever, unexpected weight gain and unexpected weight loss.   HENT:  Positive for ear pain. Negative for hearing loss, mouth sores, sore throat, swollen glands, trouble swallowing and voice change.    Eyes:  Negative for blurred vision.   Respiratory:  Negative for cough, shortness of breath and wheezing.    Cardiovascular:  Positive for chest pain (Where port is placed). Negative for palpitations.   Gastrointestinal:  Negative for abdominal pain, blood in stool, constipation, diarrhea, nausea and vomiting.   Endocrine: Negative for cold intolerance and heat intolerance.   Genitourinary:  Negative for difficulty urinating, dysuria, frequency, hematuria and urinary incontinence.   Musculoskeletal:  Negative for arthralgias, back pain and myalgias.   Skin:  Negative for rash, skin lesions and wound.   Neurological:  Negative for dizziness, seizures, weakness, numbness and headache.   Hematological:  Does not bruise/bleed easily.   Psychiatric/Behavioral:  Negative for depressed mood. The patient is not nervous/anxious.    All other systems reviewed and are negative.    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     Central perforation of tympanic membrane of right ear     Chronic  mastoiditis     COVID-19 vaccine series completed     Heart murmur     Hypertension     Kabuki make-up syndrome     Mitral valve prolapse     Mixed conductive and sensorineural hearing loss of both ears     Otorrhea, left     Recurrent otitis media     Skin disease     PSORIASIS/ECXEMA     Past Surgical History:   Procedure Laterality Date    MULTIPLE TOOTH EXTRACTIONS      NO PAST SURGERIES       Social History     Socioeconomic History    Marital status: Single   Tobacco Use    Smoking status: Never     Passive exposure: Yes    Smokeless tobacco: Never    Tobacco comments:     Daily exposure to 2nd hand smoke   Vaping Use    Vaping Use: Never used   Substance and Sexual Activity    Alcohol use: Not Currently     Comment: DENIES    Drug use: Never    Sexual activity: Never     Family History   Problem Relation Age of Onset    Leukemia Father     Heart disease Other        Objective   Physical Exam  General: Alert, cooperative, no acute distress  Eyes: Anicteric sclera, PERRLA  Respiratory: normal respiratory effort  Cardiovascular: no lower extremity edema  Skin: Normal tone, no rash, no lesions  Psychiatric: Appropriate affect, intact judgment  Neurologic: No focal sensory or motor deficits, normal cognition   Musculoskeletal: Normal muscle strength and tone  Extremities: No clubbing, cyanosis, or deformities    Vitals:    09/08/23 1037   BP: 121/55   Pulse: 85   Resp: 18   Temp: 98.7 °F (37.1 °C)   TempSrc: Temporal   SpO2: 100%   Weight: 65.5 kg (144 lb 6.4 oz)   PainSc:   2   PainLoc: Chest     ECOG score: 0         PHQ-9 Total Score:         Result Review :   The following data was reviewed by: Josselin Montes De Oca MD PhD on 09/08/2023:  Lab Results   Component Value Date    HGB 11.9 (L) 09/08/2023    HCT 36.1 09/08/2023    MCV 89.8 09/08/2023     09/08/2023    WBC 11.34 (H) 09/08/2023    NEUTROABS 7.96 (H) 09/08/2023    LYMPHSABS 2.60 09/08/2023    MONOSABS 0.63 09/08/2023    EOSABS 0.10 09/08/2023     BASOSABS 0.02 09/08/2023     Lab Results   Component Value Date    GLUCOSE 72 02/10/2023    BUN 14 02/10/2023    CREATININE 1.01 (H) 02/10/2023     02/10/2023    K 3.9 02/10/2023     02/10/2023    CO2 22.6 02/10/2023    CALCIUM 9.4 02/10/2023    PROTEINTOT 7.1 02/10/2023    ALBUMIN 4.1 02/10/2023    BILITOT 0.5 02/10/2023    ALKPHOS 82 02/10/2023    AST 16 02/10/2023    ALT 12 02/10/2023     Lab Results   Component Value Date    IRON 37 08/26/2022    LABIRON 7 (L) 08/26/2022    TRANSFERRIN 362 (H) 08/26/2022    TIBC 539 (H) 08/26/2022     Lab Results   Component Value Date     08/12/2019    FERRITIN 149.50 08/26/2022    [id number] 722 02/10/2023    FOLATE >20.00 02/10/2023     No results found for: PSA, CEA, AFP, ,        Assessment and Plan    Diagnoses and all orders for this visit:    1. CVID (common variable immunodeficiency) [D83.9 (ICD-10-CM)] (Primary)        CVID: No evidence of toxicity with IVIG.  She will continue treatment at the current dose.  If she begins to have more frequent infections this could be increased.  She will continue alternating follow-up appointments with Kori and another provider.  She will have repeat labs and toxicity check with Kori at the next appointment.      Patient was given instructions and counseling regarding her condition or for health maintenance advice. Please see specific information pulled into the AVS if appropriate.     Josselin Montes De Oca MD PhD    9/18/2023

## 2023-09-11 RX ORDER — FAMOTIDINE 10 MG/ML
20 INJECTION, SOLUTION INTRAVENOUS AS NEEDED
Status: CANCELLED | OUTPATIENT
Start: 2023-09-12

## 2023-09-11 RX ORDER — DIPHENHYDRAMINE HYDROCHLORIDE 50 MG/ML
50 INJECTION INTRAMUSCULAR; INTRAVENOUS AS NEEDED
Status: CANCELLED | OUTPATIENT
Start: 2023-09-12

## 2023-09-11 RX ORDER — MEPERIDINE HYDROCHLORIDE 25 MG/ML
25 INJECTION INTRAMUSCULAR; INTRAVENOUS; SUBCUTANEOUS
Status: CANCELLED | OUTPATIENT
Start: 2023-09-12

## 2023-09-11 RX ORDER — DIPHENHYDRAMINE HCL 25 MG
25 CAPSULE ORAL ONCE
Status: CANCELLED | OUTPATIENT
Start: 2023-09-12

## 2023-09-11 RX ORDER — FAMOTIDINE 10 MG/ML
20 INJECTION, SOLUTION INTRAVENOUS ONCE
Status: CANCELLED | OUTPATIENT
Start: 2023-09-12

## 2023-09-12 ENCOUNTER — HOSPITAL ENCOUNTER (OUTPATIENT)
Dept: ONCOLOGY | Facility: HOSPITAL | Age: 33
Discharge: HOME | End: 2023-09-12
Admitting: INTERNAL MEDICINE
Payer: MEDICARE

## 2023-09-12 VITALS
TEMPERATURE: 97.8 F | BODY MASS INDEX: 33.35 KG/M2 | OXYGEN SATURATION: 100 % | RESPIRATION RATE: 18 BRPM | HEIGHT: 55 IN | SYSTOLIC BLOOD PRESSURE: 110 MMHG | HEART RATE: 75 BPM | WEIGHT: 144.1 LBS | DIASTOLIC BLOOD PRESSURE: 45 MMHG

## 2023-09-12 DIAGNOSIS — Z45.2 ENCOUNTER FOR ADJUSTMENT OR MANAGEMENT OF VASCULAR ACCESS DEVICE: ICD-10-CM

## 2023-09-12 DIAGNOSIS — D83.9 CVID (COMMON VARIABLE IMMUNODEFICIENCY): Primary | ICD-10-CM

## 2023-09-12 PROCEDURE — 25010000002 HEPARIN LOCK FLUSH PER 10 UNITS: Performed by: INTERNAL MEDICINE

## 2023-09-12 PROCEDURE — 96374 THER/PROPH/DIAG INJ IV PUSH: CPT

## 2023-09-12 PROCEDURE — 25010000002 IMMUNE GLOBULIN (HUMAN) 10 GM/100ML SOLUTION: Performed by: INTERNAL MEDICINE

## 2023-09-12 PROCEDURE — 96366 THER/PROPH/DIAG IV INF ADDON: CPT

## 2023-09-12 PROCEDURE — 96365 THER/PROPH/DIAG IV INF INIT: CPT

## 2023-09-12 RX ORDER — SODIUM CHLORIDE 0.9 % (FLUSH) 0.9 %
20 SYRINGE (ML) INJECTION AS NEEDED
OUTPATIENT
Start: 2023-09-12

## 2023-09-12 RX ORDER — HEPARIN SODIUM (PORCINE) LOCK FLUSH IV SOLN 100 UNIT/ML 100 UNIT/ML
500 SOLUTION INTRAVENOUS AS NEEDED
Status: DISCONTINUED | OUTPATIENT
Start: 2023-09-12 | End: 2023-09-13 | Stop reason: HOSPADM

## 2023-09-12 RX ORDER — SODIUM CHLORIDE 0.9 % (FLUSH) 0.9 %
20 SYRINGE (ML) INJECTION AS NEEDED
Status: DISCONTINUED | OUTPATIENT
Start: 2023-09-12 | End: 2023-09-13 | Stop reason: HOSPADM

## 2023-09-12 RX ORDER — SODIUM CHLORIDE 9 MG/ML
250 INJECTION, SOLUTION INTRAVENOUS ONCE
Status: COMPLETED | OUTPATIENT
Start: 2023-09-12 | End: 2023-09-12

## 2023-09-12 RX ORDER — HEPARIN SODIUM (PORCINE) LOCK FLUSH IV SOLN 100 UNIT/ML 100 UNIT/ML
500 SOLUTION INTRAVENOUS AS NEEDED
OUTPATIENT
Start: 2023-09-12

## 2023-09-12 RX ADMIN — HEPARIN SODIUM (PORCINE) LOCK FLUSH IV SOLN 100 UNIT/ML 500 UNITS: 100 SOLUTION at 13:53

## 2023-09-12 RX ADMIN — SODIUM CHLORIDE 250 ML: 9 INJECTION, SOLUTION INTRAVENOUS at 10:14

## 2023-09-12 RX ADMIN — Medication 20 ML: at 13:53

## 2023-09-12 RX ADMIN — IMMUNE GLOBULIN INFUSION (HUMAN) 10 G: 100 INJECTION, SOLUTION INTRAVENOUS; SUBCUTANEOUS at 12:00

## 2023-09-14 ENCOUNTER — CLINICAL SUPPORT (OUTPATIENT)
Dept: FAMILY MEDICINE CLINIC | Facility: CLINIC | Age: 33
End: 2023-09-14
Payer: MEDICARE

## 2023-09-14 DIAGNOSIS — Z23 NEED FOR PNEUMOCOCCAL 20-VALENT CONJUGATE VACCINATION: Primary | ICD-10-CM

## 2023-09-14 RX ADMIN — CYANOCOBALAMIN 1000 MCG: 1000 INJECTION, SOLUTION INTRAMUSCULAR; SUBCUTANEOUS at 14:16

## 2023-10-13 ENCOUNTER — CLINICAL SUPPORT (OUTPATIENT)
Dept: FAMILY MEDICINE CLINIC | Facility: CLINIC | Age: 33
End: 2023-10-13
Payer: MEDICARE

## 2023-10-13 DIAGNOSIS — Z23 NEED FOR IMMUNIZATION AGAINST INFLUENZA: Primary | ICD-10-CM

## 2023-10-13 DIAGNOSIS — E53.8 VITAMIN B12 DEFICIENCY: ICD-10-CM

## 2023-10-13 RX ADMIN — CYANOCOBALAMIN 1000 MCG: 1000 INJECTION, SOLUTION INTRAMUSCULAR; SUBCUTANEOUS at 14:10

## 2023-10-20 ENCOUNTER — HOSPITAL ENCOUNTER (OUTPATIENT)
Dept: ONCOLOGY | Facility: HOSPITAL | Age: 33
Discharge: HOME | End: 2023-10-20
Payer: MEDICARE

## 2023-10-20 ENCOUNTER — OFFICE VISIT (OUTPATIENT)
Dept: ONCOLOGY | Facility: HOSPITAL | Age: 33
End: 2023-10-20
Payer: MEDICARE

## 2023-10-20 VITALS
HEIGHT: 55 IN | TEMPERATURE: 97.1 F | SYSTOLIC BLOOD PRESSURE: 118 MMHG | DIASTOLIC BLOOD PRESSURE: 68 MMHG | BODY MASS INDEX: 34.18 KG/M2 | OXYGEN SATURATION: 100 % | HEART RATE: 90 BPM | RESPIRATION RATE: 18 BRPM | WEIGHT: 147.71 LBS

## 2023-10-20 DIAGNOSIS — Z45.2 ENCOUNTER FOR ADJUSTMENT OR MANAGEMENT OF VASCULAR ACCESS DEVICE: ICD-10-CM

## 2023-10-20 DIAGNOSIS — D83.9 CVID (COMMON VARIABLE IMMUNODEFICIENCY): Primary | ICD-10-CM

## 2023-10-20 DIAGNOSIS — D64.9 ANEMIA, UNSPECIFIED TYPE: ICD-10-CM

## 2023-10-20 LAB
BASOPHILS # BLD AUTO: 0.01 10*3/MM3 (ref 0–0.2)
BASOPHILS NFR BLD AUTO: 0.1 % (ref 0–1.5)
DEPRECATED RDW RBC AUTO: 45.3 FL (ref 37–54)
EOSINOPHIL # BLD AUTO: 0.06 10*3/MM3 (ref 0–0.4)
EOSINOPHIL NFR BLD AUTO: 0.6 % (ref 0.3–6.2)
ERYTHROCYTE [DISTWIDTH] IN BLOOD BY AUTOMATED COUNT: 13.2 % (ref 12.3–15.4)
HCT VFR BLD AUTO: 34.4 % (ref 34–46.6)
HGB BLD-MCNC: 11.4 G/DL (ref 12–15.9)
IGA1 MFR SER: <50 MG/DL (ref 70–400)
IGG1 SER-MCNC: 510 MG/DL (ref 700–1600)
IGM SERPL-MCNC: 66 MG/DL (ref 40–230)
IMM GRANULOCYTES # BLD AUTO: 0.01 10*3/MM3 (ref 0–0.05)
IMM GRANULOCYTES NFR BLD AUTO: 0.1 % (ref 0–0.5)
LYMPHOCYTES # BLD AUTO: 1.93 10*3/MM3 (ref 0.7–3.1)
LYMPHOCYTES NFR BLD AUTO: 20 % (ref 19.6–45.3)
MCH RBC QN AUTO: 30.3 PG (ref 26.6–33)
MCHC RBC AUTO-ENTMCNC: 33.1 G/DL (ref 31.5–35.7)
MCV RBC AUTO: 91.5 FL (ref 79–97)
MONOCYTES # BLD AUTO: 0.49 10*3/MM3 (ref 0.1–0.9)
MONOCYTES NFR BLD AUTO: 5.1 % (ref 5–12)
NEUTROPHILS NFR BLD AUTO: 7.15 10*3/MM3 (ref 1.7–7)
NEUTROPHILS NFR BLD AUTO: 74.1 % (ref 42.7–76)
PLATELET # BLD AUTO: 279 10*3/MM3 (ref 140–450)
PMV BLD AUTO: 9.1 FL (ref 6–12)
RBC # BLD AUTO: 3.76 10*6/MM3 (ref 3.77–5.28)
WBC NRBC COR # BLD: 9.65 10*3/MM3 (ref 3.4–10.8)

## 2023-10-20 PROCEDURE — G0463 HOSPITAL OUTPT CLINIC VISIT: HCPCS | Performed by: NURSE PRACTITIONER

## 2023-10-20 PROCEDURE — 82784 ASSAY IGA/IGD/IGG/IGM EACH: CPT | Performed by: NURSE PRACTITIONER

## 2023-10-20 PROCEDURE — 85025 COMPLETE CBC W/AUTO DIFF WBC: CPT | Performed by: NURSE PRACTITIONER

## 2023-10-20 PROCEDURE — 25010000002 HEPARIN LOCK FLUSH PER 10 UNITS: Performed by: INTERNAL MEDICINE

## 2023-10-20 PROCEDURE — 36591 DRAW BLOOD OFF VENOUS DEVICE: CPT

## 2023-10-20 RX ORDER — MEPERIDINE HYDROCHLORIDE 25 MG/ML
25 INJECTION INTRAMUSCULAR; INTRAVENOUS; SUBCUTANEOUS
OUTPATIENT
Start: 2023-10-24

## 2023-10-20 RX ORDER — HEPARIN SODIUM (PORCINE) LOCK FLUSH IV SOLN 100 UNIT/ML 100 UNIT/ML
500 SOLUTION INTRAVENOUS AS NEEDED
OUTPATIENT
Start: 2023-10-20

## 2023-10-20 RX ORDER — DIPHENHYDRAMINE HCL 25 MG
25 CAPSULE ORAL ONCE
OUTPATIENT
Start: 2023-10-24

## 2023-10-20 RX ORDER — SODIUM CHLORIDE 0.9 % (FLUSH) 0.9 %
20 SYRINGE (ML) INJECTION AS NEEDED
Status: DISCONTINUED | OUTPATIENT
Start: 2023-10-20 | End: 2023-10-21 | Stop reason: HOSPADM

## 2023-10-20 RX ORDER — FAMOTIDINE 10 MG/ML
20 INJECTION, SOLUTION INTRAVENOUS ONCE
OUTPATIENT
Start: 2023-10-24

## 2023-10-20 RX ORDER — DIPHENHYDRAMINE HYDROCHLORIDE 50 MG/ML
50 INJECTION INTRAMUSCULAR; INTRAVENOUS AS NEEDED
OUTPATIENT
Start: 2023-10-24

## 2023-10-20 RX ORDER — SODIUM CHLORIDE 9 MG/ML
250 INJECTION, SOLUTION INTRAVENOUS ONCE
OUTPATIENT
Start: 2023-10-24

## 2023-10-20 RX ORDER — SODIUM CHLORIDE 0.9 % (FLUSH) 0.9 %
20 SYRINGE (ML) INJECTION AS NEEDED
OUTPATIENT
Start: 2023-10-20

## 2023-10-20 RX ORDER — FAMOTIDINE 10 MG/ML
20 INJECTION, SOLUTION INTRAVENOUS AS NEEDED
OUTPATIENT
Start: 2023-10-24

## 2023-10-20 RX ORDER — HEPARIN SODIUM (PORCINE) LOCK FLUSH IV SOLN 100 UNIT/ML 100 UNIT/ML
500 SOLUTION INTRAVENOUS AS NEEDED
Status: DISCONTINUED | OUTPATIENT
Start: 2023-10-20 | End: 2023-10-21 | Stop reason: HOSPADM

## 2023-10-20 RX ADMIN — HEPARIN SODIUM (PORCINE) LOCK FLUSH IV SOLN 100 UNIT/ML 500 UNITS: 100 SOLUTION at 10:00

## 2023-10-20 RX ADMIN — Medication 20 ML: at 09:59

## 2023-10-20 NOTE — PROGRESS NOTES
Chief Complaint/Reason for Referral:  CVID (common variable immunodeficiency)  D83.9 (ICD-10-CM)]    Arnie Rucker MD Kindervater, Kasey, APRN        Subjective    History of Present Illness    Ms. Francine Preciado presents with her mother for IVIG infusions every 6 weeks for CVID. Reports has had chronic ear infections likely related to chronic exposure to environmental tobacco exposure. Mom smokes. Mom reports daughter has ear drops she uses for the ear infections.     Reviewed labs today. Mild anemia with hemoglobin of 11.4 today. Iron studies were done in August and showed iron sat of 8%. She is taking oral iron 1 tab QD. Instructed to take oral iron 1 tab BID.     Last IgG levels have been above 500 range.       Oncology/Hematology History Overview Note   CVID:   -Diagnosed by Immunologist 8/20/12  -presented with frequent/severe infections             Review of Systems   Constitutional:  Negative for appetite change, diaphoresis, fatigue, fever, unexpected weight gain and unexpected weight loss.   HENT:  Negative for hearing loss, mouth sores, sore throat, swollen glands, trouble swallowing and voice change.    Eyes:  Negative for blurred vision.   Respiratory:  Negative for cough, shortness of breath and wheezing.    Cardiovascular:  Negative for chest pain and palpitations.   Gastrointestinal:  Negative for abdominal pain, blood in stool, constipation, diarrhea, nausea and vomiting.   Endocrine: Negative for cold intolerance and heat intolerance.   Genitourinary:  Negative for difficulty urinating, dysuria, frequency, hematuria and urinary incontinence.   Musculoskeletal:  Negative for arthralgias, back pain and myalgias.   Skin:  Negative for rash, skin lesions and wound.   Neurological:  Negative for dizziness, seizures, weakness, numbness and headache.   Hematological:  Does not bruise/bleed easily.   Psychiatric/Behavioral:  Negative for depressed mood. The patient is not nervous/anxious.    All other  systems reviewed and are negative.      Current Outpatient Medications on File Prior to Visit   Medication Sig Dispense Refill    atenolol (TENORMIN) 25 MG tablet TAKE ONE TABLET BY MOUTH DAILY 90 tablet 1    atorvastatin (LIPITOR) 10 MG tablet Take 1 tablet by mouth Daily. 90 tablet 1    azelastine (ASTELIN) 0.1 % nasal spray       EPINEPHrine (EPIPEN) 0.3 MG/0.3ML solution auto-injector injection 0.3 mL.      famotidine (PEPCID) 10 MG tablet Take 2 tablets by mouth Daily.      fenofibrate 160 MG tablet Take 1 tablet by mouth Daily. 90 tablet 1    fluticasone (FLONASE) 50 MCG/ACT nasal spray 2 sprays into the nostril(s) as directed by provider Daily. 16 g 11    levocetirizine (XYZAL) 5 MG tablet       levothyroxine (SYNTHROID, LEVOTHROID) 50 MCG tablet TAKE ONE TABLET BY MOUTH DAILY 90 tablet 1    linaclotide (Linzess) 145 MCG capsule capsule Take 1 capsule by mouth Every Morning Before Breakfast. 90 capsule 1    montelukast (SINGULAIR) 10 MG tablet       multivitamin with minerals tablet tablet Take 1 tablet by mouth Daily.      norgestimate-ethinyl estradiol (Ortho Tri-Cyclen Lo) 0.18/0.215/0.25 MG-25 MCG per tablet Take 1 tablet by mouth Daily. 28 tablet 12    ondansetron (Zofran) 4 MG tablet Take 1 tablet by mouth Every 8 (Eight) Hours As Needed for Nausea or Vomiting. 90 tablet 1    Tobramycin-Dexamethasone (TobraDex ST) 0.3-0.05 % suspension 3-4 drops in left ear twice a day 5 mL 3    amoxicillin (AMOXIL) 500 MG capsule Take 2 capsules by mouth 2 (Two) Times a Day. (Patient not taking: Reported on 10/20/2023) 28 capsule 0     Current Facility-Administered Medications on File Prior to Visit   Medication Dose Route Frequency Provider Last Rate Last Admin    cyanocobalamin injection 1,000 mcg  1,000 mcg Intramuscular Q28 Days Arnie Rucker MD   1,000 mcg at 10/13/23 1410    heparin injection 500 Units  500 Units Intravenous PRN Kayden Leal MD   500 Units at 10/20/23 1000    sodium chloride 0.9 %  flush 20 mL  20 mL Intravenous PRN Kayden Leal MD   20 mL at 10/20/23 0959       Allergies   Allergen Reactions    Cefuroxime Axetil Unknown - Low Severity    Neosporin [Bacitracin-Polymyxin B] Unknown - Low Severity     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     Central perforation of tympanic membrane of right ear     Chronic mastoiditis     COVID-19 vaccine series completed     Heart murmur     Hypertension     Kabuki make-up syndrome     Mitral valve prolapse     Mixed conductive and sensorineural hearing loss of both ears     Otorrhea, left     Recurrent otitis media     Skin disease     PSORIASIS/ECXEMA     Past Surgical History:   Procedure Laterality Date    MULTIPLE TOOTH EXTRACTIONS      NO PAST SURGERIES       Social History     Socioeconomic History    Marital status: Single   Tobacco Use    Smoking status: Never     Passive exposure: Yes    Smokeless tobacco: Never    Tobacco comments:     Daily exposure to 2nd hand smoke   Vaping Use    Vaping Use: Never used   Substance and Sexual Activity    Alcohol use: Not Currently     Comment: DENIES    Drug use: Never    Sexual activity: Never     Family History   Problem Relation Age of Onset    Leukemia Father     Heart disease Other      Immunization History   Administered Date(s) Administered    COVID-19 (PFIZER) Purple Cap Monovalent 04/04/2021, 04/25/2021    Flu Vaccine Quad PF >36MO 11/04/2013, 10/24/2014, 11/02/2015, 10/13/2016, 10/19/2017    Fluzone (or Fluarix & Flulaval for VFC) >6mos 11/04/2013, 10/24/2014, 11/02/2015, 10/13/2016, 10/19/2017, 11/23/2021, 10/11/2022, 10/13/2023    Fluzone Quad >6mos (Multi-dose) 11/04/2013, 11/02/2015, 10/19/2017, 10/29/2019    Hep B, Adolescent or Pediatric 11/24/1998, 01/14/1999, 06/02/1999    Influenza LAIV (Nasal) 10/13/2016, 10/14/2020    Influenza TIV (IM) 09/17/2009, 11/07/2018    Influenza, Unspecified 10/14/2020, 10/11/2022    MMR 08/19/1996  "   Pneumococcal Conjugate 20-Valent (PCV20) 09/14/2023    Pneumococcal Polysaccharide (PPSV23) 10/04/2012, 04/10/2014    influenza Split 10/02/2018       Tobacco Use: Medium Risk (10/20/2023)    Patient History     Smoking Tobacco Use: Never     Smokeless Tobacco Use: Never     Passive Exposure: Yes       Objective     Physical Exam  Vitals and nursing note reviewed.   Constitutional:       Appearance: She is obese.   HENT:      Mouth/Throat:      Mouth: Mucous membranes are moist.   Eyes:      Pupils: Pupils are equal, round, and reactive to light.   Cardiovascular:      Rate and Rhythm: Normal rate and regular rhythm.      Pulses: Normal pulses.      Heart sounds: Normal heart sounds. No murmur heard.  Pulmonary:      Effort: Pulmonary effort is normal. No respiratory distress.      Breath sounds: Normal breath sounds. No wheezing, rhonchi or rales.   Abdominal:      General: Bowel sounds are normal. There is no distension.      Palpations: Abdomen is soft.   Musculoskeletal:         General: Normal range of motion.      Cervical back: Normal range of motion.   Skin:     General: Skin is warm and dry.      Capillary Refill: Capillary refill takes less than 2 seconds.   Neurological:      General: No focal deficit present.      Mental Status: She is alert and oriented to person, place, and time.   Psychiatric:         Mood and Affect: Mood normal.         Behavior: Behavior normal.         Thought Content: Thought content normal.         Judgment: Judgment normal.         Vitals:    10/20/23 1012   BP: 118/68   Pulse: 90   Resp: 18   Temp: 97.1 °F (36.2 °C)   SpO2: 100%   Weight: 67 kg (147 lb 11.3 oz)   Height: 139.7 cm (55\")   PainSc: 0-No pain       Wt Readings from Last 3 Encounters:   10/20/23 67 kg (147 lb 11.3 oz)   09/12/23 65.4 kg (144 lb 1.6 oz)   09/08/23 65.5 kg (144 lb 6.4 oz)        ECOG score: 0         ECOG: (0) Fully Active - Able to Carry On All Pre-disease Performance Without Restriction  Fall " "Risk Assessment was completed, and patient is at low risk for falls.  PHQ-9 Total Score: 0       The patient is not  experiencing fatigue. Fatigue score: 0    PT/OT Functional Screening: PT fx screen : No needs identified  Speech Functional Screening: Speech fx screen : No needs identified  Rehab to be ordered: Rehab to be ordered : No needs identified        Result Review :  The following data was reviewed by: NO Nur on 10/20/2023:  Lab Results   Component Value Date    HGB 11.4 (L) 10/20/2023    HCT 34.4 10/20/2023    MCV 91.5 10/20/2023     10/20/2023    WBC 9.65 10/20/2023    NEUTROABS 7.15 (H) 10/20/2023    LYMPHSABS 1.93 10/20/2023    MONOSABS 0.49 10/20/2023    EOSABS 0.06 10/20/2023    BASOSABS 0.01 10/20/2023     Lab Results   Component Value Date    GLUCOSE 72 02/10/2023    BUN 14 02/10/2023    CREATININE 1.01 (H) 02/10/2023     02/10/2023    K 3.9 02/10/2023     02/10/2023    CO2 22.6 02/10/2023    CALCIUM 9.4 02/10/2023    PROTEINTOT 7.1 02/10/2023    ALBUMIN 4.1 02/10/2023    BILITOT 0.5 02/10/2023    ALKPHOS 82 02/10/2023    AST 16 02/10/2023    ALT 12 02/10/2023     Lab Results   Component Value Date     08/12/2019    FERRITIN 149.50 08/26/2022    [id number] 722 02/10/2023    FOLATE >20.00 02/10/2023     Lab Results   Component Value Date    IRON 37 08/26/2022    LABIRON 7 (L) 08/26/2022    TRANSFERRIN 362 (H) 08/26/2022    TIBC 539 (H) 08/26/2022     Lab Results   Component Value Date     08/12/2019    FERRITIN 149.50 08/26/2022    [id number] 722 02/10/2023    FOLATE >20.00 02/10/2023     No results found for: \"PSA\", \"CEA\", \"AFP\", \"\", \"\"    No Images in the past 120 days found..         Assessment and Plan:  Diagnoses and all orders for this visit:    1. CVID (common variable immunodeficiency) [D83.9] (Primary)    2. Anemia, unspecified type    Other orders  -     sodium chloride 0.9 % infusion 250 mL  -     famotidine (PEPCID) injection " 20 mg  -     diphenhydrAMINE (BENADRYL) capsule 25 mg  -     immune globulin (human) (GAMUNEX-C) 10 g infusion 100 mL  -     hydrocortisone sodium succinate (Solu-CORTEF) injection 100 mg  -     diphenhydrAMINE (BENADRYL) injection 50 mg  -     famotidine (PEPCID) injection 20 mg  -     meperidine (DEMEROL) injection 25 mg    CVID and receives IVIG infusions every 6 weeks. IgG levels have been above 500. Continue current treatment plan every 6 weeks. CBC today with mild anemia noted on hemoglobin. Patient will increase oral iron to 1 tab BID. Will recheck iron panel, ferritin in 4 months. VS are stable.       CBC reviewed. OK for treatment today.     Follow up on next treatment visit with Dr. Suazo or Dr. Leal.       I spent 20 minutes caring for Francine on this date of service. This time includes time spent by me in the following activities:preparing for the visit, reviewing tests, performing a medically appropriate examination and/or evaluation , counseling and educating the patient/family/caregiver, ordering medications, tests, or procedures, referring and communicating with other health care professionals , documenting information in the medical record, and independently interpreting results and communicating that information with the patient/family/caregiver      Patient was given instructions and counseling regarding her condition or for health maintenance advice. Please see specific information pulled into the AVS if appropriate.     Belia Saez, APRN    10/20/2023    .tob

## 2023-10-24 ENCOUNTER — HOSPITAL ENCOUNTER (OUTPATIENT)
Dept: ONCOLOGY | Facility: HOSPITAL | Age: 33
Discharge: HOME | End: 2023-10-24
Admitting: NURSE PRACTITIONER
Payer: MEDICARE

## 2023-10-24 VITALS
TEMPERATURE: 97.3 F | RESPIRATION RATE: 16 BRPM | HEART RATE: 69 BPM | HEIGHT: 55 IN | BODY MASS INDEX: 33.67 KG/M2 | OXYGEN SATURATION: 97 % | DIASTOLIC BLOOD PRESSURE: 55 MMHG | SYSTOLIC BLOOD PRESSURE: 113 MMHG | WEIGHT: 145.5 LBS

## 2023-10-24 DIAGNOSIS — Z45.2 ENCOUNTER FOR ADJUSTMENT OR MANAGEMENT OF VASCULAR ACCESS DEVICE: ICD-10-CM

## 2023-10-24 DIAGNOSIS — D83.9 CVID (COMMON VARIABLE IMMUNODEFICIENCY): Primary | ICD-10-CM

## 2023-10-24 PROCEDURE — 25010000002 IMMUNE GLOBULIN (HUMAN) 10 GM/100ML SOLUTION: Performed by: NURSE PRACTITIONER

## 2023-10-24 PROCEDURE — 25810000003 SODIUM CHLORIDE 0.9 % SOLUTION: Performed by: NURSE PRACTITIONER

## 2023-10-24 PROCEDURE — 25010000002 HEPARIN LOCK FLUSH PER 10 UNITS: Performed by: INTERNAL MEDICINE

## 2023-10-24 PROCEDURE — 96365 THER/PROPH/DIAG IV INF INIT: CPT

## 2023-10-24 PROCEDURE — 96366 THER/PROPH/DIAG IV INF ADDON: CPT

## 2023-10-24 RX ORDER — HEPARIN SODIUM (PORCINE) LOCK FLUSH IV SOLN 100 UNIT/ML 100 UNIT/ML
500 SOLUTION INTRAVENOUS AS NEEDED
Status: DISCONTINUED | OUTPATIENT
Start: 2023-10-24 | End: 2023-10-25 | Stop reason: HOSPADM

## 2023-10-24 RX ORDER — SODIUM CHLORIDE 0.9 % (FLUSH) 0.9 %
20 SYRINGE (ML) INJECTION AS NEEDED
OUTPATIENT
Start: 2023-10-24

## 2023-10-24 RX ORDER — SODIUM CHLORIDE 0.9 % (FLUSH) 0.9 %
20 SYRINGE (ML) INJECTION AS NEEDED
Status: DISCONTINUED | OUTPATIENT
Start: 2023-10-24 | End: 2023-10-25 | Stop reason: HOSPADM

## 2023-10-24 RX ORDER — HEPARIN SODIUM (PORCINE) LOCK FLUSH IV SOLN 100 UNIT/ML 100 UNIT/ML
500 SOLUTION INTRAVENOUS AS NEEDED
OUTPATIENT
Start: 2023-10-24

## 2023-10-24 RX ORDER — SODIUM CHLORIDE 9 MG/ML
250 INJECTION, SOLUTION INTRAVENOUS ONCE
Status: COMPLETED | OUTPATIENT
Start: 2023-10-24 | End: 2023-10-24

## 2023-10-24 RX ADMIN — IMMUNE GLOBULIN (HUMAN) 10 G: 10 INJECTION INTRAVENOUS; SUBCUTANEOUS at 10:55

## 2023-10-24 RX ADMIN — SODIUM CHLORIDE 250 ML: 9 INJECTION, SOLUTION INTRAVENOUS at 10:55

## 2023-10-24 RX ADMIN — Medication 20 ML: at 12:41

## 2023-10-24 RX ADMIN — HEPARIN SODIUM (PORCINE) LOCK FLUSH IV SOLN 100 UNIT/ML 500 UNITS: 100 SOLUTION at 12:41

## 2023-11-08 ENCOUNTER — OFFICE VISIT (OUTPATIENT)
Dept: OTOLARYNGOLOGY | Facility: CLINIC | Age: 33
End: 2023-11-08
Payer: MEDICARE

## 2023-11-08 VITALS — TEMPERATURE: 97 F | HEIGHT: 55 IN | WEIGHT: 145.2 LBS | BODY MASS INDEX: 33.6 KG/M2

## 2023-11-08 DIAGNOSIS — H70.11 CHRONIC MASTOIDITIS OF RIGHT SIDE: Primary | ICD-10-CM

## 2023-11-08 DIAGNOSIS — H72.92 TYMPANIC MEMBRANE PERFORATION, LEFT: ICD-10-CM

## 2023-11-08 DIAGNOSIS — H90.6 MIXED HEARING LOSS, BILATERAL: ICD-10-CM

## 2023-11-08 DIAGNOSIS — H92.12 OTORRHEA OF LEFT EAR: ICD-10-CM

## 2023-11-08 NOTE — PROGRESS NOTES
Patient Name: Francine Preciado   Visit Date: 11/08/2023   Patient ID: [id number]  Provider: Jose Elias Jorgensen MD    Sex: female  Location: [facility]   YOB: 1990  Location Address: [address] [place] [address], [address],?[place]?[id number]    Primary Care Provider Aggie Chance APRN  Location Phone: [phone]    Referring Provider: No ref. provider found        Chief Complaint  6 MONTH FOLLOW UP    History of Present Illness  Francine Preciado is a 33 y.o. 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 She underwent an audiogram on 5/11/17 which revealed bilateral moderate to profound mixed hearing loss. Speech discrimination was 100% bilaterally at 90 dB. Tympanograms were type B bilaterally.  She wears bilateral behind-the-ear hearing aids.    She returns today for routine follow-up.  She was last seen on 5/8/2023 at which time her right mastoid cavity was debrided and the left tympanic membrane perforation appeared dry.  She and her mother tell me that her left ear has been draining purulence recently.  They have been using TobraDex drops over the past few days.  She does report some otalgia.  She continues to wear her hearing aids.      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     Central perforation of tympanic membrane of right ear     Chronic mastoiditis     COVID-19 vaccine series completed     Heart murmur     Hypertension     Kabuki make-up syndrome     Mitral valve prolapse     Mixed conductive and sensorineural hearing loss of both ears     Otorrhea, left     Recurrent otitis media     Skin disease     PSORIASIS/ECXEMA       Past Surgical History:   Procedure Laterality Date    MULTIPLE TOOTH EXTRACTIONS      NO PAST SURGERIES            Current Outpatient Medications:     atenolol (TENORMIN) 25 MG tablet, TAKE ONE TABLET BY MOUTH DAILY, Disp: 90 tablet, Rfl: 1    atorvastatin (LIPITOR) 10 MG tablet, Take 1 tablet by mouth Daily., Disp: 90 tablet, Rfl: 1    azelastine (ASTELIN) 0.1 % nasal spray, , Disp: , Rfl:     EPINEPHrine (EPIPEN) 0.3 MG/0.3ML solution auto-injector injection, 0.3 mL., Disp: , Rfl:     famotidine (PEPCID) 10 MG tablet, Take 2 tablets by mouth Daily., Disp: , Rfl:     fenofibrate 160 MG tablet, Take 1 tablet by mouth Daily., Disp: 90 tablet, Rfl: 1    fluticasone (FLONASE) 50 MCG/ACT nasal spray, 2 sprays into the nostril(s) as directed by provider Daily., Disp: 16 g, Rfl: 11    levocetirizine (XYZAL) 5 MG tablet, , Disp: , Rfl:     levothyroxine (SYNTHROID, LEVOTHROID) 50 MCG tablet, TAKE ONE TABLET BY MOUTH DAILY, Disp: 90 tablet, Rfl: 1    linaclotide (Linzess) 145 MCG capsule capsule, Take 1 capsule by mouth Every Morning Before Breakfast., Disp: 90 capsule, Rfl: 1    montelukast (SINGULAIR) 10 MG tablet, , Disp: , Rfl:     multivitamin with minerals tablet tablet, Take 1 tablet by mouth Daily., Disp: , Rfl:     norgestimate-ethinyl estradiol (Ortho Tri-Cyclen Lo) 0.18/0.215/0.25 MG-25 MCG per tablet, Take 1 tablet by mouth Daily., Disp: 28 tablet, Rfl: 12    ondansetron (Zofran) 4 MG tablet, Take 1 tablet by mouth Every 8 (Eight) Hours As Needed for Nausea or Vomiting., Disp: 90 tablet, Rfl: 1    Tobramycin-Dexamethasone (TobraDex ST) 0.3-0.05 % suspension, 3-4 drops in left ear twice a day, Disp: 5 mL, Rfl: 3    amoxicillin (AMOXIL) 500 MG capsule, Take 2 capsules by mouth 2 (Two) Times a Day., Disp: 28 capsule, Rfl: 0    Current Facility-Administered Medications:     cyanocobalamin injection 1,000 mcg, 1,000 mcg, Intramuscular, Q28 Days, Arnie Rucker MD, 1,000 mcg at 10/13/23 1410     Allergies   Allergen Reactions    Cefuroxime Axetil Unknown - Low Severity    Neosporin [Bacitracin-Polymyxin B] Unknown - Low  "Severity       Social History     Tobacco Use    Smoking status: Never     Passive exposure: Yes    Smokeless tobacco: Never    Tobacco comments:     Daily exposure to 2nd hand smoke   Vaping Use    Vaping Use: Never used   Substance Use Topics    Alcohol use: Not Currently     Comment: DENIES    Drug use: Never        Objective     Vital Signs:   Temp 97 °F (36.1 °C) (Temporal)   Ht 139.7 cm (55\")   Wt 65.9 kg (145 lb 3.2 oz)   BMI 33.75 kg/m²       Physical Exam    General: Well developed, well nourished patient of stated age in no acute distress. Voice is strong and clear.   Head: Normocephalic and atraumatic.  Face: No lesions.  Bilateral parotid and submandibular glands are unremarkable.  Stensen's and Warthin's ducts are productive of clear saliva bilaterally.  House-Brackmann I/VI     bilaterally.   muscles and temporomandibular joint nontender to palpation.  No TMJ crepitus.  Eyes: PERRLA, sclerae anicteric, no conjunctival injection. Extra ocular movements are intact and full. No nystagmus.   Ears: Auricles are normal in appearance.  Left external auditory canal is unremarkable.  Left tympanic membrane with perforation that is draining a small amount of purulence.  Right mastoid cavity is dry with moderate buildup of squamous debris which was removed using the operating microscope and alligator forceps revealing healthy appearing skin.  Hearing normal to conversational voice.   Nose: External nose is normal in appearance. Bilateral nares are patent with normal appearing mucosa. Septum midline. Turbinates are unremarkable. No lesions.   Oral Cavity: Lips are normal in appearance. Oral mucosa is unremarkable. Gingiva is unremarkable.  Partial dentition. Tongue is unremarkable with good movement. Hard palate is unremarkable.   Oropharynx: Soft palate is unremarkable with full movement. Uvula is unremarkable. Bilateral tonsils are unremarkable. Posterior oropharynx is unremarkable.    Larynx and " hypopharynx: Deferred secondary to gag reflex.  Neck: Supple.  No mass.  Nontender to palpation.  Trachea midline. Thyroid normal size and without nodules to palpation.   Lymphatic: No lymphadenopathy upon palpation.   Psychiatric: Appropriate affect, cooperative   Neurologic: Oriented x 3, strength symmetric in all extremities, Cranial Nerves II-XII are grossly intact to confrontation   Skin: Warm and dry. No rashes.    Procedures           Result Review :               Assessment and Plan    Diagnoses and all orders for this visit:    1. Chronic mastoiditis of right side (Primary)    2. Tympanic membrane perforation, left    3. Otorrhea of left ear    4. Mixed hearing loss, bilateral        Impressions and findings were discussed.  Currently, she returns today for follow-up of her ears.  Examination today reveals moderate buildup of squamous debris in the right mastoid cavity which was removed using the operating microscope and alligator forceps.  Her left tympanic membranes perforation is draining a small amount of purulence.  Ciprodex drops were placed in clinic today.  She may continue with the drops she has at home but if it is not improving they will give me a call.  She was given ample time to ask questions, all of which were answered to her satisfaction.  She will follow-up in 6 months or sooner if needed.  Follow Up   Return in about 6 months (around 5/8/2024).  Patient was given instructions and counseling regarding her condition or for health maintenance advice. Please see specific information pulled into the AVS if appropriate.

## 2023-11-13 ENCOUNTER — CLINICAL SUPPORT (OUTPATIENT)
Dept: FAMILY MEDICINE CLINIC | Facility: CLINIC | Age: 33
End: 2023-11-13
Payer: MEDICARE

## 2023-11-13 DIAGNOSIS — R79.89 LOW VITAMIN B12 LEVEL: Primary | ICD-10-CM

## 2023-11-13 DIAGNOSIS — Z30.41 ENCOUNTER FOR SURVEILLANCE OF CONTRACEPTIVE PILLS: ICD-10-CM

## 2023-11-13 DIAGNOSIS — E03.9 ACQUIRED HYPOTHYROIDISM: ICD-10-CM

## 2023-11-13 RX ORDER — NORGESTIMATE AND ETHINYL ESTRADIOL
1 KIT DAILY
Qty: 28 TABLET | Refills: 12 | Status: SHIPPED | OUTPATIENT
Start: 2023-11-13

## 2023-11-13 RX ORDER — LEVOTHYROXINE SODIUM 0.05 MG/1
50 TABLET ORAL DAILY
Qty: 90 TABLET | Refills: 1 | Status: SHIPPED | OUTPATIENT
Start: 2023-11-13

## 2023-11-13 RX ADMIN — CYANOCOBALAMIN 1000 MCG: 1000 INJECTION, SOLUTION INTRAMUSCULAR; SUBCUTANEOUS at 14:36

## 2023-11-15 DIAGNOSIS — K58.1 IRRITABLE BOWEL SYNDROME WITH CONSTIPATION: ICD-10-CM

## 2023-11-15 RX ORDER — LINACLOTIDE 145 UG/1
145 CAPSULE, GELATIN COATED ORAL
Qty: 90 CAPSULE | Refills: 1 | Status: SHIPPED | OUTPATIENT
Start: 2023-11-15

## 2023-12-01 ENCOUNTER — HOSPITAL ENCOUNTER (OUTPATIENT)
Dept: ONCOLOGY | Facility: HOSPITAL | Age: 33
Discharge: HOME | End: 2023-12-01
Admitting: INTERNAL MEDICINE
Payer: MEDICARE

## 2023-12-01 ENCOUNTER — OFFICE VISIT (OUTPATIENT)
Dept: ONCOLOGY | Facility: HOSPITAL | Age: 33
End: 2023-12-01
Payer: MEDICARE

## 2023-12-01 VITALS
OXYGEN SATURATION: 99 % | BODY MASS INDEX: 33.82 KG/M2 | HEART RATE: 76 BPM | WEIGHT: 145.5 LBS | RESPIRATION RATE: 16 BRPM | TEMPERATURE: 96.6 F | SYSTOLIC BLOOD PRESSURE: 115 MMHG | DIASTOLIC BLOOD PRESSURE: 55 MMHG

## 2023-12-01 DIAGNOSIS — D83.9 CVID (COMMON VARIABLE IMMUNODEFICIENCY): Primary | ICD-10-CM

## 2023-12-01 DIAGNOSIS — Z45.2 ENCOUNTER FOR ADJUSTMENT OR MANAGEMENT OF VASCULAR ACCESS DEVICE: ICD-10-CM

## 2023-12-01 LAB
BASOPHILS # BLD AUTO: 0.02 10*3/MM3 (ref 0–0.2)
BASOPHILS NFR BLD AUTO: 0.2 % (ref 0–1.5)
DEPRECATED RDW RBC AUTO: 45 FL (ref 37–54)
EOSINOPHIL # BLD AUTO: 0.02 10*3/MM3 (ref 0–0.4)
EOSINOPHIL NFR BLD AUTO: 0.2 % (ref 0.3–6.2)
ERYTHROCYTE [DISTWIDTH] IN BLOOD BY AUTOMATED COUNT: 13.3 % (ref 12.3–15.4)
HCT VFR BLD AUTO: 37 % (ref 34–46.6)
HGB BLD-MCNC: 12.2 G/DL (ref 12–15.9)
IGA1 MFR SER: <50 MG/DL (ref 70–400)
IGG1 SER-MCNC: 584 MG/DL (ref 700–1600)
IGM SERPL-MCNC: 80 MG/DL (ref 40–230)
IMM GRANULOCYTES # BLD AUTO: 0.03 10*3/MM3 (ref 0–0.05)
IMM GRANULOCYTES NFR BLD AUTO: 0.3 % (ref 0–0.5)
LYMPHOCYTES # BLD AUTO: 2.44 10*3/MM3 (ref 0.7–3.1)
LYMPHOCYTES NFR BLD AUTO: 24.4 % (ref 19.6–45.3)
MCH RBC QN AUTO: 29.8 PG (ref 26.6–33)
MCHC RBC AUTO-ENTMCNC: 33 G/DL (ref 31.5–35.7)
MCV RBC AUTO: 90.2 FL (ref 79–97)
MONOCYTES # BLD AUTO: 0.72 10*3/MM3 (ref 0.1–0.9)
MONOCYTES NFR BLD AUTO: 7.2 % (ref 5–12)
NEUTROPHILS NFR BLD AUTO: 6.78 10*3/MM3 (ref 1.7–7)
NEUTROPHILS NFR BLD AUTO: 67.7 % (ref 42.7–76)
PLATELET # BLD AUTO: 271 10*3/MM3 (ref 140–450)
PMV BLD AUTO: 9.2 FL (ref 6–12)
RBC # BLD AUTO: 4.1 10*6/MM3 (ref 3.77–5.28)
WBC NRBC COR # BLD AUTO: 10.01 10*3/MM3 (ref 3.4–10.8)

## 2023-12-01 PROCEDURE — 36591 DRAW BLOOD OFF VENOUS DEVICE: CPT

## 2023-12-01 PROCEDURE — 82784 ASSAY IGA/IGD/IGG/IGM EACH: CPT | Performed by: INTERNAL MEDICINE

## 2023-12-01 PROCEDURE — 85025 COMPLETE CBC W/AUTO DIFF WBC: CPT | Performed by: INTERNAL MEDICINE

## 2023-12-01 PROCEDURE — 25010000002 HEPARIN LOCK FLUSH PER 10 UNITS: Performed by: INTERNAL MEDICINE

## 2023-12-01 RX ORDER — FAMOTIDINE 10 MG/ML
20 INJECTION, SOLUTION INTRAVENOUS AS NEEDED
OUTPATIENT
Start: 2023-12-05

## 2023-12-01 RX ORDER — SODIUM CHLORIDE 0.9 % (FLUSH) 0.9 %
20 SYRINGE (ML) INJECTION AS NEEDED
OUTPATIENT
Start: 2023-12-01

## 2023-12-01 RX ORDER — DIPHENHYDRAMINE HCL 25 MG
25 CAPSULE ORAL ONCE
OUTPATIENT
Start: 2023-12-05

## 2023-12-01 RX ORDER — DIPHENHYDRAMINE HYDROCHLORIDE 50 MG/ML
50 INJECTION INTRAMUSCULAR; INTRAVENOUS AS NEEDED
OUTPATIENT
Start: 2023-12-05

## 2023-12-01 RX ORDER — FAMOTIDINE 10 MG/ML
20 INJECTION, SOLUTION INTRAVENOUS ONCE
OUTPATIENT
Start: 2023-12-05

## 2023-12-01 RX ORDER — MEPERIDINE HYDROCHLORIDE 25 MG/ML
25 INJECTION INTRAMUSCULAR; INTRAVENOUS; SUBCUTANEOUS
OUTPATIENT
Start: 2023-12-05

## 2023-12-01 RX ORDER — HEPARIN SODIUM (PORCINE) LOCK FLUSH IV SOLN 100 UNIT/ML 100 UNIT/ML
500 SOLUTION INTRAVENOUS AS NEEDED
Status: DISCONTINUED | OUTPATIENT
Start: 2023-12-01 | End: 2023-12-02 | Stop reason: HOSPADM

## 2023-12-01 RX ORDER — SODIUM CHLORIDE 0.9 % (FLUSH) 0.9 %
20 SYRINGE (ML) INJECTION AS NEEDED
Status: DISCONTINUED | OUTPATIENT
Start: 2023-12-01 | End: 2023-12-02 | Stop reason: HOSPADM

## 2023-12-01 RX ORDER — HEPARIN SODIUM (PORCINE) LOCK FLUSH IV SOLN 100 UNIT/ML 100 UNIT/ML
500 SOLUTION INTRAVENOUS AS NEEDED
OUTPATIENT
Start: 2023-12-05

## 2023-12-01 RX ORDER — SODIUM CHLORIDE 9 MG/ML
250 INJECTION, SOLUTION INTRAVENOUS ONCE
OUTPATIENT
Start: 2023-12-05

## 2023-12-01 RX ADMIN — HEPARIN SODIUM (PORCINE) LOCK FLUSH IV SOLN 100 UNIT/ML 500 UNITS: 100 SOLUTION at 09:39

## 2023-12-01 RX ADMIN — Medication 20 ML: at 09:39

## 2023-12-01 NOTE — PROGRESS NOTES
Chief Complaint/Reason for Referral:  COMMON VARIABLE IMMUNODEFIENCY    Arnie Rucker MD Kindervater, Kasey, NO        Subjective    History of Present Illness    Ms. Francine Preciado presents with her mother for IVIG infusions every 6 weeks for CVID. Doing well. No fevers, chills, infections, rash, dairrhea, cough. CBC normal today.     Last IgG levels have been above 500 range.       Oncology/Hematology History Overview Note   CVID:   -Diagnosed by Immunologist 8/20/12  -presented with frequent/severe infections             Review of Systems   Constitutional:  Negative for appetite change, diaphoresis, fatigue, fever, unexpected weight gain and unexpected weight loss.   HENT:  Negative for hearing loss, mouth sores, sore throat, swollen glands, trouble swallowing and voice change.    Eyes:  Negative for blurred vision.   Respiratory:  Negative for cough, shortness of breath and wheezing.    Cardiovascular:  Negative for chest pain and palpitations.   Gastrointestinal:  Negative for abdominal pain, blood in stool, constipation, diarrhea, nausea and vomiting.   Endocrine: Negative for cold intolerance and heat intolerance.   Genitourinary:  Negative for difficulty urinating, dysuria, frequency, hematuria and urinary incontinence.   Musculoskeletal:  Negative for arthralgias, back pain and myalgias.   Skin:  Negative for rash, skin lesions and wound.   Neurological:  Negative for dizziness, seizures, weakness, numbness and headache.   Hematological:  Does not bruise/bleed easily.   Psychiatric/Behavioral:  Negative for depressed mood. The patient is not nervous/anxious.    All other systems reviewed and are negative.      Current Outpatient Medications on File Prior to Visit   Medication Sig Dispense Refill    amoxicillin (AMOXIL) 500 MG capsule Take 2 capsules by mouth 2 (Two) Times a Day. 28 capsule 0    atenolol (TENORMIN) 25 MG tablet TAKE ONE TABLET BY MOUTH DAILY 90 tablet 1    atorvastatin (LIPITOR) 10 MG  tablet Take 1 tablet by mouth Daily. 90 tablet 1    azelastine (ASTELIN) 0.1 % nasal spray       EPINEPHrine (EPIPEN) 0.3 MG/0.3ML solution auto-injector injection 0.3 mL.      famotidine (PEPCID) 10 MG tablet Take 2 tablets by mouth Daily.      fenofibrate 160 MG tablet Take 1 tablet by mouth Daily. 90 tablet 1    fluticasone (FLONASE) 50 MCG/ACT nasal spray 2 sprays into the nostril(s) as directed by provider Daily. 16 g 11    levocetirizine (XYZAL) 5 MG tablet       levothyroxine (SYNTHROID, LEVOTHROID) 50 MCG tablet TAKE 1 TABLET BY MOUTH DAILY 90 tablet 1    Linzess 145 MCG capsule capsule TAKE ONE CAPSULE BY MOUTH EVERY MORNING BEFORE BREAKFAST 90 capsule 1    montelukast (SINGULAIR) 10 MG tablet       multivitamin with minerals tablet tablet Take 1 tablet by mouth Daily.      ondansetron (Zofran) 4 MG tablet Take 1 tablet by mouth Every 8 (Eight) Hours As Needed for Nausea or Vomiting. 90 tablet 1    Tobramycin-Dexamethasone (TobraDex ST) 0.3-0.05 % suspension 3-4 drops in left ear twice a day 5 mL 3    Tri-Lo-Rosy 0.18/0.215/0.25 MG-25 MCG per tablet TAKE ONE TABLET BY MOUTH DAILY 28 tablet 12     Current Facility-Administered Medications on File Prior to Visit   Medication Dose Route Frequency Provider Last Rate Last Admin    cyanocobalamin injection 1,000 mcg  1,000 mcg Intramuscular Q28 Days Arnie Rucker MD   1,000 mcg at 11/13/23 1436       Allergies   Allergen Reactions    Cefuroxime Axetil Unknown - Low Severity    Neosporin [Bacitracin-Polymyxin B] Unknown - Low Severity     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     Central perforation of tympanic membrane of right ear     Chronic mastoiditis     COVID-19 vaccine series completed     Heart murmur     Hypertension     Kabuki make-up syndrome     Mitral valve prolapse     Mixed conductive and sensorineural hearing loss of both ears     Otorrhea, left     Recurrent otitis media     Skin  disease     PSORIASIS/ECXEMA     Past Surgical History:   Procedure Laterality Date    MULTIPLE TOOTH EXTRACTIONS      NO PAST SURGERIES       Social History     Socioeconomic History    Marital status: Single   Tobacco Use    Smoking status: Never     Passive exposure: Yes    Smokeless tobacco: Never    Tobacco comments:     Daily exposure to 2nd hand smoke   Vaping Use    Vaping Use: Never used   Substance and Sexual Activity    Alcohol use: Not Currently     Comment: DENIES    Drug use: Never    Sexual activity: Never     Family History   Problem Relation Age of Onset    Leukemia Father     Heart disease Other      Immunization History   Administered Date(s) Administered    COVID-19 (PFIZER) Purple Cap Monovalent 04/04/2021, 04/25/2021    Flu Vaccine Quad PF >36MO 11/04/2013, 10/24/2014, 11/02/2015, 10/13/2016, 10/19/2017    Fluzone (or Fluarix & Flulaval for VFC) >6mos 11/04/2013, 10/24/2014, 11/02/2015, 10/13/2016, 10/19/2017, 11/23/2021, 10/11/2022, 10/13/2023    Fluzone Quad >6mos (Multi-dose) 11/04/2013, 11/02/2015, 10/19/2017, 10/29/2019    Hep B, Adolescent or Pediatric 11/24/1998, 01/14/1999, 06/02/1999    Influenza LAIV (Nasal) 10/13/2016, 10/14/2020    Influenza TIV (IM) 09/17/2009, 11/07/2018    Influenza, Unspecified 10/14/2020, 10/11/2022    MMR 08/19/1996    Pneumococcal Conjugate 20-Valent (PCV20) 09/14/2023    Pneumococcal Polysaccharide (PPSV23) 10/04/2012, 04/10/2014    influenza Split 10/02/2018       Tobacco Use: Medium Risk (12/1/2023)    Patient History     Smoking Tobacco Use: Never     Smokeless Tobacco Use: Never     Passive Exposure: Yes       Objective     Physical Exam  Well appearing obese patient in no acute distress on RA  Anicteric sclerae, no rash on exposed skin  Respirations non-labored  Awake, alert, and oriented x 4. Speech intact. No gross neurologic deficit  Appropriate mood and affect    Vitals:    12/01/23 0950   BP: 115/55   Pulse: 76   Resp: 16   Temp: 96.6 °F (35.9 °C)    TempSrc: Temporal   SpO2: 99%   Weight: 66 kg (145 lb 8.1 oz)   PainSc: 0-No pain         Wt Readings from Last 3 Encounters:   11/08/23 65.9 kg (145 lb 3.2 oz)   10/24/23 66 kg (145 lb 8.1 oz)   10/20/23 67 kg (147 lb 11.3 oz)                  ECOG: (0) Fully Active - Able to Carry On All Pre-disease Performance Without Restriction  Fall Risk Assessment was completed, and patient is at low risk for falls.  PHQ-9 Total Score:         The patient is not  experiencing fatigue. Fatigue score: 0    PT/OT Functional Screening: PT fx screen : No needs identified  Speech Functional Screening: Speech fx screen : No needs identified  Rehab to be ordered: Rehab to be ordered : No needs identified        Result Review :  The following data was reviewed by: Kayden Leal MD on 12/01/23:  Lab Results   Component Value Date    HGB 11.4 (L) 10/20/2023    HCT 34.4 10/20/2023    MCV 91.5 10/20/2023     10/20/2023    WBC 9.65 10/20/2023    NEUTROABS 7.15 (H) 10/20/2023    LYMPHSABS 1.93 10/20/2023    MONOSABS 0.49 10/20/2023    EOSABS 0.06 10/20/2023    BASOSABS 0.01 10/20/2023     Lab Results   Component Value Date    GLUCOSE 72 02/10/2023    BUN 14 02/10/2023    CREATININE 1.01 (H) 02/10/2023     02/10/2023    K 3.9 02/10/2023     02/10/2023    CO2 22.6 02/10/2023    CALCIUM 9.4 02/10/2023    PROTEINTOT 7.1 02/10/2023    ALBUMIN 4.1 02/10/2023    BILITOT 0.5 02/10/2023    ALKPHOS 82 02/10/2023    AST 16 02/10/2023    ALT 12 02/10/2023     Lab Results   Component Value Date     08/12/2019    FERRITIN 149.50 08/26/2022    FTRLLGPQ41 722 02/10/2023    FOLATE >20.00 02/10/2023     Lab Results   Component Value Date    IRON 37 08/26/2022    LABIRON 7 (L) 08/26/2022    TRANSFERRIN 362 (H) 08/26/2022    TIBC 539 (H) 08/26/2022     Lab Results   Component Value Date     08/12/2019    FERRITIN 149.50 08/26/2022    [id number] 722 02/10/2023    FOLATE >20.00 02/10/2023     No results found for:  "\"PSA\", \"CEA\", \"AFP\", \"\", \"\"    Labs personally reviewed. Anemia resolved.          Assessment and Plan:  Diagnoses and all orders for this visit:    1. CVID (common variable immunodeficiency) (Primary)    Other orders  -     sodium chloride 0.9 % infusion 250 mL  -     famotidine (PEPCID) injection 20 mg  -     diphenhydrAMINE (BENADRYL) capsule 25 mg  -     immune globulin (human) (GAMUNEX-C) 10 g infusion 100 mL  -     hydrocortisone sodium succinate (Solu-CORTEF) injection 100 mg  -     diphenhydrAMINE (BENADRYL) injection 50 mg  -     famotidine (PEPCID) injection 20 mg  -     meperidine (DEMEROL) injection 25 mg      CVID and receives IVIG infusions every 6 weeks. IgG levels have been above 500. Continue current treatment plan every 6 weeks. CBC normal today. VS are stable. Needs repeat iron labs in 2 months. Ok for IVIG treatment today.           I spent 20 minutes caring for Francine on this date of service. This time includes time spent by me in the following activities:preparing for the visit, reviewing tests, performing a medically appropriate examination and/or evaluation , counseling and educating the patient/family/caregiver, ordering medications, tests, or procedures, referring and communicating with other health care professionals , documenting information in the medical record, and independently interpreting results and communicating that information with the patient/family/caregiver      Patient was given instructions and counseling regarding her condition or for health maintenance advice. Please see specific information pulled into the AVS if appropriate.   "

## 2023-12-05 ENCOUNTER — HOSPITAL ENCOUNTER (OUTPATIENT)
Dept: ONCOLOGY | Facility: HOSPITAL | Age: 33
Discharge: HOME | End: 2023-12-05
Admitting: INTERNAL MEDICINE
Payer: MEDICARE

## 2023-12-05 VITALS
TEMPERATURE: 98.2 F | SYSTOLIC BLOOD PRESSURE: 115 MMHG | WEIGHT: 145.94 LBS | HEIGHT: 55 IN | HEART RATE: 73 BPM | OXYGEN SATURATION: 100 % | RESPIRATION RATE: 18 BRPM | DIASTOLIC BLOOD PRESSURE: 49 MMHG | BODY MASS INDEX: 33.78 KG/M2

## 2023-12-05 DIAGNOSIS — Z45.2 ENCOUNTER FOR ADJUSTMENT OR MANAGEMENT OF VASCULAR ACCESS DEVICE: ICD-10-CM

## 2023-12-05 DIAGNOSIS — D83.9 CVID (COMMON VARIABLE IMMUNODEFICIENCY): Primary | ICD-10-CM

## 2023-12-05 PROCEDURE — 25010000002 IMMUNE GLOBULIN (HUMAN) 10 GM/100ML SOLUTION: Performed by: INTERNAL MEDICINE

## 2023-12-05 PROCEDURE — 96374 THER/PROPH/DIAG INJ IV PUSH: CPT

## 2023-12-05 PROCEDURE — 25010000002 HEPARIN LOCK FLUSH PER 10 UNITS: Performed by: INTERNAL MEDICINE

## 2023-12-05 RX ORDER — FAMOTIDINE 10 MG/ML
20 INJECTION, SOLUTION INTRAVENOUS ONCE
Status: DISCONTINUED | OUTPATIENT
Start: 2023-12-05 | End: 2023-12-06 | Stop reason: HOSPADM

## 2023-12-05 RX ORDER — SODIUM CHLORIDE 9 MG/ML
250 INJECTION, SOLUTION INTRAVENOUS ONCE
Status: DISCONTINUED | OUTPATIENT
Start: 2023-12-05 | End: 2023-12-06 | Stop reason: HOSPADM

## 2023-12-05 RX ORDER — HEPARIN SODIUM (PORCINE) LOCK FLUSH IV SOLN 100 UNIT/ML 100 UNIT/ML
500 SOLUTION INTRAVENOUS AS NEEDED
Status: DISCONTINUED | OUTPATIENT
Start: 2023-12-05 | End: 2023-12-06 | Stop reason: HOSPADM

## 2023-12-05 RX ORDER — HEPARIN SODIUM (PORCINE) LOCK FLUSH IV SOLN 100 UNIT/ML 100 UNIT/ML
500 SOLUTION INTRAVENOUS AS NEEDED
OUTPATIENT
Start: 2023-12-05

## 2023-12-05 RX ORDER — SODIUM CHLORIDE 0.9 % (FLUSH) 0.9 %
20 SYRINGE (ML) INJECTION AS NEEDED
OUTPATIENT
Start: 2023-12-05

## 2023-12-05 RX ORDER — SODIUM CHLORIDE 0.9 % (FLUSH) 0.9 %
20 SYRINGE (ML) INJECTION AS NEEDED
Status: DISCONTINUED | OUTPATIENT
Start: 2023-12-05 | End: 2023-12-06 | Stop reason: HOSPADM

## 2023-12-05 RX ORDER — DIPHENHYDRAMINE HCL 25 MG
25 CAPSULE ORAL ONCE
Status: DISCONTINUED | OUTPATIENT
Start: 2023-12-05 | End: 2023-12-06 | Stop reason: HOSPADM

## 2023-12-05 RX ADMIN — Medication 20 ML: at 11:26

## 2023-12-05 RX ADMIN — IMMUNE GLOBULIN (HUMAN) 10 G: 10 INJECTION INTRAVENOUS; SUBCUTANEOUS at 10:04

## 2023-12-05 RX ADMIN — HEPARIN SODIUM (PORCINE) LOCK FLUSH IV SOLN 100 UNIT/ML 500 UNITS: 100 SOLUTION at 11:27

## 2023-12-15 ENCOUNTER — CLINICAL SUPPORT (OUTPATIENT)
Dept: FAMILY MEDICINE CLINIC | Facility: CLINIC | Age: 33
End: 2023-12-15
Payer: MEDICARE

## 2023-12-15 DIAGNOSIS — R79.89 LOW VITAMIN B12 LEVEL: Primary | ICD-10-CM

## 2023-12-15 RX ADMIN — CYANOCOBALAMIN 1000 MCG: 1000 INJECTION, SOLUTION INTRAMUSCULAR; SUBCUTANEOUS at 14:29

## 2024-01-12 ENCOUNTER — OFFICE VISIT (OUTPATIENT)
Dept: ONCOLOGY | Facility: HOSPITAL | Age: 34
End: 2024-01-12
Payer: MEDICARE

## 2024-01-12 ENCOUNTER — HOSPITAL ENCOUNTER (OUTPATIENT)
Dept: ONCOLOGY | Facility: HOSPITAL | Age: 34
Discharge: HOME | End: 2024-01-12
Admitting: INTERNAL MEDICINE
Payer: MEDICARE

## 2024-01-12 VITALS
RESPIRATION RATE: 18 BRPM | BODY MASS INDEX: 34.72 KG/M2 | SYSTOLIC BLOOD PRESSURE: 120 MMHG | HEART RATE: 82 BPM | WEIGHT: 149.4 LBS | OXYGEN SATURATION: 99 % | TEMPERATURE: 97.5 F | DIASTOLIC BLOOD PRESSURE: 70 MMHG

## 2024-01-12 DIAGNOSIS — D50.9 IRON DEFICIENCY ANEMIA, UNSPECIFIED IRON DEFICIENCY ANEMIA TYPE: Primary | ICD-10-CM

## 2024-01-12 DIAGNOSIS — Z45.2 ENCOUNTER FOR ADJUSTMENT OR MANAGEMENT OF VASCULAR ACCESS DEVICE: ICD-10-CM

## 2024-01-12 DIAGNOSIS — D83.9 CVID (COMMON VARIABLE IMMUNODEFICIENCY): ICD-10-CM

## 2024-01-12 DIAGNOSIS — D83.9 CVID (COMMON VARIABLE IMMUNODEFICIENCY): Primary | ICD-10-CM

## 2024-01-12 LAB
BASOPHILS # BLD AUTO: 0.02 10*3/MM3 (ref 0–0.2)
BASOPHILS NFR BLD AUTO: 0.2 % (ref 0–1.5)
DEPRECATED RDW RBC AUTO: 45 FL (ref 37–54)
EOSINOPHIL # BLD AUTO: 0.03 10*3/MM3 (ref 0–0.4)
EOSINOPHIL NFR BLD AUTO: 0.3 % (ref 0.3–6.2)
ERYTHROCYTE [DISTWIDTH] IN BLOOD BY AUTOMATED COUNT: 13.6 % (ref 12.3–15.4)
HCT VFR BLD AUTO: 33.6 % (ref 34–46.6)
HGB BLD-MCNC: 11.3 G/DL (ref 12–15.9)
IGA1 MFR SER: <50 MG/DL (ref 70–400)
IGG1 SER-MCNC: 487 MG/DL (ref 700–1600)
IGM SERPL-MCNC: 67 MG/DL (ref 40–230)
IMM GRANULOCYTES # BLD AUTO: 0.01 10*3/MM3 (ref 0–0.05)
IMM GRANULOCYTES NFR BLD AUTO: 0.1 % (ref 0–0.5)
LYMPHOCYTES # BLD AUTO: 2.13 10*3/MM3 (ref 0.7–3.1)
LYMPHOCYTES NFR BLD AUTO: 22.3 % (ref 19.6–45.3)
MCH RBC QN AUTO: 30.2 PG (ref 26.6–33)
MCHC RBC AUTO-ENTMCNC: 33.6 G/DL (ref 31.5–35.7)
MCV RBC AUTO: 89.8 FL (ref 79–97)
MONOCYTES # BLD AUTO: 0.69 10*3/MM3 (ref 0.1–0.9)
MONOCYTES NFR BLD AUTO: 7.2 % (ref 5–12)
NEUTROPHILS NFR BLD AUTO: 6.68 10*3/MM3 (ref 1.7–7)
NEUTROPHILS NFR BLD AUTO: 69.9 % (ref 42.7–76)
PLATELET # BLD AUTO: 276 10*3/MM3 (ref 140–450)
PMV BLD AUTO: 9.2 FL (ref 6–12)
RBC # BLD AUTO: 3.74 10*6/MM3 (ref 3.77–5.28)
WBC NRBC COR # BLD AUTO: 9.56 10*3/MM3 (ref 3.4–10.8)

## 2024-01-12 PROCEDURE — 82784 ASSAY IGA/IGD/IGG/IGM EACH: CPT | Performed by: INTERNAL MEDICINE

## 2024-01-12 PROCEDURE — 25010000002 HEPARIN LOCK FLUSH PER 10 UNITS: Performed by: INTERNAL MEDICINE

## 2024-01-12 PROCEDURE — 36591 DRAW BLOOD OFF VENOUS DEVICE: CPT

## 2024-01-12 PROCEDURE — 85025 COMPLETE CBC W/AUTO DIFF WBC: CPT | Performed by: INTERNAL MEDICINE

## 2024-01-12 RX ORDER — SODIUM CHLORIDE 0.9 % (FLUSH) 0.9 %
20 SYRINGE (ML) INJECTION AS NEEDED
OUTPATIENT
Start: 2024-01-12

## 2024-01-12 RX ORDER — SODIUM CHLORIDE 0.9 % (FLUSH) 0.9 %
20 SYRINGE (ML) INJECTION AS NEEDED
Status: DISCONTINUED | OUTPATIENT
Start: 2024-01-12 | End: 2024-01-13 | Stop reason: HOSPADM

## 2024-01-12 RX ORDER — HEPARIN SODIUM (PORCINE) LOCK FLUSH IV SOLN 100 UNIT/ML 100 UNIT/ML
500 SOLUTION INTRAVENOUS AS NEEDED
OUTPATIENT
Start: 2024-01-16

## 2024-01-12 RX ORDER — HEPARIN SODIUM (PORCINE) LOCK FLUSH IV SOLN 100 UNIT/ML 100 UNIT/ML
500 SOLUTION INTRAVENOUS AS NEEDED
Status: DISCONTINUED | OUTPATIENT
Start: 2024-01-12 | End: 2024-01-13 | Stop reason: HOSPADM

## 2024-01-12 RX ADMIN — HEPARIN SODIUM (PORCINE) LOCK FLUSH IV SOLN 100 UNIT/ML 500 UNITS: 100 SOLUTION at 09:48

## 2024-01-12 RX ADMIN — Medication 20 ML: at 09:48

## 2024-01-12 NOTE — PROGRESS NOTES
Chief Complaint/Reason for Referral:  CVID (common variable immunodeficiency)    Arnie Rucker MD Kindervater, Kasey, APRN    Subjective    History of Present Illness  Ms. Francine Preciado presents with her mother for lab check for IVIG treatment scheduled for 1/16/2024. Reports she is doing well with treatment. Reports has had ear infection recenlty. Receives IVIG treatment every 6 weeks.     Last IgG level of 584 on 12/1/23 and acceptable.     Taking oral iron 1 tab BID. Plan to recheck iron panel and ferritin at next visit. Reports menses are sometime heavy.     Hemoglobin 12.2 on 12/1/23. Decreased to 11.3 today. CBC is normal otherwise.       Oncology/Hematology History Overview Note   CVID:   -Diagnosed by Immunologist 8/20/12  -presented with frequent/severe infections             Review of Systems   Constitutional:  Negative for appetite change, diaphoresis, fatigue, fever, unexpected weight gain and unexpected weight loss.   HENT:  Negative for hearing loss, mouth sores, sore throat, swollen glands, trouble swallowing and voice change.    Eyes:  Negative for blurred vision.   Respiratory:  Negative for cough, shortness of breath and wheezing.    Cardiovascular:  Negative for chest pain and palpitations.   Gastrointestinal:  Negative for abdominal pain, blood in stool, constipation, diarrhea, nausea and vomiting.   Endocrine: Negative for cold intolerance and heat intolerance.   Genitourinary:  Negative for difficulty urinating, dysuria, frequency, hematuria and urinary incontinence.   Musculoskeletal:  Negative for arthralgias, back pain and myalgias.   Skin:  Negative for rash, skin lesions and wound.   Neurological:  Negative for dizziness, seizures, weakness, numbness and headache.   Hematological:  Does not bruise/bleed easily.   Psychiatric/Behavioral:  Negative for depressed mood. The patient is not nervous/anxious.    All other systems reviewed and are negative.      Current Outpatient Medications on  File Prior to Visit   Medication Sig Dispense Refill    amoxicillin (AMOXIL) 500 MG capsule Take 2 capsules by mouth 2 (Two) Times a Day. 28 capsule 0    atenolol (TENORMIN) 25 MG tablet TAKE ONE TABLET BY MOUTH DAILY 90 tablet 1    atorvastatin (LIPITOR) 10 MG tablet Take 1 tablet by mouth Daily. 90 tablet 1    azelastine (ASTELIN) 0.1 % nasal spray       EPINEPHrine (EPIPEN) 0.3 MG/0.3ML solution auto-injector injection 0.3 mL.      famotidine (PEPCID) 10 MG tablet Take 2 tablets by mouth Daily.      fenofibrate 160 MG tablet Take 1 tablet by mouth Daily. 90 tablet 1    fluticasone (FLONASE) 50 MCG/ACT nasal spray 2 sprays into the nostril(s) as directed by provider Daily. 16 g 11    levocetirizine (XYZAL) 5 MG tablet       levothyroxine (SYNTHROID, LEVOTHROID) 50 MCG tablet TAKE 1 TABLET BY MOUTH DAILY 90 tablet 1    Linzess 145 MCG capsule capsule TAKE ONE CAPSULE BY MOUTH EVERY MORNING BEFORE BREAKFAST 90 capsule 1    montelukast (SINGULAIR) 10 MG tablet       multivitamin with minerals tablet tablet Take 1 tablet by mouth Daily.      ondansetron (Zofran) 4 MG tablet Take 1 tablet by mouth Every 8 (Eight) Hours As Needed for Nausea or Vomiting. 90 tablet 1    Tobramycin-Dexamethasone (TobraDex ST) 0.3-0.05 % suspension 3-4 drops in left ear twice a day 5 mL 3    Tri-Lo-Rosy 0.18/0.215/0.25 MG-25 MCG per tablet TAKE ONE TABLET BY MOUTH DAILY 28 tablet 12     Current Facility-Administered Medications on File Prior to Visit   Medication Dose Route Frequency Provider Last Rate Last Admin    cyanocobalamin injection 1,000 mcg  1,000 mcg Intramuscular Q28 Days Arnie Rucker MD   1,000 mcg at 12/15/23 1429    heparin injection 500 Units  500 Units Intravenous PRN Kayden Leal MD   500 Units at 01/12/24 0948    sodium chloride 0.9 % flush 20 mL  20 mL Intravenous PRN Kayden Leal MD   20 mL at 01/12/24 0948       Allergies   Allergen Reactions    Cefuroxime Axetil Unknown - Low Severity     Neosporin [Bacitracin-Polymyxin B] Unknown - Low Severity     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     Central perforation of tympanic membrane of right ear     Chronic mastoiditis     COVID-19 vaccine series completed     Heart murmur     Hypertension     Kabuki make-up syndrome     Mitral valve prolapse     Mixed conductive and sensorineural hearing loss of both ears     Otorrhea, left     Recurrent otitis media     Skin disease     PSORIASIS/ECXEMA     Past Surgical History:   Procedure Laterality Date    MULTIPLE TOOTH EXTRACTIONS      NO PAST SURGERIES       Social History     Socioeconomic History    Marital status: Single   Tobacco Use    Smoking status: Never     Passive exposure: Yes    Smokeless tobacco: Never    Tobacco comments:     Daily exposure to 2nd hand smoke   Vaping Use    Vaping Use: Never used   Substance and Sexual Activity    Alcohol use: Not Currently     Comment: DENIES    Drug use: Never    Sexual activity: Never     Family History   Problem Relation Age of Onset    Leukemia Father     Heart disease Other      Immunization History   Administered Date(s) Administered    COVID-19 (PFIZER) Purple Cap Monovalent 04/04/2021, 04/25/2021    Flu Vaccine Quad PF >36MO 11/04/2013, 10/24/2014, 11/02/2015, 10/13/2016, 10/19/2017    Fluzone (or Fluarix & Flulaval for VFC) >6mos 11/04/2013, 10/24/2014, 11/02/2015, 10/13/2016, 10/19/2017, 11/23/2021, 10/11/2022, 10/13/2023    Fluzone Quad >6mos (Multi-dose) 11/04/2013, 11/02/2015, 10/19/2017, 10/29/2019    Hep B, Adolescent or Pediatric 11/24/1998, 01/14/1999, 06/02/1999    Influenza LAIV (Nasal) 10/13/2016, 10/14/2020    Influenza TIV (IM) 09/17/2009, 11/07/2018    Influenza, Unspecified 10/14/2020, 10/11/2022    MMR 08/19/1996    Pneumococcal Conjugate 20-Valent (PCV20) 09/14/2023    Pneumococcal Polysaccharide (PPSV23) 10/04/2012, 04/10/2014    influenza Split 10/02/2018       Tobacco Use:  Medium Risk (1/12/2024)    Patient History     Smoking Tobacco Use: Never     Smokeless Tobacco Use: Never     Passive Exposure: Yes       Objective     Physical Exam  Vitals and nursing note reviewed.   Constitutional:       Appearance: Normal appearance. She is obese.   HENT:      Nose: Nose normal.      Mouth/Throat:      Mouth: Mucous membranes are moist.   Eyes:      Pupils: Pupils are equal, round, and reactive to light.   Cardiovascular:      Rate and Rhythm: Normal rate and regular rhythm.      Pulses: Normal pulses.      Heart sounds: Normal heart sounds. No murmur heard.  Pulmonary:      Effort: Pulmonary effort is normal. No respiratory distress.      Breath sounds: Normal breath sounds.   Abdominal:      General: Bowel sounds are normal. There is no distension.      Palpations: Abdomen is soft.   Musculoskeletal:         General: Normal range of motion.      Cervical back: Normal range of motion and neck supple.   Skin:     General: Skin is warm and dry.      Capillary Refill: Capillary refill takes less than 2 seconds.   Neurological:      General: No focal deficit present.      Mental Status: She is alert and oriented to person, place, and time.   Psychiatric:         Mood and Affect: Mood normal.         Behavior: Behavior normal.         Thought Content: Thought content normal.         Judgment: Judgment normal.         Vitals:    01/12/24 1017   BP: 120/70   Pulse: 82   Resp: 18   Temp: 97.5 °F (36.4 °C)   TempSrc: Temporal   SpO2: 99%   Weight: 67.8 kg (149 lb 6.4 oz)   PainSc: 0-No pain       Wt Readings from Last 3 Encounters:   01/12/24 67.8 kg (149 lb 6.4 oz)   12/05/23 66.2 kg (145 lb 15.1 oz)   12/01/23 66 kg (145 lb 8.1 oz)        ECOG score: 0         ECOG: (0) Fully Active - Able to Carry On All Pre-disease Performance Without Restriction  Fall Risk Assessment was completed, and patient is at low risk for falls.  PHQ-9 Total Score: 0       The patient is  experiencing fatigue. Fatigue  "score: 3    PT/OT Functional Screening: PT fx screen : No needs identified  Speech Functional Screening: Speech fx screen : No needs identified  Rehab to be ordered: Rehab to be ordered : No needs identified        Result Review :  The following data was reviewed by: NO Nur on 01/12/2024:  Lab Results   Component Value Date    HGB 11.3 (L) 01/12/2024    HCT 33.6 (L) 01/12/2024    MCV 89.8 01/12/2024     01/12/2024    WBC 9.56 01/12/2024    NEUTROABS 6.68 01/12/2024    LYMPHSABS 2.13 01/12/2024    MONOSABS 0.69 01/12/2024    EOSABS 0.03 01/12/2024    BASOSABS 0.02 01/12/2024     Lab Results   Component Value Date    GLUCOSE 72 02/10/2023    BUN 14 02/10/2023    CREATININE 1.01 (H) 02/10/2023     02/10/2023    K 3.9 02/10/2023     02/10/2023    CO2 22.6 02/10/2023    CALCIUM 9.4 02/10/2023    PROTEINTOT 7.1 02/10/2023    ALBUMIN 4.1 02/10/2023    BILITOT 0.5 02/10/2023    ALKPHOS 82 02/10/2023    AST 16 02/10/2023    ALT 12 02/10/2023     Lab Results   Component Value Date     08/12/2019    FERRITIN 149.50 08/26/2022    [id number] 722 02/10/2023    FOLATE >20.00 02/10/2023     Lab Results   Component Value Date    IRON 37 08/26/2022    LABIRON 7 (L) 08/26/2022    TRANSFERRIN 362 (H) 08/26/2022    TIBC 539 (H) 08/26/2022     Lab Results   Component Value Date     08/12/2019    FERRITIN 149.50 08/26/2022    [id number] 722 02/10/2023    FOLATE >20.00 02/10/2023     No results found for: \"PSA\", \"CEA\", \"AFP\", \"\", \"\"    No Images in the past 120 days found..         Assessment and Plan:  Diagnoses and all orders for this visit:    1. Iron deficiency anemia, unspecified iron deficiency anemia type (Primary)  -     Iron Profile; Future  -     Ferritin; Future    2. CVID (common variable immunodeficiency)    CVID and receives IVIG treatment every 6 weeks. Due for treatment on 1/16/24. IgG levels are above 500. Concerned about weather for Tuesday with the weather " report. Will wait to see what weather does before she cancels treatment. Discussed to stay home if weather bad.     Continue oral iron for now. Anemia is stable. Recheck  iron panel, ferritin at next visit in 6 weeks.     I spent 20 minutes caring for Francine on this date of service. This time includes time spent by me in the following activities:preparing for the visit, reviewing tests, obtaining and/or reviewing a separately obtained history, performing a medically appropriate examination and/or evaluation , counseling and educating the patient/family/caregiver, ordering medications, tests, or procedures, referring and communicating with other health care professionals , documenting information in the medical record, independently interpreting results and communicating that information with the patient/family/caregiver, and care coordination    Patient Follow Up: 6 weeks and follow up with Dr. Leal.     Patient was given instructions and counseling regarding her condition or for health maintenance advice. Please see specific information pulled into the AVS if appropriate.     Belia Saez, APRN    1/12/2024    .tob

## 2024-01-14 DIAGNOSIS — I34.1 MITRAL VALVE PROLAPSE: ICD-10-CM

## 2024-01-15 ENCOUNTER — TELEPHONE (OUTPATIENT)
Dept: ONCOLOGY | Facility: HOSPITAL | Age: 34
End: 2024-01-15
Payer: MEDICARE

## 2024-01-15 RX ORDER — SODIUM CHLORIDE 9 MG/ML
250 INJECTION, SOLUTION INTRAVENOUS ONCE
Status: CANCELLED | OUTPATIENT
Start: 2024-01-18

## 2024-01-15 RX ORDER — DIPHENHYDRAMINE HCL 25 MG
25 CAPSULE ORAL ONCE
Status: CANCELLED | OUTPATIENT
Start: 2024-01-18

## 2024-01-15 RX ORDER — FAMOTIDINE 10 MG/ML
20 INJECTION, SOLUTION INTRAVENOUS ONCE
Status: CANCELLED | OUTPATIENT
Start: 2024-01-18

## 2024-01-15 RX ORDER — MEPERIDINE HYDROCHLORIDE 25 MG/ML
25 INJECTION INTRAMUSCULAR; INTRAVENOUS; SUBCUTANEOUS
Status: CANCELLED | OUTPATIENT
Start: 2024-01-18

## 2024-01-15 RX ORDER — ATENOLOL 25 MG/1
25 TABLET ORAL DAILY
Qty: 90 TABLET | Refills: 1 | Status: SHIPPED | OUTPATIENT
Start: 2024-01-15

## 2024-01-15 RX ORDER — DIPHENHYDRAMINE HYDROCHLORIDE 50 MG/ML
50 INJECTION INTRAMUSCULAR; INTRAVENOUS AS NEEDED
Status: CANCELLED | OUTPATIENT
Start: 2024-01-18

## 2024-01-15 RX ORDER — FAMOTIDINE 10 MG/ML
20 INJECTION, SOLUTION INTRAVENOUS AS NEEDED
Status: CANCELLED | OUTPATIENT
Start: 2024-01-18

## 2024-01-15 NOTE — TELEPHONE ENCOUNTER
Caller: HEATHER FELTON    Relationship to patient: Mother    Best call back number: [phone]    Chief complaint: R/S    Type of visit: INFUSION    Requested date: 1-18 OR NEXT AVLIABLE    If rescheduling, when is the original appointment: 1-16

## 2024-01-18 ENCOUNTER — HOSPITAL ENCOUNTER (OUTPATIENT)
Dept: ONCOLOGY | Facility: HOSPITAL | Age: 34
Discharge: HOME | End: 2024-01-18
Admitting: INTERNAL MEDICINE
Payer: MEDICARE

## 2024-01-18 VITALS
OXYGEN SATURATION: 100 % | BODY MASS INDEX: 34.69 KG/M2 | DIASTOLIC BLOOD PRESSURE: 53 MMHG | HEART RATE: 76 BPM | RESPIRATION RATE: 18 BRPM | WEIGHT: 149.25 LBS | TEMPERATURE: 98.1 F | SYSTOLIC BLOOD PRESSURE: 113 MMHG

## 2024-01-18 DIAGNOSIS — Z45.2 ENCOUNTER FOR ADJUSTMENT OR MANAGEMENT OF VASCULAR ACCESS DEVICE: ICD-10-CM

## 2024-01-18 DIAGNOSIS — D83.9 CVID (COMMON VARIABLE IMMUNODEFICIENCY): Primary | ICD-10-CM

## 2024-01-18 PROCEDURE — 96365 THER/PROPH/DIAG IV INF INIT: CPT

## 2024-01-18 PROCEDURE — 25010000002 IMMUNE GLOBULIN (HUMAN) 10 GM/100ML SOLUTION: Performed by: INTERNAL MEDICINE

## 2024-01-18 PROCEDURE — 25810000003 SODIUM CHLORIDE 0.9 % SOLUTION: Performed by: INTERNAL MEDICINE

## 2024-01-18 PROCEDURE — 25010000002 HEPARIN LOCK FLUSH PER 10 UNITS: Performed by: INTERNAL MEDICINE

## 2024-01-18 RX ORDER — SODIUM CHLORIDE 0.9 % (FLUSH) 0.9 %
20 SYRINGE (ML) INJECTION AS NEEDED
OUTPATIENT
Start: 2024-01-18

## 2024-01-18 RX ORDER — DIPHENHYDRAMINE HYDROCHLORIDE 50 MG/ML
50 INJECTION INTRAMUSCULAR; INTRAVENOUS AS NEEDED
Status: DISCONTINUED | OUTPATIENT
Start: 2024-01-18 | End: 2024-01-19 | Stop reason: HOSPADM

## 2024-01-18 RX ORDER — MEPERIDINE HYDROCHLORIDE 25 MG/ML
25 INJECTION INTRAMUSCULAR; INTRAVENOUS; SUBCUTANEOUS
Status: DISCONTINUED | OUTPATIENT
Start: 2024-01-18 | End: 2024-01-19 | Stop reason: HOSPADM

## 2024-01-18 RX ORDER — FAMOTIDINE 10 MG/ML
20 INJECTION, SOLUTION INTRAVENOUS AS NEEDED
Status: DISCONTINUED | OUTPATIENT
Start: 2024-01-18 | End: 2024-01-19 | Stop reason: HOSPADM

## 2024-01-18 RX ORDER — HEPARIN SODIUM (PORCINE) LOCK FLUSH IV SOLN 100 UNIT/ML 100 UNIT/ML
500 SOLUTION INTRAVENOUS AS NEEDED
Status: DISCONTINUED | OUTPATIENT
Start: 2024-01-18 | End: 2024-01-19 | Stop reason: HOSPADM

## 2024-01-18 RX ORDER — SODIUM CHLORIDE 0.9 % (FLUSH) 0.9 %
20 SYRINGE (ML) INJECTION AS NEEDED
Status: DISCONTINUED | OUTPATIENT
Start: 2024-01-18 | End: 2024-01-19 | Stop reason: HOSPADM

## 2024-01-18 RX ORDER — HEPARIN SODIUM (PORCINE) LOCK FLUSH IV SOLN 100 UNIT/ML 100 UNIT/ML
500 SOLUTION INTRAVENOUS AS NEEDED
OUTPATIENT
Start: 2024-01-18

## 2024-01-18 RX ORDER — SODIUM CHLORIDE 9 MG/ML
250 INJECTION, SOLUTION INTRAVENOUS ONCE
Status: COMPLETED | OUTPATIENT
Start: 2024-01-18 | End: 2024-01-18

## 2024-01-18 RX ADMIN — HEPARIN SODIUM (PORCINE) LOCK FLUSH IV SOLN 100 UNIT/ML 500 UNITS: 100 SOLUTION at 09:52

## 2024-01-18 RX ADMIN — IMMUNE GLOBULIN (HUMAN) 10 G: 10 INJECTION INTRAVENOUS; SUBCUTANEOUS at 08:31

## 2024-01-18 RX ADMIN — Medication 20 ML: at 09:52

## 2024-01-18 RX ADMIN — SODIUM CHLORIDE 250 ML: 9 INJECTION, SOLUTION INTRAVENOUS at 08:17

## 2024-02-06 ENCOUNTER — CLINICAL SUPPORT (OUTPATIENT)
Dept: FAMILY MEDICINE CLINIC | Facility: CLINIC | Age: 34
End: 2024-02-06
Payer: MEDICARE

## 2024-02-06 DIAGNOSIS — D64.9 ANEMIA, UNSPECIFIED TYPE: Primary | ICD-10-CM

## 2024-02-06 PROCEDURE — 96372 THER/PROPH/DIAG INJ SC/IM: CPT

## 2024-02-06 RX ADMIN — CYANOCOBALAMIN 1000 MCG: 1000 INJECTION, SOLUTION INTRAMUSCULAR; SUBCUTANEOUS at 14:22

## 2024-02-23 ENCOUNTER — HOSPITAL ENCOUNTER (OUTPATIENT)
Dept: ONCOLOGY | Facility: HOSPITAL | Age: 34
Discharge: HOME | End: 2024-02-23
Payer: MEDICARE

## 2024-02-23 ENCOUNTER — OFFICE VISIT (OUTPATIENT)
Dept: ONCOLOGY | Facility: HOSPITAL | Age: 34
End: 2024-02-23
Payer: MEDICARE

## 2024-02-23 VITALS
TEMPERATURE: 96.9 F | BODY MASS INDEX: 34.07 KG/M2 | DIASTOLIC BLOOD PRESSURE: 67 MMHG | SYSTOLIC BLOOD PRESSURE: 130 MMHG | RESPIRATION RATE: 18 BRPM | OXYGEN SATURATION: 99 % | WEIGHT: 146.61 LBS | HEART RATE: 79 BPM

## 2024-02-23 DIAGNOSIS — D50.9 IRON DEFICIENCY ANEMIA, UNSPECIFIED IRON DEFICIENCY ANEMIA TYPE: ICD-10-CM

## 2024-02-23 DIAGNOSIS — Z01.89 ROUTINE LAB DRAW: ICD-10-CM

## 2024-02-23 DIAGNOSIS — E78.2 MIXED HYPERLIPIDEMIA: ICD-10-CM

## 2024-02-23 DIAGNOSIS — D83.9 CVID (COMMON VARIABLE IMMUNODEFICIENCY): Primary | ICD-10-CM

## 2024-02-23 DIAGNOSIS — E03.9 ACQUIRED HYPOTHYROIDISM: ICD-10-CM

## 2024-02-23 DIAGNOSIS — E53.8 VITAMIN B12 DEFICIENCY: ICD-10-CM

## 2024-02-23 DIAGNOSIS — Z45.2 ENCOUNTER FOR ADJUSTMENT OR MANAGEMENT OF VASCULAR ACCESS DEVICE: ICD-10-CM

## 2024-02-23 LAB
ALBUMIN SERPL-MCNC: 4 G/DL (ref 3.5–5.2)
ALBUMIN/GLOB SERPL: 1.2 G/DL
ALP SERPL-CCNC: 125 U/L (ref 39–117)
ALT SERPL W P-5'-P-CCNC: 71 U/L (ref 1–33)
ANION GAP SERPL CALCULATED.3IONS-SCNC: 15.3 MMOL/L (ref 5–15)
AST SERPL-CCNC: 56 U/L (ref 1–32)
BASOPHILS # BLD AUTO: 0.02 10*3/MM3 (ref 0–0.2)
BASOPHILS NFR BLD AUTO: 0.2 % (ref 0–1.5)
BILIRUB SERPL-MCNC: 0.7 MG/DL (ref 0–1.2)
BUN SERPL-MCNC: 11 MG/DL (ref 6–20)
BUN/CREAT SERPL: 13.3 (ref 7–25)
CALCIUM SPEC-SCNC: 9.2 MG/DL (ref 8.6–10.5)
CHLORIDE SERPL-SCNC: 102 MMOL/L (ref 98–107)
CHOLEST SERPL-MCNC: 158 MG/DL (ref 0–200)
CO2 SERPL-SCNC: 21.7 MMOL/L (ref 22–29)
CREAT SERPL-MCNC: 0.83 MG/DL (ref 0.57–1)
DEPRECATED RDW RBC AUTO: 43 FL (ref 37–54)
EGFRCR SERPLBLD CKD-EPI 2021: 96.2 ML/MIN/1.73
EOSINOPHIL # BLD AUTO: 0.07 10*3/MM3 (ref 0–0.4)
EOSINOPHIL NFR BLD AUTO: 0.6 % (ref 0.3–6.2)
ERYTHROCYTE [DISTWIDTH] IN BLOOD BY AUTOMATED COUNT: 13 % (ref 12.3–15.4)
FERRITIN SERPL-MCNC: 262.2 NG/ML (ref 13–150)
FOLATE SERPL-MCNC: >20 NG/ML (ref 4.78–24.2)
GLOBULIN UR ELPH-MCNC: 3.4 GM/DL
GLUCOSE SERPL-MCNC: 73 MG/DL (ref 65–99)
HCT VFR BLD AUTO: 38.7 % (ref 34–46.6)
HDLC SERPL-MCNC: 35 MG/DL (ref 40–60)
HGB BLD-MCNC: 12.8 G/DL (ref 12–15.9)
IGA1 MFR SER: <50 MG/DL (ref 70–400)
IGG1 SER-MCNC: 578 MG/DL (ref 700–1600)
IGM SERPL-MCNC: 87 MG/DL (ref 40–230)
IMM GRANULOCYTES # BLD AUTO: 0.02 10*3/MM3 (ref 0–0.05)
IMM GRANULOCYTES NFR BLD AUTO: 0.2 % (ref 0–0.5)
IRON 24H UR-MRATE: 84 MCG/DL (ref 37–145)
IRON SATN MFR SERPL: 15 % (ref 20–50)
LDLC SERPL CALC-MCNC: 78 MG/DL (ref 0–100)
LDLC/HDLC SERPL: 1.93 {RATIO}
LYMPHOCYTES # BLD AUTO: 2.11 10*3/MM3 (ref 0.7–3.1)
LYMPHOCYTES NFR BLD AUTO: 18.3 % (ref 19.6–45.3)
MCH RBC QN AUTO: 29.6 PG (ref 26.6–33)
MCHC RBC AUTO-ENTMCNC: 33.1 G/DL (ref 31.5–35.7)
MCV RBC AUTO: 89.4 FL (ref 79–97)
MONOCYTES # BLD AUTO: 0.65 10*3/MM3 (ref 0.1–0.9)
MONOCYTES NFR BLD AUTO: 5.6 % (ref 5–12)
NEUTROPHILS NFR BLD AUTO: 75.1 % (ref 42.7–76)
NEUTROPHILS NFR BLD AUTO: 8.66 10*3/MM3 (ref 1.7–7)
PLATELET # BLD AUTO: 279 10*3/MM3 (ref 140–450)
PMV BLD AUTO: 9.1 FL (ref 6–12)
POTASSIUM SERPL-SCNC: 3.7 MMOL/L (ref 3.5–5.2)
PROT SERPL-MCNC: 7.4 G/DL (ref 6–8.5)
RBC # BLD AUTO: 4.33 10*6/MM3 (ref 3.77–5.28)
SODIUM SERPL-SCNC: 139 MMOL/L (ref 136–145)
TIBC SERPL-MCNC: 542 MCG/DL (ref 298–536)
TRANSFERRIN SERPL-MCNC: 364 MG/DL (ref 200–360)
TRIGL SERPL-MCNC: 277 MG/DL (ref 0–150)
TSH SERPL DL<=0.05 MIU/L-ACNC: 2.91 UIU/ML (ref 0.27–4.2)
VIT B12 BLD-MCNC: 1283 PG/ML (ref 211–946)
VLDLC SERPL-MCNC: 45 MG/DL (ref 5–40)
WBC NRBC COR # BLD AUTO: 11.53 10*3/MM3 (ref 3.4–10.8)

## 2024-02-23 PROCEDURE — 83540 ASSAY OF IRON: CPT | Performed by: NURSE PRACTITIONER

## 2024-02-23 PROCEDURE — 80053 COMPREHEN METABOLIC PANEL: CPT

## 2024-02-23 PROCEDURE — 85025 COMPLETE CBC W/AUTO DIFF WBC: CPT | Performed by: INTERNAL MEDICINE

## 2024-02-23 PROCEDURE — 25010000002 HEPARIN LOCK FLUSH PER 10 UNITS: Performed by: INTERNAL MEDICINE

## 2024-02-23 PROCEDURE — 82728 ASSAY OF FERRITIN: CPT | Performed by: NURSE PRACTITIONER

## 2024-02-23 PROCEDURE — 82784 ASSAY IGA/IGD/IGG/IGM EACH: CPT | Performed by: INTERNAL MEDICINE

## 2024-02-23 PROCEDURE — 36591 DRAW BLOOD OFF VENOUS DEVICE: CPT

## 2024-02-23 PROCEDURE — 84443 ASSAY THYROID STIM HORMONE: CPT

## 2024-02-23 PROCEDURE — 84466 ASSAY OF TRANSFERRIN: CPT | Performed by: NURSE PRACTITIONER

## 2024-02-23 PROCEDURE — 82607 VITAMIN B-12: CPT

## 2024-02-23 PROCEDURE — 82746 ASSAY OF FOLIC ACID SERUM: CPT

## 2024-02-23 PROCEDURE — 80061 LIPID PANEL: CPT

## 2024-02-23 RX ORDER — SODIUM CHLORIDE 0.9 % (FLUSH) 0.9 %
20 SYRINGE (ML) INJECTION AS NEEDED
Status: DISCONTINUED | OUTPATIENT
Start: 2024-02-23 | End: 2024-02-24 | Stop reason: HOSPADM

## 2024-02-23 RX ORDER — HEPARIN SODIUM (PORCINE) LOCK FLUSH IV SOLN 100 UNIT/ML 100 UNIT/ML
500 SOLUTION INTRAVENOUS AS NEEDED
OUTPATIENT
Start: 2024-02-27

## 2024-02-23 RX ORDER — MEPERIDINE HYDROCHLORIDE 25 MG/ML
25 INJECTION INTRAMUSCULAR; INTRAVENOUS; SUBCUTANEOUS
OUTPATIENT
Start: 2024-02-29

## 2024-02-23 RX ORDER — HEPARIN SODIUM (PORCINE) LOCK FLUSH IV SOLN 100 UNIT/ML 100 UNIT/ML
500 SOLUTION INTRAVENOUS AS NEEDED
Status: DISCONTINUED | OUTPATIENT
Start: 2024-02-23 | End: 2024-02-24 | Stop reason: HOSPADM

## 2024-02-23 RX ORDER — FAMOTIDINE 10 MG/ML
20 INJECTION, SOLUTION INTRAVENOUS ONCE
OUTPATIENT
Start: 2024-02-29

## 2024-02-23 RX ORDER — SODIUM CHLORIDE 9 MG/ML
250 INJECTION, SOLUTION INTRAVENOUS ONCE
OUTPATIENT
Start: 2024-02-29

## 2024-02-23 RX ORDER — DIPHENHYDRAMINE HYDROCHLORIDE 50 MG/ML
50 INJECTION INTRAMUSCULAR; INTRAVENOUS AS NEEDED
OUTPATIENT
Start: 2024-02-29

## 2024-02-23 RX ORDER — DIPHENHYDRAMINE HCL 25 MG
25 CAPSULE ORAL ONCE
OUTPATIENT
Start: 2024-02-29

## 2024-02-23 RX ORDER — FAMOTIDINE 10 MG/ML
20 INJECTION, SOLUTION INTRAVENOUS AS NEEDED
OUTPATIENT
Start: 2024-02-29

## 2024-02-23 RX ORDER — SODIUM CHLORIDE 0.9 % (FLUSH) 0.9 %
20 SYRINGE (ML) INJECTION AS NEEDED
OUTPATIENT
Start: 2024-02-23

## 2024-02-23 RX ADMIN — Medication 20 ML: at 10:22

## 2024-02-23 RX ADMIN — HEPARIN SODIUM (PORCINE) LOCK FLUSH IV SOLN 100 UNIT/ML 500 UNITS: 100 SOLUTION at 10:22

## 2024-02-23 NOTE — PROGRESS NOTES
Chief Complaint/Reason for Referral:  CVID (common variable immunodeficiency)    Arnie Rucker MD Kindervater, Kasey, APRN    Subjective    History of Present Illness  Ms. Francine Preciado presents with her mother for lab check for IVIG treatment scheduled for 2/27/2024. Reports she is doing well with treatment. Has had sinus issues with the weather changes. No fevers or chills. Reports they have been having to put her in unique positions in order to get her port to work. She has had this port for a while.  Receives IVIG treatment every 6 weeks. No fevers or chills reported.     Last IgG level of 487 on 1/12/24.    Taking oral iron 1 tab BID. Repeat iron labs pending.     Hemoglobin 12.8 today. WBC slightly increased today. Ferritin 262, iron sat 15%      Oncology/Hematology History Overview Note   CVID:   -Diagnosed by Immunologist 8/20/12  -presented with frequent/severe infections             Review of Systems   Constitutional:  Negative for appetite change, diaphoresis, fatigue, fever, unexpected weight gain and unexpected weight loss.   HENT:  Negative for hearing loss, mouth sores, sore throat, swollen glands, trouble swallowing and voice change.    Eyes:  Negative for blurred vision.   Respiratory:  Negative for cough, shortness of breath and wheezing.    Cardiovascular:  Negative for chest pain and palpitations.   Gastrointestinal:  Negative for abdominal pain, blood in stool, constipation, diarrhea, nausea and vomiting.   Endocrine: Negative for cold intolerance and heat intolerance.   Genitourinary:  Negative for difficulty urinating, dysuria, frequency, hematuria and urinary incontinence.   Musculoskeletal:  Negative for arthralgias, back pain and myalgias.   Skin:  Negative for rash, skin lesions and wound.   Neurological:  Negative for dizziness, seizures, weakness, numbness and headache.   Hematological:  Does not bruise/bleed easily.   Psychiatric/Behavioral:  Negative for depressed mood. The patient is  not nervous/anxious.    All other systems reviewed and are negative.      Current Outpatient Medications on File Prior to Visit   Medication Sig Dispense Refill    amoxicillin (AMOXIL) 500 MG capsule Take 2 capsules by mouth 2 (Two) Times a Day. 28 capsule 0    atenolol (TENORMIN) 25 MG tablet TAKE 1 TABLET BY MOUTH DAILY 90 tablet 1    atorvastatin (LIPITOR) 10 MG tablet Take 1 tablet by mouth Daily. 90 tablet 1    azelastine (ASTELIN) 0.1 % nasal spray       EPINEPHrine (EPIPEN) 0.3 MG/0.3ML solution auto-injector injection 0.3 mL.      famotidine (PEPCID) 10 MG tablet Take 2 tablets by mouth Daily.      fenofibrate 160 MG tablet Take 1 tablet by mouth Daily. 90 tablet 1    fluticasone (FLONASE) 50 MCG/ACT nasal spray 2 sprays into the nostril(s) as directed by provider Daily. 16 g 11    levocetirizine (XYZAL) 5 MG tablet       levothyroxine (SYNTHROID, LEVOTHROID) 50 MCG tablet TAKE 1 TABLET BY MOUTH DAILY 90 tablet 1    Linzess 145 MCG capsule capsule TAKE ONE CAPSULE BY MOUTH EVERY MORNING BEFORE BREAKFAST 90 capsule 1    montelukast (SINGULAIR) 10 MG tablet       multivitamin with minerals tablet tablet Take 1 tablet by mouth Daily.      ondansetron (Zofran) 4 MG tablet Take 1 tablet by mouth Every 8 (Eight) Hours As Needed for Nausea or Vomiting. 90 tablet 1    Tobramycin-Dexamethasone (TobraDex ST) 0.3-0.05 % suspension 3-4 drops in left ear twice a day 5 mL 3    Tri-Lo-Rosy 0.18/0.215/0.25 MG-25 MCG per tablet TAKE ONE TABLET BY MOUTH DAILY 28 tablet 12     Current Facility-Administered Medications on File Prior to Visit   Medication Dose Route Frequency Provider Last Rate Last Admin    cyanocobalamin injection 1,000 mcg  1,000 mcg Intramuscular Q28 Days Arnie Rucker MD   1,000 mcg at 02/06/24 1422    heparin injection 500 Units  500 Units Intravenous PRN Kayden Leal MD   500 Units at 02/23/24 1022    sodium chloride 0.9 % flush 20 mL  20 mL Intravenous PRN Kayden Leal MD   20  mL at 02/23/24 1022       Allergies   Allergen Reactions    Cefuroxime Axetil Unknown - Low Severity    Neosporin [Bacitracin-Polymyxin B] Unknown - Low Severity     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     Central perforation of tympanic membrane of right ear     Chronic mastoiditis     COVID-19 vaccine series completed     Heart murmur     Hypertension     Kabuki make-up syndrome     Mitral valve prolapse     Mixed conductive and sensorineural hearing loss of both ears     Otorrhea, left     Recurrent otitis media     Skin disease     PSORIASIS/ECXEMA     Past Surgical History:   Procedure Laterality Date    MULTIPLE TOOTH EXTRACTIONS      NO PAST SURGERIES       Social History     Socioeconomic History    Marital status: Single   Tobacco Use    Smoking status: Never     Passive exposure: Yes    Smokeless tobacco: Never    Tobacco comments:     Daily exposure to 2nd hand smoke   Vaping Use    Vaping Use: Never used   Substance and Sexual Activity    Alcohol use: Not Currently     Comment: DENIES    Drug use: Never    Sexual activity: Never     Family History   Problem Relation Age of Onset    Leukemia Father     Heart disease Other      Immunization History   Administered Date(s) Administered    COVID-19 (PFIZER) Purple Cap Monovalent 04/04/2021, 04/25/2021    Flu Vaccine Quad PF >36MO 11/04/2013, 10/24/2014, 11/02/2015, 10/13/2016, 10/19/2017    Fluzone (or Fluarix & Flulaval for VFC) >6mos 11/04/2013, 10/24/2014, 11/02/2015, 10/13/2016, 10/19/2017, 11/23/2021, 10/11/2022, 10/13/2023    Fluzone Quad >6mos (Multi-dose) 11/04/2013, 11/02/2015, 10/19/2017, 10/29/2019    Hep B, Adolescent or Pediatric 11/24/1998, 01/14/1999, 06/02/1999    Influenza LAIV (Nasal) 10/13/2016, 10/14/2020    Influenza TIV (IM) 09/17/2009, 11/07/2018    Influenza, Unspecified 10/14/2020, 10/11/2022    MMR 08/19/1996    Pneumococcal Conjugate 20-Valent (PCV20) 09/14/2023     Pneumococcal Polysaccharide (PPSV23) 10/04/2012, 04/10/2014    influenza Split 10/02/2018       Tobacco Use: Medium Risk (2/23/2024)    Patient History     Smoking Tobacco Use: Never     Smokeless Tobacco Use: Never     Passive Exposure: Yes       Objective     Physical Exam  Well appearing patient in no acute distress on RA  Anicteric sclerae, no rash on exposed skin  Respirations non-labored  Awake, alert, and oriented x 4. Speech intact. No gross neurologic deficit  Appropriate mood and affect    Vitals:    02/23/24 1039   BP: 130/67   Pulse: 79   Resp: 18   Temp: 96.9 °F (36.1 °C)   TempSrc: Temporal   SpO2: 99%   Weight: Comment: Scale down   PainSc: 0-No pain         Wt Readings from Last 3 Encounters:   01/18/24 67.7 kg (149 lb 4 oz)   01/12/24 67.8 kg (149 lb 6.4 oz)   12/05/23 66.2 kg (145 lb 15.1 oz)                  ECOG: (0) Fully Active - Able to Carry On All Pre-disease Performance Without Restriction  Fall Risk Assessment was completed, and patient is at low risk for falls.  PHQ-9 Total Score:         The patient is  experiencing fatigue. Fatigue score: 3    PT/OT Functional Screening: PT fx screen : No needs identified  Speech Functional Screening: Speech fx screen : No needs identified  Rehab to be ordered: Rehab to be ordered : No needs identified        Result Review :  The following data was reviewed by: Kayden Leal MD on 02/23/24:  Lab Results   Component Value Date    HGB 12.8 02/23/2024    HCT 38.7 02/23/2024    MCV 89.4 02/23/2024     02/23/2024    WBC 11.53 (H) 02/23/2024    NEUTROABS 8.66 (H) 02/23/2024    LYMPHSABS 2.11 02/23/2024    MONOSABS 0.65 02/23/2024    EOSABS 0.07 02/23/2024    BASOSABS 0.02 02/23/2024     Lab Results   Component Value Date    GLUCOSE 73 02/23/2024    BUN 11 02/23/2024    CREATININE 0.83 02/23/2024     02/23/2024    K 3.7 02/23/2024     02/23/2024    CO2 21.7 (L) 02/23/2024    CALCIUM 9.2 02/23/2024    PROTEINTOT 7.4 02/23/2024     "ALBUMIN 4.0 02/23/2024    BILITOT 0.7 02/23/2024    ALKPHOS 125 (H) 02/23/2024    AST 56 (H) 02/23/2024    ALT 71 (H) 02/23/2024     Lab Results   Component Value Date     08/12/2019    FERRITIN 149.50 08/26/2022    [id number] 722 02/10/2023    FOLATE >20.00 02/10/2023     Lab Results   Component Value Date    IRON 37 08/26/2022    LABIRON 7 (L) 08/26/2022    TRANSFERRIN 362 (H) 08/26/2022    TIBC 539 (H) 08/26/2022     Lab Results   Component Value Date     08/12/2019    FERRITIN 149.50 08/26/2022    [id number] 722 02/10/2023    FOLATE >20.00 02/10/2023     No results found for: \"PSA\", \"CEA\", \"AFP\", \"\", \"\"    Labs personally reviewed and anemia has resolved. IgG levels near 500. Ferritin elevated at 262.          Assessment and Plan:  Diagnoses and all orders for this visit:    1. CVID (common variable immunodeficiency) (Primary)    2. Iron deficiency anemia, unspecified iron deficiency anemia type    Other orders  -     sodium chloride 0.9 % infusion 250 mL  -     famotidine (PEPCID) injection 20 mg  -     diphenhydrAMINE (BENADRYL) capsule 25 mg  -     immune globulin (human) (GAMUNEX-C) 10 g infusion 100 mL  -     hydrocortisone sodium succinate (Solu-CORTEF) injection 100 mg  -     diphenhydrAMINE (BENADRYL) injection 50 mg  -     famotidine (PEPCID) injection 20 mg  -     meperidine (DEMEROL) injection 25 mg      CVID and receives IVIG treatment every 6 weeks. Due for treatment on 2/27/24. IgG levels are near 500, normally above.    Continue oral iron for now. Anemia is resolved. Repeat iron labs with elevated ferritin. Recommend to decrease oral iron from twice daily to once daily. Repeat due May 2024.     I spent 20 minutes caring for Francine on this date of service. This time includes time spent by me in the following activities:preparing for the visit, reviewing tests, obtaining and/or reviewing a separately obtained history, performing a medically appropriate examination and/or " evaluation , counseling and educating the patient/family/caregiver, ordering medications, tests, or procedures, referring and communicating with other health care professionals , documenting information in the medical record, independently interpreting results and communicating that information with the patient/family/caregiver, and care coordination    Patient Follow Up: 6 weeks    Patient was given instructions and counseling regarding her condition or for health maintenance advice. Please see specific information pulled into the AVS if appropriate.

## 2024-02-27 ENCOUNTER — HOSPITAL ENCOUNTER (OUTPATIENT)
Dept: ONCOLOGY | Facility: HOSPITAL | Age: 34
Discharge: HOME | End: 2024-02-27
Admitting: INTERNAL MEDICINE
Payer: MEDICARE

## 2024-02-27 VITALS
WEIGHT: 148 LBS | SYSTOLIC BLOOD PRESSURE: 120 MMHG | TEMPERATURE: 96.8 F | DIASTOLIC BLOOD PRESSURE: 64 MMHG | RESPIRATION RATE: 18 BRPM | OXYGEN SATURATION: 99 % | BODY MASS INDEX: 34.4 KG/M2 | HEART RATE: 81 BPM

## 2024-02-27 DIAGNOSIS — D83.9 CVID (COMMON VARIABLE IMMUNODEFICIENCY): Primary | ICD-10-CM

## 2024-02-27 DIAGNOSIS — Z45.2 ENCOUNTER FOR ADJUSTMENT OR MANAGEMENT OF VASCULAR ACCESS DEVICE: ICD-10-CM

## 2024-02-27 PROCEDURE — 25010000002 IMMUNE GLOBULIN (HUMAN) 10 GM/100ML SOLUTION: Performed by: INTERNAL MEDICINE

## 2024-02-27 PROCEDURE — 96365 THER/PROPH/DIAG IV INF INIT: CPT

## 2024-02-27 PROCEDURE — 25810000003 SODIUM CHLORIDE 0.9 % SOLUTION: Performed by: INTERNAL MEDICINE

## 2024-02-27 PROCEDURE — 25010000002 HEPARIN LOCK FLUSH PER 10 UNITS: Performed by: INTERNAL MEDICINE

## 2024-02-27 RX ORDER — FAMOTIDINE 10 MG/ML
20 INJECTION, SOLUTION INTRAVENOUS AS NEEDED
Status: DISCONTINUED | OUTPATIENT
Start: 2024-02-27 | End: 2024-02-28 | Stop reason: HOSPADM

## 2024-02-27 RX ORDER — SODIUM CHLORIDE 0.9 % (FLUSH) 0.9 %
20 SYRINGE (ML) INJECTION AS NEEDED
Status: DISCONTINUED | OUTPATIENT
Start: 2024-02-27 | End: 2024-02-28 | Stop reason: HOSPADM

## 2024-02-27 RX ORDER — DIPHENHYDRAMINE HCL 25 MG
25 CAPSULE ORAL ONCE
Status: DISCONTINUED | OUTPATIENT
Start: 2024-02-27 | End: 2024-02-28 | Stop reason: HOSPADM

## 2024-02-27 RX ORDER — FAMOTIDINE 10 MG/ML
20 INJECTION, SOLUTION INTRAVENOUS ONCE
Status: DISCONTINUED | OUTPATIENT
Start: 2024-02-27 | End: 2024-02-28 | Stop reason: HOSPADM

## 2024-02-27 RX ORDER — DIPHENHYDRAMINE HYDROCHLORIDE 50 MG/ML
50 INJECTION INTRAMUSCULAR; INTRAVENOUS AS NEEDED
Status: DISCONTINUED | OUTPATIENT
Start: 2024-02-27 | End: 2024-02-28 | Stop reason: HOSPADM

## 2024-02-27 RX ORDER — SODIUM CHLORIDE 9 MG/ML
250 INJECTION, SOLUTION INTRAVENOUS ONCE
Status: COMPLETED | OUTPATIENT
Start: 2024-02-27 | End: 2024-02-27

## 2024-02-27 RX ORDER — HEPARIN SODIUM (PORCINE) LOCK FLUSH IV SOLN 100 UNIT/ML 100 UNIT/ML
500 SOLUTION INTRAVENOUS AS NEEDED
Status: DISCONTINUED | OUTPATIENT
Start: 2024-02-27 | End: 2024-02-28 | Stop reason: HOSPADM

## 2024-02-27 RX ORDER — MEPERIDINE HYDROCHLORIDE 25 MG/ML
25 INJECTION INTRAMUSCULAR; INTRAVENOUS; SUBCUTANEOUS
Status: DISCONTINUED | OUTPATIENT
Start: 2024-02-27 | End: 2024-02-28 | Stop reason: HOSPADM

## 2024-02-27 RX ORDER — SODIUM CHLORIDE 0.9 % (FLUSH) 0.9 %
20 SYRINGE (ML) INJECTION AS NEEDED
OUTPATIENT
Start: 2024-02-27

## 2024-02-27 RX ORDER — HEPARIN SODIUM (PORCINE) LOCK FLUSH IV SOLN 100 UNIT/ML 100 UNIT/ML
500 SOLUTION INTRAVENOUS AS NEEDED
OUTPATIENT
Start: 2024-02-27

## 2024-02-27 RX ADMIN — IMMUNE GLOBULIN (HUMAN) 10 G: 10 INJECTION INTRAVENOUS; SUBCUTANEOUS at 10:12

## 2024-02-27 RX ADMIN — Medication 20 ML: at 11:29

## 2024-02-27 RX ADMIN — HEPARIN 500 UNITS: 100 SYRINGE at 11:30

## 2024-02-27 RX ADMIN — SODIUM CHLORIDE 250 ML: 9 INJECTION, SOLUTION INTRAVENOUS at 10:12

## 2024-02-29 ENCOUNTER — OFFICE VISIT (OUTPATIENT)
Dept: FAMILY MEDICINE CLINIC | Facility: CLINIC | Age: 34
End: 2024-02-29
Payer: MEDICARE

## 2024-02-29 VITALS
WEIGHT: 148.8 LBS | SYSTOLIC BLOOD PRESSURE: 110 MMHG | HEIGHT: 55 IN | BODY MASS INDEX: 34.43 KG/M2 | TEMPERATURE: 97.7 F | HEART RATE: 74 BPM | OXYGEN SATURATION: 100 % | RESPIRATION RATE: 21 BRPM | DIASTOLIC BLOOD PRESSURE: 67 MMHG

## 2024-02-29 DIAGNOSIS — Z00.00 MEDICARE ANNUAL WELLNESS VISIT, SUBSEQUENT: Primary | ICD-10-CM

## 2024-02-29 DIAGNOSIS — K58.1 IRRITABLE BOWEL SYNDROME WITH CONSTIPATION: ICD-10-CM

## 2024-02-29 DIAGNOSIS — E78.2 MIXED HYPERLIPIDEMIA: ICD-10-CM

## 2024-02-29 DIAGNOSIS — E03.9 ACQUIRED HYPOTHYROIDISM: ICD-10-CM

## 2024-02-29 DIAGNOSIS — R79.89 ELEVATED LFTS: ICD-10-CM

## 2024-02-29 RX ORDER — ROSUVASTATIN CALCIUM 10 MG/1
10 TABLET, COATED ORAL DAILY
Qty: 90 TABLET | Refills: 1 | Status: SHIPPED | OUTPATIENT
Start: 2024-02-29

## 2024-02-29 RX ORDER — LEVOTHYROXINE SODIUM 0.05 MG/1
50 TABLET ORAL DAILY
Qty: 90 TABLET | Refills: 1 | Status: SHIPPED | OUTPATIENT
Start: 2024-02-29

## 2024-02-29 RX ORDER — FENOFIBRATE 160 MG/1
160 TABLET ORAL DAILY
Qty: 90 TABLET | Refills: 1 | Status: SHIPPED | OUTPATIENT
Start: 2024-02-29

## 2024-02-29 RX ADMIN — CYANOCOBALAMIN 1000 MCG: 1000 INJECTION, SOLUTION INTRAMUSCULAR; SUBCUTANEOUS at 14:14

## 2024-02-29 NOTE — PROGRESS NOTES
The ABCs of the Annual Wellness Visit  Subsequent Medicare Wellness Visit    Subjective      Francine Preciado is a 33 y.o. female who presents for a Subsequent Medicare Wellness Visit.    The following portions of the patient's history were reviewed and   updated as appropriate: allergies, current medications, past family history, past medical history, past social history, past surgical history, and problem list.    Compared to one year ago, the patient feels her physical   health is better.    Compared to one year ago, the patient feels her mental   health is the same.    Recent Hospitalizations:  She was not admitted to the hospital during the last year.       Current Medical Providers:  Patient Care Team:  Aggie Chance APRN as PCP - General (Nurse Practitioner)  Kayden Leal MD as Consulting Physician (Hematology and Oncology)    Outpatient Medications Prior to Visit   Medication Sig Dispense Refill    atenolol (TENORMIN) 25 MG tablet TAKE 1 TABLET BY MOUTH DAILY 90 tablet 1    azelastine (ASTELIN) 0.1 % nasal spray       EPINEPHrine (EPIPEN) 0.3 MG/0.3ML solution auto-injector injection 0.3 mL.      famotidine (PEPCID) 10 MG tablet Take 2 tablets by mouth Daily.      fluticasone (FLONASE) 50 MCG/ACT nasal spray 2 sprays into the nostril(s) as directed by provider Daily. 16 g 11    levocetirizine (XYZAL) 5 MG tablet       montelukast (SINGULAIR) 10 MG tablet       multivitamin with minerals tablet tablet Take 1 tablet by mouth Daily.      ondansetron (Zofran) 4 MG tablet Take 1 tablet by mouth Every 8 (Eight) Hours As Needed for Nausea or Vomiting. 90 tablet 1    Tobramycin-Dexamethasone (TobraDex ST) 0.3-0.05 % suspension 3-4 drops in left ear twice a day 5 mL 3    Tri-Lo-Rosy 0.18/0.215/0.25 MG-25 MCG per tablet TAKE ONE TABLET BY MOUTH DAILY 28 tablet 12    atorvastatin (LIPITOR) 10 MG tablet Take 1 tablet by mouth Daily. 90 tablet 1    fenofibrate 160 MG tablet Take 1 tablet by mouth  Daily. 90 tablet 1    levothyroxine (SYNTHROID, LEVOTHROID) 50 MCG tablet TAKE 1 TABLET BY MOUTH DAILY 90 tablet 1    Linzess 145 MCG capsule capsule TAKE ONE CAPSULE BY MOUTH EVERY MORNING BEFORE BREAKFAST 90 capsule 1    amoxicillin (AMOXIL) 500 MG capsule Take 2 capsules by mouth 2 (Two) Times a Day. (Patient not taking: Reported on 2/29/2024) 28 capsule 0     Facility-Administered Medications Prior to Visit   Medication Dose Route Frequency Provider Last Rate Last Admin    cyanocobalamin injection 1,000 mcg  1,000 mcg Intramuscular Q28 Days Arnie Rucker MD   1,000 mcg at 02/29/24 1414       No opioid medication identified on active medication list. I have reviewed chart for other potential  high risk medication/s and harmful drug interactions in the elderly.        Aspirin is not on active medication list.  Aspirin use is not indicated based on review of current medical condition/s. Risk of harm outweighs potential benefits.  .    Patient Active Problem List   Diagnosis    CVID (common variable immunodeficiency)    Encounter for adjustment or management of vascular access device    Encounter for adjustment or management of vascular access device    Psoriasis-eczema overlap condition    Central perforation of tympanic membrane    Chronic mastoiditis    COVID-19 vaccine series completed    Heart murmur    Hypertension    Kabuki make-up syndrome    Mitral valve prolapse    Mixed conductive and sensorineural hearing loss of both ears    Anemia    Mixed hyperlipidemia    Acquired hypothyroidism    Irritable bowel syndrome with constipation     Advance Care Planning   Advance Care Planning     Advance Directive is on file.  ACP discussion was held with the patient during this visit. Patient has an advance directive in EMR which is still valid.      Objective    Vitals:    02/29/24 1330   BP: 110/67   Pulse: 74   Resp: 21   Temp: 97.7 °F (36.5 °C)   SpO2: 100%   Weight: 67.5 kg (148 lb 12.8 oz)   Height: 139.7 cm  "(55\")     Estimated body mass index is 34.58 kg/m² as calculated from the following:    Height as of this encounter: 139.7 cm (55\").    Weight as of this encounter: 67.5 kg (148 lb 12.8 oz).    BMI is >= 30 and <35. (Class 1 Obesity). The following options were offered after discussion;: exercise counseling/recommendations and nutrition counseling/recommendations      Does the patient have evidence of cognitive impairment?   No    Lab Results   Component Value Date    TRIG 277 (H) 2024    HDL 35 (L) 2024    LDL 78 2024    VLDL 45 (H) 2024          HEALTH RISK ASSESSMENT    Smoking Status:  Social History     Tobacco Use   Smoking Status Never    Passive exposure: Yes   Smokeless Tobacco Never   Tobacco Comments    Daily exposure to 2nd hand smoke     Alcohol Consumption:  Social History     Substance and Sexual Activity   Alcohol Use Not Currently    Comment: DENIES     Fall Risk Screen:    MONE Fall Risk Assessment was completed, and patient is at HIGH risk for falls. Assessment completed on:2024    Depression Screenin/29/2024     1:40 PM   PHQ-2/PHQ-9 Depression Screening   Little Interest or Pleasure in Doing Things 0-->not at all   Feeling Down, Depressed or Hopeless 0-->not at all   PHQ-9: Brief Depression Severity Measure Score 0       Health Habits and Functional and Cognitive Screenin/29/2024     1:34 PM   Functional & Cognitive Status   Do you have difficulty preparing food and eating? No   Do you have difficulty bathing yourself, getting dressed or grooming yourself? Yes   Do you have difficulty using the toilet? No   Do you have difficulty moving around from place to place? No   Do you have trouble with steps or getting out of a bed or a chair? No   Current Diet Other        Current Diet Comment low sodium   Dental Exam Up to date   Eye Exam Up to date   Exercise (times per week) 3 times per week   Current Exercises Include Walking;No Regular Exercise;Other "   Do you need help using the phone?  Yes   Are you deaf or do you have serious difficulty hearing?  Yes   Do you need help to go to places out of walking distance? No   Do you need help shopping? No   Do you need help preparing meals?  Yes   Do you need help with housework?  No   Do you need help with laundry? Yes   Do you need help taking your medications? No   Do you need help managing money? Yes   Do you ever drive or ride in a car without wearing a seat belt? No   Have you felt unusual stress, anger or loneliness in the last month? No   Who do you live with? Other   If you need help, do you have trouble finding someone available to you? No   Have you been bothered in the last four weeks by sexual problems? No   Do you have difficulty concentrating, remembering or making decisions? Yes       Age-appropriate Screening Schedule:  Refer to the list below for future screening recommendations based on patient's age, sex and/or medical conditions. Orders for these recommended tests are listed in the plan section. The patient has been provided with a written plan.    Health Maintenance   Topic Date Due    BMI FOLLOWUP  02/27/2024    COVID-19 Vaccine (3 - Pfizer risk series) 03/02/2024 (Originally 5/23/2021)    TDAP/TD VACCINES (1 - Tdap) 08/28/2024 (Originally 7/9/2010)    PAP SMEAR  10/22/2024    LIPID PANEL  02/23/2025    ANNUAL WELLNESS VISIT  02/28/2025    HEPATITIS C SCREENING  Completed    Pneumococcal Vaccine 0-64  Completed    INFLUENZA VACCINE  Completed                  CMS Preventative Services Quick Reference  Risk Factors Identified During Encounter:    Inactivity/Sedentary: Patient was advised to exercise at least 150 minutes a week per CDC recommendations.    The above risks/problems have been discussed with the patient.  Pertinent information has been shared with the patient in the After Visit Summary.    Diagnoses and all orders for this visit:    1. Medicare annual wellness visit, subsequent  (Primary)    2. Mixed hyperlipidemia  -     rosuvastatin (Crestor) 10 MG tablet; Take 1 tablet by mouth Daily.  Dispense: 90 tablet; Refill: 1  -     fenofibrate 160 MG tablet; Take 1 tablet by mouth Daily.  Dispense: 90 tablet; Refill: 1    3. Elevated LFTs  -     Hepatic Function Panel; Future    4. Mixed hyperlipidemia  Comments:  Repeat blood work.  Continue on atorvastatin and fenofibrate.  Orders:  -     rosuvastatin (Crestor) 10 MG tablet; Take 1 tablet by mouth Daily.  Dispense: 90 tablet; Refill: 1  -     fenofibrate 160 MG tablet; Take 1 tablet by mouth Daily.  Dispense: 90 tablet; Refill: 1    5. Acquired hypothyroidism  -     levothyroxine (SYNTHROID, LEVOTHROID) 50 MCG tablet; Take 1 tablet by mouth Daily.  Dispense: 90 tablet; Refill: 1    6. Irritable bowel syndrome with constipation  -     linaclotide (Linzess) 145 MCG capsule capsule; Take 1 capsule by mouth Every Morning Before Breakfast.  Dispense: 90 capsule; Refill: 1        Follow Up:   Next Medicare Wellness visit to be scheduled in 1 year.      An After Visit Summary and PPPS were made available to the patient.

## 2024-04-05 ENCOUNTER — OFFICE VISIT (OUTPATIENT)
Dept: ONCOLOGY | Facility: HOSPITAL | Age: 34
End: 2024-04-05
Payer: MEDICARE

## 2024-04-05 ENCOUNTER — HOSPITAL ENCOUNTER (OUTPATIENT)
Dept: ONCOLOGY | Facility: HOSPITAL | Age: 34
Discharge: HOME | End: 2024-04-05
Payer: MEDICARE

## 2024-04-05 VITALS
SYSTOLIC BLOOD PRESSURE: 129 MMHG | TEMPERATURE: 97.6 F | RESPIRATION RATE: 18 BRPM | DIASTOLIC BLOOD PRESSURE: 68 MMHG | OXYGEN SATURATION: 97 % | HEART RATE: 89 BPM | WEIGHT: 149.47 LBS | BODY MASS INDEX: 34.59 KG/M2 | HEIGHT: 55 IN

## 2024-04-05 DIAGNOSIS — D83.9 CVID (COMMON VARIABLE IMMUNODEFICIENCY): Primary | ICD-10-CM

## 2024-04-05 DIAGNOSIS — D50.9 IRON DEFICIENCY ANEMIA, UNSPECIFIED IRON DEFICIENCY ANEMIA TYPE: ICD-10-CM

## 2024-04-05 DIAGNOSIS — Z45.2 ENCOUNTER FOR ADJUSTMENT OR MANAGEMENT OF VASCULAR ACCESS DEVICE: ICD-10-CM

## 2024-04-05 LAB
BASOPHILS # BLD AUTO: 0.02 10*3/MM3 (ref 0–0.2)
BASOPHILS NFR BLD AUTO: 0.2 % (ref 0–1.5)
DEPRECATED RDW RBC AUTO: 44.5 FL (ref 37–54)
EOSINOPHIL # BLD AUTO: 0.19 10*3/MM3 (ref 0–0.4)
EOSINOPHIL NFR BLD AUTO: 1.8 % (ref 0.3–6.2)
ERYTHROCYTE [DISTWIDTH] IN BLOOD BY AUTOMATED COUNT: 13.6 % (ref 12.3–15.4)
HCT VFR BLD AUTO: 36 % (ref 34–46.6)
HGB BLD-MCNC: 11.9 G/DL (ref 12–15.9)
IGA1 MFR SER: <50 MG/DL (ref 70–400)
IGG1 SER-MCNC: 534 MG/DL (ref 700–1600)
IGM SERPL-MCNC: 70 MG/DL (ref 40–230)
IMM GRANULOCYTES # BLD AUTO: 0.02 10*3/MM3 (ref 0–0.05)
IMM GRANULOCYTES NFR BLD AUTO: 0.2 % (ref 0–0.5)
LYMPHOCYTES # BLD AUTO: 2.14 10*3/MM3 (ref 0.7–3.1)
LYMPHOCYTES NFR BLD AUTO: 20.7 % (ref 19.6–45.3)
MCH RBC QN AUTO: 29.6 PG (ref 26.6–33)
MCHC RBC AUTO-ENTMCNC: 33.1 G/DL (ref 31.5–35.7)
MCV RBC AUTO: 89.6 FL (ref 79–97)
MONOCYTES # BLD AUTO: 0.6 10*3/MM3 (ref 0.1–0.9)
MONOCYTES NFR BLD AUTO: 5.8 % (ref 5–12)
NEUTROPHILS NFR BLD AUTO: 7.35 10*3/MM3 (ref 1.7–7)
NEUTROPHILS NFR BLD AUTO: 71.3 % (ref 42.7–76)
PLATELET # BLD AUTO: 282 10*3/MM3 (ref 140–450)
PMV BLD AUTO: 9 FL (ref 6–12)
RBC # BLD AUTO: 4.02 10*6/MM3 (ref 3.77–5.28)
WBC NRBC COR # BLD AUTO: 10.32 10*3/MM3 (ref 3.4–10.8)

## 2024-04-05 PROCEDURE — 25010000002 HEPARIN LOCK FLUSH PER 10 UNITS: Performed by: INTERNAL MEDICINE

## 2024-04-05 PROCEDURE — 82784 ASSAY IGA/IGD/IGG/IGM EACH: CPT | Performed by: INTERNAL MEDICINE

## 2024-04-05 PROCEDURE — 85025 COMPLETE CBC W/AUTO DIFF WBC: CPT | Performed by: INTERNAL MEDICINE

## 2024-04-05 PROCEDURE — 36591 DRAW BLOOD OFF VENOUS DEVICE: CPT

## 2024-04-05 RX ORDER — HEPARIN SODIUM (PORCINE) LOCK FLUSH IV SOLN 100 UNIT/ML 100 UNIT/ML
500 SOLUTION INTRAVENOUS AS NEEDED
OUTPATIENT
Start: 2024-04-09

## 2024-04-05 RX ORDER — SODIUM CHLORIDE 0.9 % (FLUSH) 0.9 %
20 SYRINGE (ML) INJECTION AS NEEDED
Status: DISCONTINUED | OUTPATIENT
Start: 2024-04-05 | End: 2024-04-06 | Stop reason: HOSPADM

## 2024-04-05 RX ORDER — HEPARIN SODIUM (PORCINE) LOCK FLUSH IV SOLN 100 UNIT/ML 100 UNIT/ML
500 SOLUTION INTRAVENOUS AS NEEDED
Status: DISCONTINUED | OUTPATIENT
Start: 2024-04-05 | End: 2024-04-06 | Stop reason: HOSPADM

## 2024-04-05 RX ORDER — SODIUM CHLORIDE 0.9 % (FLUSH) 0.9 %
20 SYRINGE (ML) INJECTION AS NEEDED
OUTPATIENT
Start: 2024-04-05

## 2024-04-05 RX ADMIN — HEPARIN 500 UNITS: 100 SYRINGE at 09:55

## 2024-04-05 RX ADMIN — Medication 20 ML: at 09:55

## 2024-04-05 NOTE — PROGRESS NOTES
Chief Complaint/Reason for Referral:  CVID (common variable immunodeficiency)    Arnie Rucker MD Kindervater, Kasey, APRN      Subjective    History of Present Illness    Ms. Francine Preciado presents for lab only visit with her mother for CVID. Due for treatment with IVIG on 4/9/2024. CBC today is all within acceptable limits. Denies any recent infections. Still taking oral iron 1 tab QD recently decreased from 2 tabs a day at her last visit with Dr. Leal. Reports she was bitten by some type of insect to the right inner arm last Saturday and took Benadryl for the insect bite. Site is still mildly reddened and swollen, but improving.       Oncology/Hematology History Overview Note   CVID:   -Diagnosed by Immunologist 8/20/12  -presented with frequent/severe infections             Review of Systems   Constitutional:  Negative for appetite change, diaphoresis, fatigue, fever, unexpected weight gain and unexpected weight loss.   HENT:  Negative for hearing loss, mouth sores, sore throat, swollen glands, trouble swallowing and voice change.    Eyes:  Negative for blurred vision.   Respiratory:  Negative for cough, shortness of breath and wheezing.    Cardiovascular:  Negative for chest pain and palpitations.   Gastrointestinal:  Negative for abdominal pain, blood in stool, constipation, diarrhea, nausea and vomiting.   Endocrine: Negative for cold intolerance and heat intolerance.   Genitourinary:  Negative for difficulty urinating, dysuria, frequency, hematuria and urinary incontinence.   Musculoskeletal:  Negative for arthralgias, back pain and myalgias.   Skin:  Negative for rash, skin lesions and wound.   Neurological:  Negative for dizziness, seizures, weakness, numbness and headache.   Hematological:  Does not bruise/bleed easily.   Psychiatric/Behavioral:  Negative for depressed mood. The patient is not nervous/anxious.    All other systems reviewed and are negative.      Current Outpatient Medications on File  Prior to Visit   Medication Sig Dispense Refill    atenolol (TENORMIN) 25 MG tablet TAKE 1 TABLET BY MOUTH DAILY 90 tablet 1    azelastine (ASTELIN) 0.1 % nasal spray       EPINEPHrine (EPIPEN) 0.3 MG/0.3ML solution auto-injector injection 0.3 mL.      famotidine (PEPCID) 10 MG tablet Take 2 tablets by mouth Daily.      fenofibrate 160 MG tablet Take 1 tablet by mouth Daily. 90 tablet 1    fluticasone (FLONASE) 50 MCG/ACT nasal spray 2 sprays into the nostril(s) as directed by provider Daily. 16 g 11    levocetirizine (XYZAL) 5 MG tablet       levothyroxine (SYNTHROID, LEVOTHROID) 50 MCG tablet Take 1 tablet by mouth Daily. 90 tablet 1    linaclotide (Linzess) 145 MCG capsule capsule Take 1 capsule by mouth Every Morning Before Breakfast. 90 capsule 1    montelukast (SINGULAIR) 10 MG tablet       multivitamin with minerals tablet tablet Take 1 tablet by mouth Daily.      ondansetron (Zofran) 4 MG tablet Take 1 tablet by mouth Every 8 (Eight) Hours As Needed for Nausea or Vomiting. 90 tablet 1    rosuvastatin (Crestor) 10 MG tablet Take 1 tablet by mouth Daily. 90 tablet 1    Tobramycin-Dexamethasone (TobraDex ST) 0.3-0.05 % suspension 3-4 drops in left ear twice a day 5 mL 3    Tri-Lo-Rosy 0.18/0.215/0.25 MG-25 MCG per tablet TAKE ONE TABLET BY MOUTH DAILY 28 tablet 12     Current Facility-Administered Medications on File Prior to Visit   Medication Dose Route Frequency Provider Last Rate Last Admin    cyanocobalamin injection 1,000 mcg  1,000 mcg Intramuscular Q28 Days Arnie Rucker MD   1,000 mcg at 02/29/24 1414    heparin injection 500 Units  500 Units Intravenous PRN Kayden Leal MD   500 Units at 04/05/24 0955    sodium chloride 0.9 % flush 20 mL  20 mL Intravenous PRN Kayden Leal MD   20 mL at 04/05/24 0955       Allergies   Allergen Reactions    Cefuroxime Axetil Unknown - Low Severity    Neosporin [Bacitracin-Polymyxin B] Unknown - Low Severity     Past Medical History:    Diagnosis Date    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     Central perforation of tympanic membrane of right ear     Chronic mastoiditis     COVID-19 vaccine series completed     Heart murmur     Hypertension     Kabuki make-up syndrome     Mitral valve prolapse     Mixed conductive and sensorineural hearing loss of both ears     Otorrhea, left     Recurrent otitis media     Skin disease     PSORIASIS/ECXEMA     Past Surgical History:   Procedure Laterality Date    MULTIPLE TOOTH EXTRACTIONS      NO PAST SURGERIES       Social History     Socioeconomic History    Marital status: Single   Tobacco Use    Smoking status: Never     Passive exposure: Yes    Smokeless tobacco: Never    Tobacco comments:     Daily exposure to 2nd hand smoke   Vaping Use    Vaping status: Never Used   Substance and Sexual Activity    Alcohol use: Not Currently     Comment: DENIES    Drug use: Never    Sexual activity: Never     Family History   Problem Relation Age of Onset    Leukemia Father     Heart disease Other      Immunization History   Administered Date(s) Administered    COVID-19 (PFIZER) Purple Cap Monovalent 04/04/2021, 04/25/2021    Flu Vaccine Quad PF >36MO 11/04/2013, 10/24/2014, 11/02/2015, 10/13/2016, 10/19/2017    Fluzone (or Fluarix & Flulaval for VFC) >6mos 11/04/2013, 10/24/2014, 11/02/2015, 10/13/2016, 10/19/2017, 11/23/2021, 10/11/2022, 10/13/2023    Fluzone Quad >6mos (Multi-dose) 11/04/2013, 11/02/2015, 10/19/2017, 10/29/2019    Hep B, Adolescent or Pediatric 11/24/1998, 01/14/1999, 06/02/1999    Influenza LAIV (Nasal) 10/13/2016, 10/14/2020    Influenza TIV (IM) 09/17/2009, 11/07/2018    Influenza, Unspecified 10/14/2020, 10/11/2022    MMR 08/19/1996    Pneumococcal Conjugate 20-Valent (PCV20) 09/14/2023    Pneumococcal Polysaccharide (PPSV23) 10/04/2012, 04/10/2014    influenza Split 10/02/2018       Tobacco Use: Medium Risk (4/5/2024)    Patient History     Smoking Tobacco Use: Never      "Smokeless Tobacco Use: Never     Passive Exposure: Yes       Objective     Physical Exam  Vitals and nursing note reviewed.   Constitutional:       Appearance: Normal appearance.   HENT:      Head: Normocephalic.      Nose: Nose normal.      Mouth/Throat:      Mouth: Mucous membranes are moist.   Eyes:      Pupils: Pupils are equal, round, and reactive to light.   Cardiovascular:      Rate and Rhythm: Normal rate and regular rhythm.      Pulses: Normal pulses.      Heart sounds: Normal heart sounds. No murmur heard.  Pulmonary:      Effort: Pulmonary effort is normal. No respiratory distress.      Breath sounds: Normal breath sounds. No wheezing, rhonchi or rales.   Abdominal:      General: Bowel sounds are normal.      Palpations: Abdomen is soft.   Musculoskeletal:         General: Normal range of motion.      Cervical back: Normal range of motion and neck supple.   Skin:     General: Skin is warm and dry.      Capillary Refill: Capillary refill takes less than 2 seconds.   Neurological:      General: No focal deficit present.      Mental Status: She is alert and oriented to person, place, and time.   Psychiatric:         Mood and Affect: Mood normal.         Behavior: Behavior normal.         Thought Content: Thought content normal.         Judgment: Judgment normal.         Vitals:    04/05/24 1011   BP: 129/68   Pulse: 89   Resp: 18   Temp: 97.6 °F (36.4 °C)   SpO2: 97%   Weight: 67.8 kg (149 lb 7.6 oz)   Height: 139.7 cm (55\")   PainSc: 0-No pain       Wt Readings from Last 3 Encounters:   04/05/24 67.8 kg (149 lb 7.6 oz)   02/29/24 67.5 kg (148 lb 12.8 oz)   02/27/24 67.1 kg (148 lb)        ECOG score: 0         ECOG: (0) Fully Active - Able to Carry On All Pre-disease Performance Without Restriction  Fall Risk Assessment was completed, and patient is at low risk for falls.  PHQ-9 Total Score: 0       The patient is not  experiencing fatigue. Fatigue score: 0    PT/OT Functional Screening: PT fx screen : No " "needs identified  Speech Functional Screening: Speech fx screen : No needs identified  Rehab to be ordered: Rehab to be ordered : No needs identified        Result Review :  The following data was reviewed by: NO Nur on 04/05/2024:  Lab Results   Component Value Date    HGB 11.9 (L) 04/05/2024    HCT 36.0 04/05/2024    MCV 89.6 04/05/2024     04/05/2024    WBC 10.32 04/05/2024    NEUTROABS 7.35 (H) 04/05/2024    LYMPHSABS 2.14 04/05/2024    MONOSABS 0.60 04/05/2024    EOSABS 0.19 04/05/2024    BASOSABS 0.02 04/05/2024     Lab Results   Component Value Date    GLUCOSE 73 02/23/2024    BUN 11 02/23/2024    CREATININE 0.83 02/23/2024     02/23/2024    K 3.7 02/23/2024     02/23/2024    CO2 21.7 (L) 02/23/2024    CALCIUM 9.2 02/23/2024    PROTEINTOT 7.4 02/23/2024    ALBUMIN 4.0 02/23/2024    BILITOT 0.7 02/23/2024    ALKPHOS 125 (H) 02/23/2024    AST 56 (H) 02/23/2024    ALT 71 (H) 02/23/2024     Lab Results   Component Value Date     08/12/2019    FERRITIN 262.20 (H) 02/23/2024    TSPSZCHX38 1,283 (H) 02/23/2024    FOLATE >20.00 02/23/2024     Lab Results   Component Value Date    IRON 84 02/23/2024    LABIRON 15 (L) 02/23/2024    TRANSFERRIN 364 (H) 02/23/2024    TIBC 542 (H) 02/23/2024     Lab Results   Component Value Date     08/12/2019    FERRITIN 262.20 (H) 02/23/2024    [id number] 1,283 (H) 02/23/2024    FOLATE >20.00 02/23/2024     No results found for: \"PSA\", \"CEA\", \"AFP\", \"\", \"\"    No Images in the past 120 days found..         Assessment and Plan:  Diagnoses and all orders for this visit:    1. CVID (common variable immunodeficiency) (Primary)    2. Iron deficiency anemia, unspecified iron deficiency anemia type    CVID and requires IVIG every 6 weeks. CBC today is within acceptable limits and OK to proceed with treatment on 4/9/2024. IgG level pending. Last IgG level of 578 on 2/23/2024. No recent viral or bacterial infections noted. "     Recent decreased oral iron to 1 tab QD. Will recheck iron panel, ferritin periodically.     OK for IVIG treatment on 4/9/2024. Follow up as scheduled for IVIG labs and  infusions as ordered.     Call with any questions or concerns.     I spent 20 minutes caring for Francine on this date of service. This time includes time spent by me in the following activities:preparing for the visit, reviewing tests, performing a medically appropriate examination and/or evaluation , counseling and educating the patient/family/caregiver, ordering medications, tests, or procedures, referring and communicating with other health care professionals , documenting information in the medical record, and independently interpreting results and communicating that information with the patient/family/caregiver    Patient Follow Up: 6 weeks with MD: Dr. Leal.     Patient was given instructions and counseling regarding her condition or for health maintenance advice. Please see specific information pulled into the AVS if appropriate.     Belia Saez, APRN    4/5/2024    .tob

## 2024-04-09 ENCOUNTER — HOSPITAL ENCOUNTER (OUTPATIENT)
Dept: ONCOLOGY | Facility: HOSPITAL | Age: 34
Discharge: HOME | End: 2024-04-09
Admitting: INTERNAL MEDICINE
Payer: MEDICARE

## 2024-04-09 VITALS
SYSTOLIC BLOOD PRESSURE: 115 MMHG | DIASTOLIC BLOOD PRESSURE: 57 MMHG | TEMPERATURE: 97.9 F | HEART RATE: 88 BPM | RESPIRATION RATE: 16 BRPM | OXYGEN SATURATION: 100 % | WEIGHT: 149.6 LBS | BODY MASS INDEX: 34.62 KG/M2 | HEIGHT: 55 IN

## 2024-04-09 DIAGNOSIS — Z45.2 ENCOUNTER FOR ADJUSTMENT OR MANAGEMENT OF VASCULAR ACCESS DEVICE: ICD-10-CM

## 2024-04-09 DIAGNOSIS — D83.9 CVID (COMMON VARIABLE IMMUNODEFICIENCY): Primary | ICD-10-CM

## 2024-04-09 PROCEDURE — 25810000003 SODIUM CHLORIDE 0.9 % SOLUTION: Performed by: INTERNAL MEDICINE

## 2024-04-09 PROCEDURE — 96365 THER/PROPH/DIAG IV INF INIT: CPT

## 2024-04-09 PROCEDURE — 25010000002 HEPARIN LOCK FLUSH PER 10 UNITS: Performed by: INTERNAL MEDICINE

## 2024-04-09 PROCEDURE — 25010000002 IMMUNE GLOBULIN (HUMAN) 10 GM/100ML SOLUTION: Performed by: INTERNAL MEDICINE

## 2024-04-09 RX ORDER — MEPERIDINE HYDROCHLORIDE 25 MG/ML
25 INJECTION INTRAMUSCULAR; INTRAVENOUS; SUBCUTANEOUS
Status: DISCONTINUED | OUTPATIENT
Start: 2024-04-09 | End: 2024-04-10 | Stop reason: HOSPADM

## 2024-04-09 RX ORDER — DIPHENHYDRAMINE HYDROCHLORIDE 50 MG/ML
50 INJECTION INTRAMUSCULAR; INTRAVENOUS AS NEEDED
Status: DISCONTINUED | OUTPATIENT
Start: 2024-04-09 | End: 2024-04-10 | Stop reason: HOSPADM

## 2024-04-09 RX ORDER — HEPARIN SODIUM (PORCINE) LOCK FLUSH IV SOLN 100 UNIT/ML 100 UNIT/ML
500 SOLUTION INTRAVENOUS AS NEEDED
OUTPATIENT
Start: 2024-04-09

## 2024-04-09 RX ORDER — SODIUM CHLORIDE 9 MG/ML
250 INJECTION, SOLUTION INTRAVENOUS ONCE
Status: COMPLETED | OUTPATIENT
Start: 2024-04-09 | End: 2024-04-09

## 2024-04-09 RX ORDER — HEPARIN SODIUM (PORCINE) LOCK FLUSH IV SOLN 100 UNIT/ML 100 UNIT/ML
500 SOLUTION INTRAVENOUS AS NEEDED
Status: DISCONTINUED | OUTPATIENT
Start: 2024-04-09 | End: 2024-04-10 | Stop reason: HOSPADM

## 2024-04-09 RX ORDER — FAMOTIDINE 10 MG/ML
20 INJECTION, SOLUTION INTRAVENOUS AS NEEDED
Status: DISCONTINUED | OUTPATIENT
Start: 2024-04-09 | End: 2024-04-10 | Stop reason: HOSPADM

## 2024-04-09 RX ORDER — SODIUM CHLORIDE 0.9 % (FLUSH) 0.9 %
20 SYRINGE (ML) INJECTION AS NEEDED
OUTPATIENT
Start: 2024-04-09

## 2024-04-09 RX ORDER — SODIUM CHLORIDE 0.9 % (FLUSH) 0.9 %
20 SYRINGE (ML) INJECTION AS NEEDED
Status: DISCONTINUED | OUTPATIENT
Start: 2024-04-09 | End: 2024-04-10 | Stop reason: HOSPADM

## 2024-04-09 RX ADMIN — Medication 20 ML: at 11:30

## 2024-04-09 RX ADMIN — IMMUNE GLOBULIN (HUMAN) 10 G: 10 INJECTION INTRAVENOUS; SUBCUTANEOUS at 10:08

## 2024-04-09 RX ADMIN — HEPARIN 500 UNITS: 100 SYRINGE at 11:31

## 2024-04-09 RX ADMIN — SODIUM CHLORIDE 250 ML: 9 INJECTION, SOLUTION INTRAVENOUS at 10:00

## 2024-05-08 ENCOUNTER — OFFICE VISIT (OUTPATIENT)
Dept: OTOLARYNGOLOGY | Facility: CLINIC | Age: 34
End: 2024-05-08
Payer: MEDICARE

## 2024-05-08 VITALS
SYSTOLIC BLOOD PRESSURE: 109 MMHG | TEMPERATURE: 97 F | HEART RATE: 71 BPM | BODY MASS INDEX: 34.16 KG/M2 | HEIGHT: 55 IN | WEIGHT: 147.6 LBS | DIASTOLIC BLOOD PRESSURE: 76 MMHG | OXYGEN SATURATION: 99 %

## 2024-05-08 DIAGNOSIS — H72.92 TYMPANIC MEMBRANE PERFORATION, LEFT: ICD-10-CM

## 2024-05-08 DIAGNOSIS — H70.11 CHRONIC MASTOIDITIS OF RIGHT SIDE: Primary | ICD-10-CM

## 2024-05-08 DIAGNOSIS — H90.6 MIXED HEARING LOSS, BILATERAL: ICD-10-CM

## 2024-05-17 ENCOUNTER — OFFICE VISIT (OUTPATIENT)
Dept: ONCOLOGY | Facility: HOSPITAL | Age: 34
End: 2024-05-17
Payer: MEDICARE

## 2024-05-17 ENCOUNTER — HOSPITAL ENCOUNTER (OUTPATIENT)
Dept: ONCOLOGY | Facility: HOSPITAL | Age: 34
Discharge: HOME | End: 2024-05-17
Payer: MEDICARE

## 2024-05-17 VITALS
OXYGEN SATURATION: 97 % | DIASTOLIC BLOOD PRESSURE: 75 MMHG | HEART RATE: 88 BPM | BODY MASS INDEX: 34.39 KG/M2 | HEIGHT: 55 IN | TEMPERATURE: 97.1 F | WEIGHT: 148.59 LBS | RESPIRATION RATE: 18 BRPM | SYSTOLIC BLOOD PRESSURE: 134 MMHG

## 2024-05-17 DIAGNOSIS — D83.9 CVID (COMMON VARIABLE IMMUNODEFICIENCY): Primary | ICD-10-CM

## 2024-05-17 DIAGNOSIS — D64.9 ANEMIA, UNSPECIFIED TYPE: ICD-10-CM

## 2024-05-17 DIAGNOSIS — Z45.2 ENCOUNTER FOR ADJUSTMENT OR MANAGEMENT OF VASCULAR ACCESS DEVICE: ICD-10-CM

## 2024-05-17 LAB
BASOPHILS # BLD AUTO: 0.03 10*3/MM3 (ref 0–0.2)
BASOPHILS NFR BLD AUTO: 0.3 % (ref 0–1.5)
DEPRECATED RDW RBC AUTO: 44.3 FL (ref 37–54)
EOSINOPHIL # BLD AUTO: 0.04 10*3/MM3 (ref 0–0.4)
EOSINOPHIL NFR BLD AUTO: 0.4 % (ref 0.3–6.2)
ERYTHROCYTE [DISTWIDTH] IN BLOOD BY AUTOMATED COUNT: 13.1 % (ref 12.3–15.4)
HCT VFR BLD AUTO: 38.9 % (ref 34–46.6)
HGB BLD-MCNC: 12.9 G/DL (ref 12–15.9)
IGA1 MFR SER: <50 MG/DL (ref 70–400)
IGG1 SER-MCNC: 611 MG/DL (ref 700–1600)
IGM SERPL-MCNC: 87 MG/DL (ref 40–230)
IMM GRANULOCYTES # BLD AUTO: 0.01 10*3/MM3 (ref 0–0.05)
IMM GRANULOCYTES NFR BLD AUTO: 0.1 % (ref 0–0.5)
LYMPHOCYTES # BLD AUTO: 2.05 10*3/MM3 (ref 0.7–3.1)
LYMPHOCYTES NFR BLD AUTO: 19.7 % (ref 19.6–45.3)
MCH RBC QN AUTO: 29.7 PG (ref 26.6–33)
MCHC RBC AUTO-ENTMCNC: 33.2 G/DL (ref 31.5–35.7)
MCV RBC AUTO: 89.6 FL (ref 79–97)
MONOCYTES # BLD AUTO: 0.63 10*3/MM3 (ref 0.1–0.9)
MONOCYTES NFR BLD AUTO: 6 % (ref 5–12)
NEUTROPHILS NFR BLD AUTO: 7.67 10*3/MM3 (ref 1.7–7)
NEUTROPHILS NFR BLD AUTO: 73.5 % (ref 42.7–76)
PLATELET # BLD AUTO: 257 10*3/MM3 (ref 140–450)
PMV BLD AUTO: 9 FL (ref 6–12)
RBC # BLD AUTO: 4.34 10*6/MM3 (ref 3.77–5.28)
WBC NRBC COR # BLD AUTO: 10.43 10*3/MM3 (ref 3.4–10.8)

## 2024-05-17 PROCEDURE — 25010000002 HEPARIN LOCK FLUSH PER 10 UNITS: Performed by: INTERNAL MEDICINE

## 2024-05-17 PROCEDURE — 82784 ASSAY IGA/IGD/IGG/IGM EACH: CPT | Performed by: INTERNAL MEDICINE

## 2024-05-17 PROCEDURE — 85025 COMPLETE CBC W/AUTO DIFF WBC: CPT | Performed by: INTERNAL MEDICINE

## 2024-05-17 PROCEDURE — 36415 COLL VENOUS BLD VENIPUNCTURE: CPT

## 2024-05-17 RX ORDER — HEPARIN SODIUM (PORCINE) LOCK FLUSH IV SOLN 100 UNIT/ML 100 UNIT/ML
500 SOLUTION INTRAVENOUS AS NEEDED
OUTPATIENT
Start: 2024-05-17

## 2024-05-17 RX ORDER — HEPARIN SODIUM (PORCINE) LOCK FLUSH IV SOLN 100 UNIT/ML 100 UNIT/ML
500 SOLUTION INTRAVENOUS AS NEEDED
Status: DISCONTINUED | OUTPATIENT
Start: 2024-05-17 | End: 2024-05-18 | Stop reason: HOSPADM

## 2024-05-17 RX ORDER — SODIUM CHLORIDE 0.9 % (FLUSH) 0.9 %
20 SYRINGE (ML) INJECTION AS NEEDED
OUTPATIENT
Start: 2024-05-17

## 2024-05-17 RX ORDER — SODIUM CHLORIDE 0.9 % (FLUSH) 0.9 %
20 SYRINGE (ML) INJECTION AS NEEDED
Status: DISCONTINUED | OUTPATIENT
Start: 2024-05-17 | End: 2024-05-18 | Stop reason: HOSPADM

## 2024-05-17 RX ADMIN — Medication 20 ML: at 09:38

## 2024-05-17 RX ADMIN — HEPARIN 500 UNITS: 100 SYRINGE at 09:38

## 2024-05-17 NOTE — PROGRESS NOTES
Chief Complaint/Reason for Referral:  CVID (common variable immunodeficiency)    Arnie Rucker MD Kindervater, Kasey, APRN      Subjective    History of Present Illness    Ms. Francine Preciado presents with her mom for decoupled visit today for IVIG treatment on 5/21/2024 for CVID. Doing well otherwise. Port is not functioning correctly today. May require activase today. Labs done peripherally.     Receives IVIG treatment every 6 weeks. IgG level on 4/5/2024 of 534. Levels have been mostly in the 500+ range. Tolerating treatment well. NO recent infections. Recently saw ENT for follow up on chronic mastoiditis of the right ear and left perforated TM. Has mixed hearing loss as well.     Labs today: 5/17/2024: CBC is within acceptable limits.     Oncology/Hematology History Overview Note   CVID:   -Diagnosed by Immunologist 8/20/12  -presented with frequent/severe infections             Review of Systems   Constitutional:  Positive for fatigue. Negative for appetite change, diaphoresis, fever, unexpected weight gain and unexpected weight loss.   HENT:  Negative for hearing loss, mouth sores, sore throat, swollen glands, trouble swallowing and voice change.    Eyes:  Negative for blurred vision.   Respiratory:  Negative for cough, shortness of breath and wheezing.    Cardiovascular:  Negative for chest pain and palpitations.   Gastrointestinal:  Negative for abdominal pain, blood in stool, constipation, diarrhea, nausea and vomiting.   Endocrine: Negative for cold intolerance and heat intolerance.   Genitourinary:  Negative for difficulty urinating, dysuria, frequency, hematuria and urinary incontinence.   Musculoskeletal:  Negative for arthralgias, back pain and myalgias.   Skin:  Negative for rash, skin lesions and wound.   Neurological:  Negative for dizziness, seizures, weakness, numbness and headache.   Hematological:  Does not bruise/bleed easily.   Psychiatric/Behavioral:  Negative for depressed mood. The patient is  not nervous/anxious.    All other systems reviewed and are negative.      Current Outpatient Medications on File Prior to Visit   Medication Sig Dispense Refill    atenolol (TENORMIN) 25 MG tablet TAKE 1 TABLET BY MOUTH DAILY 90 tablet 1    azelastine (ASTELIN) 0.1 % nasal spray       EPINEPHrine (EPIPEN) 0.3 MG/0.3ML solution auto-injector injection 0.3 mL.      famotidine (PEPCID) 10 MG tablet Take 2 tablets by mouth Daily.      fenofibrate 160 MG tablet Take 1 tablet by mouth Daily. 90 tablet 1    fluticasone (FLONASE) 50 MCG/ACT nasal spray 2 sprays into the nostril(s) as directed by provider Daily. 16 g 11    levocetirizine (XYZAL) 5 MG tablet       levothyroxine (SYNTHROID, LEVOTHROID) 50 MCG tablet Take 1 tablet by mouth Daily. 90 tablet 1    linaclotide (Linzess) 145 MCG capsule capsule Take 1 capsule by mouth Every Morning Before Breakfast. 90 capsule 1    montelukast (SINGULAIR) 10 MG tablet       multivitamin with minerals tablet tablet Take 1 tablet by mouth Daily.      ondansetron (Zofran) 4 MG tablet Take 1 tablet by mouth Every 8 (Eight) Hours As Needed for Nausea or Vomiting. 90 tablet 1    rosuvastatin (Crestor) 10 MG tablet Take 1 tablet by mouth Daily. 90 tablet 1    Tobramycin-Dexamethasone (TobraDex ST) 0.3-0.05 % suspension 3-4 drops in left ear twice a day 5 mL 3    Tri-Lo-Rosy 0.18/0.215/0.25 MG-25 MCG per tablet TAKE ONE TABLET BY MOUTH DAILY 28 tablet 12     Current Facility-Administered Medications on File Prior to Visit   Medication Dose Route Frequency Provider Last Rate Last Admin    cyanocobalamin injection 1,000 mcg  1,000 mcg Intramuscular Q28 Days Arnie Rucker MD   1,000 mcg at 02/29/24 1414    heparin injection 500 Units  500 Units Intravenous PRN Kayden Leal MD   500 Units at 05/17/24 0938    sodium chloride 0.9 % flush 20 mL  20 mL Intravenous PRN Kayden Leal MD   20 mL at 05/17/24 0938       Allergies   Allergen Reactions    Cefuroxime Axetil Unknown  - Low Severity    Neosporin [Bacitracin-Polymyxin B] Unknown - Low Severity     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     Central perforation of tympanic membrane of right ear     Chronic mastoiditis     COVID-19 vaccine series completed     Heart murmur     Hypertension     Kabuki make-up syndrome     Mitral valve prolapse     Mixed conductive and sensorineural hearing loss of both ears     Otorrhea, left     Recurrent otitis media     Skin disease     PSORIASIS/ECXEMA     Past Surgical History:   Procedure Laterality Date    MULTIPLE TOOTH EXTRACTIONS      NO PAST SURGERIES       Social History     Socioeconomic History    Marital status: Single   Tobacco Use    Smoking status: Never     Passive exposure: Yes    Smokeless tobacco: Never    Tobacco comments:     Daily exposure to 2nd hand smoke   Vaping Use    Vaping status: Never Used   Substance and Sexual Activity    Alcohol use: Not Currently     Comment: DENIES    Drug use: Never    Sexual activity: Never     Family History   Problem Relation Age of Onset    Leukemia Father     Heart disease Other      Immunization History   Administered Date(s) Administered    COVID-19 (PFIZER) Purple Cap Monovalent 04/04/2021, 04/25/2021    Flu Vaccine Quad PF >36MO 11/04/2013, 10/24/2014, 11/02/2015, 10/13/2016, 10/19/2017    Fluzone (or Fluarix & Flulaval for VFC) >6mos 11/04/2013, 10/24/2014, 11/02/2015, 10/13/2016, 10/19/2017, 11/23/2021, 10/11/2022, 10/13/2023    Fluzone Quad >6mos (Multi-dose) 11/04/2013, 11/02/2015, 10/19/2017, 10/29/2019    Hep B, Adolescent or Pediatric 11/24/1998, 01/14/1999, 06/02/1999    Influenza LAIV (Nasal) 10/13/2016, 10/14/2020    Influenza TIV (IM) 09/17/2009, 11/07/2018    Influenza, Unspecified 10/14/2020, 10/11/2022    MMR 08/19/1996    Pneumococcal Conjugate 20-Valent (PCV20) 09/14/2023    Pneumococcal Polysaccharide (PPSV23) 10/04/2012, 04/10/2014    influenza Split 10/02/2018  "      Tobacco Use: Medium Risk (5/17/2024)    Patient History     Smoking Tobacco Use: Never     Smokeless Tobacco Use: Never     Passive Exposure: Yes       Objective     Physical Exam  Vitals and nursing note reviewed.   Constitutional:       Appearance: Normal appearance. She is obese.   HENT:      Head: Normocephalic.      Nose: Nose normal.      Mouth/Throat:      Mouth: Mucous membranes are moist.   Eyes:      Pupils: Pupils are equal, round, and reactive to light.   Cardiovascular:      Rate and Rhythm: Normal rate and regular rhythm.      Pulses: Normal pulses.      Heart sounds: Normal heart sounds.   Pulmonary:      Effort: Pulmonary effort is normal. No respiratory distress.      Breath sounds: Normal breath sounds.   Abdominal:      General: Bowel sounds are normal.      Palpations: Abdomen is soft.   Musculoskeletal:         General: Normal range of motion.      Cervical back: Normal range of motion and neck supple.   Skin:     General: Skin is warm and dry.      Capillary Refill: Capillary refill takes less than 2 seconds.   Neurological:      General: No focal deficit present.      Mental Status: She is alert and oriented to person, place, and time.   Psychiatric:         Mood and Affect: Mood normal.         Behavior: Behavior normal.         Thought Content: Thought content normal.         Judgment: Judgment normal.         Vitals:    05/17/24 1010   BP: 134/75   Pulse: 88   Resp: 18   Temp: 97.1 °F (36.2 °C)   SpO2: 97%   Weight: 67.4 kg (148 lb 9.4 oz)   Height: 139.7 cm (55\")   PainSc: 0-No pain       Wt Readings from Last 3 Encounters:   05/17/24 67.4 kg (148 lb 9.4 oz)   05/08/24 67 kg (147 lb 9.6 oz)   04/09/24 67.9 kg (149 lb 9.6 oz)       ECOG score: 0         ECOG: (0) Fully Active - Able to Carry On All Pre-disease Performance Without Restriction  Fall Risk Assessment was completed, and patient is at low risk for falls.  PHQ-9 Total Score:         The patient is  experiencing fatigue. " "Fatigue score: 2    PT/OT Functional Screening: PT fx screen : No needs identified  Speech Functional Screening: Speech fx screen : No needs identified  Rehab to be ordered: Rehab to be ordered : No needs identified        Result Review :  The following data was reviewed by: NO Nur on 05/17/2024:  Lab Results   Component Value Date    HGB 12.9 05/17/2024    HCT 38.9 05/17/2024    MCV 89.6 05/17/2024     05/17/2024    WBC 10.43 05/17/2024    NEUTROABS 7.67 (H) 05/17/2024    LYMPHSABS 2.05 05/17/2024    MONOSABS 0.63 05/17/2024    EOSABS 0.04 05/17/2024    BASOSABS 0.03 05/17/2024     Lab Results   Component Value Date    GLUCOSE 73 02/23/2024    BUN 11 02/23/2024    CREATININE 0.83 02/23/2024     02/23/2024    K 3.7 02/23/2024     02/23/2024    CO2 21.7 (L) 02/23/2024    CALCIUM 9.2 02/23/2024    PROTEINTOT 7.4 02/23/2024    ALBUMIN 4.0 02/23/2024    BILITOT 0.7 02/23/2024    ALKPHOS 125 (H) 02/23/2024    AST 56 (H) 02/23/2024    ALT 71 (H) 02/23/2024     Lab Results   Component Value Date     08/12/2019    FERRITIN 262.20 (H) 02/23/2024    [id number] 1,283 (H) 02/23/2024    FOLATE >20.00 02/23/2024     Lab Results   Component Value Date    IRON 84 02/23/2024    LABIRON 15 (L) 02/23/2024    TRANSFERRIN 364 (H) 02/23/2024    TIBC 542 (H) 02/23/2024     Lab Results   Component Value Date     08/12/2019    FERRITIN 262.20 (H) 02/23/2024    [id number] 1,283 (H) 02/23/2024    FOLATE >20.00 02/23/2024     No results found for: \"PSA\", \"CEA\", \"AFP\", \"\", \"\"    No Images in the past 120 days found..         Assessment and Plan:  Diagnoses and all orders for this visit:    1. CVID (common variable immunodeficiency) (Primary)    2. Anemia, unspecified type    Chronic CVID and receives every 42 day treatment with IVIG. History of chronic ear issues. Follows with ENT. Tolerating IVIG well. CBC today is within normal limits. igG levels are 534 and acceptable. "     Anemia has improved.       Follow up as scheduled for treatment every 6 weeks.     I spent 20 minutes caring for Francine on this date of service. This time includes time spent by me in the following activities:preparing for the visit, obtaining and/or reviewing a separately obtained history, performing a medically appropriate examination and/or evaluation , counseling and educating the patient/family/caregiver, ordering medications, tests, or procedures, referring and communicating with other health care professionals , documenting information in the medical record, and independently interpreting results and communicating that information with the patient/family/caregiver    Patient Follow Up: 6 weeks for next cycle of IVIG.     Patient was given instructions and counseling regarding her condition or for health maintenance advice. Please see specific information pulled into the AVS if appropriate.     Belia Saez, APRN    5/17/2024    .tob

## 2024-05-21 ENCOUNTER — HOSPITAL ENCOUNTER (OUTPATIENT)
Dept: ONCOLOGY | Facility: HOSPITAL | Age: 34
Discharge: HOME | End: 2024-05-21
Admitting: INTERNAL MEDICINE
Payer: MEDICARE

## 2024-05-21 VITALS
HEIGHT: 55 IN | RESPIRATION RATE: 18 BRPM | DIASTOLIC BLOOD PRESSURE: 60 MMHG | SYSTOLIC BLOOD PRESSURE: 108 MMHG | OXYGEN SATURATION: 99 % | TEMPERATURE: 97.6 F | WEIGHT: 147.93 LBS | BODY MASS INDEX: 34.23 KG/M2 | HEART RATE: 76 BPM

## 2024-05-21 DIAGNOSIS — D83.9 CVID (COMMON VARIABLE IMMUNODEFICIENCY): Primary | ICD-10-CM

## 2024-05-21 DIAGNOSIS — Z45.2 ENCOUNTER FOR ADJUSTMENT OR MANAGEMENT OF VASCULAR ACCESS DEVICE: ICD-10-CM

## 2024-05-21 PROCEDURE — 96365 THER/PROPH/DIAG IV INF INIT: CPT

## 2024-05-21 PROCEDURE — 25010000002 HEPARIN LOCK FLUSH PER 10 UNITS: Performed by: INTERNAL MEDICINE

## 2024-05-21 PROCEDURE — 25010000002 IMMUNE GLOBULIN (HUMAN) 10 GM/100ML SOLUTION: Performed by: INTERNAL MEDICINE

## 2024-05-21 PROCEDURE — 25810000003 SODIUM CHLORIDE 0.9 % SOLUTION: Performed by: INTERNAL MEDICINE

## 2024-05-21 RX ORDER — FAMOTIDINE 10 MG/ML
20 INJECTION, SOLUTION INTRAVENOUS ONCE
Status: DISCONTINUED | OUTPATIENT
Start: 2024-05-21 | End: 2024-05-22 | Stop reason: HOSPADM

## 2024-05-21 RX ORDER — SODIUM CHLORIDE 9 MG/ML
250 INJECTION, SOLUTION INTRAVENOUS ONCE
Status: COMPLETED | OUTPATIENT
Start: 2024-05-21 | End: 2024-05-21

## 2024-05-21 RX ORDER — HEPARIN SODIUM (PORCINE) LOCK FLUSH IV SOLN 100 UNIT/ML 100 UNIT/ML
500 SOLUTION INTRAVENOUS AS NEEDED
Status: DISCONTINUED | OUTPATIENT
Start: 2024-05-21 | End: 2024-05-22 | Stop reason: HOSPADM

## 2024-05-21 RX ORDER — SODIUM CHLORIDE 0.9 % (FLUSH) 0.9 %
20 SYRINGE (ML) INJECTION AS NEEDED
OUTPATIENT
Start: 2024-05-21

## 2024-05-21 RX ORDER — SODIUM CHLORIDE 0.9 % (FLUSH) 0.9 %
20 SYRINGE (ML) INJECTION AS NEEDED
Status: DISCONTINUED | OUTPATIENT
Start: 2024-05-21 | End: 2024-05-22 | Stop reason: HOSPADM

## 2024-05-21 RX ORDER — HEPARIN SODIUM (PORCINE) LOCK FLUSH IV SOLN 100 UNIT/ML 100 UNIT/ML
500 SOLUTION INTRAVENOUS AS NEEDED
OUTPATIENT
Start: 2024-05-21

## 2024-05-21 RX ORDER — DIPHENHYDRAMINE HCL 25 MG
25 CAPSULE ORAL ONCE
Status: DISCONTINUED | OUTPATIENT
Start: 2024-05-21 | End: 2024-05-22 | Stop reason: HOSPADM

## 2024-05-21 RX ADMIN — Medication 20 ML: at 12:01

## 2024-05-21 RX ADMIN — SODIUM CHLORIDE 250 ML: 9 INJECTION, SOLUTION INTRAVENOUS at 10:24

## 2024-05-21 RX ADMIN — IMMUNE GLOBULIN (HUMAN) 10 G: 10 INJECTION INTRAVENOUS; SUBCUTANEOUS at 10:30

## 2024-05-21 RX ADMIN — HEPARIN 500 UNITS: 100 SYRINGE at 12:01

## 2024-06-28 ENCOUNTER — HOSPITAL ENCOUNTER (OUTPATIENT)
Dept: ONCOLOGY | Facility: HOSPITAL | Age: 34
Discharge: HOME | End: 2024-06-28
Payer: MEDICARE

## 2024-06-28 ENCOUNTER — OFFICE VISIT (OUTPATIENT)
Dept: ONCOLOGY | Facility: HOSPITAL | Age: 34
End: 2024-06-28
Payer: MEDICARE

## 2024-06-28 ENCOUNTER — APPOINTMENT (OUTPATIENT)
Dept: ONCOLOGY | Facility: HOSPITAL | Age: 34
End: 2024-06-28
Payer: MEDICARE

## 2024-06-28 VITALS
WEIGHT: 146 LBS | DIASTOLIC BLOOD PRESSURE: 68 MMHG | RESPIRATION RATE: 20 BRPM | BODY MASS INDEX: 33.93 KG/M2 | TEMPERATURE: 98 F | SYSTOLIC BLOOD PRESSURE: 121 MMHG | HEART RATE: 82 BPM | OXYGEN SATURATION: 100 %

## 2024-06-28 DIAGNOSIS — D64.9 ANEMIA, UNSPECIFIED TYPE: Primary | ICD-10-CM

## 2024-06-28 DIAGNOSIS — D83.9 CVID (COMMON VARIABLE IMMUNODEFICIENCY): ICD-10-CM

## 2024-06-28 DIAGNOSIS — D83.9 CVID (COMMON VARIABLE IMMUNODEFICIENCY): Primary | ICD-10-CM

## 2024-06-28 DIAGNOSIS — Z45.2 ENCOUNTER FOR ADJUSTMENT OR MANAGEMENT OF VASCULAR ACCESS DEVICE: Primary | ICD-10-CM

## 2024-06-28 LAB
BASOPHILS # BLD AUTO: 0.01 10*3/MM3 (ref 0–0.2)
BASOPHILS NFR BLD AUTO: 0.1 % (ref 0–1.5)
DEPRECATED RDW RBC AUTO: 44.9 FL (ref 37–54)
EOSINOPHIL # BLD AUTO: 0.06 10*3/MM3 (ref 0–0.4)
EOSINOPHIL NFR BLD AUTO: 0.6 % (ref 0.3–6.2)
ERYTHROCYTE [DISTWIDTH] IN BLOOD BY AUTOMATED COUNT: 13.8 % (ref 12.3–15.4)
HCT VFR BLD AUTO: 34.9 % (ref 34–46.6)
HGB BLD-MCNC: 11.6 G/DL (ref 12–15.9)
IGA1 MFR SER: <50 MG/DL (ref 70–400)
IGG1 SER-MCNC: 534 MG/DL (ref 700–1600)
IGM SERPL-MCNC: 70 MG/DL (ref 40–230)
IMM GRANULOCYTES # BLD AUTO: 0.02 10*3/MM3 (ref 0–0.05)
IMM GRANULOCYTES NFR BLD AUTO: 0.2 % (ref 0–0.5)
LYMPHOCYTES # BLD AUTO: 2.1 10*3/MM3 (ref 0.7–3.1)
LYMPHOCYTES NFR BLD AUTO: 22.5 % (ref 19.6–45.3)
MCH RBC QN AUTO: 29.8 PG (ref 26.6–33)
MCHC RBC AUTO-ENTMCNC: 33.2 G/DL (ref 31.5–35.7)
MCV RBC AUTO: 89.7 FL (ref 79–97)
MONOCYTES # BLD AUTO: 0.56 10*3/MM3 (ref 0.1–0.9)
MONOCYTES NFR BLD AUTO: 6 % (ref 5–12)
NEUTROPHILS NFR BLD AUTO: 6.57 10*3/MM3 (ref 1.7–7)
NEUTROPHILS NFR BLD AUTO: 70.6 % (ref 42.7–76)
PLATELET # BLD AUTO: 280 10*3/MM3 (ref 140–450)
PMV BLD AUTO: 9 FL (ref 6–12)
RBC # BLD AUTO: 3.89 10*6/MM3 (ref 3.77–5.28)
WBC NRBC COR # BLD AUTO: 9.32 10*3/MM3 (ref 3.4–10.8)

## 2024-06-28 PROCEDURE — 85025 COMPLETE CBC W/AUTO DIFF WBC: CPT | Performed by: INTERNAL MEDICINE

## 2024-06-28 PROCEDURE — 3078F DIAST BP <80 MM HG: CPT | Performed by: INTERNAL MEDICINE

## 2024-06-28 PROCEDURE — 3074F SYST BP LT 130 MM HG: CPT | Performed by: INTERNAL MEDICINE

## 2024-06-28 PROCEDURE — 1126F AMNT PAIN NOTED NONE PRSNT: CPT | Performed by: INTERNAL MEDICINE

## 2024-06-28 PROCEDURE — 25010000002 HEPARIN LOCK FLUSH PER 10 UNITS: Performed by: INTERNAL MEDICINE

## 2024-06-28 PROCEDURE — 82784 ASSAY IGA/IGD/IGG/IGM EACH: CPT | Performed by: INTERNAL MEDICINE

## 2024-06-28 PROCEDURE — 99214 OFFICE O/P EST MOD 30 MIN: CPT | Performed by: INTERNAL MEDICINE

## 2024-06-28 PROCEDURE — 36591 DRAW BLOOD OFF VENOUS DEVICE: CPT

## 2024-06-28 RX ORDER — SODIUM CHLORIDE 0.9 % (FLUSH) 0.9 %
20 SYRINGE (ML) INJECTION AS NEEDED
Status: DISCONTINUED | OUTPATIENT
Start: 2024-06-28 | End: 2024-06-29 | Stop reason: HOSPADM

## 2024-06-28 RX ORDER — LIDOCAINE AND PRILOCAINE 25; 25 MG/G; MG/G
1 CREAM TOPICAL TAKE AS DIRECTED
Qty: 30 G | Refills: 1 | Status: SHIPPED | OUTPATIENT
Start: 2024-06-28

## 2024-06-28 RX ORDER — SODIUM CHLORIDE 0.9 % (FLUSH) 0.9 %
20 SYRINGE (ML) INJECTION AS NEEDED
OUTPATIENT
Start: 2024-06-28

## 2024-06-28 RX ORDER — FAMOTIDINE 10 MG/ML
20 INJECTION, SOLUTION INTRAVENOUS AS NEEDED
OUTPATIENT
Start: 2024-07-02

## 2024-06-28 RX ORDER — HEPARIN SODIUM (PORCINE) LOCK FLUSH IV SOLN 100 UNIT/ML 100 UNIT/ML
500 SOLUTION INTRAVENOUS AS NEEDED
OUTPATIENT
Start: 2024-07-02

## 2024-06-28 RX ORDER — MEPERIDINE HYDROCHLORIDE 25 MG/ML
25 INJECTION INTRAMUSCULAR; INTRAVENOUS; SUBCUTANEOUS
OUTPATIENT
Start: 2024-07-02

## 2024-06-28 RX ORDER — DIPHENHYDRAMINE HYDROCHLORIDE 50 MG/ML
50 INJECTION INTRAMUSCULAR; INTRAVENOUS AS NEEDED
OUTPATIENT
Start: 2024-07-02

## 2024-06-28 RX ORDER — FAMOTIDINE 10 MG/ML
20 INJECTION, SOLUTION INTRAVENOUS ONCE
OUTPATIENT
Start: 2024-07-02

## 2024-06-28 RX ORDER — HEPARIN SODIUM (PORCINE) LOCK FLUSH IV SOLN 100 UNIT/ML 100 UNIT/ML
500 SOLUTION INTRAVENOUS AS NEEDED
Status: DISCONTINUED | OUTPATIENT
Start: 2024-06-28 | End: 2024-06-29 | Stop reason: HOSPADM

## 2024-06-28 RX ORDER — SODIUM CHLORIDE 9 MG/ML
250 INJECTION, SOLUTION INTRAVENOUS ONCE
OUTPATIENT
Start: 2024-07-02

## 2024-06-28 RX ORDER — DIPHENHYDRAMINE HCL 25 MG
25 CAPSULE ORAL ONCE
OUTPATIENT
Start: 2024-07-02

## 2024-06-28 RX ADMIN — HEPARIN 500 UNITS: 100 SYRINGE at 09:43

## 2024-06-28 RX ADMIN — Medication 20 ML: at 09:43

## 2024-06-28 NOTE — PROGRESS NOTES
Chief Complaint/Reason for Referral:  CVID    rAnie Rucker MD Kindervater, Kasey, APRN    Subjective    History of Present Illness  Ms. Francine Preciado presents with her mother for lab check for IVIG treatment scheduled for today. Reports she is doing well with treatment.  Receives IVIG treatment every 6 weeks. No fevers or chills reported. Due to start period next week. No recent infections.     Last IgG level of 611 on 5/17/24.    Repeat iron labs 2/23/24 with elevated ferritin of 262 and low iron sat of 15%.     Hemoglobin 11.6 today. WBC normal.       Oncology/Hematology History Overview Note   CVID:   -Diagnosed by Immunologist 8/20/12  -presented with frequent/severe infections             Review of Systems   Constitutional:  Negative for appetite change, diaphoresis, fatigue, fever, unexpected weight gain and unexpected weight loss.   HENT:  Negative for hearing loss, mouth sores, sore throat, swollen glands, trouble swallowing and voice change.    Eyes:  Negative for blurred vision.   Respiratory:  Negative for cough, shortness of breath and wheezing.    Cardiovascular:  Negative for chest pain and palpitations.   Gastrointestinal:  Negative for abdominal pain, blood in stool, constipation, diarrhea, nausea and vomiting.   Endocrine: Negative for cold intolerance and heat intolerance.   Genitourinary:  Negative for difficulty urinating, dysuria, frequency, hematuria and urinary incontinence.   Musculoskeletal:  Negative for arthralgias, back pain and myalgias.   Skin:  Negative for rash, skin lesions and wound.   Neurological:  Negative for dizziness, seizures, weakness, numbness and headache.   Hematological:  Does not bruise/bleed easily.   Psychiatric/Behavioral:  Negative for depressed mood. The patient is not nervous/anxious.    All other systems reviewed and are negative.      Current Outpatient Medications on File Prior to Visit   Medication Sig Dispense Refill    atenolol (TENORMIN) 25 MG tablet TAKE  1 TABLET BY MOUTH DAILY 90 tablet 1    azelastine (ASTELIN) 0.1 % nasal spray       EPINEPHrine (EPIPEN) 0.3 MG/0.3ML solution auto-injector injection 0.3 mL.      famotidine (PEPCID) 10 MG tablet Take 2 tablets by mouth Daily.      fenofibrate 160 MG tablet Take 1 tablet by mouth Daily. 90 tablet 1    fluticasone (FLONASE) 50 MCG/ACT nasal spray 2 sprays into the nostril(s) as directed by provider Daily. 16 g 11    levocetirizine (XYZAL) 5 MG tablet       levothyroxine (SYNTHROID, LEVOTHROID) 50 MCG tablet Take 1 tablet by mouth Daily. 90 tablet 1    linaclotide (Linzess) 145 MCG capsule capsule Take 1 capsule by mouth Every Morning Before Breakfast. 90 capsule 1    montelukast (SINGULAIR) 10 MG tablet       multivitamin with minerals tablet tablet Take 1 tablet by mouth Daily.      ondansetron (Zofran) 4 MG tablet Take 1 tablet by mouth Every 8 (Eight) Hours As Needed for Nausea or Vomiting. 90 tablet 1    rosuvastatin (Crestor) 10 MG tablet Take 1 tablet by mouth Daily. 90 tablet 1    Tobramycin-Dexamethasone (TobraDex ST) 0.3-0.05 % suspension 3-4 drops in left ear twice a day 5 mL 3    Tri-Lo-Rosy 0.18/0.215/0.25 MG-25 MCG per tablet TAKE ONE TABLET BY MOUTH DAILY 28 tablet 12     Current Facility-Administered Medications on File Prior to Visit   Medication Dose Route Frequency Provider Last Rate Last Admin    cyanocobalamin injection 1,000 mcg  1,000 mcg Intramuscular Q28 Days Arnie Rucker MD   1,000 mcg at 02/29/24 1414       Allergies   Allergen Reactions    Cefuroxime Axetil Unknown - Low Severity    Neosporin [Bacitracin-Polymyxin B] Unknown - Low Severity     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     Central perforation of tympanic membrane of right ear     Chronic mastoiditis     COVID-19 vaccine series completed     Heart murmur     Hypertension     Kabuki make-up syndrome     Mitral valve prolapse     Mixed conductive and sensorineural  hearing loss of both ears     Otorrhea, left     Recurrent otitis media     Skin disease     PSORIASIS/ECXEMA     Past Surgical History:   Procedure Laterality Date    MULTIPLE TOOTH EXTRACTIONS      NO PAST SURGERIES       Social History     Socioeconomic History    Marital status: Single   Tobacco Use    Smoking status: Never     Passive exposure: Yes    Smokeless tobacco: Never    Tobacco comments:     Daily exposure to 2nd hand smoke   Vaping Use    Vaping status: Never Used   Substance and Sexual Activity    Alcohol use: Not Currently     Comment: DENIES    Drug use: Never    Sexual activity: Never     Family History   Problem Relation Age of Onset    Leukemia Father     Heart disease Other      Immunization History   Administered Date(s) Administered    COVID-19 (PFIZER) Purple Cap Monovalent 04/04/2021, 04/25/2021    Flu Vaccine Quad PF >36MO 11/04/2013, 10/24/2014, 11/02/2015, 10/13/2016, 10/19/2017    Fluzone (or Fluarix & Flulaval for VFC) >6mos 11/04/2013, 10/24/2014, 11/02/2015, 10/13/2016, 10/19/2017, 11/23/2021, 10/11/2022, 10/13/2023    Fluzone Quad >6mos (Multi-dose) 11/04/2013, 11/02/2015, 10/19/2017, 10/29/2019    Hep B, Adolescent or Pediatric 11/24/1998, 01/14/1999, 06/02/1999    Influenza LAIV (Nasal) 10/13/2016, 10/14/2020    Influenza TIV (IM) 09/17/2009, 11/07/2018    Influenza, Unspecified 10/14/2020, 10/11/2022    MMR 08/19/1996    Pneumococcal Conjugate 20-Valent (PCV20) 09/14/2023    Pneumococcal Polysaccharide (PPSV23) 10/04/2012, 04/10/2014    influenza Split 10/02/2018       Tobacco Use: Medium Risk (6/28/2024)    Patient History     Smoking Tobacco Use: Never     Smokeless Tobacco Use: Never     Passive Exposure: Yes       Objective     Physical Exam  Well appearing patient in no acute distress on RA  Anicteric sclerae, no rash on exposed skin  Respirations non-labored  Awake, alert, and oriented x 4. Speech intact. No gross neurologic deficit  Appropriate mood and affect    Vitals:     06/28/24 1000   BP: 121/68   Pulse: 82   Resp: 20   Temp: 98 °F (36.7 °C)   SpO2: 100%   Weight: 66.2 kg (146 lb)   PainSc: 0-No pain           Wt Readings from Last 3 Encounters:   05/21/24 67.1 kg (147 lb 14.9 oz)   05/17/24 67.4 kg (148 lb 9.4 oz)   05/08/24 67 kg (147 lb 9.6 oz)        ECOG score: 0         ECOG: (0) Fully Active - Able to Carry On All Pre-disease Performance Without Restriction  Fall Risk Assessment was completed, and patient is at low risk for falls.  PHQ-9 Total Score: 0       The patient is  experiencing fatigue. Fatigue score: 3    PT/OT Functional Screening: PT fx screen : No needs identified  Speech Functional Screening: Speech fx screen : No needs identified  Rehab to be ordered: Rehab to be ordered : No needs identified        Result Review :  The following data was reviewed by: Kayden Leal MD on 06/28/24:  Lab Results   Component Value Date    HGB 11.6 (L) 06/28/2024    HCT 34.9 06/28/2024    MCV 89.7 06/28/2024     06/28/2024    WBC 9.32 06/28/2024    NEUTROABS 6.57 06/28/2024    LYMPHSABS 2.10 06/28/2024    MONOSABS 0.56 06/28/2024    EOSABS 0.06 06/28/2024    BASOSABS 0.01 06/28/2024     Lab Results   Component Value Date    GLUCOSE 73 02/23/2024    BUN 11 02/23/2024    CREATININE 0.83 02/23/2024     02/23/2024    K 3.7 02/23/2024     02/23/2024    CO2 21.7 (L) 02/23/2024    CALCIUM 9.2 02/23/2024    PROTEINTOT 7.4 02/23/2024    ALBUMIN 4.0 02/23/2024    BILITOT 0.7 02/23/2024    ALKPHOS 125 (H) 02/23/2024    AST 56 (H) 02/23/2024    ALT 71 (H) 02/23/2024     Lab Results   Component Value Date     08/12/2019    FERRITIN 262.20 (H) 02/23/2024    [id number] 1,283 (H) 02/23/2024    FOLATE >20.00 02/23/2024     Lab Results   Component Value Date    IRON 84 02/23/2024    LABIRON 15 (L) 02/23/2024    TRANSFERRIN 364 (H) 02/23/2024    TIBC 542 (H) 02/23/2024     Lab Results   Component Value Date     08/12/2019    FERRITIN 262.20 (H)  "02/23/2024    [id number] 1,283 (H) 02/23/2024    FOLATE >20.00 02/23/2024     No results found for: \"PSA\", \"CEA\", \"AFP\", \"\", \"\"    Labs personally reviewed. IgG levels near 500. Ferritin elevated. Anemia returned.         Assessment and Plan:  Diagnoses and all orders for this visit:    1. Anemia, unspecified type (Primary)  -     Iron Profile; Future  -     Ferritin; Future    2. CVID (common variable immunodeficiency)    Other orders  -     sodium chloride 0.9 % infusion 250 mL  -     famotidine (PEPCID) injection 20 mg  -     diphenhydrAMINE (BENADRYL) capsule 25 mg  -     immune globulin (human) (GAMUNEX-C) 10 g infusion 100 mL  -     hydrocortisone sodium succinate (Solu-CORTEF) injection 100 mg  -     diphenhydrAMINE (BENADRYL) injection 50 mg  -     famotidine (PEPCID) injection 20 mg  -     meperidine (DEMEROL) injection 25 mg  -     lidocaine-prilocaine (EMLA) 2.5-2.5 % cream; Apply 1 Application topically to the appropriate area as directed Take As Directed. Apply to port site 30 minutes prior to use  Dispense: 30 g; Refill: 1        CVID  Receives IVIG treatment every 6 weeks. Due for treatment on 2/27/24. IgG levels are near 500, normally above.    Anemia  Continue oral iron for now. Anemia fluctuates and only mild. Repeat iron labs with elevated ferritin. Recommend to decrease oral iron from twice daily to once daily at last visit, which she remains on. Will repeat iron labs next week.     I spent 20 minutes caring for Francine on this date of service. This time includes time spent by me in the following activities:preparing for the visit, reviewing tests, obtaining and/or reviewing a separately obtained history, performing a medically appropriate examination and/or evaluation , counseling and educating the patient/family/caregiver, ordering medications, tests, or procedures, referring and communicating with other health care professionals , documenting information in the medical record, " independently interpreting results and communicating that information with the patient/family/caregiver, and care coordination    Patient Follow Up: 6 weeks    Patient was given instructions and counseling regarding her condition or for health maintenance advice. Please see specific information pulled into the AVS if appropriate.

## 2024-07-02 ENCOUNTER — HOSPITAL ENCOUNTER (OUTPATIENT)
Dept: ONCOLOGY | Facility: HOSPITAL | Age: 34
Discharge: HOME | End: 2024-07-02
Admitting: INTERNAL MEDICINE
Payer: MEDICARE

## 2024-07-02 VITALS
RESPIRATION RATE: 18 BRPM | BODY MASS INDEX: 34.63 KG/M2 | HEART RATE: 78 BPM | TEMPERATURE: 97.6 F | WEIGHT: 149 LBS | OXYGEN SATURATION: 100 % | DIASTOLIC BLOOD PRESSURE: 64 MMHG | SYSTOLIC BLOOD PRESSURE: 117 MMHG

## 2024-07-02 DIAGNOSIS — Z45.2 ENCOUNTER FOR ADJUSTMENT OR MANAGEMENT OF VASCULAR ACCESS DEVICE: ICD-10-CM

## 2024-07-02 DIAGNOSIS — D64.9 ANEMIA, UNSPECIFIED TYPE: ICD-10-CM

## 2024-07-02 DIAGNOSIS — D83.9 CVID (COMMON VARIABLE IMMUNODEFICIENCY): Primary | ICD-10-CM

## 2024-07-02 LAB
FERRITIN SERPL-MCNC: 166.9 NG/ML (ref 13–150)
IRON 24H UR-MRATE: 46 MCG/DL (ref 37–145)
IRON SATN MFR SERPL: 8 % (ref 20–50)
TIBC SERPL-MCNC: 550 MCG/DL (ref 298–536)
TRANSFERRIN SERPL-MCNC: 369 MG/DL (ref 200–360)

## 2024-07-02 PROCEDURE — 25010000002 HEPARIN LOCK FLUSH PER 10 UNITS: Performed by: INTERNAL MEDICINE

## 2024-07-02 PROCEDURE — 82728 ASSAY OF FERRITIN: CPT | Performed by: INTERNAL MEDICINE

## 2024-07-02 PROCEDURE — 25010000002 IMMUNE GLOBULIN (HUMAN) 10 GM/100ML SOLUTION: Performed by: INTERNAL MEDICINE

## 2024-07-02 PROCEDURE — 25810000003 SODIUM CHLORIDE 0.9 % SOLUTION: Performed by: INTERNAL MEDICINE

## 2024-07-02 PROCEDURE — 83540 ASSAY OF IRON: CPT | Performed by: INTERNAL MEDICINE

## 2024-07-02 PROCEDURE — 84466 ASSAY OF TRANSFERRIN: CPT | Performed by: INTERNAL MEDICINE

## 2024-07-02 PROCEDURE — 96365 THER/PROPH/DIAG IV INF INIT: CPT

## 2024-07-02 RX ORDER — SODIUM CHLORIDE 0.9 % (FLUSH) 0.9 %
20 SYRINGE (ML) INJECTION AS NEEDED
OUTPATIENT
Start: 2024-07-02

## 2024-07-02 RX ORDER — SODIUM CHLORIDE 9 MG/ML
250 INJECTION, SOLUTION INTRAVENOUS ONCE
Status: COMPLETED | OUTPATIENT
Start: 2024-07-02 | End: 2024-07-02

## 2024-07-02 RX ORDER — DIPHENHYDRAMINE HCL 25 MG
25 CAPSULE ORAL ONCE
Status: DISCONTINUED | OUTPATIENT
Start: 2024-07-02 | End: 2024-07-03 | Stop reason: HOSPADM

## 2024-07-02 RX ORDER — FAMOTIDINE 10 MG/ML
20 INJECTION, SOLUTION INTRAVENOUS ONCE
Status: DISCONTINUED | OUTPATIENT
Start: 2024-07-02 | End: 2024-07-03 | Stop reason: HOSPADM

## 2024-07-02 RX ORDER — HEPARIN SODIUM (PORCINE) LOCK FLUSH IV SOLN 100 UNIT/ML 100 UNIT/ML
500 SOLUTION INTRAVENOUS AS NEEDED
Status: DISCONTINUED | OUTPATIENT
Start: 2024-07-02 | End: 2024-07-03 | Stop reason: HOSPADM

## 2024-07-02 RX ORDER — SODIUM CHLORIDE 0.9 % (FLUSH) 0.9 %
20 SYRINGE (ML) INJECTION AS NEEDED
Status: DISCONTINUED | OUTPATIENT
Start: 2024-07-02 | End: 2024-07-03 | Stop reason: HOSPADM

## 2024-07-02 RX ORDER — HEPARIN SODIUM (PORCINE) LOCK FLUSH IV SOLN 100 UNIT/ML 100 UNIT/ML
500 SOLUTION INTRAVENOUS AS NEEDED
OUTPATIENT
Start: 2024-07-02

## 2024-07-02 RX ADMIN — IMMUNE GLOBULIN (HUMAN) 10 G: 10 INJECTION INTRAVENOUS; SUBCUTANEOUS at 10:00

## 2024-07-02 RX ADMIN — SODIUM CHLORIDE 250 ML: 9 INJECTION, SOLUTION INTRAVENOUS at 09:55

## 2024-07-02 RX ADMIN — HEPARIN 500 UNITS: 100 SYRINGE at 11:27

## 2024-07-02 RX ADMIN — Medication 20 ML: at 11:27

## 2024-07-03 ENCOUNTER — TELEPHONE (OUTPATIENT)
Dept: ONCOLOGY | Facility: HOSPITAL | Age: 34
End: 2024-07-03
Payer: MEDICARE

## 2024-07-03 RX ORDER — FERROUS SULFATE 325(65) MG
325 TABLET ORAL
Qty: 90 TABLET | Refills: 1 | Status: SHIPPED | OUTPATIENT
Start: 2024-07-03

## 2024-07-12 DIAGNOSIS — I34.1 MITRAL VALVE PROLAPSE: ICD-10-CM

## 2024-07-12 RX ORDER — ATENOLOL 25 MG/1
25 TABLET ORAL DAILY
Qty: 90 TABLET | Refills: 1 | Status: SHIPPED | OUTPATIENT
Start: 2024-07-12

## 2024-08-09 ENCOUNTER — HOSPITAL ENCOUNTER (OUTPATIENT)
Dept: ONCOLOGY | Facility: HOSPITAL | Age: 34
Discharge: HOME | End: 2024-08-09
Payer: MEDICARE

## 2024-08-09 ENCOUNTER — OFFICE VISIT (OUTPATIENT)
Dept: ONCOLOGY | Facility: HOSPITAL | Age: 34
End: 2024-08-09
Payer: MEDICARE

## 2024-08-09 VITALS
OXYGEN SATURATION: 100 % | SYSTOLIC BLOOD PRESSURE: 120 MMHG | HEART RATE: 62 BPM | HEIGHT: 55 IN | WEIGHT: 145.94 LBS | RESPIRATION RATE: 16 BRPM | TEMPERATURE: 98.3 F | BODY MASS INDEX: 33.78 KG/M2 | DIASTOLIC BLOOD PRESSURE: 51 MMHG

## 2024-08-09 DIAGNOSIS — Z45.2 ENCOUNTER FOR ADJUSTMENT OR MANAGEMENT OF VASCULAR ACCESS DEVICE: ICD-10-CM

## 2024-08-09 DIAGNOSIS — D83.9 CVID (COMMON VARIABLE IMMUNODEFICIENCY): ICD-10-CM

## 2024-08-09 DIAGNOSIS — D83.9 CVID (COMMON VARIABLE IMMUNODEFICIENCY): Primary | ICD-10-CM

## 2024-08-09 DIAGNOSIS — Z45.2 ENCOUNTER FOR ADJUSTMENT OR MANAGEMENT OF VASCULAR ACCESS DEVICE: Primary | ICD-10-CM

## 2024-08-09 DIAGNOSIS — D50.0 IRON DEFICIENCY ANEMIA DUE TO CHRONIC BLOOD LOSS: ICD-10-CM

## 2024-08-09 LAB
BASOPHILS # BLD AUTO: 0.02 10*3/MM3 (ref 0–0.2)
BASOPHILS NFR BLD AUTO: 0.2 % (ref 0–1.5)
DEPRECATED RDW RBC AUTO: 43.9 FL (ref 37–54)
EOSINOPHIL # BLD AUTO: 0.05 10*3/MM3 (ref 0–0.4)
EOSINOPHIL NFR BLD AUTO: 0.6 % (ref 0.3–6.2)
ERYTHROCYTE [DISTWIDTH] IN BLOOD BY AUTOMATED COUNT: 13.2 % (ref 12.3–15.4)
HCT VFR BLD AUTO: 36.6 % (ref 34–46.6)
HGB BLD-MCNC: 12.2 G/DL (ref 12–15.9)
IGA1 MFR SER: <50 MG/DL (ref 70–400)
IGG1 SER-MCNC: 542 MG/DL (ref 700–1600)
IGM SERPL-MCNC: 76 MG/DL (ref 40–230)
IMM GRANULOCYTES # BLD AUTO: 0.01 10*3/MM3 (ref 0–0.05)
IMM GRANULOCYTES NFR BLD AUTO: 0.1 % (ref 0–0.5)
LYMPHOCYTES # BLD AUTO: 2.04 10*3/MM3 (ref 0.7–3.1)
LYMPHOCYTES NFR BLD AUTO: 23.1 % (ref 19.6–45.3)
MCH RBC QN AUTO: 29.9 PG (ref 26.6–33)
MCHC RBC AUTO-ENTMCNC: 33.3 G/DL (ref 31.5–35.7)
MCV RBC AUTO: 89.7 FL (ref 79–97)
MONOCYTES # BLD AUTO: 0.65 10*3/MM3 (ref 0.1–0.9)
MONOCYTES NFR BLD AUTO: 7.4 % (ref 5–12)
NEUTROPHILS NFR BLD AUTO: 6.05 10*3/MM3 (ref 1.7–7)
NEUTROPHILS NFR BLD AUTO: 68.6 % (ref 42.7–76)
PLATELET # BLD AUTO: 234 10*3/MM3 (ref 140–450)
PMV BLD AUTO: 9.1 FL (ref 6–12)
RBC # BLD AUTO: 4.08 10*6/MM3 (ref 3.77–5.28)
WBC NRBC COR # BLD AUTO: 8.82 10*3/MM3 (ref 3.4–10.8)

## 2024-08-09 PROCEDURE — 36591 DRAW BLOOD OFF VENOUS DEVICE: CPT

## 2024-08-09 PROCEDURE — 25010000002 HEPARIN LOCK FLUSH PER 10 UNITS: Performed by: INTERNAL MEDICINE

## 2024-08-09 PROCEDURE — 85025 COMPLETE CBC W/AUTO DIFF WBC: CPT | Performed by: INTERNAL MEDICINE

## 2024-08-09 PROCEDURE — 82784 ASSAY IGA/IGD/IGG/IGM EACH: CPT | Performed by: INTERNAL MEDICINE

## 2024-08-09 RX ORDER — HEPARIN SODIUM (PORCINE) LOCK FLUSH IV SOLN 100 UNIT/ML 100 UNIT/ML
500 SOLUTION INTRAVENOUS AS NEEDED
OUTPATIENT
Start: 2024-08-13

## 2024-08-09 RX ORDER — SODIUM CHLORIDE 0.9 % (FLUSH) 0.9 %
20 SYRINGE (ML) INJECTION AS NEEDED
Status: DISCONTINUED | OUTPATIENT
Start: 2024-08-09 | End: 2024-08-10 | Stop reason: HOSPADM

## 2024-08-09 RX ORDER — SODIUM CHLORIDE 0.9 % (FLUSH) 0.9 %
20 SYRINGE (ML) INJECTION AS NEEDED
OUTPATIENT
Start: 2024-08-09

## 2024-08-09 RX ORDER — HEPARIN SODIUM (PORCINE) LOCK FLUSH IV SOLN 100 UNIT/ML 100 UNIT/ML
500 SOLUTION INTRAVENOUS AS NEEDED
Status: DISCONTINUED | OUTPATIENT
Start: 2024-08-09 | End: 2024-08-10 | Stop reason: HOSPADM

## 2024-08-09 RX ADMIN — Medication 20 ML: at 10:37

## 2024-08-09 RX ADMIN — HEPARIN SODIUM (PORCINE) LOCK FLUSH IV SOLN 100 UNIT/ML 500 UNITS: 100 SOLUTION at 10:37

## 2024-08-09 NOTE — PROGRESS NOTES
Chief Complaint/Reason for Referral:  follow up treatment with IVIG for  variable immunodefiency     Arnie Rucker MD Kindervater, Kasey, APRN    Records Obtained:  Records of the patients history including those obtained from  [facility] and patient information were reviewed and summarized in detail.    Subjective    History of Present Illness    Ms. Francine Preciado presents for every 42 day IVIG treatment for common variable immunodeficiency. She is tolerating treatment well. Denies any intolerable side effects. CBC is normal today. Reports recent dysuria and has been started on cranberry tablets and trying to drink more fluids. Currently, not on any antibotic therapy. Notes occasional bruising noted. Her mom reports when her IVIG was monthly she was having increased kidney issues. Notes mild fatigue. Reports she is taking 2 gummy iron vites a day. Notes when she took the oral ferrous sulfate previously, stools were loose, black and greenish in color.     Lab work: 8/9/2024: CBC is normal. IgG of 534 on last IVIG date on 6/28/2024. Last CMP in February with creatinine normal at 0.83. Iron studies: iron normal at 46, ferritin elevated 166, iron sat decreased at 8%, TIBC 550.       Oncology/Hematology History Overview Note   CVID:   -Diagnosed by Immunologist 8/20/12  -presented with frequent/severe infections             Review of Systems   Constitutional: Negative.  Positive for fatigue.   HENT: Negative.     Eyes: Negative.    Respiratory: Negative.     Cardiovascular: Negative.    Gastrointestinal: Negative.    Endocrine: Negative.    Genitourinary: Negative.  Positive for urgency.   Musculoskeletal: Negative.    Skin: Negative.  Positive for bruise.   Allergic/Immunologic: Negative.    Neurological: Negative.    Hematological: Negative.    Psychiatric/Behavioral: Negative.         Current Outpatient Medications on File Prior to Visit   Medication Sig Dispense Refill    atenolol (TENORMIN) 25 MG tablet TAKE 1 TABLET BY  MOUTH DAILY 90 tablet 1    azelastine (ASTELIN) 0.1 % nasal spray       EPINEPHrine (EPIPEN) 0.3 MG/0.3ML solution auto-injector injection 0.3 mL.      famotidine (PEPCID) 10 MG tablet Take 2 tablets by mouth Daily.      fenofibrate 160 MG tablet Take 1 tablet by mouth Daily. 90 tablet 1    fluticasone (FLONASE) 50 MCG/ACT nasal spray 2 sprays into the nostril(s) as directed by provider Daily. 16 g 11    levocetirizine (XYZAL) 5 MG tablet       levothyroxine (SYNTHROID, LEVOTHROID) 50 MCG tablet Take 1 tablet by mouth Daily. 90 tablet 1    lidocaine-prilocaine (EMLA) 2.5-2.5 % cream Apply 1 Application topically to the appropriate area as directed Take As Directed. Apply to port site 30 minutes prior to use 30 g 1    linaclotide (Linzess) 145 MCG capsule capsule Take 1 capsule by mouth Every Morning Before Breakfast. 90 capsule 1    montelukast (SINGULAIR) 10 MG tablet       multivitamin with minerals tablet tablet Take 1 tablet by mouth Daily.      ondansetron (Zofran) 4 MG tablet Take 1 tablet by mouth Every 8 (Eight) Hours As Needed for Nausea or Vomiting. 90 tablet 1    rosuvastatin (Crestor) 10 MG tablet Take 1 tablet by mouth Daily. 90 tablet 1    Tobramycin-Dexamethasone (TobraDex ST) 0.3-0.05 % suspension 3-4 drops in left ear twice a day 5 mL 3    Tri-Lo-Rosy 0.18/0.215/0.25 MG-25 MCG per tablet TAKE ONE TABLET BY MOUTH DAILY 28 tablet 12    [DISCONTINUED] ferrous sulfate 325 (65 FE) MG tablet Take 1 tablet by mouth Daily With Breakfast. 90 tablet 1     Current Facility-Administered Medications on File Prior to Visit   Medication Dose Route Frequency Provider Last Rate Last Admin    cyanocobalamin injection 1,000 mcg  1,000 mcg Intramuscular Q28 Days Arnie Rucker MD   1,000 mcg at 02/29/24 1414    heparin injection 500 Units  500 Units Intravenous PRN Kayden Leal MD   500 Units at 08/09/24 1037    sodium chloride 0.9 % flush 20 mL  20 mL Intravenous PRN Kayden Leal MD   20 mL at  08/09/24 1037       Allergies   Allergen Reactions    Cefuroxime Axetil Unknown - Low Severity    Neosporin [Bacitracin-Polymyxin B] Unknown - Low Severity     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     Central perforation of tympanic membrane of right ear     Chronic mastoiditis     COVID-19 vaccine series completed     Heart murmur     Hypertension     Kabuki make-up syndrome     Mitral valve prolapse     Mixed conductive and sensorineural hearing loss of both ears     Otorrhea, left     Recurrent otitis media     Skin disease     PSORIASIS/ECXEMA     Past Surgical History:   Procedure Laterality Date    MULTIPLE TOOTH EXTRACTIONS      NO PAST SURGERIES       Social History     Socioeconomic History    Marital status: Single   Tobacco Use    Smoking status: Never     Passive exposure: Yes    Smokeless tobacco: Never    Tobacco comments:     Daily exposure to 2nd hand smoke   Vaping Use    Vaping status: Never Used   Substance and Sexual Activity    Alcohol use: Not Currently     Comment: DENIES    Drug use: Never    Sexual activity: Never     Family History   Problem Relation Age of Onset    Leukemia Father     Heart disease Other      Immunization History   Administered Date(s) Administered    COVID-19 (PFIZER) Purple Cap Monovalent 04/04/2021, 04/25/2021    Flu Vaccine Quad PF >36MO 11/04/2013, 10/24/2014, 11/02/2015, 10/13/2016, 10/19/2017    Fluzone (or Fluarix & Flulaval for VFC) >6mos 11/04/2013, 10/24/2014, 11/02/2015, 10/13/2016, 10/19/2017, 11/23/2021, 10/11/2022, 10/13/2023    Fluzone Quad >6mos (Multi-dose) 11/04/2013, 11/02/2015, 10/19/2017, 10/29/2019    Hep B, Adolescent or Pediatric 11/24/1998, 01/14/1999, 06/02/1999    Influenza LAIV (Nasal) 10/13/2016, 10/14/2020    Influenza TIV (IM) 09/17/2009, 11/07/2018    Influenza, Unspecified 10/14/2020, 10/11/2022    MMR 08/19/1996    Pneumococcal Conjugate 20-Valent (PCV20) 09/14/2023    Pneumococcal  "Polysaccharide (PPSV23) 10/04/2012, 04/10/2014    influenza Split 10/02/2018       Tobacco Use: Medium Risk (8/9/2024)    Patient History     Smoking Tobacco Use: Never     Smokeless Tobacco Use: Never     Passive Exposure: Yes       Objective     Physical Exam  Vitals and nursing note reviewed.   Constitutional:       Appearance: Normal appearance.   HENT:      Head: Normocephalic.      Nose: Nose normal.      Mouth/Throat:      Mouth: Mucous membranes are moist.   Eyes:      Pupils: Pupils are equal, round, and reactive to light.   Cardiovascular:      Rate and Rhythm: Normal rate and regular rhythm.      Pulses: Normal pulses.      Heart sounds: Normal heart sounds.   Pulmonary:      Effort: Pulmonary effort is normal. No respiratory distress.      Breath sounds: Normal breath sounds. No wheezing, rhonchi or rales.   Abdominal:      Palpations: Abdomen is soft.   Musculoskeletal:         General: Normal range of motion.      Cervical back: Normal range of motion and neck supple.   Skin:     General: Skin is warm and dry.      Capillary Refill: Capillary refill takes less than 2 seconds.   Neurological:      General: No focal deficit present.      Mental Status: She is alert and oriented to person, place, and time. Mental status is at baseline.   Psychiatric:         Mood and Affect: Mood normal.         Behavior: Behavior normal.         Thought Content: Thought content normal.         Judgment: Judgment normal.         Vitals:    08/09/24 1043   BP: 120/51   Pulse: 62   Resp: 16   Temp: 98.3 °F (36.8 °C)   TempSrc: Temporal   SpO2: 100%   Weight: 66.2 kg (145 lb 15.1 oz)   Height: 139.7 cm (55\")   PainSc: 0-No pain       Wt Readings from Last 3 Encounters:   08/09/24 66.2 kg (145 lb 15.1 oz)   07/02/24 67.6 kg (149 lb)   06/28/24 66.2 kg (146 lb)           ECOG score: 0         ECOG: (0) Fully Active - Able to Carry On All Pre-disease Performance Without Restriction  Fall Risk Assessment was completed, and " "patient is at low risk for falls.  PHQ-9 Total Score:         The patient is  experiencing fatigue. Fatigue score: 1    PT/OT Functional Screening: PT fx screen : No needs identified  Speech Functional Screening: Speech fx screen : No needs identified  Rehab to be ordered: Rehab to be ordered : No needs identified        Result Review :  The following data was reviewed by: NO Nur on 08/09/2024:  Lab Results   Component Value Date    HGB 12.2 08/09/2024    HCT 36.6 08/09/2024    MCV 89.7 08/09/2024     08/09/2024    WBC 8.82 08/09/2024    NEUTROABS 6.05 08/09/2024    LYMPHSABS 2.04 08/09/2024    MONOSABS 0.65 08/09/2024    EOSABS 0.05 08/09/2024    BASOSABS 0.02 08/09/2024     Lab Results   Component Value Date    GLUCOSE 73 02/23/2024    BUN 11 02/23/2024    CREATININE 0.83 02/23/2024     02/23/2024    K 3.7 02/23/2024     02/23/2024    CO2 21.7 (L) 02/23/2024    CALCIUM 9.2 02/23/2024    PROTEINTOT 7.4 02/23/2024    ALBUMIN 4.0 02/23/2024    BILITOT 0.7 02/23/2024    ALKPHOS 125 (H) 02/23/2024    AST 56 (H) 02/23/2024    ALT 71 (H) 02/23/2024     Lab Results   Component Value Date     08/12/2019    FERRITIN 166.90 (H) 07/02/2024    PCXUGIIC11 1,283 (H) 02/23/2024    FOLATE >20.00 02/23/2024     Lab Results   Component Value Date    IRON 46 07/02/2024    LABIRON 8 (L) 07/02/2024    TRANSFERRIN 369 (H) 07/02/2024    TIBC 550 (H) 07/02/2024     Lab Results   Component Value Date     08/12/2019    FERRITIN 166.90 (H) 07/02/2024    [id number] 1,283 (H) 02/23/2024    FOLATE >20.00 02/23/2024     No results found for: \"PSA\", \"CEA\", \"AFP\", \"\", \"\"    No Images in the past 120 days found..         Assessment and Plan:  Diagnoses and all orders for this visit:    1. CVID (common variable immunodeficiency) (Primary)    2. Encounter for adjustment or management of vascular access device    3. Iron deficiency anemia due to chronic blood loss    Chronic CVID and is " on every 42 day treatment with IVIG infusions. Tolerating well. Due for treatment on 8/13/24. Labs appropriate to proceed with treatment. Last IgG level of 534 back in June. Repeat level today.     Iron deficiency anemia: anemia improved with hemoglobin of 12.2. Iron decreased, iron sat decreased to 8%. TIBC elevated. Continue iron gummies. Iron rich diet.     Follow up as scheduled for IVIG infusions with labs.     I spent 20 minutes caring for Francine on this date of service. This time includes time spent by me in the following activities:preparing for the visit, reviewing tests, obtaining and/or reviewing a separately obtained history, performing a medically appropriate examination and/or evaluation , counseling and educating the patient/family/caregiver, ordering medications, tests, or procedures, referring and communicating with other health care professionals , documenting information in the medical record, and independently interpreting results and communicating that information with the patient/family/caregiver    Patient Follow Up: 42 days for next cycle.     Patient was given instructions and counseling regarding her condition or for health maintenance advice. Please see specific information pulled into the AVS if appropriate.     Belia Saez, APRN    8/9/2024    .tob

## 2024-08-13 ENCOUNTER — HOSPITAL ENCOUNTER (OUTPATIENT)
Dept: ONCOLOGY | Facility: HOSPITAL | Age: 34
Discharge: HOME | End: 2024-08-13
Admitting: INTERNAL MEDICINE
Payer: MEDICARE

## 2024-08-13 VITALS
OXYGEN SATURATION: 100 % | BODY MASS INDEX: 34.02 KG/M2 | HEART RATE: 80 BPM | WEIGHT: 147 LBS | DIASTOLIC BLOOD PRESSURE: 56 MMHG | SYSTOLIC BLOOD PRESSURE: 112 MMHG | TEMPERATURE: 98 F | RESPIRATION RATE: 16 BRPM | HEIGHT: 55 IN

## 2024-08-13 DIAGNOSIS — Z45.2 ENCOUNTER FOR ADJUSTMENT OR MANAGEMENT OF VASCULAR ACCESS DEVICE: ICD-10-CM

## 2024-08-13 DIAGNOSIS — D83.9 CVID (COMMON VARIABLE IMMUNODEFICIENCY): Primary | ICD-10-CM

## 2024-08-13 PROCEDURE — 25010000002 IMMUNE GLOBULIN (HUMAN) 10 GM/100ML SOLUTION: Performed by: NURSE PRACTITIONER

## 2024-08-13 PROCEDURE — 96365 THER/PROPH/DIAG IV INF INIT: CPT

## 2024-08-13 PROCEDURE — 25810000003 SODIUM CHLORIDE 0.9 % SOLUTION: Performed by: NURSE PRACTITIONER

## 2024-08-13 PROCEDURE — 25010000002 HEPARIN LOCK FLUSH PER 10 UNITS: Performed by: INTERNAL MEDICINE

## 2024-08-13 RX ORDER — SODIUM CHLORIDE 9 MG/ML
250 INJECTION, SOLUTION INTRAVENOUS ONCE
Status: COMPLETED | OUTPATIENT
Start: 2024-08-13 | End: 2024-08-13

## 2024-08-13 RX ORDER — HEPARIN SODIUM (PORCINE) LOCK FLUSH IV SOLN 100 UNIT/ML 100 UNIT/ML
500 SOLUTION INTRAVENOUS AS NEEDED
Status: DISCONTINUED | OUTPATIENT
Start: 2024-08-13 | End: 2024-08-14 | Stop reason: HOSPADM

## 2024-08-13 RX ORDER — HEPARIN SODIUM (PORCINE) LOCK FLUSH IV SOLN 100 UNIT/ML 100 UNIT/ML
500 SOLUTION INTRAVENOUS AS NEEDED
OUTPATIENT
Start: 2024-08-13

## 2024-08-13 RX ORDER — DIPHENHYDRAMINE HCL 25 MG
25 CAPSULE ORAL ONCE
Status: DISCONTINUED | OUTPATIENT
Start: 2024-08-13 | End: 2024-08-14 | Stop reason: HOSPADM

## 2024-08-13 RX ORDER — FAMOTIDINE 10 MG/ML
20 INJECTION, SOLUTION INTRAVENOUS AS NEEDED
Status: DISCONTINUED | OUTPATIENT
Start: 2024-08-13 | End: 2024-08-14 | Stop reason: HOSPADM

## 2024-08-13 RX ORDER — MEPERIDINE HYDROCHLORIDE 25 MG/ML
25 INJECTION INTRAMUSCULAR; INTRAVENOUS; SUBCUTANEOUS
Status: DISCONTINUED | OUTPATIENT
Start: 2024-08-13 | End: 2024-08-14 | Stop reason: HOSPADM

## 2024-08-13 RX ORDER — SODIUM CHLORIDE 0.9 % (FLUSH) 0.9 %
20 SYRINGE (ML) INJECTION AS NEEDED
Status: DISCONTINUED | OUTPATIENT
Start: 2024-08-13 | End: 2024-08-14 | Stop reason: HOSPADM

## 2024-08-13 RX ORDER — DIPHENHYDRAMINE HYDROCHLORIDE 50 MG/ML
50 INJECTION INTRAMUSCULAR; INTRAVENOUS AS NEEDED
Status: DISCONTINUED | OUTPATIENT
Start: 2024-08-13 | End: 2024-08-14 | Stop reason: HOSPADM

## 2024-08-13 RX ORDER — SODIUM CHLORIDE 0.9 % (FLUSH) 0.9 %
20 SYRINGE (ML) INJECTION AS NEEDED
OUTPATIENT
Start: 2024-08-13

## 2024-08-13 RX ORDER — FAMOTIDINE 10 MG/ML
20 INJECTION, SOLUTION INTRAVENOUS ONCE
Status: DISCONTINUED | OUTPATIENT
Start: 2024-08-13 | End: 2024-08-14 | Stop reason: HOSPADM

## 2024-08-13 RX ADMIN — HEPARIN 500 UNITS: 100 SYRINGE at 12:03

## 2024-08-13 RX ADMIN — Medication 20 ML: at 12:03

## 2024-08-13 RX ADMIN — SODIUM CHLORIDE 250 ML: 9 INJECTION, SOLUTION INTRAVENOUS at 10:32

## 2024-08-13 RX ADMIN — IMMUNE GLOBULIN (HUMAN) 10 G: 10 INJECTION INTRAVENOUS; SUBCUTANEOUS at 10:39

## 2024-08-15 DIAGNOSIS — E78.2 MIXED HYPERLIPIDEMIA: ICD-10-CM

## 2024-08-15 RX ORDER — ROSUVASTATIN CALCIUM 10 MG/1
10 TABLET, COATED ORAL DAILY
Qty: 90 TABLET | Refills: 1 | Status: SHIPPED | OUTPATIENT
Start: 2024-08-15

## 2024-08-29 ENCOUNTER — OFFICE VISIT (OUTPATIENT)
Dept: FAMILY MEDICINE CLINIC | Facility: CLINIC | Age: 34
End: 2024-08-29
Payer: MEDICARE

## 2024-08-29 VITALS
BODY MASS INDEX: 33.81 KG/M2 | TEMPERATURE: 97.9 F | WEIGHT: 146.1 LBS | SYSTOLIC BLOOD PRESSURE: 122 MMHG | HEIGHT: 55 IN | HEART RATE: 68 BPM | DIASTOLIC BLOOD PRESSURE: 80 MMHG | OXYGEN SATURATION: 98 %

## 2024-08-29 DIAGNOSIS — E03.9 ACQUIRED HYPOTHYROIDISM: ICD-10-CM

## 2024-08-29 DIAGNOSIS — Z98.890 HISTORY OF VASCULAR ACCESS DEVICE: ICD-10-CM

## 2024-08-29 DIAGNOSIS — J30.89 NON-SEASONAL ALLERGIC RHINITIS, UNSPECIFIED TRIGGER: Primary | ICD-10-CM

## 2024-08-29 DIAGNOSIS — E55.9 VITAMIN D DEFICIENCY: ICD-10-CM

## 2024-08-29 DIAGNOSIS — K58.1 IRRITABLE BOWEL SYNDROME WITH CONSTIPATION: ICD-10-CM

## 2024-08-29 DIAGNOSIS — R79.89 LOW VITAMIN B12 LEVEL: ICD-10-CM

## 2024-08-29 DIAGNOSIS — E78.2 MIXED HYPERLIPIDEMIA: ICD-10-CM

## 2024-08-29 PROCEDURE — 3074F SYST BP LT 130 MM HG: CPT

## 2024-08-29 PROCEDURE — 99214 OFFICE O/P EST MOD 30 MIN: CPT

## 2024-08-29 PROCEDURE — 1160F RVW MEDS BY RX/DR IN RCRD: CPT

## 2024-08-29 PROCEDURE — 1126F AMNT PAIN NOTED NONE PRSNT: CPT

## 2024-08-29 PROCEDURE — 1159F MED LIST DOCD IN RCRD: CPT

## 2024-08-29 PROCEDURE — 3079F DIAST BP 80-89 MM HG: CPT

## 2024-08-29 RX ORDER — FENOFIBRATE 160 MG/1
160 TABLET ORAL DAILY
Qty: 90 TABLET | Refills: 1 | Status: SHIPPED | OUTPATIENT
Start: 2024-08-29

## 2024-08-29 RX ORDER — LEVOCETIRIZINE DIHYDROCHLORIDE 5 MG/1
5 TABLET, FILM COATED ORAL EVERY EVENING
Qty: 90 TABLET | Refills: 1 | Status: SHIPPED | OUTPATIENT
Start: 2024-08-29

## 2024-08-29 RX ORDER — LEVOTHYROXINE SODIUM 50 UG/1
50 TABLET ORAL DAILY
Qty: 90 TABLET | Refills: 1 | Status: SHIPPED | OUTPATIENT
Start: 2024-08-29

## 2024-08-29 RX ORDER — MONTELUKAST SODIUM 10 MG/1
10 TABLET ORAL NIGHTLY
Qty: 90 TABLET | Refills: 1 | Status: SHIPPED | OUTPATIENT
Start: 2024-08-29

## 2024-08-29 NOTE — PROGRESS NOTES
"Francine Preciado presents to [facility] for a 6-month follow-up.      History of Present Illness  Francine is a 34-year-old female presenting to the clinic with her mother for a 6-month follow-up and medication refills.    Patient reports that she feels pretty good overall, however she has been having some difficulty with her implanted port.  Her and her mother both state that the last several times she has went to heme-onc that she has had difficulty getting accessed.  They also report that her port has been \"flipping \"and causing a lot of discomfort in her chest.  At this time, I did assess the area, there does not appear to be any irregularities.  However I am concerned since this has been in place for quite some time, and the patient is describing discomfort with numerous access attempts and slow flow.  I would like for the surgeon who originally placed the device to reassess and give recommendations.  I am also going to order routine lab work, to be obtained during her next access.    She has no other concerns except needing refills of some of her routine medications.    She is currently up-to-date on vaccinations, routine and preventative screenings.    The following portions of the patient's history were personally reviewed and updated as appropriate: allergies, current medications, past medical history, past surgical history, past family history, and past social history.       Objective   Vital Signs:   /80 (BP Location: Left arm, Patient Position: Sitting, Cuff Size: Adult)   Pulse 68   Temp 97.9 °F (36.6 °C)   Ht 139.7 cm (55\")   Wt 66.3 kg (146 lb 1.6 oz)   SpO2 98%   BMI 33.96 kg/m²     Body mass index is 33.96 kg/m².    All labs, imaging, test results, and specialty provider notes reviewed with patient.     Physical Exam  Vitals reviewed.   Constitutional:       Appearance: Normal appearance.   HENT:      Right Ear: Ear canal and external ear normal. There is " impacted cerumen.      Left Ear: Ear canal and external ear normal. There is impacted cerumen.   Cardiovascular:      Rate and Rhythm: Normal rate and regular rhythm.      Pulses: Normal pulses.      Heart sounds: Normal heart sounds.   Pulmonary:      Effort: Pulmonary effort is normal.      Breath sounds: Normal breath sounds.   Skin:     General: Skin is warm and dry.   Neurological:      General: No focal deficit present.      Mental Status: She is alert and oriented to person, place, and time.   Psychiatric:         Mood and Affect: Mood normal.         Behavior: Behavior normal.                               Assessment and Plan:  Diagnoses and all orders for this visit:    1. Non-seasonal allergic rhinitis, unspecified trigger (Primary)  Comments:  Medications refilled  Orders:  -     montelukast (SINGULAIR) 10 MG tablet; Take 1 tablet by mouth Every Night.  Dispense: 90 tablet; Refill: 1  -     levocetirizine (XYZAL) 5 MG tablet; Take 1 tablet by mouth Every Evening.  Dispense: 90 tablet; Refill: 1    2. Mixed hyperlipidemia  Comments:  Repeat blood work.  Continue on atorvastatin and fenofibrate.  Orders:  -     fenofibrate 160 MG tablet; Take 1 tablet by mouth Daily.  Dispense: 90 tablet; Refill: 1  -     Comprehensive Metabolic Panel; Future  -     Lipid Panel; Future    3. Acquired hypothyroidism  Comments:  Repeat lab work ordered, medications refilled.  Orders:  -     levothyroxine (SYNTHROID, LEVOTHROID) 50 MCG tablet; Take 1 tablet by mouth Daily.  Dispense: 90 tablet; Refill: 1  -     TSH Rfx On Abnormal To Free T4; Future    4. Irritable bowel syndrome with constipation  Comments:  Refilled medications.  Orders:  -     linaclotide (Linzess) 145 MCG capsule capsule; Take 1 capsule by mouth Every Morning Before Breakfast.  Dispense: 90 capsule; Refill: 1    5. History of vascular access device  Comments:  Referring to Dr. Call for assessment and management of this device to ensure that it is  functioning appropriately.  Orders:  -     Ambulatory Referral to General Surgery    6. Low vitamin B12 level  -     Vitamin B12 & Folate; Future    7. Vitamin D deficiency  -     Vitamin D,25-Hydroxy; Future          Follow Up:  Return in about 6 months (around 2/28/2025).    Patient was given instructions and counseling regarding her condition or for health maintenance advice. Please see specific information pulled into the AVS if appropriate.

## 2024-09-16 ENCOUNTER — OFFICE VISIT (OUTPATIENT)
Dept: SURGERY | Facility: CLINIC | Age: 34
End: 2024-09-16
Payer: MEDICARE

## 2024-09-16 ENCOUNTER — TELEPHONE (OUTPATIENT)
Dept: ONCOLOGY | Facility: HOSPITAL | Age: 34
End: 2024-09-16

## 2024-09-16 VITALS — WEIGHT: 146.4 LBS | BODY MASS INDEX: 33.88 KG/M2 | HEIGHT: 55 IN | RESPIRATION RATE: 17 BRPM

## 2024-09-16 DIAGNOSIS — Z45.2 ENCOUNTER FOR ADJUSTMENT OR MANAGEMENT OF VASCULAR ACCESS DEVICE: Primary | ICD-10-CM

## 2024-09-16 PROCEDURE — 1159F MED LIST DOCD IN RCRD: CPT | Performed by: SURGERY

## 2024-09-16 PROCEDURE — 99203 OFFICE O/P NEW LOW 30 MIN: CPT | Performed by: SURGERY

## 2024-09-16 PROCEDURE — 1160F RVW MEDS BY RX/DR IN RCRD: CPT | Performed by: SURGERY

## 2024-09-16 NOTE — TELEPHONE ENCOUNTER
Caller: HEATHER FELTON    Relationship: Mother    Best call back number: [phone]      Who are you requesting to speak with (clinical staff, provider,  specific staff member): SCHEDULING    What was the call regarding: EDMOND CALLING TO ADVISE THE PT WILL BE HAVING HER LABS DRAWN ON 9/20 AT THE HOSPITAL WHILE SHE IS HAVING HER PORT REPLACED.  LAB APPT FOR 9/20 CAN BE CANCELED .. PT WILL BE AT HER FU.    
Yes

## 2024-09-18 RX ORDER — FERROUS GLUCONATE 324(38)MG
324 TABLET ORAL 2 TIMES DAILY
COMMUNITY

## 2024-09-18 RX ORDER — MULTIVIT WITH MINERALS/LUTEIN
250 TABLET ORAL DAILY
COMMUNITY

## 2024-09-19 ENCOUNTER — ANESTHESIA EVENT (OUTPATIENT)
Dept: PERIOP | Facility: HOSPITAL | Age: 34
End: 2024-09-19
Payer: MEDICARE

## 2024-09-20 ENCOUNTER — APPOINTMENT (OUTPATIENT)
Dept: GENERAL RADIOLOGY | Facility: HOSPITAL | Age: 34
End: 2024-09-20
Payer: MEDICARE

## 2024-09-20 ENCOUNTER — APPOINTMENT (OUTPATIENT)
Dept: ONCOLOGY | Facility: HOSPITAL | Age: 34
End: 2024-09-20
Payer: MEDICARE

## 2024-09-20 ENCOUNTER — LAB (OUTPATIENT)
Dept: LAB | Facility: HOSPITAL | Age: 34
End: 2024-09-20
Payer: MEDICARE

## 2024-09-20 ENCOUNTER — ANESTHESIA (OUTPATIENT)
Dept: PERIOP | Facility: HOSPITAL | Age: 34
End: 2024-09-20
Payer: MEDICARE

## 2024-09-20 ENCOUNTER — OFFICE VISIT (OUTPATIENT)
Dept: ONCOLOGY | Facility: HOSPITAL | Age: 34
End: 2024-09-20
Payer: MEDICARE

## 2024-09-20 ENCOUNTER — HOSPITAL ENCOUNTER (OUTPATIENT)
Facility: HOSPITAL | Age: 34
Setting detail: HOSPITAL OUTPATIENT SURGERY
Discharge: HOME | End: 2024-09-20
Attending: SURGERY | Admitting: SURGERY
Payer: MEDICARE

## 2024-09-20 VITALS
SYSTOLIC BLOOD PRESSURE: 117 MMHG | BODY MASS INDEX: 34.08 KG/M2 | HEIGHT: 55 IN | RESPIRATION RATE: 22 BRPM | TEMPERATURE: 97 F | WEIGHT: 147.27 LBS | DIASTOLIC BLOOD PRESSURE: 57 MMHG | OXYGEN SATURATION: 100 % | HEART RATE: 88 BPM

## 2024-09-20 VITALS
TEMPERATURE: 97.1 F | RESPIRATION RATE: 16 BRPM | HEART RATE: 90 BPM | BODY MASS INDEX: 34.08 KG/M2 | SYSTOLIC BLOOD PRESSURE: 115 MMHG | HEIGHT: 55 IN | DIASTOLIC BLOOD PRESSURE: 63 MMHG | OXYGEN SATURATION: 90 % | WEIGHT: 147.27 LBS

## 2024-09-20 DIAGNOSIS — R79.89 LOW VITAMIN B12 LEVEL: ICD-10-CM

## 2024-09-20 DIAGNOSIS — Z45.2 ENCOUNTER FOR ADJUSTMENT OR MANAGEMENT OF VASCULAR ACCESS DEVICE: ICD-10-CM

## 2024-09-20 DIAGNOSIS — R53.83 FATIGUE, UNSPECIFIED TYPE: ICD-10-CM

## 2024-09-20 DIAGNOSIS — D64.9 ANEMIA, UNSPECIFIED TYPE: ICD-10-CM

## 2024-09-20 DIAGNOSIS — D83.9 CVID (COMMON VARIABLE IMMUNODEFICIENCY): Primary | ICD-10-CM

## 2024-09-20 DIAGNOSIS — E03.9 ACQUIRED HYPOTHYROIDISM: ICD-10-CM

## 2024-09-20 DIAGNOSIS — E55.9 VITAMIN D DEFICIENCY: ICD-10-CM

## 2024-09-20 DIAGNOSIS — Z01.89 ROUTINE LAB DRAW: ICD-10-CM

## 2024-09-20 DIAGNOSIS — T88.59XA NAUSEA AFTER ANESTHESIA, INITIAL ENCOUNTER: ICD-10-CM

## 2024-09-20 DIAGNOSIS — R11.0 NAUSEA AFTER ANESTHESIA, INITIAL ENCOUNTER: ICD-10-CM

## 2024-09-20 DIAGNOSIS — E78.2 MIXED HYPERLIPIDEMIA: ICD-10-CM

## 2024-09-20 LAB
25(OH)D3 SERPL-MCNC: 44.4 NG/ML (ref 30–100)
ALBUMIN SERPL-MCNC: 4.1 G/DL (ref 3.5–5.2)
ALBUMIN/GLOB SERPL: 1.3 G/DL
ALP SERPL-CCNC: 111 U/L (ref 39–117)
ALT SERPL W P-5'-P-CCNC: 17 U/L (ref 1–33)
ANION GAP SERPL CALCULATED.3IONS-SCNC: 15.7 MMOL/L (ref 5–15)
AST SERPL-CCNC: 21 U/L (ref 1–32)
BILIRUB SERPL-MCNC: 0.5 MG/DL (ref 0–1.2)
BUN SERPL-MCNC: 13 MG/DL (ref 6–20)
BUN/CREAT SERPL: 13 (ref 7–25)
CALCIUM SPEC-SCNC: 9.3 MG/DL (ref 8.6–10.5)
CHLORIDE SERPL-SCNC: 108 MMOL/L (ref 98–107)
CHOLEST SERPL-MCNC: 160 MG/DL (ref 0–200)
CO2 SERPL-SCNC: 18.3 MMOL/L (ref 22–29)
CREAT SERPL-MCNC: 1 MG/DL (ref 0.57–1)
EGFRCR SERPLBLD CKD-EPI 2021: 76.4 ML/MIN/1.73
FOLATE SERPL-MCNC: >20 NG/ML (ref 4.78–24.2)
GLOBULIN UR ELPH-MCNC: 3.1 GM/DL
GLUCOSE SERPL-MCNC: 82 MG/DL (ref 65–99)
HCG SERPL QL: NEGATIVE
HDLC SERPL-MCNC: 42 MG/DL (ref 40–60)
LDLC SERPL CALC-MCNC: 85 MG/DL (ref 0–100)
LDLC/HDLC SERPL: 1.88 {RATIO}
POTASSIUM SERPL-SCNC: 4.2 MMOL/L (ref 3.5–5.2)
PROT SERPL-MCNC: 7.2 G/DL (ref 6–8.5)
SODIUM SERPL-SCNC: 142 MMOL/L (ref 136–145)
TRIGL SERPL-MCNC: 195 MG/DL (ref 0–150)
TSH SERPL DL<=0.05 MIU/L-ACNC: 4.06 UIU/ML (ref 0.27–4.2)
VIT B12 BLD-MCNC: 850 PG/ML (ref 211–946)
VLDLC SERPL-MCNC: 33 MG/DL (ref 5–40)

## 2024-09-20 PROCEDURE — 25010000002 MIDAZOLAM PER 1MG: Performed by: ANESTHESIOLOGY

## 2024-09-20 PROCEDURE — 80061 LIPID PANEL: CPT

## 2024-09-20 PROCEDURE — 76000 FLUOROSCOPY <1 HR PHYS/QHP: CPT

## 2024-09-20 PROCEDURE — 25010000002 ONDANSETRON PER 1 MG

## 2024-09-20 PROCEDURE — 25010000002 CEFAZOLIN PER 500 MG: Performed by: SURGERY

## 2024-09-20 PROCEDURE — 36590 REMOVAL TUNNELED CV CATH: CPT | Performed by: SURGERY

## 2024-09-20 PROCEDURE — 25010000002 PROPOFOL 200 MG/20ML EMULSION

## 2024-09-20 PROCEDURE — C1788 PORT, INDWELLING, IMP: HCPCS | Performed by: SURGERY

## 2024-09-20 PROCEDURE — 82306 VITAMIN D 25 HYDROXY: CPT

## 2024-09-20 PROCEDURE — 25010000002 HEPARIN (PORCINE) PER 1000 UNITS: Performed by: SURGERY

## 2024-09-20 PROCEDURE — 25010000002 DEXAMETHASONE PER 1 MG

## 2024-09-20 PROCEDURE — 84703 CHORIONIC GONADOTROPIN ASSAY: CPT | Performed by: ANESTHESIOLOGY

## 2024-09-20 PROCEDURE — 84443 ASSAY THYROID STIM HORMONE: CPT

## 2024-09-20 PROCEDURE — 82607 VITAMIN B-12: CPT

## 2024-09-20 PROCEDURE — 25010000002 FENTANYL CITRATE (PF) 50 MCG/ML SOLUTION

## 2024-09-20 PROCEDURE — 25010000002 BUPIVACAINE (PF) 0.25 % SOLUTION: Performed by: SURGERY

## 2024-09-20 PROCEDURE — 25810000003 LACTATED RINGERS PER 1000 ML: Performed by: ANESTHESIOLOGY

## 2024-09-20 PROCEDURE — 82746 ASSAY OF FOLIC ACID SERUM: CPT

## 2024-09-20 PROCEDURE — 77001 FLUOROGUIDE FOR VEIN DEVICE: CPT | Performed by: SURGERY

## 2024-09-20 PROCEDURE — 36561 INSERT TUNNELED CV CATH: CPT | Performed by: SURGERY

## 2024-09-20 PROCEDURE — 80053 COMPREHEN METABOLIC PANEL: CPT

## 2024-09-20 DEVICE — POWERPORT CLEARVUE ISP IMPLANTABLE PORT WITH ATTACHABLE 8F POLYURETHANE OPEN-ENDED SINGLE-LUMEN VENOUS CATHETER PROCEDURAL KIT
Type: IMPLANTABLE DEVICE | Site: CHEST | Status: FUNCTIONAL
Brand: POWERPORT CLEARVUE

## 2024-09-20 RX ORDER — EPHEDRINE SULFATE 50 MG/ML
INJECTION INTRAVENOUS AS NEEDED
Status: DISCONTINUED | OUTPATIENT
Start: 2024-09-20 | End: 2024-09-20 | Stop reason: SURG

## 2024-09-20 RX ORDER — MEPERIDINE HYDROCHLORIDE 25 MG/ML
12.5 INJECTION INTRAMUSCULAR; INTRAVENOUS; SUBCUTANEOUS
Status: DISCONTINUED | OUTPATIENT
Start: 2024-09-20 | End: 2024-09-20 | Stop reason: HOSPADM

## 2024-09-20 RX ORDER — ACETAMINOPHEN 500 MG
500 TABLET ORAL ONCE
Status: COMPLETED | OUTPATIENT
Start: 2024-09-20 | End: 2024-09-20

## 2024-09-20 RX ORDER — ONDANSETRON 4 MG/1
4 TABLET, ORALLY DISINTEGRATING ORAL ONCE AS NEEDED
Status: DISCONTINUED | OUTPATIENT
Start: 2024-09-20 | End: 2024-09-20 | Stop reason: HOSPADM

## 2024-09-20 RX ORDER — HYDROCODONE BITARTRATE AND ACETAMINOPHEN 5; 325 MG/1; MG/1
1 TABLET ORAL EVERY 6 HOURS PRN
Qty: 7 TABLET | Refills: 0 | Status: SHIPPED | OUTPATIENT
Start: 2024-09-20

## 2024-09-20 RX ORDER — BUPIVACAINE HYDROCHLORIDE 2.5 MG/ML
INJECTION, SOLUTION EPIDURAL; INFILTRATION; INTRACAUDAL AS NEEDED
Status: DISCONTINUED | OUTPATIENT
Start: 2024-09-20 | End: 2024-09-20 | Stop reason: HOSPADM

## 2024-09-20 RX ORDER — FENTANYL CITRATE 50 UG/ML
INJECTION, SOLUTION INTRAMUSCULAR; INTRAVENOUS AS NEEDED
Status: DISCONTINUED | OUTPATIENT
Start: 2024-09-20 | End: 2024-09-20 | Stop reason: SURG

## 2024-09-20 RX ORDER — MEPERIDINE HYDROCHLORIDE 25 MG/ML
25 INJECTION INTRAMUSCULAR; INTRAVENOUS; SUBCUTANEOUS
Status: CANCELLED | OUTPATIENT
Start: 2024-09-23

## 2024-09-20 RX ORDER — PROMETHAZINE HYDROCHLORIDE 12.5 MG/1
25 TABLET ORAL ONCE AS NEEDED
Status: DISCONTINUED | OUTPATIENT
Start: 2024-09-20 | End: 2024-09-20 | Stop reason: HOSPADM

## 2024-09-20 RX ORDER — DEXMEDETOMIDINE HYDROCHLORIDE 4 UG/ML
INJECTION, SOLUTION INTRAVENOUS AS NEEDED
Status: DISCONTINUED | OUTPATIENT
Start: 2024-09-20 | End: 2024-09-20 | Stop reason: SURG

## 2024-09-20 RX ORDER — FAMOTIDINE 10 MG/ML
20 INJECTION, SOLUTION INTRAVENOUS AS NEEDED
Status: CANCELLED | OUTPATIENT
Start: 2024-09-23

## 2024-09-20 RX ORDER — MAGNESIUM HYDROXIDE 1200 MG/15ML
LIQUID ORAL AS NEEDED
Status: DISCONTINUED | OUTPATIENT
Start: 2024-09-20 | End: 2024-09-20 | Stop reason: HOSPADM

## 2024-09-20 RX ORDER — FAMOTIDINE 10 MG/ML
20 INJECTION, SOLUTION INTRAVENOUS ONCE
Status: CANCELLED | OUTPATIENT
Start: 2024-09-23

## 2024-09-20 RX ORDER — DIPHENHYDRAMINE HCL 25 MG
25 CAPSULE ORAL ONCE
Status: CANCELLED | OUTPATIENT
Start: 2024-09-23

## 2024-09-20 RX ORDER — PROPOFOL 10 MG/ML
INJECTION, EMULSION INTRAVENOUS AS NEEDED
Status: DISCONTINUED | OUTPATIENT
Start: 2024-09-20 | End: 2024-09-20 | Stop reason: SURG

## 2024-09-20 RX ORDER — SODIUM CHLORIDE, SODIUM LACTATE, POTASSIUM CHLORIDE, CALCIUM CHLORIDE 600; 310; 30; 20 MG/100ML; MG/100ML; MG/100ML; MG/100ML
9 INJECTION, SOLUTION INTRAVENOUS CONTINUOUS PRN
Status: DISCONTINUED | OUTPATIENT
Start: 2024-09-20 | End: 2024-09-20 | Stop reason: HOSPADM

## 2024-09-20 RX ORDER — ONDANSETRON 2 MG/ML
4 INJECTION INTRAMUSCULAR; INTRAVENOUS ONCE AS NEEDED
Status: DISCONTINUED | OUTPATIENT
Start: 2024-09-20 | End: 2024-09-20 | Stop reason: HOSPADM

## 2024-09-20 RX ORDER — PROMETHAZINE HYDROCHLORIDE 25 MG/1
25 SUPPOSITORY RECTAL ONCE AS NEEDED
Status: DISCONTINUED | OUTPATIENT
Start: 2024-09-20 | End: 2024-09-20 | Stop reason: HOSPADM

## 2024-09-20 RX ORDER — OXYCODONE HYDROCHLORIDE 5 MG/1
5 TABLET ORAL
Status: DISCONTINUED | OUTPATIENT
Start: 2024-09-20 | End: 2024-09-20 | Stop reason: HOSPADM

## 2024-09-20 RX ORDER — ONDANSETRON 2 MG/ML
INJECTION INTRAMUSCULAR; INTRAVENOUS AS NEEDED
Status: DISCONTINUED | OUTPATIENT
Start: 2024-09-20 | End: 2024-09-20 | Stop reason: SURG

## 2024-09-20 RX ORDER — LIDOCAINE HYDROCHLORIDE 20 MG/ML
INJECTION, SOLUTION EPIDURAL; INFILTRATION; INTRACAUDAL; PERINEURAL AS NEEDED
Status: DISCONTINUED | OUTPATIENT
Start: 2024-09-20 | End: 2024-09-20 | Stop reason: SURG

## 2024-09-20 RX ORDER — DEXAMETHASONE SODIUM PHOSPHATE 4 MG/ML
INJECTION, SOLUTION INTRA-ARTICULAR; INTRALESIONAL; INTRAMUSCULAR; INTRAVENOUS; SOFT TISSUE AS NEEDED
Status: DISCONTINUED | OUTPATIENT
Start: 2024-09-20 | End: 2024-09-20 | Stop reason: SURG

## 2024-09-20 RX ORDER — MIDAZOLAM HYDROCHLORIDE 2 MG/2ML
1 INJECTION, SOLUTION INTRAMUSCULAR; INTRAVENOUS ONCE
Status: COMPLETED | OUTPATIENT
Start: 2024-09-20 | End: 2024-09-20

## 2024-09-20 RX ORDER — SODIUM CHLORIDE 9 MG/ML
250 INJECTION, SOLUTION INTRAVENOUS ONCE
Status: CANCELLED | OUTPATIENT
Start: 2024-09-23

## 2024-09-20 RX ORDER — DIPHENHYDRAMINE HYDROCHLORIDE 50 MG/ML
50 INJECTION INTRAMUSCULAR; INTRAVENOUS AS NEEDED
Status: CANCELLED | OUTPATIENT
Start: 2024-09-23

## 2024-09-20 RX ADMIN — EPHEDRINE SULFATE 5 MG: 50 INJECTION INTRAVENOUS at 08:06

## 2024-09-20 RX ADMIN — OXYCODONE HYDROCHLORIDE 5 MG: 5 TABLET ORAL at 08:26

## 2024-09-20 RX ADMIN — FENTANYL CITRATE 25 MCG: 50 INJECTION, SOLUTION INTRAMUSCULAR; INTRAVENOUS at 07:48

## 2024-09-20 RX ADMIN — SODIUM CHLORIDE, POTASSIUM CHLORIDE, SODIUM LACTATE AND CALCIUM CHLORIDE 9 ML/HR: 600; 310; 30; 20 INJECTION, SOLUTION INTRAVENOUS at 07:21

## 2024-09-20 RX ADMIN — ONDANSETRON 4 MG: 2 INJECTION INTRAMUSCULAR; INTRAVENOUS at 07:37

## 2024-09-20 RX ADMIN — DEXAMETHASONE SODIUM PHOSPHATE 4 MG: 4 INJECTION, SOLUTION INTRAMUSCULAR; INTRAVENOUS at 07:37

## 2024-09-20 RX ADMIN — MIDAZOLAM HYDROCHLORIDE 1 MG: 1 INJECTION, SOLUTION INTRAMUSCULAR; INTRAVENOUS at 07:20

## 2024-09-20 RX ADMIN — DEXMEDETOMIDINE HYDROCHLORIDE IN 0.9% SODIUM CHLORIDE 4 MCG: 4 INJECTION INTRAVENOUS at 07:30

## 2024-09-20 RX ADMIN — FENTANYL CITRATE 25 MCG: 50 INJECTION, SOLUTION INTRAMUSCULAR; INTRAVENOUS at 07:32

## 2024-09-20 RX ADMIN — LIDOCAINE HYDROCHLORIDE 80 MG: 20 INJECTION, SOLUTION EPIDURAL; INFILTRATION; INTRACAUDAL; PERINEURAL at 07:32

## 2024-09-20 RX ADMIN — EPHEDRINE SULFATE 5 MG: 50 INJECTION INTRAVENOUS at 07:44

## 2024-09-20 RX ADMIN — SODIUM CHLORIDE 2000 MG: 9 INJECTION, SOLUTION INTRAVENOUS at 07:35

## 2024-09-20 RX ADMIN — EPHEDRINE SULFATE 5 MG: 50 INJECTION INTRAVENOUS at 08:13

## 2024-09-20 RX ADMIN — PROPOFOL 100 MG: 10 INJECTION, EMULSION INTRAVENOUS at 07:32

## 2024-09-20 RX ADMIN — EPHEDRINE SULFATE 5 MG: 50 INJECTION INTRAVENOUS at 07:48

## 2024-09-20 RX ADMIN — ACETAMINOPHEN 500 MG: 500 TABLET ORAL at 06:45

## 2024-09-23 ENCOUNTER — HOSPITAL ENCOUNTER (OUTPATIENT)
Dept: ONCOLOGY | Facility: HOSPITAL | Age: 34
Discharge: HOME | End: 2024-09-23
Admitting: INTERNAL MEDICINE
Payer: MEDICARE

## 2024-09-23 VITALS
DIASTOLIC BLOOD PRESSURE: 53 MMHG | SYSTOLIC BLOOD PRESSURE: 113 MMHG | TEMPERATURE: 97.9 F | RESPIRATION RATE: 18 BRPM | HEART RATE: 73 BPM | WEIGHT: 147.27 LBS | OXYGEN SATURATION: 97 % | BODY MASS INDEX: 34.23 KG/M2

## 2024-09-23 DIAGNOSIS — D83.9 CVID (COMMON VARIABLE IMMUNODEFICIENCY): Primary | ICD-10-CM

## 2024-09-23 DIAGNOSIS — Z45.2 ENCOUNTER FOR ADJUSTMENT OR MANAGEMENT OF VASCULAR ACCESS DEVICE: ICD-10-CM

## 2024-09-23 LAB
BASOPHILS # BLD AUTO: 0.02 10*3/MM3 (ref 0–0.2)
BASOPHILS NFR BLD AUTO: 0.2 % (ref 0–1.5)
DEPRECATED RDW RBC AUTO: 46.7 FL (ref 37–54)
EOSINOPHIL # BLD AUTO: 0.08 10*3/MM3 (ref 0–0.4)
EOSINOPHIL NFR BLD AUTO: 0.7 % (ref 0.3–6.2)
ERYTHROCYTE [DISTWIDTH] IN BLOOD BY AUTOMATED COUNT: 13.7 % (ref 12.3–15.4)
HCT VFR BLD AUTO: 32.4 % (ref 34–46.6)
HGB BLD-MCNC: 10.7 G/DL (ref 12–15.9)
IGA1 MFR SER: <50 MG/DL (ref 70–400)
IGG1 SER-MCNC: 459 MG/DL (ref 700–1600)
IGM SERPL-MCNC: 70 MG/DL (ref 40–230)
IMM GRANULOCYTES # BLD AUTO: 0.02 10*3/MM3 (ref 0–0.05)
IMM GRANULOCYTES NFR BLD AUTO: 0.2 % (ref 0–0.5)
LYMPHOCYTES # BLD AUTO: 2.13 10*3/MM3 (ref 0.7–3.1)
LYMPHOCYTES NFR BLD AUTO: 17.4 % (ref 19.6–45.3)
MCH RBC QN AUTO: 30.1 PG (ref 26.6–33)
MCHC RBC AUTO-ENTMCNC: 33 G/DL (ref 31.5–35.7)
MCV RBC AUTO: 91.3 FL (ref 79–97)
MONOCYTES # BLD AUTO: 0.73 10*3/MM3 (ref 0.1–0.9)
MONOCYTES NFR BLD AUTO: 6 % (ref 5–12)
NEUTROPHILS NFR BLD AUTO: 75.5 % (ref 42.7–76)
NEUTROPHILS NFR BLD AUTO: 9.27 10*3/MM3 (ref 1.7–7)
PLATELET # BLD AUTO: 285 10*3/MM3 (ref 140–450)
PMV BLD AUTO: 9.2 FL (ref 6–12)
RBC # BLD AUTO: 3.55 10*6/MM3 (ref 3.77–5.28)
WBC NRBC COR # BLD AUTO: 12.25 10*3/MM3 (ref 3.4–10.8)

## 2024-09-23 PROCEDURE — 25010000002 IMMUNE GLOBULIN (HUMAN) 10 GM/100ML SOLUTION: Performed by: INTERNAL MEDICINE

## 2024-09-23 PROCEDURE — 82784 ASSAY IGA/IGD/IGG/IGM EACH: CPT | Performed by: INTERNAL MEDICINE

## 2024-09-23 PROCEDURE — 85025 COMPLETE CBC W/AUTO DIFF WBC: CPT | Performed by: INTERNAL MEDICINE

## 2024-09-23 PROCEDURE — 25810000003 SODIUM CHLORIDE 0.9 % SOLUTION: Performed by: INTERNAL MEDICINE

## 2024-09-23 PROCEDURE — 96365 THER/PROPH/DIAG IV INF INIT: CPT

## 2024-09-23 PROCEDURE — 25010000002 HEPARIN LOCK FLUSH PER 10 UNITS: Performed by: INTERNAL MEDICINE

## 2024-09-23 RX ORDER — HEPARIN SODIUM (PORCINE) LOCK FLUSH IV SOLN 100 UNIT/ML 100 UNIT/ML
500 SOLUTION INTRAVENOUS AS NEEDED
OUTPATIENT
Start: 2024-09-23

## 2024-09-23 RX ORDER — FAMOTIDINE 10 MG/ML
20 INJECTION, SOLUTION INTRAVENOUS ONCE
Status: DISCONTINUED | OUTPATIENT
Start: 2024-09-23 | End: 2024-09-24 | Stop reason: HOSPADM

## 2024-09-23 RX ORDER — SODIUM CHLORIDE 0.9 % (FLUSH) 0.9 %
20 SYRINGE (ML) INJECTION AS NEEDED
OUTPATIENT
Start: 2024-09-23

## 2024-09-23 RX ORDER — HEPARIN SODIUM (PORCINE) LOCK FLUSH IV SOLN 100 UNIT/ML 100 UNIT/ML
500 SOLUTION INTRAVENOUS AS NEEDED
Status: DISCONTINUED | OUTPATIENT
Start: 2024-09-23 | End: 2024-09-24 | Stop reason: HOSPADM

## 2024-09-23 RX ORDER — DIPHENHYDRAMINE HCL 25 MG
25 CAPSULE ORAL ONCE
Status: DISCONTINUED | OUTPATIENT
Start: 2024-09-23 | End: 2024-09-24 | Stop reason: HOSPADM

## 2024-09-23 RX ORDER — SODIUM CHLORIDE 9 MG/ML
250 INJECTION, SOLUTION INTRAVENOUS ONCE
Status: COMPLETED | OUTPATIENT
Start: 2024-09-23 | End: 2024-09-23

## 2024-09-23 RX ORDER — DIPHENHYDRAMINE HYDROCHLORIDE 50 MG/ML
50 INJECTION INTRAMUSCULAR; INTRAVENOUS AS NEEDED
Status: DISCONTINUED | OUTPATIENT
Start: 2024-09-23 | End: 2024-09-24 | Stop reason: HOSPADM

## 2024-09-23 RX ORDER — SODIUM CHLORIDE 0.9 % (FLUSH) 0.9 %
20 SYRINGE (ML) INJECTION AS NEEDED
Status: DISCONTINUED | OUTPATIENT
Start: 2024-09-23 | End: 2024-09-24 | Stop reason: HOSPADM

## 2024-09-23 RX ORDER — FAMOTIDINE 10 MG/ML
20 INJECTION, SOLUTION INTRAVENOUS AS NEEDED
Status: DISCONTINUED | OUTPATIENT
Start: 2024-09-23 | End: 2024-09-24 | Stop reason: HOSPADM

## 2024-09-23 RX ORDER — MEPERIDINE HYDROCHLORIDE 25 MG/ML
25 INJECTION INTRAMUSCULAR; INTRAVENOUS; SUBCUTANEOUS
Status: DISCONTINUED | OUTPATIENT
Start: 2024-09-23 | End: 2024-09-24 | Stop reason: HOSPADM

## 2024-09-23 RX ADMIN — SODIUM CHLORIDE 250 ML: 9 INJECTION, SOLUTION INTRAVENOUS at 10:27

## 2024-09-23 RX ADMIN — Medication 20 ML: at 12:02

## 2024-09-23 RX ADMIN — IMMUNE GLOBULIN (HUMAN) 10 G: 10 INJECTION INTRAVENOUS; SUBCUTANEOUS at 10:36

## 2024-09-23 RX ADMIN — HEPARIN 500 UNITS: 100 SYRINGE at 12:02

## 2024-09-24 ENCOUNTER — HOSPITAL ENCOUNTER (OUTPATIENT)
Dept: ONCOLOGY | Facility: HOSPITAL | Age: 34
Discharge: HOME | End: 2024-09-24
Payer: MEDICARE

## 2024-09-24 ENCOUNTER — OFFICE VISIT (OUTPATIENT)
Dept: FAMILY MEDICINE CLINIC | Facility: CLINIC | Age: 34
End: 2024-09-24
Payer: MEDICARE

## 2024-09-24 VITALS
HEART RATE: 80 BPM | TEMPERATURE: 97.8 F | OXYGEN SATURATION: 96 % | DIASTOLIC BLOOD PRESSURE: 80 MMHG | BODY MASS INDEX: 34.11 KG/M2 | WEIGHT: 147.4 LBS | HEIGHT: 55 IN | SYSTOLIC BLOOD PRESSURE: 130 MMHG

## 2024-09-24 DIAGNOSIS — R05.1 ACUTE COUGH: Primary | ICD-10-CM

## 2024-09-24 PROCEDURE — 1125F AMNT PAIN NOTED PAIN PRSNT: CPT

## 2024-09-24 PROCEDURE — 3079F DIAST BP 80-89 MM HG: CPT

## 2024-09-24 PROCEDURE — 3075F SYST BP GE 130 - 139MM HG: CPT

## 2024-09-24 PROCEDURE — 99213 OFFICE O/P EST LOW 20 MIN: CPT

## 2024-09-24 RX ORDER — AZITHROMYCIN 250 MG/1
TABLET, FILM COATED ORAL
Qty: 6 TABLET | Refills: 0 | Status: SHIPPED | OUTPATIENT
Start: 2024-09-24

## 2024-09-24 RX ORDER — DEXTROMETHORPHAN HYDROBROMIDE AND PROMETHAZINE HYDROCHLORIDE 15; 6.25 MG/5ML; MG/5ML
5 SYRUP ORAL 4 TIMES DAILY PRN
Qty: 240 ML | Refills: 0 | Status: SHIPPED | OUTPATIENT
Start: 2024-09-24

## 2024-09-26 ENCOUNTER — OFFICE VISIT (OUTPATIENT)
Dept: SURGERY | Facility: CLINIC | Age: 34
End: 2024-09-26
Payer: MEDICARE

## 2024-09-26 VITALS — HEIGHT: 55 IN | BODY MASS INDEX: 33.09 KG/M2 | WEIGHT: 143 LBS | RESPIRATION RATE: 16 BRPM

## 2024-09-26 DIAGNOSIS — Z45.2 ENCOUNTER FOR ADJUSTMENT OR MANAGEMENT OF VASCULAR ACCESS DEVICE: Primary | ICD-10-CM

## 2024-09-26 PROCEDURE — 99024 POSTOP FOLLOW-UP VISIT: CPT | Performed by: SURGERY

## 2024-09-26 PROCEDURE — 1160F RVW MEDS BY RX/DR IN RCRD: CPT | Performed by: SURGERY

## 2024-09-26 PROCEDURE — 1159F MED LIST DOCD IN RCRD: CPT | Performed by: SURGERY

## 2024-10-19 DIAGNOSIS — Z30.41 ENCOUNTER FOR SURVEILLANCE OF CONTRACEPTIVE PILLS: ICD-10-CM

## 2024-10-21 RX ORDER — NORGESTIMATE AND ETHINYL ESTRADIOL
1 KIT DAILY
Qty: 84 TABLET | Refills: 0 | Status: SHIPPED | OUTPATIENT
Start: 2024-10-21

## 2024-11-01 ENCOUNTER — OFFICE VISIT (OUTPATIENT)
Dept: ONCOLOGY | Facility: HOSPITAL | Age: 34
End: 2024-11-01
Payer: MEDICARE

## 2024-11-01 ENCOUNTER — HOSPITAL ENCOUNTER (OUTPATIENT)
Dept: ONCOLOGY | Facility: HOSPITAL | Age: 34
Discharge: HOME | End: 2024-11-01
Payer: MEDICARE

## 2024-11-01 VITALS
DIASTOLIC BLOOD PRESSURE: 59 MMHG | HEART RATE: 76 BPM | SYSTOLIC BLOOD PRESSURE: 113 MMHG | WEIGHT: 144.18 LBS | OXYGEN SATURATION: 100 % | BODY MASS INDEX: 33.37 KG/M2 | RESPIRATION RATE: 18 BRPM | TEMPERATURE: 98.3 F | HEIGHT: 55 IN

## 2024-11-01 DIAGNOSIS — Z45.2 ENCOUNTER FOR ADJUSTMENT OR MANAGEMENT OF VASCULAR ACCESS DEVICE: ICD-10-CM

## 2024-11-01 DIAGNOSIS — D50.9 IRON DEFICIENCY ANEMIA, UNSPECIFIED IRON DEFICIENCY ANEMIA TYPE: Primary | ICD-10-CM

## 2024-11-01 DIAGNOSIS — D83.9 CVID (COMMON VARIABLE IMMUNODEFICIENCY): ICD-10-CM

## 2024-11-01 DIAGNOSIS — D83.9 CVID (COMMON VARIABLE IMMUNODEFICIENCY): Primary | ICD-10-CM

## 2024-11-01 LAB
BASOPHILS # BLD AUTO: 0.01 10*3/MM3 (ref 0–0.2)
BASOPHILS NFR BLD AUTO: 0.1 % (ref 0–1.5)
DEPRECATED RDW RBC AUTO: 46.3 FL (ref 37–54)
EOSINOPHIL # BLD AUTO: 0.01 10*3/MM3 (ref 0–0.4)
EOSINOPHIL NFR BLD AUTO: 0.1 % (ref 0.3–6.2)
ERYTHROCYTE [DISTWIDTH] IN BLOOD BY AUTOMATED COUNT: 13.7 % (ref 12.3–15.4)
HCT VFR BLD AUTO: 35.3 % (ref 34–46.6)
HGB BLD-MCNC: 11.7 G/DL (ref 12–15.9)
IGA1 MFR SER: <50 MG/DL (ref 70–400)
IGG1 SER-MCNC: 509 MG/DL (ref 700–1600)
IGM SERPL-MCNC: 80 MG/DL (ref 40–230)
IMM GRANULOCYTES # BLD AUTO: 0.01 10*3/MM3 (ref 0–0.05)
IMM GRANULOCYTES NFR BLD AUTO: 0.1 % (ref 0–0.5)
LYMPHOCYTES # BLD AUTO: 2.37 10*3/MM3 (ref 0.7–3.1)
LYMPHOCYTES NFR BLD AUTO: 24.6 % (ref 19.6–45.3)
MCH RBC QN AUTO: 30.2 PG (ref 26.6–33)
MCHC RBC AUTO-ENTMCNC: 33.1 G/DL (ref 31.5–35.7)
MCV RBC AUTO: 91 FL (ref 79–97)
MONOCYTES # BLD AUTO: 0.67 10*3/MM3 (ref 0.1–0.9)
MONOCYTES NFR BLD AUTO: 7 % (ref 5–12)
NEUTROPHILS NFR BLD AUTO: 6.55 10*3/MM3 (ref 1.7–7)
NEUTROPHILS NFR BLD AUTO: 68.1 % (ref 42.7–76)
PLATELET # BLD AUTO: 256 10*3/MM3 (ref 140–450)
PMV BLD AUTO: 9.4 FL (ref 6–12)
RBC # BLD AUTO: 3.88 10*6/MM3 (ref 3.77–5.28)
WBC NRBC COR # BLD AUTO: 9.62 10*3/MM3 (ref 3.4–10.8)

## 2024-11-01 PROCEDURE — 85025 COMPLETE CBC W/AUTO DIFF WBC: CPT | Performed by: INTERNAL MEDICINE

## 2024-11-01 PROCEDURE — 3074F SYST BP LT 130 MM HG: CPT | Performed by: INTERNAL MEDICINE

## 2024-11-01 PROCEDURE — 99214 OFFICE O/P EST MOD 30 MIN: CPT | Performed by: INTERNAL MEDICINE

## 2024-11-01 PROCEDURE — 1126F AMNT PAIN NOTED NONE PRSNT: CPT | Performed by: INTERNAL MEDICINE

## 2024-11-01 PROCEDURE — 36591 DRAW BLOOD OFF VENOUS DEVICE: CPT

## 2024-11-01 PROCEDURE — 25010000002 HEPARIN LOCK FLUSH PER 10 UNITS: Performed by: INTERNAL MEDICINE

## 2024-11-01 PROCEDURE — 3078F DIAST BP <80 MM HG: CPT | Performed by: INTERNAL MEDICINE

## 2024-11-01 PROCEDURE — 82784 ASSAY IGA/IGD/IGG/IGM EACH: CPT | Performed by: INTERNAL MEDICINE

## 2024-11-01 RX ORDER — SODIUM CHLORIDE 0.9 % (FLUSH) 0.9 %
20 SYRINGE (ML) INJECTION AS NEEDED
Status: DISCONTINUED | OUTPATIENT
Start: 2024-11-01 | End: 2024-11-02 | Stop reason: HOSPADM

## 2024-11-01 RX ORDER — DIPHENHYDRAMINE HCL 25 MG
25 CAPSULE ORAL ONCE
OUTPATIENT
Start: 2024-11-05

## 2024-11-01 RX ORDER — SODIUM CHLORIDE 9 MG/ML
250 INJECTION, SOLUTION INTRAVENOUS ONCE
OUTPATIENT
Start: 2024-11-05

## 2024-11-01 RX ORDER — FAMOTIDINE 10 MG/ML
20 INJECTION, SOLUTION INTRAVENOUS AS NEEDED
OUTPATIENT
Start: 2024-11-05

## 2024-11-01 RX ORDER — HEPARIN SODIUM (PORCINE) LOCK FLUSH IV SOLN 100 UNIT/ML 100 UNIT/ML
500 SOLUTION INTRAVENOUS AS NEEDED
OUTPATIENT
Start: 2024-11-01

## 2024-11-01 RX ORDER — HEPARIN SODIUM (PORCINE) LOCK FLUSH IV SOLN 100 UNIT/ML 100 UNIT/ML
500 SOLUTION INTRAVENOUS AS NEEDED
Status: DISCONTINUED | OUTPATIENT
Start: 2024-11-01 | End: 2024-11-02 | Stop reason: HOSPADM

## 2024-11-01 RX ORDER — SODIUM CHLORIDE 0.9 % (FLUSH) 0.9 %
20 SYRINGE (ML) INJECTION AS NEEDED
OUTPATIENT
Start: 2024-11-01

## 2024-11-01 RX ORDER — FAMOTIDINE 10 MG/ML
20 INJECTION, SOLUTION INTRAVENOUS ONCE
OUTPATIENT
Start: 2024-11-05

## 2024-11-01 RX ORDER — MEPERIDINE HYDROCHLORIDE 25 MG/ML
25 INJECTION INTRAMUSCULAR; INTRAVENOUS; SUBCUTANEOUS
OUTPATIENT
Start: 2024-11-05

## 2024-11-01 RX ORDER — DIPHENHYDRAMINE HYDROCHLORIDE 50 MG/ML
50 INJECTION INTRAMUSCULAR; INTRAVENOUS AS NEEDED
OUTPATIENT
Start: 2024-11-05

## 2024-11-01 RX ADMIN — Medication 20 ML: at 09:53

## 2024-11-01 RX ADMIN — HEPARIN 500 UNITS: 100 SYRINGE at 09:53

## 2024-11-01 NOTE — PROGRESS NOTES
Chief Complaint/Reason for Referral:  FOLLOW UP 1    Arnie Rucker MD Kindervater, Kasey, APRN    Subjective    History of Present Illness  Ms. Francine Preciado presents with her mother for lab check for IVIG treatment.  She did not get this on Tuesday.  She has been tolerating these treatments well.  Has had port replaced and is functioning much better than the prior port.  No fevers chills.  Has been dealing with ear infection.  Has been using eardrops.  Infection going on for last 4 weeks.  Continues to take iron.  Tolerating well.  Bleeding not as bad currently with menstrual cycles.    Last IgG level of 459 on 9/23/24.    Repeat iron labs 7/2/24 with elevated ferritin of 166.9 and low iron sat of 8%.     Hemoglobin 11.7 today. WBC normal.       Oncology/Hematology History Overview Note   CVID:   -Diagnosed by Immunologist 8/20/12  -presented with frequent/severe infections             Review of Systems   Constitutional:  Positive for fatigue. Negative for appetite change, diaphoresis, fever, unexpected weight gain and unexpected weight loss.   HENT:  Negative for hearing loss, mouth sores, sore throat, swollen glands, trouble swallowing and voice change.    Eyes:  Negative for blurred vision.   Respiratory:  Negative for cough, shortness of breath and wheezing.    Cardiovascular:  Negative for chest pain and palpitations.   Gastrointestinal:  Negative for abdominal pain, blood in stool, constipation, diarrhea, nausea and vomiting.   Endocrine: Negative for cold intolerance and heat intolerance.   Genitourinary:  Negative for difficulty urinating, dysuria, frequency, hematuria and urinary incontinence.   Musculoskeletal:  Negative for arthralgias, back pain and myalgias.   Skin:  Negative for rash, skin lesions and wound.   Neurological:  Negative for dizziness, seizures, weakness, numbness and headache.   Hematological:  Does not bruise/bleed easily.   Psychiatric/Behavioral:  Negative for depressed mood. The  patient is not nervous/anxious.    All other systems reviewed and are negative.      Current Outpatient Medications on File Prior to Visit   Medication Sig Dispense Refill    atenolol (TENORMIN) 25 MG tablet TAKE 1 TABLET BY MOUTH DAILY 90 tablet 1    azithromycin (Zithromax Z-Jaswinder) 250 MG tablet Take 2 tablets by mouth on day 1, then 1 tablet daily on days 2-5 6 tablet 0    Cranberry 50 MG chewable tablet Chew 2 (Two) Times a Day.      Cyanocobalamin (VITAMIN B 12 PO) Take  by mouth.      EPINEPHrine (EPIPEN) 0.3 MG/0.3ML solution auto-injector injection 0.3 mL.      famotidine (PEPCID) 10 MG tablet Take 2 tablets by mouth 2 (Two) Times a Day As Needed.      fenofibrate 160 MG tablet Take 1 tablet by mouth Daily. (Patient taking differently: Take 1 tablet by mouth Every Night.) 90 tablet 1    ferrous gluconate (FERGON) 324 MG tablet Take 1 tablet by mouth 2 (Two) Times a Day. Gummies      fluticasone (FLONASE) 50 MCG/ACT nasal spray 2 sprays into the nostril(s) as directed by provider Daily. (Patient taking differently: Administer 2 sprays into the nostril(s) as directed by provider Daily As Needed.) 16 g 11    HYDROcodone-acetaminophen (NORCO) 5-325 MG per tablet Take 1 tablet by mouth Every 6 (Six) Hours As Needed (Pain). 7 tablet 0    levocetirizine (XYZAL) 5 MG tablet Take 1 tablet by mouth Every Evening. 90 tablet 1    levothyroxine (SYNTHROID, LEVOTHROID) 50 MCG tablet Take 1 tablet by mouth Daily. 90 tablet 1    lidocaine-prilocaine (EMLA) 2.5-2.5 % cream Apply 1 Application topically to the appropriate area as directed Take As Directed. Apply to port site 30 minutes prior to use 30 g 1    linaclotide (Linzess) 145 MCG capsule capsule Take 1 capsule by mouth Every Morning Before Breakfast. (Patient taking differently: Take 1 capsule by mouth Daily As Needed.) 90 capsule 1    montelukast (SINGULAIR) 10 MG tablet Take 1 tablet by mouth Every Night. 90 tablet 1    multivitamin with minerals tablet tablet Take 1  tablet by mouth Daily.      norgestimate-ethinyl estradiol (Tri-Lo-Rosy) 0.18/0.215/0.25 MG-25 MCG per tablet TAKE 1 TABLET BY MOUTH DAILY 84 tablet 0    ondansetron (Zofran) 4 MG tablet Take 1 tablet by mouth Every 8 (Eight) Hours As Needed for Nausea or Vomiting. 90 tablet 1    promethazine-dextromethorphan (PROMETHAZINE-DM) 6.25-15 MG/5ML syrup Take 5 mL by mouth 4 (Four) Times a Day As Needed for Cough. 240 mL 0    rosuvastatin (CRESTOR) 10 MG tablet TAKE 1 TABLET BY MOUTH DAILY (Patient taking differently: Take 1 tablet by mouth Every Night.) 90 tablet 1    Tobramycin-Dexamethasone (TobraDex ST) 0.3-0.05 % suspension 3-4 drops in left ear twice a day 5 mL 3    vitamin C (ASCORBIC ACID) 250 MG tablet Take 1 tablet by mouth Daily.       Current Facility-Administered Medications on File Prior to Visit   Medication Dose Route Frequency Provider Last Rate Last Admin    cyanocobalamin injection 1,000 mcg  1,000 mcg Intramuscular Q28 Days Arnie Rucker MD   1,000 mcg at 02/29/24 1414    heparin injection 500 Units  500 Units Intravenous PRN Kayden Leal MD   500 Units at 11/01/24 0953    sodium chloride 0.9 % flush 20 mL  20 mL Intravenous PRN Kayden Leal MD   20 mL at 11/01/24 0953       Allergies   Allergen Reactions    Cefuroxime Axetil Unknown - Low Severity    Neosporin [Bacitracin-Polymyxin B] Unknown - Low Severity     Past Medical History:   Diagnosis Date    Acid reflux     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 Leal    Disease of thyroid gland     H/O Pneumonia     Heart murmur     Hypertension     follows with PCP    Jackie make-up syndrome     Mitral valve prolapse     no current issues    Mixed conductive and sensorineural hearing loss of both ears     Otorrhea,  left     PONV (postoperative nausea and vomiting)     Recurrent otitis media     Skin disease     PSORIASIS/ECXEMA     Past Surgical History:   Procedure Laterality Date    APPENDECTOMY      EXTRACORPOREAL CIRCULATION      at birth    INNER EAR SURGERY      multi    MULTIPLE TOOTH EXTRACTIONS      PORTACATH PLACEMENT      port placement x2    PORTACATH PLACEMENT      9/20/2024    TOENAIL EXCISION      TUNNELED VENOUS PORT PLACEMENT      approx 2019    VENOUS ACCESS DEVICE (PORT) INSERTION Right 9/20/2024    Procedure: INSERTION VENOUS ACCESS DEVICE;  Surgeon: Candy Call MD;  Location: [facility];  Service: General;  Laterality: Right;    VENOUS ACCESS DEVICE (PORT) REMOVAL      VENOUS ACCESS DEVICE (PORT) REMOVAL Left 9/20/2024    Procedure: REMOVAL VENOUS ACCESS DEVICE;  Surgeon: Candy Call MD;  Location: [facility] OR [facility];  Service: General;  Laterality: Left;     Social History     Socioeconomic History    Marital status: Single   Tobacco Use    Smoking status: Never     Passive exposure: Yes    Smokeless tobacco: Never    Tobacco comments:     Daily exposure to 2nd hand smoke   Vaping Use    Vaping status: Never Used   Substance and Sexual Activity    Alcohol use: Not Currently     Comment: DENIES    Drug use: Never    Sexual activity: Never     Family History   Problem Relation Age of Onset    Leukemia Father     Heart disease Other      Immunization History   Administered Date(s) Administered    COVID-19 (PFIZER) Purple Cap Monovalent 04/04/2021, 04/25/2021    Flu Vaccine Quad PF >36MO 11/04/2013, 10/24/2014, 11/02/2015, 10/13/2016, 10/19/2017    FluMist 2-49yrs (Nasal) 10/13/2016, 10/14/2020    Fluzone (or Fluarix & Flulaval for VFC) >6mos 11/04/2013, 10/24/2014, 11/02/2015, 10/13/2016, 10/19/2017, 11/23/2021, 10/11/2022, 10/13/2023    Fluzone Quad >6mos (Multi-dose) 11/04/2013, 11/02/2015, 10/19/2017, 10/29/2019    Hep B, Adolescent or Pediatric 11/24/1998, 01/14/1999, 06/02/1999     "Influenza TIV (IM) 09/17/2009, 11/07/2018    Influenza, Unspecified 10/14/2020, 10/11/2022    MMR 08/19/1996    Pneumococcal Conjugate 20-Valent (PCV20) 09/14/2023    Pneumococcal Polysaccharide (PPSV23) 10/04/2012, 04/10/2014    influenza Split 10/02/2018       Tobacco Use: Medium Risk (11/1/2024)    Patient History     Smoking Tobacco Use: Never     Smokeless Tobacco Use: Never     Passive Exposure: Yes       Objective     Physical Exam  Ill appearing patient with vomit bag, sitting on table on RA  Anicteric sclerae, no rash on exposed skin  Respirations non-labored  Awake, alert, and oriented x 4. No gross neurologic deficit  Tearful from illness      Vitals:    11/01/24 0957   BP: 113/59   Pulse: 76   Resp: 18   Temp: 98.3 °F (36.8 °C)   TempSrc: Temporal   SpO2: 100%   Weight: 65.4 kg (144 lb 2.9 oz)   Height: 139.7 cm (55\")   PainSc: 0-No pain               Wt Readings from Last 3 Encounters:   09/26/24 64.9 kg (143 lb)   09/24/24 66.9 kg (147 lb 6.4 oz)   09/23/24 66.8 kg (147 lb 4.3 oz)                  ECOG: (0) Fully Active - Able to Carry On All Pre-disease Performance Without Restriction  Fall Risk Assessment was completed, and patient is at low risk for falls.  PHQ-9 Total Score:         The patient is  experiencing fatigue. Fatigue score: 3    PT/OT Functional Screening: PT fx screen : No needs identified  Speech Functional Screening: Speech fx screen : No needs identified  Rehab to be ordered: Rehab to be ordered : No needs identified        Result Review :  The following data was reviewed by: Kayden Leal MD on 11/01/24:  Lab Results   Component Value Date    HGB 10.7 (L) 09/23/2024    HCT 32.4 (L) 09/23/2024    MCV 91.3 09/23/2024     09/23/2024    WBC 12.25 (H) 09/23/2024    NEUTROABS 9.27 (H) 09/23/2024    LYMPHSABS 2.13 09/23/2024    MONOSABS 0.73 09/23/2024    EOSABS 0.08 09/23/2024    BASOSABS 0.02 09/23/2024     Lab Results   Component Value Date    GLUCOSE 82 09/20/2024    " "BUN 13 09/20/2024    CREATININE 1.00 09/20/2024     09/20/2024    K 4.2 09/20/2024     (H) 09/20/2024    CO2 18.3 (L) 09/20/2024    CALCIUM 9.3 09/20/2024    PROTEINTOT 7.2 09/20/2024    ALBUMIN 4.1 09/20/2024    BILITOT 0.5 09/20/2024    ALKPHOS 111 09/20/2024    AST 21 09/20/2024    ALT 17 09/20/2024     Lab Results   Component Value Date     08/12/2019    FERRITIN 166.90 (H) 07/02/2024    BPNKHVYT29 850 09/20/2024    FOLATE >20.00 09/20/2024     Lab Results   Component Value Date    IRON 46 07/02/2024    LABIRON 8 (L) 07/02/2024    TRANSFERRIN 369 (H) 07/02/2024    TIBC 550 (H) 07/02/2024     Lab Results   Component Value Date     08/12/2019    FERRITIN 166.90 (H) 07/02/2024    ANQSBNXR27 850 09/20/2024    FOLATE >20.00 09/20/2024     No results found for: \"PSA\", \"CEA\", \"AFP\", \"\", \"\"    Labs personally reviewed. IgG levels near 500. Ferritin elevated. Anemia.         Assessment and Plan:  Diagnoses and all orders for this visit:    1. Iron deficiency anemia, unspecified iron deficiency anemia type (Primary)  -     Ferritin; Future  -     Iron Profile; Future    2. CVID (common variable immunodeficiency)    Other orders  -     sodium chloride 0.9 % infusion 250 mL  -     famotidine (PEPCID) injection 20 mg  -     diphenhydrAMINE (BENADRYL) capsule 25 mg  -     immune globulin (human) (GAMUNEX-C) 10 g infusion 100 mL  -     hydrocortisone sodium succinate (Solu-CORTEF) injection 100 mg  -     diphenhydrAMINE (BENADRYL) injection 50 mg  -     famotidine (PEPCID) injection 20 mg  -     meperidine (DEMEROL) injection 25 mg        CVID  Receives IVIG treatment every 6 weeks. Due for treatment on 1/5/24. IgG levels are near 500, normally above.    Anemia  Continue oral iron for now. Anemia fluctuates and only mild. Repeat iron labs 7/2/24 with elevated ferritin but low iron sat. Repeat iron labs 6 weeks. Recommend to continue oral iron once daily.       I spent 20 minutes caring for " Francine on this date of service. This time includes time spent by me in the following activities:preparing for the visit, reviewing tests, obtaining and/or reviewing a separately obtained history, performing a medically appropriate examination and/or evaluation , counseling and educating the patient/family/caregiver, ordering medications, tests, or procedures, referring and communicating with other health care professionals , documenting information in the medical record, independently interpreting results and communicating that information with the patient/family/caregiver, and care coordination    Patient Follow Up: 6 weeks    Patient was given instructions and counseling regarding her condition or for health maintenance advice. Please see specific information pulled into the AVS if appropriate.

## 2024-11-05 ENCOUNTER — HOSPITAL ENCOUNTER (OUTPATIENT)
Dept: ONCOLOGY | Facility: HOSPITAL | Age: 34
Discharge: HOME | End: 2024-11-05
Admitting: INTERNAL MEDICINE
Payer: MEDICARE

## 2024-11-05 VITALS
SYSTOLIC BLOOD PRESSURE: 111 MMHG | HEART RATE: 77 BPM | RESPIRATION RATE: 16 BRPM | TEMPERATURE: 98.1 F | BODY MASS INDEX: 33.11 KG/M2 | WEIGHT: 143.08 LBS | HEIGHT: 55 IN | OXYGEN SATURATION: 100 % | DIASTOLIC BLOOD PRESSURE: 61 MMHG

## 2024-11-05 DIAGNOSIS — D83.9 CVID (COMMON VARIABLE IMMUNODEFICIENCY): Primary | ICD-10-CM

## 2024-11-05 DIAGNOSIS — Z45.2 ENCOUNTER FOR ADJUSTMENT OR MANAGEMENT OF VASCULAR ACCESS DEVICE: ICD-10-CM

## 2024-11-05 PROCEDURE — 96365 THER/PROPH/DIAG IV INF INIT: CPT

## 2024-11-05 PROCEDURE — 25010000002 HEPARIN LOCK FLUSH PER 10 UNITS: Performed by: INTERNAL MEDICINE

## 2024-11-05 PROCEDURE — 25010000002 IMMUNE GLOBULIN (HUMAN) 10 GM/100ML SOLUTION: Performed by: INTERNAL MEDICINE

## 2024-11-05 RX ORDER — SODIUM CHLORIDE 9 MG/ML
250 INJECTION, SOLUTION INTRAVENOUS ONCE
Status: COMPLETED | OUTPATIENT
Start: 2024-11-05 | End: 2024-11-05

## 2024-11-05 RX ORDER — HEPARIN SODIUM (PORCINE) LOCK FLUSH IV SOLN 100 UNIT/ML 100 UNIT/ML
500 SOLUTION INTRAVENOUS AS NEEDED
Status: DISCONTINUED | OUTPATIENT
Start: 2024-11-05 | End: 2024-11-06 | Stop reason: HOSPADM

## 2024-11-05 RX ORDER — HEPARIN SODIUM (PORCINE) LOCK FLUSH IV SOLN 100 UNIT/ML 100 UNIT/ML
500 SOLUTION INTRAVENOUS AS NEEDED
OUTPATIENT
Start: 2024-11-05

## 2024-11-05 RX ORDER — FAMOTIDINE 10 MG/ML
20 INJECTION, SOLUTION INTRAVENOUS ONCE
Status: DISCONTINUED | OUTPATIENT
Start: 2024-11-05 | End: 2024-11-06 | Stop reason: HOSPADM

## 2024-11-05 RX ORDER — DIPHENHYDRAMINE HCL 25 MG
25 CAPSULE ORAL ONCE
Status: DISCONTINUED | OUTPATIENT
Start: 2024-11-05 | End: 2024-11-06 | Stop reason: HOSPADM

## 2024-11-05 RX ORDER — SODIUM CHLORIDE 0.9 % (FLUSH) 0.9 %
20 SYRINGE (ML) INJECTION AS NEEDED
OUTPATIENT
Start: 2024-11-05

## 2024-11-05 RX ORDER — SODIUM CHLORIDE 0.9 % (FLUSH) 0.9 %
20 SYRINGE (ML) INJECTION AS NEEDED
Status: DISCONTINUED | OUTPATIENT
Start: 2024-11-05 | End: 2024-11-06 | Stop reason: HOSPADM

## 2024-11-05 RX ADMIN — HEPARIN 500 UNITS: 100 SYRINGE at 11:45

## 2024-11-05 RX ADMIN — Medication 20 ML: at 11:45

## 2024-11-05 RX ADMIN — SODIUM CHLORIDE 100 ML: 900 INJECTION, SOLUTION INTRAVENOUS at 09:50

## 2024-11-05 RX ADMIN — IMMUNE GLOBULIN (HUMAN) 10 G: 10 INJECTION INTRAVENOUS; SUBCUTANEOUS at 09:57

## 2024-11-11 ENCOUNTER — OFFICE VISIT (OUTPATIENT)
Dept: OTOLARYNGOLOGY | Facility: CLINIC | Age: 34
End: 2024-11-11
Payer: MEDICARE

## 2024-11-11 ENCOUNTER — CLINICAL SUPPORT (OUTPATIENT)
Dept: FAMILY MEDICINE CLINIC | Facility: CLINIC | Age: 34
End: 2024-11-11
Payer: MEDICARE

## 2024-11-11 VITALS
BODY MASS INDEX: 32.63 KG/M2 | OXYGEN SATURATION: 100 % | TEMPERATURE: 97.4 F | HEIGHT: 55 IN | WEIGHT: 141 LBS | HEART RATE: 88 BPM

## 2024-11-11 DIAGNOSIS — H90.6 MIXED HEARING LOSS, BILATERAL: ICD-10-CM

## 2024-11-11 DIAGNOSIS — H92.12 OTORRHEA OF LEFT EAR: ICD-10-CM

## 2024-11-11 DIAGNOSIS — H70.11 CHRONIC MASTOIDITIS OF RIGHT SIDE: Primary | ICD-10-CM

## 2024-11-11 DIAGNOSIS — H72.92 TYMPANIC MEMBRANE PERFORATION, LEFT: ICD-10-CM

## 2024-11-11 DIAGNOSIS — Z23 NEED FOR INFLUENZA VACCINATION: Primary | ICD-10-CM

## 2024-11-11 PROCEDURE — 90656 IIV3 VACC NO PRSV 0.5 ML IM: CPT

## 2024-11-11 PROCEDURE — 1159F MED LIST DOCD IN RCRD: CPT | Performed by: OTOLARYNGOLOGY

## 2024-11-11 PROCEDURE — G0008 ADMIN INFLUENZA VIRUS VAC: HCPCS

## 2024-11-11 PROCEDURE — 1160F RVW MEDS BY RX/DR IN RCRD: CPT | Performed by: OTOLARYNGOLOGY

## 2024-11-11 PROCEDURE — 99212 OFFICE O/P EST SF 10 MIN: CPT | Performed by: OTOLARYNGOLOGY

## 2024-11-11 PROCEDURE — 69220 CLEAN OUT MASTOID CAVITY: CPT | Performed by: OTOLARYNGOLOGY

## 2024-11-11 RX ORDER — OFLOXACIN 3 MG/ML
4 SOLUTION/ DROPS OPHTHALMIC 2 TIMES DAILY
Qty: 10 ML | Refills: 2 | Status: SHIPPED | OUTPATIENT
Start: 2024-11-11 | End: 2024-11-18

## 2024-11-11 NOTE — PROGRESS NOTES
Patient Name: Francine Preciado   Visit Date: 11/11/2024   Patient ID: [id number]  Provider: Jose Elias Jorgensen MD    Sex: female  Location: [facility]   YOB: 1990  Location Address: [address] [place] [address], [address],?[place]?[address]    Primary Care Provider Aggie Chance APRN  Location Phone: [phone]    Referring Provider: No ref. provider found        Chief Complaint  6 month follow up    History of Present Illness  Francine Preciado is a 34 y.o. 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 She underwent an audiogram on 5/11/17 which revealed bilateral moderate to profound mixed hearing loss. Speech discrimination was 100% bilaterally at 90 dB. Tympanograms were type B bilaterally.  She wears bilateral behind-the-ear hearing aids.    She returns today for routine follow-up.  She was last seen on 5/8/2024 at which time her right mastoid cavity was debrided and the left tympanic membrane perforation appeared dry and stable.  Her mother tells me that she has been experiencing some drainage recently for which they finished up a course of antibiotic drops a week or 2 ago.  She denies any change in her hearing.  She continues to wear her hearing aids.  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 Leal    Disease of thyroid gland     H/O Pneumonia     Heart murmur     Hypertension     follows with PCP    Kabuki make-up syndrome     Mitral valve prolapse     no current issues    Mixed conductive and sensorineural hearing  loss of both ears     Otorrhea, left     PONV (postoperative nausea and vomiting)     Recurrent otitis media     Skin disease     PSORIASIS/ECXEMA       Past Surgical History:   Procedure Laterality Date    APPENDECTOMY      EXTRACORPOREAL CIRCULATION      at birth    INNER EAR SURGERY      multi    MULTIPLE TOOTH EXTRACTIONS      PORTACATH PLACEMENT      port placement x2    PORTACATH PLACEMENT      9/20/2024    TOENAIL EXCISION      TUNNELED VENOUS PORT PLACEMENT      approx 2019    VENOUS ACCESS DEVICE (PORT) INSERTION Right 9/20/2024    Procedure: INSERTION VENOUS ACCESS DEVICE;  Surgeon: Candy Call MD;  Location: [facility];  Service: General;  Laterality: Right;    VENOUS ACCESS DEVICE (PORT) REMOVAL      VENOUS ACCESS DEVICE (PORT) REMOVAL Left 9/20/2024    Procedure: REMOVAL VENOUS ACCESS DEVICE;  Surgeon: Candy Call MD;  Location: [facility];  Service: General;  Laterality: Left;         Current Outpatient Medications:     atenolol (TENORMIN) 25 MG tablet, TAKE 1 TABLET BY MOUTH DAILY, Disp: 90 tablet, Rfl: 1    Cranberry 50 MG chewable tablet, Chew 2 (Two) Times a Day., Disp: , Rfl:     Cyanocobalamin (VITAMIN B 12 PO), Take  by mouth., Disp: , Rfl:     famotidine (PEPCID) 10 MG tablet, Take 2 tablets by mouth 2 (Two) Times a Day As Needed., Disp: , Rfl:     fenofibrate 160 MG tablet, Take 1 tablet by mouth Daily. (Patient taking differently: Take 1 tablet by mouth Every Night.), Disp: 90 tablet, Rfl: 1    ferrous gluconate (FERGON) 324 MG tablet, Take 1 tablet by mouth 2 (Two) Times a Day. Gummies, Disp: , Rfl:     fluticasone (FLONASE) 50 MCG/ACT nasal spray, 2 sprays into the nostril(s) as directed by provider Daily. (Patient taking differently: Administer 2 sprays into the nostril(s) as directed by provider Daily As Needed.), Disp: 16 g, Rfl: 11    levocetirizine (XYZAL) 5 MG tablet, Take 1 tablet by mouth Every Evening., Disp: 90 tablet, Rfl: 1    levothyroxine (SYNTHROID,  LEVOTHROID) 50 MCG tablet, Take 1 tablet by mouth Daily., Disp: 90 tablet, Rfl: 1    montelukast (SINGULAIR) 10 MG tablet, Take 1 tablet by mouth Every Night., Disp: 90 tablet, Rfl: 1    multivitamin with minerals tablet tablet, Take 1 tablet by mouth Daily., Disp: , Rfl:     norgestimate-ethinyl estradiol (Tri-Lo-Rosy) 0.18/0.215/0.25 MG-25 MCG per tablet, TAKE 1 TABLET BY MOUTH DAILY, Disp: 84 tablet, Rfl: 0    rosuvastatin (CRESTOR) 10 MG tablet, TAKE 1 TABLET BY MOUTH DAILY (Patient taking differently: Take 1 tablet by mouth Every Night.), Disp: 90 tablet, Rfl: 1    Tobramycin-Dexamethasone (TobraDex ST) 0.3-0.05 % suspension, 3-4 drops in left ear twice a day, Disp: 5 mL, Rfl: 3    vitamin C (ASCORBIC ACID) 250 MG tablet, Take 1 tablet by mouth Daily., Disp: , Rfl:     azithromycin (Zithromax Z-Jaswinder) 250 MG tablet, Take 2 tablets by mouth on day 1, then 1 tablet daily on days 2-5, Disp: 6 tablet, Rfl: 0    EPINEPHrine (EPIPEN) 0.3 MG/0.3ML solution auto-injector injection, 0.3 mL., Disp: , Rfl:     HYDROcodone-acetaminophen (NORCO) 5-325 MG per tablet, Take 1 tablet by mouth Every 6 (Six) Hours As Needed (Pain)., Disp: 7 tablet, Rfl: 0    lidocaine-prilocaine (EMLA) 2.5-2.5 % cream, Apply 1 Application topically to the appropriate area as directed Take As Directed. Apply to port site 30 minutes prior to use, Disp: 30 g, Rfl: 1    linaclotide (Linzess) 145 MCG capsule capsule, Take 1 capsule by mouth Every Morning Before Breakfast. (Patient taking differently: Take 1 capsule by mouth Daily As Needed.), Disp: 90 capsule, Rfl: 1    ofloxacin (Ocuflox) 0.3 % ophthalmic solution, Administer 4 drops into ear(s) as directed by provider 2 (Two) Times a Day for 7 days., Disp: 10 mL, Rfl: 2    ondansetron (Zofran) 4 MG tablet, Take 1 tablet by mouth Every 8 (Eight) Hours As Needed for Nausea or Vomiting., Disp: 90 tablet, Rfl: 1    promethazine-dextromethorphan (PROMETHAZINE-DM) 6.25-15 MG/5ML syrup, Take 5 mL by mouth  "4 (Four) Times a Day As Needed for Cough., Disp: 240 mL, Rfl: 0    Current Facility-Administered Medications:     cyanocobalamin injection 1,000 mcg, 1,000 mcg, Intramuscular, Q28 Days, Arnie Rucker MD, 1,000 mcg at 02/29/24 1414     Allergies   Allergen Reactions    Cefuroxime Axetil Unknown - Low Severity    Neosporin [Bacitracin-Polymyxin B] Unknown - Low Severity       Social History     Tobacco Use    Smoking status: Never     Passive exposure: Yes    Smokeless tobacco: Never    Tobacco comments:     Daily exposure to 2nd hand smoke   Vaping Use    Vaping status: Never Used   Substance Use Topics    Alcohol use: Not Currently     Comment: DENIES    Drug use: Never        Objective     Vital Signs:   Pulse 88   Temp 97.4 °F (36.3 °C)   Ht 139.7 cm (55\")   Wt 64 kg (141 lb)   SpO2 100%   BMI 32.77 kg/m²       Physical Exam    General: Well developed, well nourished patient of stated age in no acute distress. Voice is strong and clear.   Head: Normocephalic and atraumatic.  Face: No lesions.  Bilateral parotid and submandibular glands are unremarkable.  Stensen's and Warthin's ducts are productive of clear saliva bilaterally.  House-Brackmann I/VI     bilaterally.   muscles and temporomandibular joint nontender to palpation.  No TMJ crepitus.  Eyes: PERRLA, sclerae anicteric, no conjunctival injection. Extra ocular movements are intact and full. No nystagmus.   Ears: Auricles are normal in appearance.  Left external auditory canal with mild cerumen and desiccated purulence which was removed using the operating microscope and a #5 suction.  Left tympanic membrane with perforation that is stable and dry.  Right mastoid cavity is dry with moderate buildup of squamous and desiccated purulent debris which was removed using the operating microscope and a #5 suction revealing healthy appearing skin.  Hearing normal to conversational voice.   Nose: External nose is normal in appearance. Bilateral nares " are patent with normal appearing mucosa. Septum midline. Turbinates are unremarkable. No lesions.   Oral Cavity: Lips are normal in appearance. Oral mucosa is unremarkable. Gingiva is unremarkable.  Partial dentition. Tongue is unremarkable with good movement. Hard palate is unremarkable.   Oropharynx: Soft palate is unremarkable with full movement. Uvula is unremarkable. Bilateral tonsils are unremarkable. Posterior oropharynx is unremarkable.    Larynx and hypopharynx: Deferred secondary to gag reflex.  Neck: Supple.  No mass.  Nontender to palpation.  Trachea midline. Thyroid normal size and without nodules to palpation.   Lymphatic: No lymphadenopathy upon palpation.   Psychiatric: Appropriate affect, cooperative   Neurologic: Oriented x 3, strength symmetric in all extremities, Cranial Nerves II-XII are grossly intact to confrontation   Skin: Warm and dry. No rashes.    Procedures           Result Review :               Assessment and Plan    Diagnoses and all orders for this visit:    1. Chronic mastoiditis of right side (Primary)  -     ofloxacin (Ocuflox) 0.3 % ophthalmic solution; Administer 4 drops into ear(s) as directed by provider 2 (Two) Times a Day for 7 days.  Dispense: 10 mL; Refill: 2    2. Tympanic membrane perforation, left    3. Otorrhea of left ear  -     ofloxacin (Ocuflox) 0.3 % ophthalmic solution; Administer 4 drops into ear(s) as directed by provider 2 (Two) Times a Day for 7 days.  Dispense: 10 mL; Refill: 2    4. Mixed hearing loss, bilateral      Impressions and findings were discussed.  Currently, she returns today for follow-up of her ears.  Examination today reveals moderate buildup of squamous debris and thick desiccated purulence in the right mastoid cavity which was removed using the operating microscope and a #5 suction.  Her left tympanic membranes perforation is stable and dry.  A small amount of cerumen and desiccated purulence was removed from the left external auditory  ita.  She was given ample time to ask questions, all of which were answered to her satisfaction.  She will follow-up in 6 months or sooner if needed.  Follow Up   Return in about 6 months (around 5/11/2025).  Patient was given instructions and counseling regarding her condition or for health maintenance advice. Please see specific information pulled into the AVS if appropriate.

## 2024-12-13 ENCOUNTER — OFFICE VISIT (OUTPATIENT)
Dept: ONCOLOGY | Facility: HOSPITAL | Age: 34
End: 2024-12-13
Payer: MEDICARE

## 2024-12-13 ENCOUNTER — HOSPITAL ENCOUNTER (OUTPATIENT)
Dept: ONCOLOGY | Facility: HOSPITAL | Age: 34
Discharge: HOME | End: 2024-12-13
Payer: MEDICARE

## 2024-12-13 VITALS
HEART RATE: 65 BPM | TEMPERATURE: 97.4 F | WEIGHT: 145.5 LBS | DIASTOLIC BLOOD PRESSURE: 65 MMHG | SYSTOLIC BLOOD PRESSURE: 115 MMHG | BODY MASS INDEX: 33.67 KG/M2 | RESPIRATION RATE: 18 BRPM | OXYGEN SATURATION: 100 % | HEIGHT: 55 IN

## 2024-12-13 DIAGNOSIS — D50.9 IRON DEFICIENCY ANEMIA, UNSPECIFIED IRON DEFICIENCY ANEMIA TYPE: ICD-10-CM

## 2024-12-13 DIAGNOSIS — D83.9 CVID (COMMON VARIABLE IMMUNODEFICIENCY): Primary | ICD-10-CM

## 2024-12-13 DIAGNOSIS — Z45.2 ENCOUNTER FOR ADJUSTMENT OR MANAGEMENT OF VASCULAR ACCESS DEVICE: ICD-10-CM

## 2024-12-13 LAB
BASOPHILS # BLD AUTO: 0.03 10*3/MM3 (ref 0–0.2)
BASOPHILS NFR BLD AUTO: 0.3 % (ref 0–1.5)
DEPRECATED RDW RBC AUTO: 46 FL (ref 37–54)
EOSINOPHIL # BLD AUTO: 0.03 10*3/MM3 (ref 0–0.4)
EOSINOPHIL NFR BLD AUTO: 0.3 % (ref 0.3–6.2)
ERYTHROCYTE [DISTWIDTH] IN BLOOD BY AUTOMATED COUNT: 14.1 % (ref 12.3–15.4)
FERRITIN SERPL-MCNC: 164.1 NG/ML (ref 13–150)
HCT VFR BLD AUTO: 33.3 % (ref 34–46.6)
HGB BLD-MCNC: 10.8 G/DL (ref 12–15.9)
IGA1 MFR SER: <50 MG/DL (ref 70–400)
IGG1 SER-MCNC: 518 MG/DL (ref 700–1600)
IGM SERPL-MCNC: 78 MG/DL (ref 40–230)
IMM GRANULOCYTES # BLD AUTO: 0.02 10*3/MM3 (ref 0–0.05)
IMM GRANULOCYTES NFR BLD AUTO: 0.2 % (ref 0–0.5)
IRON 24H UR-MRATE: 77 MCG/DL (ref 37–145)
IRON SATN MFR SERPL: 14 % (ref 20–50)
LYMPHOCYTES # BLD AUTO: 2.11 10*3/MM3 (ref 0.7–3.1)
LYMPHOCYTES NFR BLD AUTO: 21.9 % (ref 19.6–45.3)
MCH RBC QN AUTO: 29.3 PG (ref 26.6–33)
MCHC RBC AUTO-ENTMCNC: 32.4 G/DL (ref 31.5–35.7)
MCV RBC AUTO: 90.2 FL (ref 79–97)
MONOCYTES # BLD AUTO: 0.64 10*3/MM3 (ref 0.1–0.9)
MONOCYTES NFR BLD AUTO: 6.6 % (ref 5–12)
NEUTROPHILS NFR BLD AUTO: 6.82 10*3/MM3 (ref 1.7–7)
NEUTROPHILS NFR BLD AUTO: 70.7 % (ref 42.7–76)
NRBC BLD AUTO-RTO: 0.2 /100 WBC (ref 0–0.2)
PLATELET # BLD AUTO: 293 10*3/MM3 (ref 140–450)
PMV BLD AUTO: 9.1 FL (ref 6–12)
RBC # BLD AUTO: 3.69 10*6/MM3 (ref 3.77–5.28)
TIBC SERPL-MCNC: 560 MCG/DL (ref 298–536)
TRANSFERRIN SERPL-MCNC: 376 MG/DL (ref 200–360)
WBC NRBC COR # BLD AUTO: 9.65 10*3/MM3 (ref 3.4–10.8)

## 2024-12-13 PROCEDURE — 83540 ASSAY OF IRON: CPT | Performed by: INTERNAL MEDICINE

## 2024-12-13 PROCEDURE — 84466 ASSAY OF TRANSFERRIN: CPT | Performed by: INTERNAL MEDICINE

## 2024-12-13 PROCEDURE — 82728 ASSAY OF FERRITIN: CPT | Performed by: INTERNAL MEDICINE

## 2024-12-13 PROCEDURE — 25010000002 HEPARIN LOCK FLUSH PER 10 UNITS: Performed by: INTERNAL MEDICINE

## 2024-12-13 PROCEDURE — 82784 ASSAY IGA/IGD/IGG/IGM EACH: CPT | Performed by: INTERNAL MEDICINE

## 2024-12-13 PROCEDURE — 36591 DRAW BLOOD OFF VENOUS DEVICE: CPT

## 2024-12-13 PROCEDURE — 85025 COMPLETE CBC W/AUTO DIFF WBC: CPT | Performed by: INTERNAL MEDICINE

## 2024-12-13 RX ORDER — SODIUM CHLORIDE 0.9 % (FLUSH) 0.9 %
20 SYRINGE (ML) INJECTION AS NEEDED
OUTPATIENT
Start: 2024-12-13

## 2024-12-13 RX ORDER — HEPARIN SODIUM (PORCINE) LOCK FLUSH IV SOLN 100 UNIT/ML 100 UNIT/ML
500 SOLUTION INTRAVENOUS AS NEEDED
Status: DISCONTINUED | OUTPATIENT
Start: 2024-12-13 | End: 2024-12-14 | Stop reason: HOSPADM

## 2024-12-13 RX ORDER — HEPARIN SODIUM (PORCINE) LOCK FLUSH IV SOLN 100 UNIT/ML 100 UNIT/ML
500 SOLUTION INTRAVENOUS AS NEEDED
OUTPATIENT
Start: 2024-12-17

## 2024-12-13 RX ORDER — SODIUM CHLORIDE 0.9 % (FLUSH) 0.9 %
20 SYRINGE (ML) INJECTION AS NEEDED
Status: DISCONTINUED | OUTPATIENT
Start: 2024-12-13 | End: 2024-12-14 | Stop reason: HOSPADM

## 2024-12-13 RX ADMIN — Medication 20 ML: at 12:58

## 2024-12-13 RX ADMIN — HEPARIN 500 UNITS: 100 SYRINGE at 12:58

## 2024-12-13 NOTE — PROGRESS NOTES
Chief Complaint/Reason for Referral:  FOLLOW UP 1     Arnie Rucker MD Kindervater, Kasey, APRN    Subjective    History of Present Illness  Ms. Francine Preciado presents with her mother for lab check for IVIG treatment.  She will get this on Tuesday.  She has been tolerating these treatments well.  Has had port replaced and is functioning much better than the prior port.  No fevers chills.  Ear infection is better.  She is taking oral iron from over-the-counter.  She is taking 2 of these a day.  She reports not tolerating prescription dose as it made her sick.  Bowel issues stable.  No fevers or chills.  No new cough.  No new diarrhea.      Last IgG level of 509 on 11/1/24.    Repeat iron labs 7/2/24 with elevated ferritin of 166.9 and low iron sat of 8%.     Hemoglobin 10.8 today. WBC normal at 9.65, plts 293K.        Oncology/Hematology History Overview Note   CVID:   -Diagnosed by Immunologist 8/20/12  -presented with frequent/severe infections             Review of Systems   Constitutional:  Positive for fatigue. Negative for appetite change, diaphoresis, fever, unexpected weight gain and unexpected weight loss.   HENT:  Negative for hearing loss, mouth sores, sore throat, swollen glands, trouble swallowing and voice change.    Eyes:  Negative for blurred vision.   Respiratory:  Negative for cough, shortness of breath and wheezing.    Cardiovascular:  Negative for chest pain and palpitations.   Gastrointestinal:  Negative for abdominal pain, blood in stool, constipation, diarrhea, nausea and vomiting.   Endocrine: Negative for cold intolerance and heat intolerance.   Genitourinary:  Negative for difficulty urinating, dysuria, frequency, hematuria and urinary incontinence.   Musculoskeletal:  Negative for arthralgias, back pain and myalgias.   Skin:  Negative for rash, skin lesions and wound.   Neurological:  Negative for dizziness, seizures, weakness, numbness and headache.   Hematological:  Does not bruise/bleed  easily.   Psychiatric/Behavioral:  Negative for depressed mood. The patient is not nervous/anxious.    All other systems reviewed and are negative.      Current Outpatient Medications on File Prior to Visit   Medication Sig Dispense Refill    atenolol (TENORMIN) 25 MG tablet TAKE 1 TABLET BY MOUTH DAILY 90 tablet 1    azithromycin (Zithromax Z-Jaswinder) 250 MG tablet Take 2 tablets by mouth on day 1, then 1 tablet daily on days 2-5 6 tablet 0    Cranberry 50 MG chewable tablet Chew 2 (Two) Times a Day.      Cyanocobalamin (VITAMIN B 12 PO) Take  by mouth.      EPINEPHrine (EPIPEN) 0.3 MG/0.3ML solution auto-injector injection 0.3 mL.      famotidine (PEPCID) 10 MG tablet Take 2 tablets by mouth 2 (Two) Times a Day As Needed.      fenofibrate 160 MG tablet Take 1 tablet by mouth Daily. (Patient taking differently: Take 1 tablet by mouth Every Night.) 90 tablet 1    ferrous gluconate (FERGON) 324 MG tablet Take 1 tablet by mouth 2 (Two) Times a Day. Gummies      fluticasone (FLONASE) 50 MCG/ACT nasal spray 2 sprays into the nostril(s) as directed by provider Daily. (Patient taking differently: Administer 2 sprays into the nostril(s) as directed by provider Daily As Needed.) 16 g 11    HYDROcodone-acetaminophen (NORCO) 5-325 MG per tablet Take 1 tablet by mouth Every 6 (Six) Hours As Needed (Pain). 7 tablet 0    levocetirizine (XYZAL) 5 MG tablet Take 1 tablet by mouth Every Evening. 90 tablet 1    levothyroxine (SYNTHROID, LEVOTHROID) 50 MCG tablet Take 1 tablet by mouth Daily. 90 tablet 1    lidocaine-prilocaine (EMLA) 2.5-2.5 % cream Apply 1 Application topically to the appropriate area as directed Take As Directed. Apply to port site 30 minutes prior to use 30 g 1    linaclotide (Linzess) 145 MCG capsule capsule Take 1 capsule by mouth Every Morning Before Breakfast. (Patient taking differently: Take 1 capsule by mouth Daily As Needed.) 90 capsule 1    montelukast (SINGULAIR) 10 MG tablet Take 1 tablet by mouth Every  Night. 90 tablet 1    multivitamin with minerals tablet tablet Take 1 tablet by mouth Daily.      norgestimate-ethinyl estradiol (Tri-Lo-Rosy) 0.18/0.215/0.25 MG-25 MCG per tablet TAKE 1 TABLET BY MOUTH DAILY 84 tablet 0    ondansetron (Zofran) 4 MG tablet Take 1 tablet by mouth Every 8 (Eight) Hours As Needed for Nausea or Vomiting. 90 tablet 1    promethazine-dextromethorphan (PROMETHAZINE-DM) 6.25-15 MG/5ML syrup Take 5 mL by mouth 4 (Four) Times a Day As Needed for Cough. 240 mL 0    rosuvastatin (CRESTOR) 10 MG tablet TAKE 1 TABLET BY MOUTH DAILY (Patient taking differently: Take 1 tablet by mouth Every Night.) 90 tablet 1    Tobramycin-Dexamethasone (TobraDex ST) 0.3-0.05 % suspension 3-4 drops in left ear twice a day 5 mL 3    vitamin C (ASCORBIC ACID) 250 MG tablet Take 1 tablet by mouth Daily.       Current Facility-Administered Medications on File Prior to Visit   Medication Dose Route Frequency Provider Last Rate Last Admin    cyanocobalamin injection 1,000 mcg  1,000 mcg Intramuscular Q28 Days Arnie Rucker MD   1,000 mcg at 02/29/24 1414    heparin injection 500 Units  500 Units Intravenous PRN Kayden Leal MD   500 Units at 12/13/24 1258    sodium chloride 0.9 % flush 20 mL  20 mL Intravenous PRN Kayden Leal MD   20 mL at 12/13/24 1258       Allergies   Allergen Reactions    Cefuroxime Axetil Unknown - Low Severity    Neosporin [Bacitracin-Polymyxin B] Unknown - Low Severity     Past Medical History:   Diagnosis Date    Acid reflux     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 Leal    Disease of thyroid gland     H/O Pneumonia     Heart murmur     Hypertension     follows with PCP    Jackie make-up syndrome     Mitral valve prolapse     no current issues    Mixed  conductive and sensorineural hearing loss of both ears     Otorrhea, left     PONV (postoperative nausea and vomiting)     Recurrent otitis media     Skin disease     PSORIASIS/ECXEMA     Past Surgical History:   Procedure Laterality Date    APPENDECTOMY      EXTRACORPOREAL CIRCULATION      at birth    INNER EAR SURGERY      multi    MULTIPLE TOOTH EXTRACTIONS      PORTACATH PLACEMENT      port placement x2    PORTACATH PLACEMENT      9/20/2024    TOENAIL EXCISION      TUNNELED VENOUS PORT PLACEMENT      approx 2019    VENOUS ACCESS DEVICE (PORT) INSERTION Right 9/20/2024    Procedure: INSERTION VENOUS ACCESS DEVICE;  Surgeon: Candy Call MD;  Location: [facility];  Service: General;  Laterality: Right;    VENOUS ACCESS DEVICE (PORT) REMOVAL      VENOUS ACCESS DEVICE (PORT) REMOVAL Left 9/20/2024    Procedure: REMOVAL VENOUS ACCESS DEVICE;  Surgeon: Candy Call MD;  Location: [facility];  Service: General;  Laterality: Left;     Social History     Socioeconomic History    Marital status: Single   Tobacco Use    Smoking status: Never     Passive exposure: Yes    Smokeless tobacco: Never    Tobacco comments:     Daily exposure to 2nd hand smoke   Vaping Use    Vaping status: Never Used   Substance and Sexual Activity    Alcohol use: Not Currently     Comment: DENIES    Drug use: Never    Sexual activity: Never     Family History   Problem Relation Age of Onset    Leukemia Father     Heart disease Other      Immunization History   Administered Date(s) Administered    COVID-19 (PFIZER) Purple Cap Monovalent 04/04/2021, 04/25/2021    Flu Vaccine Quad PF >36MO 11/04/2013, 10/24/2014, 11/02/2015, 10/13/2016, 10/19/2017    FluMist 2-49yrs (Nasal) 10/13/2016, 10/14/2020    Fluzone  >6mos 11/11/2024    Fluzone (or Fluarix & Flulaval for VFC) >6mos 11/04/2013, 10/24/2014, 11/02/2015, 10/13/2016, 10/19/2017, 11/23/2021, 10/11/2022, 10/13/2023    Fluzone Quad >6mos (Multi-dose) 11/04/2013, 11/02/2015,  "10/19/2017, 10/29/2019    Hep B, Adolescent or Pediatric 11/24/1998, 01/14/1999, 06/02/1999    Influenza TIV (IM) 09/17/2009, 11/07/2018    Influenza, Unspecified 10/14/2020, 10/11/2022    MMR 08/19/1996    Pneumococcal Conjugate 20-Valent (PCV20) 09/14/2023    Pneumococcal Polysaccharide (PPSV23) 10/04/2012, 04/10/2014    influenza Split 10/02/2018       Tobacco Use: Medium Risk (12/13/2024)    Patient History     Smoking Tobacco Use: Never     Smokeless Tobacco Use: Never     Passive Exposure: Yes       Objective     Physical Exam  Well appearing patient  sitting on table on RA in NAD  Anicteric sclerae, no rash on exposed skin  Respirations non-labored  Awake, alert, and oriented x 4. No gross neurologic deficit  Appropriate mood and affect      Vitals:    12/13/24 1310   BP: 115/65   Pulse: 65   Resp: 18   Temp: 97.4 °F (36.3 °C)   TempSrc: Temporal   SpO2: 100%   Weight: 66 kg (145 lb 8.1 oz)   Height: 139.7 cm (55\")   PainSc: 0-No pain                 Wt Readings from Last 3 Encounters:   11/11/24 64 kg (141 lb)   11/05/24 64.9 kg (143 lb 1.3 oz)   11/01/24 65.4 kg (144 lb 2.9 oz)                  ECOG: (0) Fully Active - Able to Carry On All Pre-disease Performance Without Restriction  Fall Risk Assessment was completed, and patient is at low risk for falls.  PHQ-9 Total Score:         The patient is  experiencing fatigue. Fatigue score: 3    PT/OT Functional Screening: PT fx screen : No needs identified  Speech Functional Screening: Speech fx screen : No needs identified  Rehab to be ordered: Rehab to be ordered : No needs identified        Result Review :  The following data was reviewed by: Kayden Leal MD on 12/13/24:  Lab Results   Component Value Date    HGB 10.8 (L) 12/13/2024    HCT 33.3 (L) 12/13/2024    MCV 90.2 12/13/2024     12/13/2024    WBC 9.65 12/13/2024    NEUTROABS 6.82 12/13/2024    LYMPHSABS 2.11 12/13/2024    MONOSABS 0.64 12/13/2024    EOSABS 0.03 12/13/2024    BASOSABS " "0.03 12/13/2024     Lab Results   Component Value Date    GLUCOSE 82 09/20/2024    BUN 13 09/20/2024    CREATININE 1.00 09/20/2024     09/20/2024    K 4.2 09/20/2024     (H) 09/20/2024    CO2 18.3 (L) 09/20/2024    CALCIUM 9.3 09/20/2024    PROTEINTOT 7.2 09/20/2024    ALBUMIN 4.1 09/20/2024    BILITOT 0.5 09/20/2024    ALKPHOS 111 09/20/2024    AST 21 09/20/2024    ALT 17 09/20/2024     Lab Results   Component Value Date     08/12/2019    Ferritin 166.90 (H) 07/02/2024    Folate >20.00 09/20/2024     Lab Results   Component Value Date    IRON 46 07/02/2024    LABIRON 8 (L) 07/02/2024    TRANSFERRIN 369 (H) 07/02/2024    TIBC 550 (H) 07/02/2024     Lab Results   Component Value Date     08/12/2019    FERRITIN 166.90 (H) 07/02/2024    [id number] 850 09/20/2024    FOLATE >20.00 09/20/2024     No results found for: \"PSA\", \"CEA\", \"AFP\", \"\", \"\"    Labs personally reviewed. IgG levels above 500.  Anemia. Ferritin elevated.          Assessment and Plan:  Diagnoses and all orders for this visit:    1. CVID (common variable immunodeficiency) (Primary)    2. Iron deficiency anemia, unspecified iron deficiency anemia type        CVID  Receives IVIG treatment every 6 weeks. Due for treatment on 12/17/24. IgG levels remain near 500, normally above.    Anemia  Anemia fluctuates and at mild level. Repeat iron labs 7/2/24 with elevated ferritin but low iron sat. Repeat iron labs today 12/13/24 are stable with increased ferritin and low iron sat. She can continue oral iron once daily - taking over the counter currently as prescription dose intolerable.       I spent 20 minutes caring for Francine on this date of service. This time includes time spent by me in the following activities:preparing for the visit, reviewing tests, obtaining and/or reviewing a separately obtained history, performing a medically appropriate examination and/or evaluation , counseling and educating the patient/family/caregiver, " ordering medications, tests, or procedures, referring and communicating with other health care professionals , documenting information in the medical record, independently interpreting results and communicating that information with the patient/family/caregiver, and care coordination    Patient Follow Up: 6 weeks    Patient was given instructions and counseling regarding her condition or for health maintenance advice. Please see specific information pulled into the AVS if appropriate.

## 2024-12-16 RX ORDER — FAMOTIDINE 10 MG/ML
20 INJECTION, SOLUTION INTRAVENOUS ONCE
Status: CANCELLED | OUTPATIENT
Start: 2024-12-17

## 2024-12-16 RX ORDER — FAMOTIDINE 10 MG/ML
20 INJECTION, SOLUTION INTRAVENOUS AS NEEDED
Status: CANCELLED | OUTPATIENT
Start: 2024-12-17

## 2024-12-16 RX ORDER — DIPHENHYDRAMINE HYDROCHLORIDE 50 MG/ML
50 INJECTION INTRAMUSCULAR; INTRAVENOUS AS NEEDED
Status: CANCELLED | OUTPATIENT
Start: 2024-12-17

## 2024-12-16 RX ORDER — MEPERIDINE HYDROCHLORIDE 25 MG/ML
25 INJECTION INTRAMUSCULAR; INTRAVENOUS; SUBCUTANEOUS
Status: CANCELLED | OUTPATIENT
Start: 2024-12-17

## 2024-12-16 RX ORDER — DIPHENHYDRAMINE HCL 25 MG
25 CAPSULE ORAL ONCE
Status: CANCELLED | OUTPATIENT
Start: 2024-12-17

## 2024-12-16 RX ORDER — SODIUM CHLORIDE 9 MG/ML
250 INJECTION, SOLUTION INTRAVENOUS ONCE
Status: CANCELLED | OUTPATIENT
Start: 2024-12-17

## 2024-12-17 ENCOUNTER — HOSPITAL ENCOUNTER (OUTPATIENT)
Dept: ONCOLOGY | Facility: HOSPITAL | Age: 34
Discharge: HOME | End: 2024-12-17
Admitting: INTERNAL MEDICINE
Payer: MEDICARE

## 2024-12-17 VITALS
BODY MASS INDEX: 33.65 KG/M2 | RESPIRATION RATE: 17 BRPM | TEMPERATURE: 97.7 F | OXYGEN SATURATION: 97 % | HEART RATE: 66 BPM | WEIGHT: 144.8 LBS | DIASTOLIC BLOOD PRESSURE: 69 MMHG | SYSTOLIC BLOOD PRESSURE: 116 MMHG

## 2024-12-17 DIAGNOSIS — Z45.2 ENCOUNTER FOR ADJUSTMENT OR MANAGEMENT OF VASCULAR ACCESS DEVICE: ICD-10-CM

## 2024-12-17 DIAGNOSIS — D83.9 CVID (COMMON VARIABLE IMMUNODEFICIENCY): Primary | ICD-10-CM

## 2024-12-17 PROCEDURE — 25010000002 IMMUNE GLOBULIN (HUMAN) 10 GM/100ML SOLUTION: Performed by: INTERNAL MEDICINE

## 2024-12-17 PROCEDURE — 96361 HYDRATE IV INFUSION ADD-ON: CPT

## 2024-12-17 PROCEDURE — 96365 THER/PROPH/DIAG IV INF INIT: CPT

## 2024-12-17 PROCEDURE — A9270 NON-COVERED ITEM OR SERVICE: HCPCS | Performed by: INTERNAL MEDICINE

## 2024-12-17 PROCEDURE — 25810000003 SODIUM CHLORIDE 0.9 % SOLUTION: Performed by: INTERNAL MEDICINE

## 2024-12-17 PROCEDURE — 63710000001 DIPHENHYDRAMINE PER 50 MG: Performed by: INTERNAL MEDICINE

## 2024-12-17 PROCEDURE — 96375 TX/PRO/DX INJ NEW DRUG ADDON: CPT

## 2024-12-17 PROCEDURE — 25010000002 HEPARIN LOCK FLUSH PER 10 UNITS: Performed by: INTERNAL MEDICINE

## 2024-12-17 RX ORDER — FAMOTIDINE 10 MG/ML
20 INJECTION, SOLUTION INTRAVENOUS AS NEEDED
Status: DISCONTINUED | OUTPATIENT
Start: 2024-12-17 | End: 2024-12-18 | Stop reason: HOSPADM

## 2024-12-17 RX ORDER — MEPERIDINE HYDROCHLORIDE 25 MG/ML
25 INJECTION INTRAMUSCULAR; INTRAVENOUS; SUBCUTANEOUS
Status: DISCONTINUED | OUTPATIENT
Start: 2024-12-17 | End: 2024-12-18 | Stop reason: HOSPADM

## 2024-12-17 RX ORDER — DIPHENHYDRAMINE HCL 25 MG
25 CAPSULE ORAL ONCE
Status: COMPLETED | OUTPATIENT
Start: 2024-12-17 | End: 2024-12-17

## 2024-12-17 RX ORDER — HEPARIN SODIUM (PORCINE) LOCK FLUSH IV SOLN 100 UNIT/ML 100 UNIT/ML
500 SOLUTION INTRAVENOUS AS NEEDED
Status: DISCONTINUED | OUTPATIENT
Start: 2024-12-17 | End: 2024-12-18 | Stop reason: HOSPADM

## 2024-12-17 RX ORDER — HEPARIN SODIUM (PORCINE) LOCK FLUSH IV SOLN 100 UNIT/ML 100 UNIT/ML
500 SOLUTION INTRAVENOUS AS NEEDED
OUTPATIENT
Start: 2024-12-17

## 2024-12-17 RX ORDER — HYDROCORTISONE SODIUM SUCCINATE 100 MG/2ML
100 INJECTION INTRAMUSCULAR; INTRAVENOUS AS NEEDED
Status: DISCONTINUED | OUTPATIENT
Start: 2024-12-17 | End: 2024-12-18 | Stop reason: HOSPADM

## 2024-12-17 RX ORDER — SODIUM CHLORIDE 9 MG/ML
250 INJECTION, SOLUTION INTRAVENOUS ONCE
Status: COMPLETED | OUTPATIENT
Start: 2024-12-17 | End: 2024-12-17

## 2024-12-17 RX ORDER — DIPHENHYDRAMINE HYDROCHLORIDE 50 MG/ML
50 INJECTION INTRAMUSCULAR; INTRAVENOUS AS NEEDED
Status: DISCONTINUED | OUTPATIENT
Start: 2024-12-17 | End: 2024-12-18 | Stop reason: HOSPADM

## 2024-12-17 RX ORDER — SODIUM CHLORIDE 0.9 % (FLUSH) 0.9 %
20 SYRINGE (ML) INJECTION AS NEEDED
OUTPATIENT
Start: 2024-12-17

## 2024-12-17 RX ORDER — SODIUM CHLORIDE 0.9 % (FLUSH) 0.9 %
20 SYRINGE (ML) INJECTION AS NEEDED
Status: DISCONTINUED | OUTPATIENT
Start: 2024-12-17 | End: 2024-12-18 | Stop reason: HOSPADM

## 2024-12-17 RX ORDER — FAMOTIDINE 10 MG/ML
20 INJECTION, SOLUTION INTRAVENOUS ONCE
Status: COMPLETED | OUTPATIENT
Start: 2024-12-17 | End: 2024-12-17

## 2024-12-17 RX ADMIN — IMMUNE GLOBULIN (HUMAN) 10 G: 10 INJECTION INTRAVENOUS; SUBCUTANEOUS at 10:51

## 2024-12-17 RX ADMIN — HEPARIN 500 UNITS: 100 SYRINGE at 12:30

## 2024-12-17 RX ADMIN — DIPHENHYDRAMINE HYDROCHLORIDE 25 MG: 25 CAPSULE ORAL at 10:14

## 2024-12-17 RX ADMIN — SODIUM CHLORIDE 250 ML: 9 INJECTION, SOLUTION INTRAVENOUS at 10:10

## 2024-12-17 RX ADMIN — Medication 20 ML: at 12:28

## 2024-12-17 RX ADMIN — FAMOTIDINE 20 MG: 10 INJECTION INTRAVENOUS at 10:14

## 2025-01-07 DIAGNOSIS — I34.1 MITRAL VALVE PROLAPSE: ICD-10-CM

## 2025-01-07 RX ORDER — ATENOLOL 25 MG/1
25 TABLET ORAL DAILY
Qty: 90 TABLET | Refills: 1 | Status: SHIPPED | OUTPATIENT
Start: 2025-01-07

## 2025-01-11 DIAGNOSIS — Z30.41 ENCOUNTER FOR SURVEILLANCE OF CONTRACEPTIVE PILLS: ICD-10-CM

## 2025-01-13 RX ORDER — NORGESTIMATE AND ETHINYL ESTRADIOL
1 KIT DAILY
Qty: 84 TABLET | Refills: 3 | Status: SHIPPED | OUTPATIENT
Start: 2025-01-13

## 2025-01-13 NOTE — TELEPHONE ENCOUNTER
UPCOMING APPTS  With Family Medicine (NO [facility])  03/05/2025 at 1:30 PM  LAST OFFICE VISIT - THIS DEPT  9/24/2024 Aggie Chance APRN

## 2025-01-24 ENCOUNTER — OFFICE VISIT (OUTPATIENT)
Dept: ONCOLOGY | Facility: HOSPITAL | Age: 35
End: 2025-01-24
Payer: MEDICARE

## 2025-01-24 ENCOUNTER — HOSPITAL ENCOUNTER (OUTPATIENT)
Dept: ONCOLOGY | Facility: HOSPITAL | Age: 35
Discharge: HOME | End: 2025-01-24
Payer: MEDICARE

## 2025-01-24 VITALS
HEIGHT: 55 IN | BODY MASS INDEX: 33.42 KG/M2 | SYSTOLIC BLOOD PRESSURE: 98 MMHG | DIASTOLIC BLOOD PRESSURE: 60 MMHG | RESPIRATION RATE: 18 BRPM | OXYGEN SATURATION: 98 % | WEIGHT: 144.4 LBS | TEMPERATURE: 97.8 F | HEART RATE: 58 BPM

## 2025-01-24 DIAGNOSIS — D83.9 CVID (COMMON VARIABLE IMMUNODEFICIENCY): ICD-10-CM

## 2025-01-24 DIAGNOSIS — Z45.2 ENCOUNTER FOR ADJUSTMENT OR MANAGEMENT OF VASCULAR ACCESS DEVICE: Primary | ICD-10-CM

## 2025-01-24 DIAGNOSIS — D50.8 OTHER IRON DEFICIENCY ANEMIA: ICD-10-CM

## 2025-01-24 DIAGNOSIS — D83.9 CVID (COMMON VARIABLE IMMUNODEFICIENCY): Primary | ICD-10-CM

## 2025-01-24 DIAGNOSIS — K59.09 CHRONIC CONSTIPATION: ICD-10-CM

## 2025-01-24 LAB
BASOPHILS # BLD AUTO: 0.05 10*3/MM3 (ref 0–0.2)
BASOPHILS NFR BLD AUTO: 0.5 % (ref 0–1.5)
DEPRECATED RDW RBC AUTO: 43.8 FL (ref 37–54)
EOSINOPHIL # BLD AUTO: 0.05 10*3/MM3 (ref 0–0.4)
EOSINOPHIL NFR BLD AUTO: 0.5 % (ref 0.3–6.2)
ERYTHROCYTE [DISTWIDTH] IN BLOOD BY AUTOMATED COUNT: 13.4 % (ref 12.3–15.4)
HCT VFR BLD AUTO: 35 % (ref 34–46.6)
HGB BLD-MCNC: 11.4 G/DL (ref 12–15.9)
IGA1 MFR SER: <50 MG/DL (ref 70–400)
IGG1 SER-MCNC: 559 MG/DL (ref 700–1600)
IGM SERPL-MCNC: 91 MG/DL (ref 40–230)
IMM GRANULOCYTES # BLD AUTO: 0.03 10*3/MM3 (ref 0–0.05)
IMM GRANULOCYTES NFR BLD AUTO: 0.3 % (ref 0–0.5)
LYMPHOCYTES # BLD AUTO: 2.58 10*3/MM3 (ref 0.7–3.1)
LYMPHOCYTES NFR BLD AUTO: 24.4 % (ref 19.6–45.3)
MCH RBC QN AUTO: 29.2 PG (ref 26.6–33)
MCHC RBC AUTO-ENTMCNC: 32.6 G/DL (ref 31.5–35.7)
MCV RBC AUTO: 89.7 FL (ref 79–97)
MONOCYTES # BLD AUTO: 0.68 10*3/MM3 (ref 0.1–0.9)
MONOCYTES NFR BLD AUTO: 6.4 % (ref 5–12)
NEUTROPHILS NFR BLD AUTO: 67.9 % (ref 42.7–76)
NEUTROPHILS NFR BLD AUTO: 7.18 10*3/MM3 (ref 1.7–7)
NRBC BLD AUTO-RTO: 0 /100 WBC (ref 0–0.2)
PLATELET # BLD AUTO: 243 10*3/MM3 (ref 140–450)
PMV BLD AUTO: 9.1 FL (ref 6–12)
RBC # BLD AUTO: 3.9 10*6/MM3 (ref 3.77–5.28)
WBC NRBC COR # BLD AUTO: 10.57 10*3/MM3 (ref 3.4–10.8)

## 2025-01-24 PROCEDURE — 25010000002 HEPARIN LOCK FLUSH PER 10 UNITS: Performed by: INTERNAL MEDICINE

## 2025-01-24 PROCEDURE — 85025 COMPLETE CBC W/AUTO DIFF WBC: CPT | Performed by: INTERNAL MEDICINE

## 2025-01-24 PROCEDURE — 82784 ASSAY IGA/IGD/IGG/IGM EACH: CPT | Performed by: INTERNAL MEDICINE

## 2025-01-24 PROCEDURE — 36591 DRAW BLOOD OFF VENOUS DEVICE: CPT

## 2025-01-24 RX ORDER — HEPARIN SODIUM (PORCINE) LOCK FLUSH IV SOLN 100 UNIT/ML 100 UNIT/ML
500 SOLUTION INTRAVENOUS AS NEEDED
OUTPATIENT
Start: 2025-01-28

## 2025-01-24 RX ORDER — SODIUM CHLORIDE 0.9 % (FLUSH) 0.9 %
20 SYRINGE (ML) INJECTION AS NEEDED
OUTPATIENT
Start: 2025-01-24

## 2025-01-24 RX ORDER — FAMOTIDINE 10 MG/ML
20 INJECTION, SOLUTION INTRAVENOUS AS NEEDED
OUTPATIENT
Start: 2025-01-28

## 2025-01-24 RX ORDER — SODIUM CHLORIDE 9 MG/ML
250 INJECTION, SOLUTION INTRAVENOUS ONCE
OUTPATIENT
Start: 2025-01-28

## 2025-01-24 RX ORDER — MEPERIDINE HYDROCHLORIDE 25 MG/ML
25 INJECTION INTRAMUSCULAR; INTRAVENOUS; SUBCUTANEOUS
OUTPATIENT
Start: 2025-01-28

## 2025-01-24 RX ORDER — FAMOTIDINE 10 MG/ML
20 INJECTION, SOLUTION INTRAVENOUS ONCE
OUTPATIENT
Start: 2025-01-28

## 2025-01-24 RX ORDER — SODIUM CHLORIDE 0.9 % (FLUSH) 0.9 %
20 SYRINGE (ML) INJECTION AS NEEDED
Status: DISCONTINUED | OUTPATIENT
Start: 2025-01-24 | End: 2025-01-25 | Stop reason: HOSPADM

## 2025-01-24 RX ORDER — HEPARIN SODIUM (PORCINE) LOCK FLUSH IV SOLN 100 UNIT/ML 100 UNIT/ML
500 SOLUTION INTRAVENOUS AS NEEDED
Status: DISCONTINUED | OUTPATIENT
Start: 2025-01-24 | End: 2025-01-25 | Stop reason: HOSPADM

## 2025-01-24 RX ORDER — DIPHENHYDRAMINE HCL 25 MG
25 CAPSULE ORAL ONCE
OUTPATIENT
Start: 2025-01-28

## 2025-01-24 RX ORDER — DIPHENHYDRAMINE HYDROCHLORIDE 50 MG/ML
50 INJECTION INTRAMUSCULAR; INTRAVENOUS AS NEEDED
OUTPATIENT
Start: 2025-01-28

## 2025-01-24 RX ADMIN — Medication 20 ML: at 09:38

## 2025-01-24 RX ADMIN — HEPARIN 500 UNITS: 100 SYRINGE at 09:38

## 2025-01-24 NOTE — PROGRESS NOTES
Chief Complaint/Reason for Referral:  FOLLOW UP 1    Arnie Rucker MD Kindervater, Kasey, APRN    Subjective    History of Present Illness  Ms. Francine Preciado presents with her mother for lab check for IVIG treatment.  She will get this on Tuesday.  She has been tolerating these treatments well.  Patient recently diagnosed with ear infection.  She is using eardrops.  She is dealing with constipation.  She is on Linzess for this.  She does not take this every day.  She also occasionally takes stool softener.  She does not drink much water during the day.  Denies any bleeding.  Remains on iron gummies.  Does not think this is contributing to the constipation.  No fevers or chills.  No diarrhea.  No cough.  No congestion.    Last IgG level of 518 on 12/13/24.    Repeat iron labs 12/13/24 with elevated ferritin of 164 and low iron sat of 14%.     Hemoglobin 11.4 g/dL today. WBC normal at 10.57 plts 243K.        Oncology/Hematology History Overview Note   CVID:   -Diagnosed by Immunologist 8/20/12  -presented with frequent/severe infections             Review of Systems   Constitutional:  Positive for fatigue. Negative for appetite change, diaphoresis, fever, unexpected weight gain and unexpected weight loss.   HENT:  Negative for hearing loss, mouth sores, sore throat, swollen glands, trouble swallowing and voice change.    Eyes:  Negative for blurred vision.   Respiratory:  Negative for cough, shortness of breath and wheezing.    Cardiovascular:  Negative for chest pain and palpitations.   Gastrointestinal:  Negative for abdominal pain, blood in stool, constipation, diarrhea, nausea and vomiting.   Endocrine: Negative for cold intolerance and heat intolerance.   Genitourinary:  Negative for difficulty urinating, dysuria, frequency, hematuria and urinary incontinence.   Musculoskeletal:  Negative for arthralgias, back pain and myalgias.   Skin:  Negative for rash, skin lesions and wound.   Neurological:  Negative for  dizziness, seizures, weakness, numbness and headache.   Hematological:  Does not bruise/bleed easily.   Psychiatric/Behavioral:  Negative for depressed mood. The patient is not nervous/anxious.    All other systems reviewed and are negative.      Current Outpatient Medications on File Prior to Visit   Medication Sig Dispense Refill    atenolol (TENORMIN) 25 MG tablet TAKE 1 TABLET BY MOUTH DAILY 90 tablet 1    azithromycin (Zithromax Z-Jaswinder) 250 MG tablet Take 2 tablets by mouth on day 1, then 1 tablet daily on days 2-5 6 tablet 0    Cranberry 50 MG chewable tablet Chew 2 (Two) Times a Day.      Cyanocobalamin (VITAMIN B 12 PO) Take  by mouth.      EPINEPHrine (EPIPEN) 0.3 MG/0.3ML solution auto-injector injection 0.3 mL.      famotidine (PEPCID) 10 MG tablet Take 2 tablets by mouth 2 (Two) Times a Day As Needed.      fenofibrate 160 MG tablet Take 1 tablet by mouth Daily. (Patient taking differently: Take 1 tablet by mouth Every Night.) 90 tablet 1    ferrous gluconate (FERGON) 324 MG tablet Take 1 tablet by mouth 2 (Two) Times a Day. Gummies      fluticasone (FLONASE) 50 MCG/ACT nasal spray 2 sprays into the nostril(s) as directed by provider Daily. (Patient taking differently: Administer 2 sprays into the nostril(s) as directed by provider Daily As Needed.) 16 g 11    HYDROcodone-acetaminophen (NORCO) 5-325 MG per tablet Take 1 tablet by mouth Every 6 (Six) Hours As Needed (Pain). 7 tablet 0    levocetirizine (XYZAL) 5 MG tablet Take 1 tablet by mouth Every Evening. 90 tablet 1    levothyroxine (SYNTHROID, LEVOTHROID) 50 MCG tablet Take 1 tablet by mouth Daily. 90 tablet 1    lidocaine-prilocaine (EMLA) 2.5-2.5 % cream Apply 1 Application topically to the appropriate area as directed Take As Directed. Apply to port site 30 minutes prior to use 30 g 1    linaclotide (Linzess) 145 MCG capsule capsule Take 1 capsule by mouth Every Morning Before Breakfast. (Patient taking differently: Take 1 capsule by mouth Daily As  Needed.) 90 capsule 1    montelukast (SINGULAIR) 10 MG tablet Take 1 tablet by mouth Every Night. 90 tablet 1    multivitamin with minerals tablet tablet Take 1 tablet by mouth Daily.      Norgestimate-Eth Estradiol (Tri-Lo-Rosy) 0.18/0.215/0.25 MG-25 MCG tablet TAKE 1 TABLET BY MOUTH DAILY 84 tablet 3    ondansetron (Zofran) 4 MG tablet Take 1 tablet by mouth Every 8 (Eight) Hours As Needed for Nausea or Vomiting. 90 tablet 1    promethazine-dextromethorphan (PROMETHAZINE-DM) 6.25-15 MG/5ML syrup Take 5 mL by mouth 4 (Four) Times a Day As Needed for Cough. 240 mL 0    rosuvastatin (CRESTOR) 10 MG tablet TAKE 1 TABLET BY MOUTH DAILY (Patient taking differently: Take 1 tablet by mouth Every Night.) 90 tablet 1    Tobramycin-Dexamethasone (TobraDex ST) 0.3-0.05 % suspension 3-4 drops in left ear twice a day 5 mL 3    vitamin C (ASCORBIC ACID) 250 MG tablet Take 1 tablet by mouth Daily.       Current Facility-Administered Medications on File Prior to Visit   Medication Dose Route Frequency Provider Last Rate Last Admin    cyanocobalamin injection 1,000 mcg  1,000 mcg Intramuscular Q28 Days Arnie Rucker MD   1,000 mcg at 02/29/24 1414    heparin injection 500 Units  500 Units Intravenous PRN Kayden Leal MD   500 Units at 01/24/25 0938    sodium chloride 0.9 % flush 20 mL  20 mL Intravenous PRN Kayden Leal MD   20 mL at 01/24/25 0938       Allergies   Allergen Reactions    Cefuroxime Axetil Unknown - Low Severity    Neosporin [Bacitracin-Polymyxin B] Unknown - Low Severity     Past Medical History:   Diagnosis Date    Acid reflux     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 Leal    Disease of thyroid gland     H/O Pneumonia     Heart murmur     Hypertension     follows with PCP     Kabuki make-up syndrome     Mitral valve prolapse     no current issues    Mixed conductive and sensorineural hearing loss of both ears     Otorrhea, left     PONV (postoperative nausea and vomiting)     Recurrent otitis media     Skin disease     PSORIASIS/ECXEMA     Past Surgical History:   Procedure Laterality Date    APPENDECTOMY      EXTRACORPOREAL CIRCULATION      at birth    INNER EAR SURGERY      multi    MULTIPLE TOOTH EXTRACTIONS      PORTACATH PLACEMENT      port placement x2    PORTACATH PLACEMENT      9/20/2024    TOENAIL EXCISION      TUNNELED VENOUS PORT PLACEMENT      approx 2019    VENOUS ACCESS DEVICE (PORT) INSERTION Right 9/20/2024    Procedure: INSERTION VENOUS ACCESS DEVICE;  Surgeon: Candy Call MD;  Location: [facility];  Service: General;  Laterality: Right;    VENOUS ACCESS DEVICE (PORT) REMOVAL      VENOUS ACCESS DEVICE (PORT) REMOVAL Left 9/20/2024    Procedure: REMOVAL VENOUS ACCESS DEVICE;  Surgeon: Candy Call MD;  Location: [facility];  Service: General;  Laterality: Left;     Social History     Socioeconomic History    Marital status: Single   Tobacco Use    Smoking status: Never     Passive exposure: Yes    Smokeless tobacco: Never    Tobacco comments:     Daily exposure to 2nd hand smoke   Vaping Use    Vaping status: Never Used   Substance and Sexual Activity    Alcohol use: Not Currently     Comment: DENIES    Drug use: Never    Sexual activity: Never     Family History   Problem Relation Age of Onset    Leukemia Father     Heart disease Other      Immunization History   Administered Date(s) Administered    COVID-19 (PFIZER) Purple Cap Monovalent 04/04/2021, 04/25/2021    Flu Vaccine Quad PF >36MO 11/04/2013, 10/24/2014, 11/02/2015, 10/13/2016, 10/19/2017    FluMist 2-49yrs (Nasal) 10/13/2016, 10/14/2020    Fluzone  >6mos 11/11/2024    Fluzone (or Fluarix & Flulaval for VFC) >6mos 11/04/2013, 10/24/2014, 11/02/2015, 10/13/2016, 10/19/2017, 11/23/2021,  "10/11/2022, 10/13/2023    Fluzone Quad >6mos (Multi-dose) 11/04/2013, 11/02/2015, 10/19/2017, 10/29/2019    Hep B, Adolescent or Pediatric 11/24/1998, 01/14/1999, 06/02/1999    Influenza TIV (IM) 09/17/2009, 11/07/2018    Influenza, Unspecified 10/14/2020, 10/11/2022    MMR 08/19/1996    Pneumococcal Conjugate 20-Valent (PCV20) 09/14/2023    Pneumococcal Polysaccharide (PPSV23) 10/04/2012, 04/10/2014    influenza Split 10/02/2018       Tobacco Use: Medium Risk (1/24/2025)    Patient History     Smoking Tobacco Use: Never     Smokeless Tobacco Use: Never     Passive Exposure: Yes       Objective     Physical Exam  Well appearing patient sitting on table on RA in NAD  Anicteric sclerae, no rash on exposed skin  Respirations non-labored  Awake, alert, and oriented x 4. No gross neurologic deficit  Appropriate mood and affect      Vitals:    01/24/25 0958   BP: 98/60   Pulse: 58   Resp: 18   Temp: 97.8 °F (36.6 °C)   TempSrc: Temporal   SpO2: 98%   Weight: 65.5 kg (144 lb 6.4 oz)   Height: 139.7 cm (55\")   PainSc: 0-No pain           Wt Readings from Last 3 Encounters:   12/17/24 65.7 kg (144 lb 12.8 oz)   12/13/24 66 kg (145 lb 8.1 oz)   11/11/24 64 kg (141 lb)                  ECOG: (0) Fully Active - Able to Carry On All Pre-disease Performance Without Restriction  Fall Risk Assessment was completed, and patient is at low risk for falls.  PHQ-9 Total Score:         The patient is  experiencing fatigue. Fatigue score: 3    PT/OT Functional Screening: PT fx screen : No needs identified  Speech Functional Screening: Speech fx screen : No needs identified  Rehab to be ordered: Rehab to be ordered : No needs identified        Result Review :  The following data was reviewed by: Kayden Leal MD on 01/24/25:  Lab Results   Component Value Date    HGB 11.4 (L) 01/24/2025    HCT 35.0 01/24/2025    MCV 89.7 01/24/2025     01/24/2025    WBC 10.57 01/24/2025    NEUTROABS 7.18 (H) 01/24/2025    LYMPHSABS 2.58 " "01/24/2025    MONOSABS 0.68 01/24/2025    EOSABS 0.05 01/24/2025    BASOSABS 0.05 01/24/2025     Lab Results   Component Value Date    GLUCOSE 82 09/20/2024    BUN 13 09/20/2024    CREATININE 1.00 09/20/2024     09/20/2024    K 4.2 09/20/2024     (H) 09/20/2024    CO2 18.3 (L) 09/20/2024    CALCIUM 9.3 09/20/2024    PROTEINTOT 7.2 09/20/2024    ALBUMIN 4.1 09/20/2024    BILITOT 0.5 09/20/2024    ALKPHOS 111 09/20/2024    AST 21 09/20/2024    ALT 17 09/20/2024     Lab Results   Component Value Date     08/12/2019    Ferritin 164.10 (H) 12/13/2024    Folate >20.00 09/20/2024     Lab Results   Component Value Date    IRON 77 12/13/2024    LABIRON 14 (L) 12/13/2024    TRANSFERRIN 376 (H) 12/13/2024    TIBC 560 (H) 12/13/2024     Lab Results   Component Value Date     08/12/2019    FERRITIN 164.10 (H) 12/13/2024    UYRFQEST41 850 09/20/2024    FOLATE >20.00 09/20/2024     No results found for: \"PSA\", \"CEA\", \"AFP\", \"\", \"\"    Labs personally reviewed. IgG levels above 500.  Anemia. Ferritin elevated.          Assessment and Plan:  Diagnoses and all orders for this visit:    1. CVID (common variable immunodeficiency) (Primary)  -     CBC and Differential; Future  -     IgG, IgA, IgM; Future    2. Other iron deficiency anemia    3. Chronic constipation    Other orders  -     sodium chloride 0.9 % infusion 250 mL  -     famotidine (PEPCID) injection 20 mg  -     diphenhydrAMINE (BENADRYL) capsule 25 mg  -     immune globulin (human) (GAMUNEX-C) 10 g infusion 100 mL  -     hydrocortisone sodium succinate (Solu-CORTEF) injection 100 mg  -     diphenhydrAMINE (BENADRYL) injection 50 mg  -     famotidine (PEPCID) injection 20 mg  -     meperidine (DEMEROL) injection 25 mg        CVID  Receives IVIG treatment every 6 weeks. Due for treatment on 12/17/24. IgG levels remain near 500, normally above.    Anemia  Anemia fluctuates and at mild level. Repeat iron labs 12/13/24 are stable with increased " ferritin and low iron sat. She can continue oral iron once daily - taking over the counter currently as prescription dose intolerable. This may be contributing to constipation, so patient could hold if she wants especially as hgb is better.     Constipation  On linzess, but not daily. Recommend increase hydration and use of stool softeners and/or miralax.       I spent 20 minutes caring for Francine on this date of service. This time includes time spent by me in the following activities:preparing for the visit, reviewing tests, obtaining and/or reviewing a separately obtained history, performing a medically appropriate examination and/or evaluation , counseling and educating the patient/family/caregiver, ordering medications, tests, or procedures, referring and communicating with other health care professionals , documenting information in the medical record, independently interpreting results and communicating that information with the patient/family/caregiver, and care coordination    Patient Follow Up: 6 weeks    Patient was given instructions and counseling regarding her condition or for health maintenance advice. Please see specific information pulled into the AVS if appropriate.

## 2025-01-28 ENCOUNTER — HOSPITAL ENCOUNTER (OUTPATIENT)
Dept: ONCOLOGY | Facility: HOSPITAL | Age: 35
Discharge: HOME | End: 2025-01-28
Admitting: INTERNAL MEDICINE
Payer: MEDICARE

## 2025-01-28 VITALS
HEIGHT: 55 IN | RESPIRATION RATE: 18 BRPM | TEMPERATURE: 98 F | SYSTOLIC BLOOD PRESSURE: 108 MMHG | WEIGHT: 141.76 LBS | OXYGEN SATURATION: 98 % | BODY MASS INDEX: 32.81 KG/M2 | DIASTOLIC BLOOD PRESSURE: 60 MMHG | HEART RATE: 80 BPM

## 2025-01-28 DIAGNOSIS — D83.9 CVID (COMMON VARIABLE IMMUNODEFICIENCY): Primary | ICD-10-CM

## 2025-01-28 DIAGNOSIS — Z45.2 ENCOUNTER FOR ADJUSTMENT OR MANAGEMENT OF VASCULAR ACCESS DEVICE: ICD-10-CM

## 2025-01-28 PROCEDURE — 25810000003 SODIUM CHLORIDE 0.9 % SOLUTION: Performed by: INTERNAL MEDICINE

## 2025-01-28 PROCEDURE — 96365 THER/PROPH/DIAG IV INF INIT: CPT

## 2025-01-28 PROCEDURE — 25010000002 IMMUNE GLOBULIN (HUMAN) 10 GM/100ML SOLUTION: Performed by: INTERNAL MEDICINE

## 2025-01-28 PROCEDURE — 25010000002 HEPARIN LOCK FLUSH PER 10 UNITS: Performed by: INTERNAL MEDICINE

## 2025-01-28 RX ORDER — SODIUM CHLORIDE 0.9 % (FLUSH) 0.9 %
20 SYRINGE (ML) INJECTION AS NEEDED
OUTPATIENT
Start: 2025-01-28

## 2025-01-28 RX ORDER — SODIUM CHLORIDE 0.9 % (FLUSH) 0.9 %
20 SYRINGE (ML) INJECTION AS NEEDED
Status: DISCONTINUED | OUTPATIENT
Start: 2025-01-28 | End: 2025-01-29 | Stop reason: HOSPADM

## 2025-01-28 RX ORDER — HEPARIN SODIUM (PORCINE) LOCK FLUSH IV SOLN 100 UNIT/ML 100 UNIT/ML
500 SOLUTION INTRAVENOUS AS NEEDED
Status: DISCONTINUED | OUTPATIENT
Start: 2025-01-28 | End: 2025-01-29 | Stop reason: HOSPADM

## 2025-01-28 RX ORDER — FAMOTIDINE 10 MG/ML
20 INJECTION, SOLUTION INTRAVENOUS ONCE
Status: DISCONTINUED | OUTPATIENT
Start: 2025-01-28 | End: 2025-01-29 | Stop reason: HOSPADM

## 2025-01-28 RX ORDER — SODIUM CHLORIDE 9 MG/ML
250 INJECTION, SOLUTION INTRAVENOUS ONCE
Status: COMPLETED | OUTPATIENT
Start: 2025-01-28 | End: 2025-01-28

## 2025-01-28 RX ORDER — FAMOTIDINE 10 MG/ML
20 INJECTION, SOLUTION INTRAVENOUS AS NEEDED
Status: DISCONTINUED | OUTPATIENT
Start: 2025-01-28 | End: 2025-01-29 | Stop reason: HOSPADM

## 2025-01-28 RX ORDER — MEPERIDINE HYDROCHLORIDE 25 MG/ML
25 INJECTION INTRAMUSCULAR; INTRAVENOUS; SUBCUTANEOUS
Status: DISCONTINUED | OUTPATIENT
Start: 2025-01-28 | End: 2025-01-29 | Stop reason: HOSPADM

## 2025-01-28 RX ORDER — DIPHENHYDRAMINE HCL 25 MG
25 CAPSULE ORAL ONCE
Status: DISCONTINUED | OUTPATIENT
Start: 2025-01-28 | End: 2025-01-29 | Stop reason: HOSPADM

## 2025-01-28 RX ORDER — HEPARIN SODIUM (PORCINE) LOCK FLUSH IV SOLN 100 UNIT/ML 100 UNIT/ML
500 SOLUTION INTRAVENOUS AS NEEDED
OUTPATIENT
Start: 2025-01-28

## 2025-01-28 RX ORDER — HYDROCORTISONE SODIUM SUCCINATE 100 MG/2ML
100 INJECTION INTRAMUSCULAR; INTRAVENOUS AS NEEDED
Status: DISCONTINUED | OUTPATIENT
Start: 2025-01-28 | End: 2025-01-29 | Stop reason: HOSPADM

## 2025-01-28 RX ORDER — DIPHENHYDRAMINE HYDROCHLORIDE 50 MG/ML
50 INJECTION INTRAMUSCULAR; INTRAVENOUS AS NEEDED
Status: DISCONTINUED | OUTPATIENT
Start: 2025-01-28 | End: 2025-01-29 | Stop reason: HOSPADM

## 2025-01-28 RX ADMIN — HEPARIN 500 UNITS: 100 SYRINGE at 12:44

## 2025-01-28 RX ADMIN — IMMUNE GLOBULIN (HUMAN) 10 G: 10 INJECTION INTRAVENOUS; SUBCUTANEOUS at 10:58

## 2025-01-28 RX ADMIN — SODIUM CHLORIDE 250 ML: 9 INJECTION, SOLUTION INTRAVENOUS at 10:43

## 2025-01-28 RX ADMIN — Medication 20 ML: at 12:44

## 2025-02-01 DIAGNOSIS — E78.2 MIXED HYPERLIPIDEMIA: ICD-10-CM

## 2025-02-03 RX ORDER — ROSUVASTATIN CALCIUM 10 MG/1
10 TABLET, COATED ORAL DAILY
Qty: 90 TABLET | Refills: 1 | Status: SHIPPED | OUTPATIENT
Start: 2025-02-03

## 2025-02-24 RX ORDER — ONDANSETRON 4 MG/1
4 TABLET, FILM COATED ORAL EVERY 8 HOURS PRN
Qty: 90 TABLET | Refills: 1 | Status: SHIPPED | OUTPATIENT
Start: 2025-02-24

## 2025-03-05 ENCOUNTER — OFFICE VISIT (OUTPATIENT)
Dept: FAMILY MEDICINE CLINIC | Facility: CLINIC | Age: 35
End: 2025-03-05
Payer: MEDICARE

## 2025-03-05 VITALS
SYSTOLIC BLOOD PRESSURE: 110 MMHG | TEMPERATURE: 98 F | WEIGHT: 141.1 LBS | OXYGEN SATURATION: 98 % | HEART RATE: 74 BPM | BODY MASS INDEX: 32.65 KG/M2 | DIASTOLIC BLOOD PRESSURE: 70 MMHG | HEIGHT: 55 IN

## 2025-03-05 DIAGNOSIS — E78.2 MIXED HYPERLIPIDEMIA: ICD-10-CM

## 2025-03-05 DIAGNOSIS — J30.89 NON-SEASONAL ALLERGIC RHINITIS, UNSPECIFIED TRIGGER: ICD-10-CM

## 2025-03-05 DIAGNOSIS — E03.9 ACQUIRED HYPOTHYROIDISM: ICD-10-CM

## 2025-03-05 DIAGNOSIS — Z00.00 MEDICARE ANNUAL WELLNESS VISIT, SUBSEQUENT: Primary | ICD-10-CM

## 2025-03-05 DIAGNOSIS — I34.1 MITRAL VALVE PROLAPSE: ICD-10-CM

## 2025-03-05 PROCEDURE — 3078F DIAST BP <80 MM HG: CPT

## 2025-03-05 PROCEDURE — 3074F SYST BP LT 130 MM HG: CPT

## 2025-03-05 PROCEDURE — 1170F FXNL STATUS ASSESSED: CPT

## 2025-03-05 PROCEDURE — G0439 PPPS, SUBSEQ VISIT: HCPCS

## 2025-03-05 PROCEDURE — 1125F AMNT PAIN NOTED PAIN PRSNT: CPT

## 2025-03-05 RX ORDER — FENOFIBRATE 160 MG/1
160 TABLET ORAL NIGHTLY
Qty: 90 TABLET | Refills: 3 | Status: SHIPPED | OUTPATIENT
Start: 2025-03-05

## 2025-03-05 RX ORDER — LEVOCETIRIZINE DIHYDROCHLORIDE 5 MG/1
5 TABLET, FILM COATED ORAL EVERY EVENING
Qty: 90 TABLET | Refills: 1 | Status: SHIPPED | OUTPATIENT
Start: 2025-03-05

## 2025-03-05 RX ORDER — ONDANSETRON 4 MG/1
4 TABLET, FILM COATED ORAL EVERY 8 HOURS PRN
Qty: 90 TABLET | Refills: 1 | Status: SHIPPED | OUTPATIENT
Start: 2025-03-05

## 2025-03-05 RX ORDER — MONTELUKAST SODIUM 10 MG/1
10 TABLET ORAL NIGHTLY
Qty: 90 TABLET | Refills: 1 | Status: SHIPPED | OUTPATIENT
Start: 2025-03-05

## 2025-03-05 RX ORDER — LEVOTHYROXINE SODIUM 50 UG/1
50 TABLET ORAL DAILY
Qty: 90 TABLET | Refills: 1 | Status: SHIPPED | OUTPATIENT
Start: 2025-03-05

## 2025-03-05 RX ORDER — ATENOLOL 25 MG/1
25 TABLET ORAL DAILY
Qty: 90 TABLET | Refills: 1 | Status: SHIPPED | OUTPATIENT
Start: 2025-03-05

## 2025-03-05 RX ORDER — ROSUVASTATIN CALCIUM 10 MG/1
10 TABLET, COATED ORAL DAILY
Qty: 90 TABLET | Refills: 1 | Status: SHIPPED | OUTPATIENT
Start: 2025-03-05

## 2025-03-05 NOTE — ASSESSMENT & PLAN NOTE
Orders:    fenofibrate 160 MG tablet; Take 1 tablet by mouth Every Night.    rosuvastatin (CRESTOR) 10 MG tablet; Take 1 tablet by mouth Daily.

## 2025-03-05 NOTE — PROGRESS NOTES
Subjective   The ABCs of the Annual Wellness Visit  Medicare Wellness Visit      Francine Preciado is a 34 y.o. patient who presents for a Medicare Wellness Visit.    The following portions of the patient's history were reviewed and   updated as appropriate: allergies, current medications, past family history, past medical history, past social history, past surgical history, and problem list.    Compared to one year ago, the patient's physical   health is the same.  Compared to one year ago, the patient's mental   health is the same.    Recent Hospitalizations:  She was not admitted to the hospital during the last year.     Current Medical Providers:  Patient Care Team:  Aggie Chance APRN as PCP - General (Nurse Practitioner)  Kayden Leal MD as Consulting Physician (Hematology and Oncology)    Outpatient Medications Prior to Visit   Medication Sig Dispense Refill    Cranberry 50 MG chewable tablet Chew 2 (Two) Times a Day.      Cyanocobalamin (VITAMIN B 12 PO) Take  by mouth.      EPINEPHrine (EPIPEN) 0.3 MG/0.3ML solution auto-injector injection 0.3 mL.      famotidine (PEPCID) 10 MG tablet Take 2 tablets by mouth 2 (Two) Times a Day As Needed.      ferrous gluconate (FERGON) 324 MG tablet Take 1 tablet by mouth 2 (Two) Times a Day. Gummies      fluticasone (FLONASE) 50 MCG/ACT nasal spray 2 sprays into the nostril(s) as directed by provider Daily. (Patient taking differently: Administer 2 sprays into the nostril(s) as directed by provider Daily As Needed.) 16 g 11    HYDROcodone-acetaminophen (NORCO) 5-325 MG per tablet Take 1 tablet by mouth Every 6 (Six) Hours As Needed (Pain). 7 tablet 0    lidocaine-prilocaine (EMLA) 2.5-2.5 % cream Apply 1 Application topically to the appropriate area as directed Take As Directed. Apply to port site 30 minutes prior to use 30 g 1    linaclotide (Linzess) 145 MCG capsule capsule Take 1 capsule by mouth Every Morning Before Breakfast. (Patient taking  differently: Take 1 capsule by mouth Daily As Needed.) 90 capsule 1    multivitamin with minerals tablet tablet Take 1 tablet by mouth Daily.      Norgestimate-Eth Estradiol (Tri-Lo-Rosy) 0.18/0.215/0.25 MG-25 MCG tablet TAKE 1 TABLET BY MOUTH DAILY 84 tablet 3    Tobramycin-Dexamethasone (TobraDex ST) 0.3-0.05 % suspension 3-4 drops in left ear twice a day 5 mL 3    vitamin C (ASCORBIC ACID) 250 MG tablet Take 1 tablet by mouth Daily.      atenolol (TENORMIN) 25 MG tablet TAKE 1 TABLET BY MOUTH DAILY 90 tablet 1    fenofibrate 160 MG tablet Take 1 tablet by mouth Daily. (Patient taking differently: Take 1 tablet by mouth Every Night.) 90 tablet 1    levocetirizine (XYZAL) 5 MG tablet Take 1 tablet by mouth Every Evening. 90 tablet 1    levothyroxine (SYNTHROID, LEVOTHROID) 50 MCG tablet Take 1 tablet by mouth Daily. 90 tablet 1    montelukast (SINGULAIR) 10 MG tablet Take 1 tablet by mouth Every Night. 90 tablet 1    ondansetron (Zofran) 4 MG tablet Take 1 tablet by mouth Every 8 (Eight) Hours As Needed for Nausea or Vomiting. 90 tablet 1    rosuvastatin (CRESTOR) 10 MG tablet TAKE 1 TABLET BY MOUTH DAILY 90 tablet 1    promethazine-dextromethorphan (PROMETHAZINE-DM) 6.25-15 MG/5ML syrup Take 5 mL by mouth 4 (Four) Times a Day As Needed for Cough. 240 mL 0    azithromycin (Zithromax Z-Jaswinder) 250 MG tablet Take 2 tablets by mouth on day 1, then 1 tablet daily on days 2-5 6 tablet 0     Facility-Administered Medications Prior to Visit   Medication Dose Route Frequency Provider Last Rate Last Admin    cyanocobalamin injection 1,000 mcg  1,000 mcg Intramuscular Q28 Days Arnie Rucker MD   1,000 mcg at 02/29/24 1414     Opioid medication/s are on active medication list.  and I have evaluated her active treatment plan and pain score trends (see table).  Vitals:    03/05/25 1329   PainSc: 9    PainLoc: Ear     I have reviewed the chart for potential of high risk medication and harmful drug interactions in the  "elderly.        Aspirin is not on active medication list.  Aspirin use is not indicated based on review of current medical condition/s. Risk of harm outweighs potential benefits.  .    Patient Active Problem List   Diagnosis    CVID (common variable immunodeficiency)    Encounter for adjustment or management of vascular access device    Encounter for adjustment or management of vascular access device    Psoriasis-eczema overlap condition    Central perforation of tympanic membrane    Chronic mastoiditis    COVID-19 vaccine series completed    Heart murmur    Hypertension    Kabuki make-up syndrome    Mitral valve prolapse    Mixed conductive and sensorineural hearing loss of both ears    Anemia    Mixed hyperlipidemia    Acquired hypothyroidism    Irritable bowel syndrome with constipation     Advance Care Planning Advance Directive is on file.  ACP discussion was declined by the patient. Patient has an advance directive in EMR which is still valid.             Objective   Vitals:    03/05/25 1329   BP: 110/70   BP Location: Left arm   Patient Position: Sitting   Cuff Size: Adult   Pulse: 74   Temp: 98 °F (36.7 °C)   SpO2: 98%   Weight: 64 kg (141 lb 1.6 oz)   Height: 139.7 cm (55\")   PainSc: 9    PainLoc: Ear       Estimated body mass index is 32.79 kg/m² as calculated from the following:    Height as of this encounter: 139.7 cm (55\").    Weight as of this encounter: 64 kg (141 lb 1.6 oz).    BMI is >= 30 and <35. (Class 1 Obesity). The following options were offered after discussion;: exercise counseling/recommendations and nutrition counseling/recommendations           Does the patient have evidence of cognitive impairment? No                                                                                               Health  Risk Assessment    Smoking Status:  Social History     Tobacco Use   Smoking Status Never    Passive exposure: Yes   Smokeless Tobacco Never   Tobacco Comments    Daily exposure to 2nd hand " smoke     Alcohol Consumption:  Social History     Substance and Sexual Activity   Alcohol Use Not Currently    Comment: DENIES       Fall Risk Screen  LUCHOADI Fall Risk Assessment was completed, and patient is at MODERATE risk for falls. Assessment completed on:3/5/2025    Depression Screening   Little interest or pleasure in doing things? Not at all   Feeling down, depressed, or hopeless? Not at all   PHQ-2 Total Score 0      Health Habits and Functional and Cognitive Screening:      3/5/2025     1:31 PM   Functional & Cognitive Status   Do you have difficulty preparing food and eating? No   Do you have difficulty bathing yourself, getting dressed or grooming yourself? Yes   Do you have difficulty using the toilet? No   Do you have difficulty moving around from place to place? No   Do you have trouble with steps or getting out of a bed or a chair? No   Current Diet Well Balanced Diet   Dental Exam Not up to date   Eye Exam Not up to date   Exercise (times per week) 3 times per week   Current Exercises Include Walking   Do you need help using the phone?  Yes   Are you deaf or do you have serious difficulty hearing?  Yes   Do you need help to go to places out of walking distance? No   Do you need help shopping? Yes   Do you need help preparing meals?  Yes   Do you need help with housework?  Yes   Do you need help with laundry? Yes   Do you need help taking your medications? Yes   Do you need help managing money? Yes   Do you ever drive or ride in a car without wearing a seat belt? No   Have you felt unusual stress, anger or loneliness in the last month? No   Who do you live with? Other   If you need help, do you have trouble finding someone available to you? No   Have you been bothered in the last four weeks by sexual problems? No   Do you have difficulty concentrating, remembering or making decisions? No           Age-appropriate Screening Schedule:  Refer to the list below for future screening recommendations based  on patient's age, sex and/or medical conditions. Orders for these recommended tests are listed in the plan section. The patient has been provided with a written plan.    Health Maintenance List  Health Maintenance   Topic Date Due    TDAP/TD VACCINES (1 - Tdap) Never done    COVID-19 Vaccine (3 - 2024-25 season) 09/01/2024    PAP SMEAR  10/22/2024    BMI FOLLOWUP  02/28/2025    LIPID PANEL  09/20/2025    ANNUAL WELLNESS VISIT  03/05/2026    HEPATITIS C SCREENING  Completed    INFLUENZA VACCINE  Completed    Pneumococcal Vaccine 0-49  Aged Out                                                                                                                                                CMS Preventative Services Quick Reference  Risk Factors Identified During Encounter  None Identified    The above risks/problems have been discussed with the patient.  Pertinent information has been shared with the patient in the After Visit Summary.  An After Visit Summary and PPPS were made available to the patient.    Follow Up:   Next Medicare Wellness visit to be scheduled in 1 year.     Assessment & Plan  Non-seasonal allergic rhinitis, unspecified trigger    Orders:    levocetirizine (XYZAL) 5 MG tablet; Take 1 tablet by mouth Every Evening.    montelukast (SINGULAIR) 10 MG tablet; Take 1 tablet by mouth Every Night.    Mixed hyperlipidemia       Orders:    fenofibrate 160 MG tablet; Take 1 tablet by mouth Every Night.    rosuvastatin (CRESTOR) 10 MG tablet; Take 1 tablet by mouth Daily.    Mixed hyperlipidemia       Orders:    fenofibrate 160 MG tablet; Take 1 tablet by mouth Every Night.    rosuvastatin (CRESTOR) 10 MG tablet; Take 1 tablet by mouth Daily.    Mitral valve prolapse    Orders:    atenolol (TENORMIN) 25 MG tablet; Take 1 tablet by mouth Daily.    Acquired hypothyroidism    Orders:    levothyroxine (SYNTHROID, LEVOTHROID) 50 MCG tablet; Take 1 tablet by mouth Daily.    Medicare annual wellness visit, subsequent               Follow Up:   No follow-ups on file.

## 2025-03-07 ENCOUNTER — HOSPITAL ENCOUNTER (OUTPATIENT)
Dept: ONCOLOGY | Facility: HOSPITAL | Age: 35
Discharge: HOME | End: 2025-03-07
Payer: MEDICARE

## 2025-03-07 ENCOUNTER — OFFICE VISIT (OUTPATIENT)
Dept: ONCOLOGY | Facility: HOSPITAL | Age: 35
End: 2025-03-07
Payer: MEDICARE

## 2025-03-07 VITALS
BODY MASS INDEX: 33.37 KG/M2 | OXYGEN SATURATION: 99 % | RESPIRATION RATE: 18 BRPM | TEMPERATURE: 97 F | SYSTOLIC BLOOD PRESSURE: 119 MMHG | WEIGHT: 144.18 LBS | HEART RATE: 61 BPM | HEIGHT: 55 IN | DIASTOLIC BLOOD PRESSURE: 73 MMHG

## 2025-03-07 DIAGNOSIS — D50.9 IRON DEFICIENCY ANEMIA, UNSPECIFIED IRON DEFICIENCY ANEMIA TYPE: Primary | ICD-10-CM

## 2025-03-07 DIAGNOSIS — D83.9 CVID (COMMON VARIABLE IMMUNODEFICIENCY): ICD-10-CM

## 2025-03-07 DIAGNOSIS — D83.9 CVID (COMMON VARIABLE IMMUNODEFICIENCY): Primary | ICD-10-CM

## 2025-03-07 DIAGNOSIS — Z45.2 ENCOUNTER FOR ADJUSTMENT OR MANAGEMENT OF VASCULAR ACCESS DEVICE: ICD-10-CM

## 2025-03-07 DIAGNOSIS — K59.09 OTHER CONSTIPATION: ICD-10-CM

## 2025-03-07 LAB
BASOPHILS # BLD AUTO: 0.03 10*3/MM3 (ref 0–0.2)
BASOPHILS NFR BLD AUTO: 0.4 % (ref 0–1.5)
DEPRECATED RDW RBC AUTO: 46.4 FL (ref 37–54)
EOSINOPHIL # BLD AUTO: 0.04 10*3/MM3 (ref 0–0.4)
EOSINOPHIL NFR BLD AUTO: 0.5 % (ref 0.3–6.2)
ERYTHROCYTE [DISTWIDTH] IN BLOOD BY AUTOMATED COUNT: 13.8 % (ref 12.3–15.4)
HCT VFR BLD AUTO: 34.2 % (ref 34–46.6)
HGB BLD-MCNC: 11.1 G/DL (ref 12–15.9)
IGA1 MFR SER: <50 MG/DL (ref 70–400)
IGG1 SER-MCNC: 515 MG/DL (ref 700–1600)
IGM SERPL-MCNC: 74 MG/DL (ref 40–230)
IMM GRANULOCYTES # BLD AUTO: 0.03 10*3/MM3 (ref 0–0.05)
IMM GRANULOCYTES NFR BLD AUTO: 0.4 % (ref 0–0.5)
LYMPHOCYTES # BLD AUTO: 2.22 10*3/MM3 (ref 0.7–3.1)
LYMPHOCYTES NFR BLD AUTO: 29.1 % (ref 19.6–45.3)
MCH RBC QN AUTO: 29.8 PG (ref 26.6–33)
MCHC RBC AUTO-ENTMCNC: 32.5 G/DL (ref 31.5–35.7)
MCV RBC AUTO: 91.7 FL (ref 79–97)
MONOCYTES # BLD AUTO: 0.54 10*3/MM3 (ref 0.1–0.9)
MONOCYTES NFR BLD AUTO: 7.1 % (ref 5–12)
NEUTROPHILS NFR BLD AUTO: 4.76 10*3/MM3 (ref 1.7–7)
NEUTROPHILS NFR BLD AUTO: 62.5 % (ref 42.7–76)
NRBC BLD AUTO-RTO: 0 /100 WBC (ref 0–0.2)
PLATELET # BLD AUTO: 277 10*3/MM3 (ref 140–450)
PMV BLD AUTO: 9.3 FL (ref 6–12)
RBC # BLD AUTO: 3.73 10*6/MM3 (ref 3.77–5.28)
WBC NRBC COR # BLD AUTO: 7.62 10*3/MM3 (ref 3.4–10.8)

## 2025-03-07 PROCEDURE — 85025 COMPLETE CBC W/AUTO DIFF WBC: CPT | Performed by: INTERNAL MEDICINE

## 2025-03-07 PROCEDURE — 36591 DRAW BLOOD OFF VENOUS DEVICE: CPT

## 2025-03-07 PROCEDURE — 25010000002 HEPARIN LOCK FLUSH PER 10 UNITS: Performed by: INTERNAL MEDICINE

## 2025-03-07 PROCEDURE — 82784 ASSAY IGA/IGD/IGG/IGM EACH: CPT | Performed by: INTERNAL MEDICINE

## 2025-03-07 RX ORDER — HEPARIN SODIUM (PORCINE) LOCK FLUSH IV SOLN 100 UNIT/ML 100 UNIT/ML
500 SOLUTION INTRAVENOUS AS NEEDED
OUTPATIENT
Start: 2025-03-11

## 2025-03-07 RX ORDER — DIPHENHYDRAMINE HCL 25 MG
25 CAPSULE ORAL ONCE
OUTPATIENT
Start: 2025-03-11

## 2025-03-07 RX ORDER — SODIUM CHLORIDE 0.9 % (FLUSH) 0.9 %
20 SYRINGE (ML) INJECTION AS NEEDED
OUTPATIENT
Start: 2025-03-07

## 2025-03-07 RX ORDER — SODIUM CHLORIDE 0.9 % (FLUSH) 0.9 %
20 SYRINGE (ML) INJECTION AS NEEDED
Status: DISCONTINUED | OUTPATIENT
Start: 2025-03-07 | End: 2025-03-08 | Stop reason: HOSPADM

## 2025-03-07 RX ORDER — MEPERIDINE HYDROCHLORIDE 25 MG/ML
25 INJECTION INTRAMUSCULAR; INTRAVENOUS; SUBCUTANEOUS
OUTPATIENT
Start: 2025-03-11

## 2025-03-07 RX ORDER — FAMOTIDINE 10 MG/ML
20 INJECTION, SOLUTION INTRAVENOUS AS NEEDED
OUTPATIENT
Start: 2025-03-11

## 2025-03-07 RX ORDER — DIPHENHYDRAMINE HYDROCHLORIDE 50 MG/ML
50 INJECTION INTRAMUSCULAR; INTRAVENOUS AS NEEDED
OUTPATIENT
Start: 2025-03-11

## 2025-03-07 RX ORDER — FAMOTIDINE 10 MG/ML
20 INJECTION, SOLUTION INTRAVENOUS ONCE
OUTPATIENT
Start: 2025-03-11

## 2025-03-07 RX ORDER — SODIUM CHLORIDE 9 MG/ML
250 INJECTION, SOLUTION INTRAVENOUS ONCE
OUTPATIENT
Start: 2025-03-11

## 2025-03-07 RX ORDER — HEPARIN SODIUM (PORCINE) LOCK FLUSH IV SOLN 100 UNIT/ML 100 UNIT/ML
500 SOLUTION INTRAVENOUS AS NEEDED
Status: DISCONTINUED | OUTPATIENT
Start: 2025-03-07 | End: 2025-03-08 | Stop reason: HOSPADM

## 2025-03-07 RX ADMIN — Medication 20 ML: at 09:33

## 2025-03-07 RX ADMIN — HEPARIN 500 UNITS: 100 SYRINGE at 09:33

## 2025-03-07 NOTE — PROGRESS NOTES
Chief Complaint/Reason for Referral:  FOLLOW UP 1     Arnie Rucker MD Kindervater, Kasey, APRN    Subjective    History of Present Illness  Ms. Francine Preciado presents with her mother for lab check for IVIG treatment.  She will get this on Tuesday.  She has been tolerating these treatments well.  She has been battling ear infections.  Has been using eardrops.  No current infection.  No fevers or chills.  No increased bleeding.  Constipation managed with stool softener.  Holding Linzess for now, uses only as needed.  No acute concerns today.    Last IgG level of 559 on 1/24/25.    Repeat iron labs 12/13/24 with elevated ferritin of 164 and low iron sat of 14%.     Hemoglobin 11.1 g/dL today. WBC normal at 10.57 plts 243K.        Oncology/Hematology History Overview Note   CVID:   -Diagnosed by Immunologist 8/20/12  -presented with frequent/severe infections             Review of Systems   Constitutional:  Positive for fatigue. Negative for appetite change, diaphoresis, fever, unexpected weight gain and unexpected weight loss.   HENT:  Negative for hearing loss, mouth sores, sore throat, swollen glands, trouble swallowing and voice change.    Eyes:  Negative for blurred vision.   Respiratory:  Negative for cough, shortness of breath and wheezing.    Cardiovascular:  Negative for chest pain and palpitations.   Gastrointestinal:  Negative for abdominal pain, blood in stool, constipation, diarrhea, nausea and vomiting.   Endocrine: Negative for cold intolerance and heat intolerance.   Genitourinary:  Negative for difficulty urinating, dysuria, frequency, hematuria and urinary incontinence.   Musculoskeletal:  Negative for arthralgias, back pain and myalgias.   Skin:  Negative for rash, skin lesions and wound.   Neurological:  Negative for dizziness, seizures, weakness, numbness and headache.   Hematological:  Does not bruise/bleed easily.   Psychiatric/Behavioral:  Negative for depressed mood. The patient is not  nervous/anxious.    All other systems reviewed and are negative.      Current Outpatient Medications on File Prior to Visit   Medication Sig Dispense Refill    atenolol (TENORMIN) 25 MG tablet Take 1 tablet by mouth Daily. 90 tablet 1    Cranberry 50 MG chewable tablet Chew 2 (Two) Times a Day.      Cyanocobalamin (VITAMIN B 12 PO) Take  by mouth.      EPINEPHrine (EPIPEN) 0.3 MG/0.3ML solution auto-injector injection 0.3 mL.      famotidine (PEPCID) 10 MG tablet Take 2 tablets by mouth 2 (Two) Times a Day As Needed.      fenofibrate 160 MG tablet Take 1 tablet by mouth Every Night. 90 tablet 3    ferrous gluconate (FERGON) 324 MG tablet Take 1 tablet by mouth 2 (Two) Times a Day. Gummies      fluticasone (FLONASE) 50 MCG/ACT nasal spray 2 sprays into the nostril(s) as directed by provider Daily. (Patient taking differently: Administer 2 sprays into the nostril(s) as directed by provider Daily As Needed.) 16 g 11    HYDROcodone-acetaminophen (NORCO) 5-325 MG per tablet Take 1 tablet by mouth Every 6 (Six) Hours As Needed (Pain). 7 tablet 0    levocetirizine (XYZAL) 5 MG tablet Take 1 tablet by mouth Every Evening. 90 tablet 1    levothyroxine (SYNTHROID, LEVOTHROID) 50 MCG tablet Take 1 tablet by mouth Daily. 90 tablet 1    lidocaine-prilocaine (EMLA) 2.5-2.5 % cream Apply 1 Application topically to the appropriate area as directed Take As Directed. Apply to port site 30 minutes prior to use 30 g 1    linaclotide (Linzess) 145 MCG capsule capsule Take 1 capsule by mouth Every Morning Before Breakfast. (Patient taking differently: Take 1 capsule by mouth Daily As Needed.) 90 capsule 1    montelukast (SINGULAIR) 10 MG tablet Take 1 tablet by mouth Every Night. 90 tablet 1    multivitamin with minerals tablet tablet Take 1 tablet by mouth Daily.      Norgestimate-Eth Estradiol (Tri-Lo-Rosy) 0.18/0.215/0.25 MG-25 MCG tablet TAKE 1 TABLET BY MOUTH DAILY 84 tablet 3    ondansetron (Zofran) 4 MG tablet Take 1 tablet by  mouth Every 8 (Eight) Hours As Needed for Nausea or Vomiting. 90 tablet 1    promethazine-dextromethorphan (PROMETHAZINE-DM) 6.25-15 MG/5ML syrup Take 5 mL by mouth 4 (Four) Times a Day As Needed for Cough. 240 mL 0    rosuvastatin (CRESTOR) 10 MG tablet Take 1 tablet by mouth Daily. 90 tablet 1    Tobramycin-Dexamethasone (TobraDex ST) 0.3-0.05 % suspension 3-4 drops in left ear twice a day 5 mL 3    vitamin C (ASCORBIC ACID) 250 MG tablet Take 1 tablet by mouth Daily.       Current Facility-Administered Medications on File Prior to Visit   Medication Dose Route Frequency Provider Last Rate Last Admin    cyanocobalamin injection 1,000 mcg  1,000 mcg Intramuscular Q28 Days Arnie Rucker MD   1,000 mcg at 02/29/24 1414    heparin injection 500 Units  500 Units Intravenous PRN Kayden Leal MD   500 Units at 03/07/25 0933    sodium chloride 0.9 % flush 20 mL  20 mL Intravenous PRN Kayden Leal MD   20 mL at 03/07/25 0933       Allergies   Allergen Reactions    Cefuroxime Axetil Unknown - Low Severity    Neosporin [Bacitracin-Polymyxin B] Unknown - Low Severity     Past Medical History:   Diagnosis Date    Acid reflux     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 Leal    Disease of thyroid gland     H/O Pneumonia     Heart murmur     Hypertension     follows with PCP    Jackie make-up syndrome     Mitral valve prolapse     no current issues    Mixed conductive and sensorineural hearing loss of both ears     Otorrhea, left     PONV (postoperative nausea and vomiting)     Recurrent otitis media     Skin disease     PSORIASIS/ECXEMA     Past Surgical History:   Procedure Laterality Date    APPENDECTOMY      EXTRACORPOREAL CIRCULATION      at birth    INNER EAR SURGERY      multi     MULTIPLE TOOTH EXTRACTIONS      PORTACATH PLACEMENT      port placement x2    PORTACATH PLACEMENT      9/20/2024    TOENAIL EXCISION      TUNNELED VENOUS PORT PLACEMENT      approx 2019    VENOUS ACCESS DEVICE (PORT) INSERTION Right 9/20/2024    Procedure: INSERTION VENOUS ACCESS DEVICE;  Surgeon: Candy Call MD;  Location: [facility] [place];  Service: General;  Laterality: Right;    VENOUS ACCESS DEVICE (PORT) REMOVAL      VENOUS ACCESS DEVICE (PORT) REMOVAL Left 9/20/2024    Procedure: REMOVAL VENOUS ACCESS DEVICE;  Surgeon: Candy Call MD;  Location: [facility] [place];  Service: General;  Laterality: Left;     Social History     Socioeconomic History    Marital status: Single   Tobacco Use    Smoking status: Never     Passive exposure: Yes    Smokeless tobacco: Never    Tobacco comments:     Daily exposure to 2nd hand smoke   Vaping Use    Vaping status: Never Used   Substance and Sexual Activity    Alcohol use: Not Currently     Comment: DENIES    Drug use: Never    Sexual activity: Never     Family History   Problem Relation Age of Onset    Leukemia Father     Heart disease Other      Immunization History   Administered Date(s) Administered    COVID-19 (PFIZER) Purple Cap Monovalent 04/04/2021, 04/25/2021    Flu Vaccine Quad PF >36MO 11/04/2013, 10/24/2014, 11/02/2015, 10/13/2016, 10/19/2017    FluMist 2-49yrs (Nasal) 10/13/2016, 10/14/2020    Fluzone  >6mos 11/11/2024    Fluzone (or Fluarix & Flulaval for VFC) >6mos 11/04/2013, 10/24/2014, 11/02/2015, 10/13/2016, 10/19/2017, 11/23/2021, 10/11/2022, 10/13/2023    Fluzone Quad >6mos (Multi-dose) 11/04/2013, 11/02/2015, 10/19/2017, 10/29/2019    Hep B, Adolescent or Pediatric 11/24/1998, 01/14/1999, 06/02/1999    Influenza TIV (IM) 09/17/2009, 11/07/2018    Influenza, Unspecified 10/14/2020, 10/11/2022    MMR 08/19/1996    Pneumococcal Conjugate 20-Valent (PCV20) 09/14/2023    Pneumococcal Polysaccharide (PPSV23) 10/04/2012, 04/10/2014    influenza  "Split 10/02/2018       Tobacco Use: Medium Risk (3/7/2025)    Patient History     Smoking Tobacco Use: Never     Smokeless Tobacco Use: Never     Passive Exposure: Yes       Objective     Physical Exam  Well appearing patient sitting on table on RA in NAD  Anicteric sclerae, no rash on exposed skin  Respirations non-labored  Awake, alert, and oriented x 4. No gross neurologic deficit  Appropriate mood and affect      Vitals:    03/07/25 1010   BP: 119/73   Pulse: 61   Resp: 18   Temp: 97 °F (36.1 °C)   TempSrc: Temporal   SpO2: 99%   Weight: 65.4 kg (144 lb 2.9 oz)   Height: 139.7 cm (55\")   PainSc: 0-No pain           Wt Readings from Last 3 Encounters:   03/07/25 65.4 kg (144 lb 2.9 oz)   03/05/25 64 kg (141 lb 1.6 oz)   01/28/25 64.3 kg (141 lb 12.1 oz)        ECOG score: 0         ECOG: (0) Fully Active - Able to Carry On All Pre-disease Performance Without Restriction  Fall Risk Assessment was completed, and patient is at low risk for falls.  PHQ-9 Total Score:         The patient is  experiencing fatigue. Fatigue score: 3    PT/OT Functional Screening: PT fx screen : No needs identified  Speech Functional Screening: Speech fx screen : No needs identified  Rehab to be ordered: Rehab to be ordered : No needs identified        Result Review :  The following data was reviewed by: Kayden Leal MD on 03/07/25:  Lab Results   Component Value Date    HGB 11.1 (L) 03/07/2025    HCT 34.2 03/07/2025    MCV 91.7 03/07/2025     03/07/2025    WBC 7.62 03/07/2025    NEUTROABS 4.76 03/07/2025    LYMPHSABS 2.22 03/07/2025    MONOSABS 0.54 03/07/2025    EOSABS 0.04 03/07/2025    BASOSABS 0.03 03/07/2025     Lab Results   Component Value Date    GLUCOSE 82 09/20/2024    BUN 13 09/20/2024    CREATININE 1.00 09/20/2024     09/20/2024    K 4.2 09/20/2024     (H) 09/20/2024    CO2 18.3 (L) 09/20/2024    CALCIUM 9.3 09/20/2024    PROTEINTOT 7.2 09/20/2024    ALBUMIN 4.1 09/20/2024    BILITOT 0.5 09/20/2024 " "   ALKPHOS 111 09/20/2024    AST 21 09/20/2024    ALT 17 09/20/2024     Lab Results   Component Value Date     08/12/2019    Ferritin 164.10 (H) 12/13/2024    Folate >20.00 09/20/2024     Lab Results   Component Value Date    IRON 77 12/13/2024    LABIRON 14 (L) 12/13/2024    TRANSFERRIN 376 (H) 12/13/2024    TIBC 560 (H) 12/13/2024     Lab Results   Component Value Date     08/12/2019    FERRITIN 164.10 (H) 12/13/2024    NDFSOQIM97 850 09/20/2024    FOLATE >20.00 09/20/2024     No results found for: \"PSA\", \"CEA\", \"AFP\", \"\", \"\"    Labs personally reviewed. IgG levels above 500.  Anemia. Ferritin elevated.          Assessment and Plan:  Diagnoses and all orders for this visit:    1. Iron deficiency anemia, unspecified iron deficiency anemia type (Primary)    2. CVID (common variable immunodeficiency)    3. Other constipation    Other orders  -     sodium chloride 0.9 % infusion 250 mL  -     famotidine (PEPCID) injection 20 mg  -     diphenhydrAMINE (BENADRYL) capsule 25 mg  -     immune globulin (human) (GAMUNEX-C) 10 g infusion 100 mL  -     hydrocortisone sodium succinate (Solu-CORTEF) injection 100 mg  -     diphenhydrAMINE (BENADRYL) injection 50 mg  -     famotidine (PEPCID) injection 20 mg  -     meperidine (DEMEROL) injection 25 mg          CVID  Receives IVIG treatment every 6 weeks. Due for treatment on 3/11/25. IgG levels remain near 500, normally above.    Anemia  Anemia fluctuates and at mild level. Repeat iron labs 12/13/24 are stable with increased ferritin and low iron sat. She can continue oral iron once daily - taking over the counter currently as prescription dose intolerable. This may be contributing to constipation, so patient could hold if she wants especially as hgb is better.     Constipation  On linzess, but uses as needed. Recommend increase hydration and use of stool softeners and/or miralax.       I spent 20 minutes caring for Francine on this date of service. This time " includes time spent by me in the following activities:preparing for the visit, reviewing tests, obtaining and/or reviewing a separately obtained history, performing a medically appropriate examination and/or evaluation , counseling and educating the patient/family/caregiver, ordering medications, tests, or procedures, referring and communicating with other health care professionals , documenting information in the medical record, independently interpreting results and communicating that information with the patient/family/caregiver, and care coordination    Patient Follow Up: 6 weeks    Patient was given instructions and counseling regarding her condition or for health maintenance advice. Please see specific information pulled into the AVS if appropriate.

## 2025-03-11 ENCOUNTER — HOSPITAL ENCOUNTER (OUTPATIENT)
Dept: ONCOLOGY | Facility: HOSPITAL | Age: 35
Discharge: HOME | End: 2025-03-11
Admitting: INTERNAL MEDICINE
Payer: MEDICARE

## 2025-03-11 VITALS
HEIGHT: 55 IN | BODY MASS INDEX: 32.63 KG/M2 | SYSTOLIC BLOOD PRESSURE: 115 MMHG | RESPIRATION RATE: 18 BRPM | HEART RATE: 66 BPM | WEIGHT: 141 LBS | TEMPERATURE: 97.3 F | OXYGEN SATURATION: 100 % | DIASTOLIC BLOOD PRESSURE: 73 MMHG

## 2025-03-11 DIAGNOSIS — Z45.2 ENCOUNTER FOR ADJUSTMENT OR MANAGEMENT OF VASCULAR ACCESS DEVICE: ICD-10-CM

## 2025-03-11 DIAGNOSIS — D83.9 CVID (COMMON VARIABLE IMMUNODEFICIENCY): Primary | ICD-10-CM

## 2025-03-11 PROCEDURE — 96365 THER/PROPH/DIAG IV INF INIT: CPT

## 2025-03-11 PROCEDURE — 25010000002 HEPARIN LOCK FLUSH PER 10 UNITS: Performed by: INTERNAL MEDICINE

## 2025-03-11 PROCEDURE — 25810000003 SODIUM CHLORIDE 0.9 % SOLUTION: Performed by: INTERNAL MEDICINE

## 2025-03-11 PROCEDURE — 25010000002 IMMUNE GLOBULIN (HUMAN) 10 GM/100ML SOLUTION: Performed by: INTERNAL MEDICINE

## 2025-03-11 RX ORDER — SODIUM CHLORIDE 0.9 % (FLUSH) 0.9 %
20 SYRINGE (ML) INJECTION AS NEEDED
OUTPATIENT
Start: 2025-03-11

## 2025-03-11 RX ORDER — HEPARIN SODIUM (PORCINE) LOCK FLUSH IV SOLN 100 UNIT/ML 100 UNIT/ML
500 SOLUTION INTRAVENOUS AS NEEDED
OUTPATIENT
Start: 2025-03-11

## 2025-03-11 RX ORDER — SODIUM CHLORIDE 0.9 % (FLUSH) 0.9 %
20 SYRINGE (ML) INJECTION AS NEEDED
Status: DISCONTINUED | OUTPATIENT
Start: 2025-03-11 | End: 2025-03-12 | Stop reason: HOSPADM

## 2025-03-11 RX ORDER — HEPARIN SODIUM (PORCINE) LOCK FLUSH IV SOLN 100 UNIT/ML 100 UNIT/ML
500 SOLUTION INTRAVENOUS AS NEEDED
Status: DISCONTINUED | OUTPATIENT
Start: 2025-03-11 | End: 2025-03-12 | Stop reason: HOSPADM

## 2025-03-11 RX ORDER — SODIUM CHLORIDE 9 MG/ML
250 INJECTION, SOLUTION INTRAVENOUS ONCE
Status: COMPLETED | OUTPATIENT
Start: 2025-03-11 | End: 2025-03-11

## 2025-03-11 RX ADMIN — Medication 20 ML: at 11:49

## 2025-03-11 RX ADMIN — SODIUM CHLORIDE 250 ML: 0.9 INJECTION, SOLUTION INTRAVENOUS at 10:17

## 2025-03-11 RX ADMIN — IMMUNE GLOBULIN (HUMAN) 10 G: 10 INJECTION INTRAVENOUS; SUBCUTANEOUS at 10:18

## 2025-03-11 RX ADMIN — HEPARIN 500 UNITS: 100 SYRINGE at 11:49

## 2025-04-18 ENCOUNTER — HOSPITAL ENCOUNTER (OUTPATIENT)
Dept: ONCOLOGY | Facility: HOSPITAL | Age: 35
Discharge: HOME | End: 2025-04-18
Payer: MEDICARE

## 2025-04-18 ENCOUNTER — OFFICE VISIT (OUTPATIENT)
Dept: ONCOLOGY | Facility: HOSPITAL | Age: 35
End: 2025-04-18
Payer: MEDICARE

## 2025-04-18 VITALS
WEIGHT: 140.65 LBS | HEIGHT: 55 IN | TEMPERATURE: 97.3 F | SYSTOLIC BLOOD PRESSURE: 124 MMHG | RESPIRATION RATE: 18 BRPM | DIASTOLIC BLOOD PRESSURE: 65 MMHG | BODY MASS INDEX: 32.55 KG/M2 | OXYGEN SATURATION: 99 % | HEART RATE: 93 BPM

## 2025-04-18 DIAGNOSIS — D83.9 CVID (COMMON VARIABLE IMMUNODEFICIENCY): Primary | ICD-10-CM

## 2025-04-18 DIAGNOSIS — K59.00 CONSTIPATION, UNSPECIFIED CONSTIPATION TYPE: ICD-10-CM

## 2025-04-18 DIAGNOSIS — D50.8 OTHER IRON DEFICIENCY ANEMIA: ICD-10-CM

## 2025-04-18 DIAGNOSIS — D50.9 IRON DEFICIENCY ANEMIA, UNSPECIFIED IRON DEFICIENCY ANEMIA TYPE: ICD-10-CM

## 2025-04-18 DIAGNOSIS — Z45.2 ENCOUNTER FOR ADJUSTMENT OR MANAGEMENT OF VASCULAR ACCESS DEVICE: ICD-10-CM

## 2025-04-18 LAB
BASOPHILS # BLD AUTO: 0.05 10*3/MM3 (ref 0–0.2)
BASOPHILS NFR BLD AUTO: 0.5 % (ref 0–1.5)
DEPRECATED RDW RBC AUTO: 45.2 FL (ref 37–54)
EOSINOPHIL # BLD AUTO: 0.02 10*3/MM3 (ref 0–0.4)
EOSINOPHIL NFR BLD AUTO: 0.2 % (ref 0.3–6.2)
ERYTHROCYTE [DISTWIDTH] IN BLOOD BY AUTOMATED COUNT: 13.6 % (ref 12.3–15.4)
FERRITIN SERPL-MCNC: 207 NG/ML (ref 13–150)
HCT VFR BLD AUTO: 35.1 % (ref 34–46.6)
HGB BLD-MCNC: 11.6 G/DL (ref 12–15.9)
IGA1 MFR SER: <50 MG/DL (ref 70–400)
IGG1 SER-MCNC: 544 MG/DL (ref 700–1600)
IGM SERPL-MCNC: 82 MG/DL (ref 40–230)
IMM GRANULOCYTES # BLD AUTO: 0.03 10*3/MM3 (ref 0–0.05)
IMM GRANULOCYTES NFR BLD AUTO: 0.3 % (ref 0–0.5)
IRON 24H UR-MRATE: 71 MCG/DL (ref 37–145)
IRON SATN MFR SERPL: 14 % (ref 20–50)
LYMPHOCYTES # BLD AUTO: 2.49 10*3/MM3 (ref 0.7–3.1)
LYMPHOCYTES NFR BLD AUTO: 25 % (ref 19.6–45.3)
MCH RBC QN AUTO: 30 PG (ref 26.6–33)
MCHC RBC AUTO-ENTMCNC: 33 G/DL (ref 31.5–35.7)
MCV RBC AUTO: 90.7 FL (ref 79–97)
MONOCYTES # BLD AUTO: 0.65 10*3/MM3 (ref 0.1–0.9)
MONOCYTES NFR BLD AUTO: 6.5 % (ref 5–12)
NEUTROPHILS NFR BLD AUTO: 6.71 10*3/MM3 (ref 1.7–7)
NEUTROPHILS NFR BLD AUTO: 67.5 % (ref 42.7–76)
NRBC BLD AUTO-RTO: 0 /100 WBC (ref 0–0.2)
PLATELET # BLD AUTO: 240 10*3/MM3 (ref 140–450)
PMV BLD AUTO: 9.4 FL (ref 6–12)
RBC # BLD AUTO: 3.87 10*6/MM3 (ref 3.77–5.28)
TIBC SERPL-MCNC: 514 MCG/DL (ref 298–536)
TRANSFERRIN SERPL-MCNC: 345 MG/DL (ref 200–360)
WBC NRBC COR # BLD AUTO: 9.95 10*3/MM3 (ref 3.4–10.8)

## 2025-04-18 PROCEDURE — 84466 ASSAY OF TRANSFERRIN: CPT | Performed by: INTERNAL MEDICINE

## 2025-04-18 PROCEDURE — 36591 DRAW BLOOD OFF VENOUS DEVICE: CPT

## 2025-04-18 PROCEDURE — 83540 ASSAY OF IRON: CPT | Performed by: INTERNAL MEDICINE

## 2025-04-18 PROCEDURE — 82728 ASSAY OF FERRITIN: CPT | Performed by: INTERNAL MEDICINE

## 2025-04-18 PROCEDURE — 82784 ASSAY IGA/IGD/IGG/IGM EACH: CPT | Performed by: INTERNAL MEDICINE

## 2025-04-18 PROCEDURE — 25010000002 HEPARIN LOCK FLUSH PER 10 UNITS: Performed by: INTERNAL MEDICINE

## 2025-04-18 PROCEDURE — 85025 COMPLETE CBC W/AUTO DIFF WBC: CPT | Performed by: INTERNAL MEDICINE

## 2025-04-18 RX ORDER — HEPARIN SODIUM (PORCINE) LOCK FLUSH IV SOLN 100 UNIT/ML 100 UNIT/ML
500 SOLUTION INTRAVENOUS AS NEEDED
Status: DISCONTINUED | OUTPATIENT
Start: 2025-04-18 | End: 2025-04-19 | Stop reason: HOSPADM

## 2025-04-18 RX ORDER — MEPERIDINE HYDROCHLORIDE 25 MG/ML
25 INJECTION INTRAMUSCULAR; INTRAVENOUS; SUBCUTANEOUS
OUTPATIENT
Start: 2025-04-22

## 2025-04-18 RX ORDER — DIPHENHYDRAMINE HYDROCHLORIDE 50 MG/ML
50 INJECTION, SOLUTION INTRAMUSCULAR; INTRAVENOUS AS NEEDED
OUTPATIENT
Start: 2025-04-22

## 2025-04-18 RX ORDER — SODIUM CHLORIDE 9 MG/ML
250 INJECTION, SOLUTION INTRAVENOUS ONCE
OUTPATIENT
Start: 2025-04-22

## 2025-04-18 RX ORDER — HEPARIN SODIUM (PORCINE) LOCK FLUSH IV SOLN 100 UNIT/ML 100 UNIT/ML
500 SOLUTION INTRAVENOUS AS NEEDED
OUTPATIENT
Start: 2025-04-18

## 2025-04-18 RX ORDER — DIPHENHYDRAMINE HCL 25 MG
25 CAPSULE ORAL ONCE
OUTPATIENT
Start: 2025-04-22

## 2025-04-18 RX ORDER — FAMOTIDINE 10 MG/ML
20 INJECTION, SOLUTION INTRAVENOUS AS NEEDED
OUTPATIENT
Start: 2025-04-22

## 2025-04-18 RX ORDER — FAMOTIDINE 10 MG/ML
20 INJECTION, SOLUTION INTRAVENOUS ONCE
OUTPATIENT
Start: 2025-04-22

## 2025-04-18 RX ORDER — SODIUM CHLORIDE 0.9 % (FLUSH) 0.9 %
20 SYRINGE (ML) INJECTION AS NEEDED
Status: DISCONTINUED | OUTPATIENT
Start: 2025-04-18 | End: 2025-04-19 | Stop reason: HOSPADM

## 2025-04-18 RX ORDER — SODIUM CHLORIDE 0.9 % (FLUSH) 0.9 %
20 SYRINGE (ML) INJECTION AS NEEDED
OUTPATIENT
Start: 2025-04-18

## 2025-04-18 RX ADMIN — Medication 20 ML: at 09:21

## 2025-04-18 RX ADMIN — HEPARIN 500 UNITS: 100 SYRINGE at 09:21

## 2025-04-18 NOTE — PROGRESS NOTES
Chief Complaint/Reason for Referral:  FOLLOW UP 1    Arnie Rucker MD Kindervater, Kasey, APRN    Subjective    History of Present Illness  Ms. Francine Preciado presents with her mother for lab check for IVIG treatment.  She will get this on Tuesday.  She has been tolerating these treatments well.  No recent infections. No sinus issues. No fevers or chills.  No increased bleeding.  Constipation managed with stool softener. Off linzess. On OTC iron supplement and tolerating.  No acute concerns today.    Last IgG level of 515 on 3/7/25.    Repeat iron labs 12/13/24 with elevated ferritin of 164 and low iron sat of 14%.     Hemoglobin 11.6 g/dL today. WBC normal at 9.95 plts 240K.        Oncology/Hematology History Overview Note   CVID:   -Diagnosed by Immunologist 8/20/12  -presented with frequent/severe infections          No history exists.       Review of Systems   Constitutional:  Positive for fatigue. Negative for appetite change, diaphoresis, fever, unexpected weight gain and unexpected weight loss.   HENT:  Negative for hearing loss, mouth sores, sore throat, swollen glands, trouble swallowing and voice change.    Eyes:  Negative for blurred vision.   Respiratory:  Negative for cough, shortness of breath and wheezing.    Cardiovascular:  Negative for chest pain and palpitations.   Gastrointestinal:  Negative for abdominal pain, blood in stool, constipation, diarrhea, nausea and vomiting.   Endocrine: Negative for cold intolerance and heat intolerance.   Genitourinary:  Negative for difficulty urinating, dysuria, frequency, hematuria and urinary incontinence.   Musculoskeletal:  Negative for arthralgias, back pain and myalgias.   Skin:  Negative for rash, skin lesions and wound.   Neurological:  Negative for dizziness, seizures, weakness, numbness and headache.   Hematological:  Does not bruise/bleed easily.   Psychiatric/Behavioral:  Negative for depressed mood. The patient is not nervous/anxious.    All other  systems reviewed and are negative.      Current Outpatient Medications on File Prior to Visit   Medication Sig Dispense Refill    atenolol (TENORMIN) 25 MG tablet Take 1 tablet by mouth Daily. 90 tablet 1    Cranberry 50 MG chewable tablet Chew 2 (Two) Times a Day.      Cyanocobalamin (VITAMIN B 12 PO) Take  by mouth.      EPINEPHrine (EPIPEN) 0.3 MG/0.3ML solution auto-injector injection 0.3 mL.      famotidine (PEPCID) 10 MG tablet Take 2 tablets by mouth 2 (Two) Times a Day As Needed.      fenofibrate 160 MG tablet Take 1 tablet by mouth Every Night. 90 tablet 3    ferrous gluconate (FERGON) 324 MG tablet Take 1 tablet by mouth 2 (Two) Times a Day. Gummies      fluticasone (FLONASE) 50 MCG/ACT nasal spray 2 sprays into the nostril(s) as directed by provider Daily. (Patient taking differently: Administer 2 sprays into the nostril(s) as directed by provider Daily As Needed.) 16 g 11    HYDROcodone-acetaminophen (NORCO) 5-325 MG per tablet Take 1 tablet by mouth Every 6 (Six) Hours As Needed (Pain). 7 tablet 0    levocetirizine (XYZAL) 5 MG tablet Take 1 tablet by mouth Every Evening. 90 tablet 1    levothyroxine (SYNTHROID, LEVOTHROID) 50 MCG tablet Take 1 tablet by mouth Daily. 90 tablet 1    lidocaine-prilocaine (EMLA) 2.5-2.5 % cream Apply 1 Application topically to the appropriate area as directed Take As Directed. Apply to port site 30 minutes prior to use 30 g 1    linaclotide (Linzess) 145 MCG capsule capsule Take 1 capsule by mouth Every Morning Before Breakfast. (Patient taking differently: Take 1 capsule by mouth Daily As Needed.) 90 capsule 1    montelukast (SINGULAIR) 10 MG tablet Take 1 tablet by mouth Every Night. 90 tablet 1    multivitamin with minerals tablet tablet Take 1 tablet by mouth Daily.      Norgestimate-Eth Estradiol (Tri-Lo-Rosy) 0.18/0.215/0.25 MG-25 MCG tablet TAKE 1 TABLET BY MOUTH DAILY 84 tablet 3    ondansetron (Zofran) 4 MG tablet Take 1 tablet by mouth Every 8 (Eight) Hours As  Needed for Nausea or Vomiting. 90 tablet 1    promethazine-dextromethorphan (PROMETHAZINE-DM) 6.25-15 MG/5ML syrup Take 5 mL by mouth 4 (Four) Times a Day As Needed for Cough. 240 mL 0    rosuvastatin (CRESTOR) 10 MG tablet Take 1 tablet by mouth Daily. 90 tablet 1    Tobramycin-Dexamethasone (TobraDex ST) 0.3-0.05 % suspension 3-4 drops in left ear twice a day 5 mL 3    vitamin C (ASCORBIC ACID) 250 MG tablet Take 1 tablet by mouth Daily.       Current Facility-Administered Medications on File Prior to Visit   Medication Dose Route Frequency Provider Last Rate Last Admin    cyanocobalamin injection 1,000 mcg  1,000 mcg Intramuscular Q28 Days Arnie Rucker MD   1,000 mcg at 02/29/24 1414    heparin injection 500 Units  500 Units Intravenous PRN Kayden Leal MD   500 Units at 04/18/25 0921    sodium chloride 0.9 % flush 20 mL  20 mL Intravenous PRN Kayden Leal MD   20 mL at 04/18/25 0921       Allergies   Allergen Reactions    Cefuroxime Axetil Unknown - Low Severity    Neosporin [Bacitracin-Polymyxin B] Unknown - Low Severity     Past Medical History:   Diagnosis Date    Acid reflux     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 Leal    Disease of thyroid gland     H/O Pneumonia     Heart murmur     Hypertension     follows with PCP    Jackie make-up syndrome     Mitral valve prolapse     no current issues    Mixed conductive and sensorineural hearing loss of both ears     Otorrhea, left     PONV (postoperative nausea and vomiting)     Recurrent otitis media     Skin disease     PSORIASIS/ECXEMA     Past Surgical History:   Procedure Laterality Date    APPENDECTOMY      EXTRACORPOREAL CIRCULATION      at birth    INNER EAR SURGERY      multi    MULTIPLE TOOTH EXTRACTIONS       PORTACATH PLACEMENT      port placement x2    PORTACATH PLACEMENT      9/20/2024    TOENAIL EXCISION      TUNNELED VENOUS PORT PLACEMENT      approx 2019    VENOUS ACCESS DEVICE (PORT) INSERTION Right 9/20/2024    Procedure: INSERTION VENOUS ACCESS DEVICE;  Surgeon: Candy Call MD;  Location: [facility];  Service: General;  Laterality: Right;    VENOUS ACCESS DEVICE (PORT) REMOVAL      VENOUS ACCESS DEVICE (PORT) REMOVAL Left 9/20/2024    Procedure: REMOVAL VENOUS ACCESS DEVICE;  Surgeon: Candy Call MD;  Location: [facility];  Service: General;  Laterality: Left;     Social History     Socioeconomic History    Marital status: Single   Tobacco Use    Smoking status: Never     Passive exposure: Yes    Smokeless tobacco: Never    Tobacco comments:     Daily exposure to 2nd hand smoke   Vaping Use    Vaping status: Never Used   Substance and Sexual Activity    Alcohol use: Not Currently     Comment: DENIES    Drug use: Never    Sexual activity: Never     Family History   Problem Relation Age of Onset    Leukemia Father     Heart disease Other      Immunization History   Administered Date(s) Administered    COVID-19 (PFIZER) Purple Cap Monovalent 04/04/2021, 04/25/2021    Flu Vaccine Quad PF >36MO 11/04/2013, 10/24/2014, 11/02/2015, 10/13/2016, 10/19/2017    FluMist 2-49yrs (Nasal) 10/13/2016, 10/14/2020    Fluzone  >6mos 11/11/2024    Fluzone (or Fluarix & Flulaval for VFC) >6mos 11/04/2013, 10/24/2014, 11/02/2015, 10/13/2016, 10/19/2017, 11/23/2021, 10/11/2022, 10/13/2023    Fluzone Quad >6mos (Multi-dose) 11/04/2013, 11/02/2015, 10/19/2017, 10/29/2019    Hep B, Adolescent or Pediatric 11/24/1998, 01/14/1999, 06/02/1999    Influenza TIV (IM) 09/17/2009, 11/07/2018    Influenza, Unspecified 10/14/2020, 10/11/2022    MMR 08/19/1996    Pneumococcal Conjugate 20-Valent (PCV20) 09/14/2023    Pneumococcal Polysaccharide (PPSV23) 10/04/2012, 04/10/2014    influenza Split 10/02/2018       Tobacco Use:  "Medium Risk (4/18/2025)    Patient History     Smoking Tobacco Use: Never     Smokeless Tobacco Use: Never     Passive Exposure: Yes       Objective     Physical Exam  Well appearing patient sitting on table on RA in NAD  Anicteric sclerae, no rash on exposed skin  Respirations non-labored  Awake, alert, and oriented x 4. No gross neurologic deficit  Appropriate mood and affect      Vitals:    04/18/25 1014   BP: 124/65   Pulse: 93   Resp: 18   Temp: 97.3 °F (36.3 °C)   TempSrc: Temporal   SpO2: 99%   Weight: 63.8 kg (140 lb 10.5 oz)   Height: 139.7 cm (55\")   PainSc: 0-No pain             Wt Readings from Last 3 Encounters:   03/11/25 64 kg (141 lb)   03/07/25 65.4 kg (144 lb 2.9 oz)   03/05/25 64 kg (141 lb 1.6 oz)                  ECOG: (0) Fully Active - Able to Carry On All Pre-disease Performance Without Restriction  Fall Risk Assessment was completed, and patient is at low risk for falls.  PHQ-9 Total Score:         The patient is  experiencing fatigue. Fatigue score: 3    PT/OT Functional Screening: PT fx screen : No needs identified  Speech Functional Screening: Speech fx screen : No needs identified  Rehab to be ordered: Rehab to be ordered : No needs identified        Result Review :  The following data was reviewed by: Kayden Leal MD on 04/18/25:  Lab Results   Component Value Date    HGB 11.6 (L) 04/18/2025    HCT 35.1 04/18/2025    MCV 90.7 04/18/2025     04/18/2025    WBC 9.95 04/18/2025    NEUTROABS 6.71 04/18/2025    LYMPHSABS 2.49 04/18/2025    MONOSABS 0.65 04/18/2025    EOSABS 0.02 04/18/2025    BASOSABS 0.05 04/18/2025     Lab Results   Component Value Date    GLUCOSE 82 09/20/2024    BUN 13 09/20/2024    CREATININE 1.00 09/20/2024     09/20/2024    K 4.2 09/20/2024     (H) 09/20/2024    CO2 18.3 (L) 09/20/2024    CALCIUM 9.3 09/20/2024    PROTEINTOT 7.2 09/20/2024    ALBUMIN 4.1 09/20/2024    BILITOT 0.5 09/20/2024    ALKPHOS 111 09/20/2024    AST 21 09/20/2024    " "ALT 17 09/20/2024     Lab Results   Component Value Date     08/12/2019    Ferritin 164.10 (H) 12/13/2024    Folate >20.00 09/20/2024     Lab Results   Component Value Date    IRON 77 12/13/2024    LABIRON 14 (L) 12/13/2024    TRANSFERRIN 376 (H) 12/13/2024    TIBC 560 (H) 12/13/2024     Lab Results   Component Value Date     08/12/2019    FERRITIN 164.10 (H) 12/13/2024    IZVFOHQL96 850 09/20/2024    FOLATE >20.00 09/20/2024     No results found for: \"PSA\", \"CEA\", \"AFP\", \"\", \"\"    Labs personally reviewed. IgG levels above 500.  Anemia mild.          Assessment and Plan:  Diagnoses and all orders for this visit:    1. CVID (common variable immunodeficiency) (Primary)    2. Other iron deficiency anemia  -     Ferritin; Future  -     Iron Profile; Future    3. Iron deficiency anemia, unspecified iron deficiency anemia type    4. Constipation, unspecified constipation type    Other orders  -     sodium chloride 0.9 % infusion 250 mL  -     famotidine (PEPCID) injection 20 mg  -     diphenhydrAMINE (BENADRYL) capsule 25 mg  -     immune globulin (human) (GAMUNEX-C) 10 g infusion 100 mL  -     hydrocortisone sodium succinate (Solu-CORTEF) injection 100 mg  -     diphenhydrAMINE (BENADRYL) injection 50 mg  -     famotidine (PEPCID) injection 20 mg  -     meperidine (DEMEROL) injection 25 mg          CVID  Receives IVIG treatment every 6 weeks. Due for treatment on 4/22/25. IgG levels remain above 500.    Anemia  Anemia fluctuates and at mild level. Repeat iron labs 12/13/24 are stable with increased ferritin and low iron sat. She can continue oral iron once daily - taking over the counter currently as prescription dose intolerable. This may be contributing to constipation, so patient could hold if she wants, especially as hgb is better. Repeat levels in 6 weeks.     Constipation  Better. Off linzess. Recommend increase hydration and use of stool softeners and/or miralax.       I spent 20 minutes " caring for Francine on this date of service. This time includes time spent by me in the following activities:preparing for the visit, reviewing tests, obtaining and/or reviewing a separately obtained history, performing a medically appropriate examination and/or evaluation , counseling and educating the patient/family/caregiver, ordering medications, tests, or procedures, referring and communicating with other health care professionals , documenting information in the medical record, independently interpreting results and communicating that information with the patient/family/caregiver, and care coordination    Patient Follow Up: 6 weeks    Patient was given instructions and counseling regarding her condition or for health maintenance advice. Please see specific information pulled into the AVS if appropriate.

## 2025-04-22 ENCOUNTER — HOSPITAL ENCOUNTER (OUTPATIENT)
Dept: ONCOLOGY | Facility: HOSPITAL | Age: 35
Discharge: HOME | End: 2025-04-22
Payer: MEDICARE

## 2025-04-22 VITALS
HEART RATE: 70 BPM | OXYGEN SATURATION: 100 % | RESPIRATION RATE: 16 BRPM | TEMPERATURE: 98.2 F | BODY MASS INDEX: 32.69 KG/M2 | DIASTOLIC BLOOD PRESSURE: 70 MMHG | WEIGHT: 140.65 LBS | SYSTOLIC BLOOD PRESSURE: 116 MMHG

## 2025-04-22 DIAGNOSIS — Z45.2 ENCOUNTER FOR ADJUSTMENT OR MANAGEMENT OF VASCULAR ACCESS DEVICE: ICD-10-CM

## 2025-04-22 DIAGNOSIS — D83.9 CVID (COMMON VARIABLE IMMUNODEFICIENCY): Primary | ICD-10-CM

## 2025-04-22 PROCEDURE — 96365 THER/PROPH/DIAG IV INF INIT: CPT

## 2025-04-22 PROCEDURE — 25010000002 HEPARIN LOCK FLUSH PER 10 UNITS: Performed by: INTERNAL MEDICINE

## 2025-04-22 PROCEDURE — 25810000003 SODIUM CHLORIDE 0.9 % SOLUTION: Performed by: INTERNAL MEDICINE

## 2025-04-22 PROCEDURE — 25010000002 IMMUNE GLOBULIN (HUMAN) 10 GM/100ML SOLUTION: Performed by: INTERNAL MEDICINE

## 2025-04-22 RX ORDER — SODIUM CHLORIDE 0.9 % (FLUSH) 0.9 %
20 SYRINGE (ML) INJECTION AS NEEDED
OUTPATIENT
Start: 2025-04-22

## 2025-04-22 RX ORDER — SODIUM CHLORIDE 0.9 % (FLUSH) 0.9 %
20 SYRINGE (ML) INJECTION AS NEEDED
Status: DISCONTINUED | OUTPATIENT
Start: 2025-04-22 | End: 2025-04-23 | Stop reason: HOSPADM

## 2025-04-22 RX ORDER — SODIUM CHLORIDE 9 MG/ML
250 INJECTION, SOLUTION INTRAVENOUS ONCE
Status: COMPLETED | OUTPATIENT
Start: 2025-04-22 | End: 2025-04-22

## 2025-04-22 RX ORDER — HEPARIN SODIUM (PORCINE) LOCK FLUSH IV SOLN 100 UNIT/ML 100 UNIT/ML
500 SOLUTION INTRAVENOUS AS NEEDED
OUTPATIENT
Start: 2025-04-22

## 2025-04-22 RX ORDER — HEPARIN SODIUM (PORCINE) LOCK FLUSH IV SOLN 100 UNIT/ML 100 UNIT/ML
500 SOLUTION INTRAVENOUS AS NEEDED
Status: DISCONTINUED | OUTPATIENT
Start: 2025-04-22 | End: 2025-04-23 | Stop reason: HOSPADM

## 2025-04-22 RX ADMIN — IMMUNE GLOBULIN (HUMAN) 10 G: 10 INJECTION INTRAVENOUS; SUBCUTANEOUS at 10:46

## 2025-04-22 RX ADMIN — Medication 20 ML: at 12:31

## 2025-04-22 RX ADMIN — SODIUM CHLORIDE 250 ML: 9 INJECTION, SOLUTION INTRAVENOUS at 10:36

## 2025-04-22 RX ADMIN — HEPARIN 500 UNITS: 100 SYRINGE at 12:31

## 2025-04-22 NOTE — NURSING NOTE
Patient states she took premedications prior to arrival-- premeds held in clinic due to already being taken prior to arrival

## 2025-05-12 ENCOUNTER — OFFICE VISIT (OUTPATIENT)
Dept: OTOLARYNGOLOGY | Facility: CLINIC | Age: 35
End: 2025-05-12
Payer: MEDICARE

## 2025-05-12 VITALS
HEIGHT: 55 IN | HEART RATE: 69 BPM | BODY MASS INDEX: 32.54 KG/M2 | DIASTOLIC BLOOD PRESSURE: 66 MMHG | SYSTOLIC BLOOD PRESSURE: 100 MMHG | OXYGEN SATURATION: 100 % | WEIGHT: 140.6 LBS

## 2025-05-12 DIAGNOSIS — H70.11 CHRONIC MASTOIDITIS OF RIGHT SIDE: Primary | ICD-10-CM

## 2025-05-12 DIAGNOSIS — H90.6 MIXED HEARING LOSS, BILATERAL: ICD-10-CM

## 2025-05-12 DIAGNOSIS — H72.92 TYMPANIC MEMBRANE PERFORATION, LEFT: ICD-10-CM

## 2025-05-12 RX ORDER — OFLOXACIN 3 MG/ML
4 SOLUTION/ DROPS OPHTHALMIC 2 TIMES DAILY
Qty: 10 ML | Refills: 2 | Status: SHIPPED | OUTPATIENT
Start: 2025-05-12 | End: 2025-05-19

## 2025-05-12 NOTE — PROGRESS NOTES
Patient Name: Francine Preciado   Visit Date: 05/12/2025   Patient ID: [id number]  Provider: Jose Elias Jorgensen MD    Sex: female  [place] [facility]   YOB: 1990  Location Address: [address] [place] [address], [address],?[place]?[address]    Primary Care Provider Aggie Chance APRN  Location Phone: [phone]    Referring Provider: No ref. provider found        Chief Complaint  Follow-up    History of Present Illness  Francine Preciado is a 34 y.o. 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 She underwent an audiogram on 5/11/17 which revealed bilateral moderate to profound mixed hearing loss. Speech discrimination was 100% bilaterally at 90 dB. Tympanograms were type B bilaterally.  She wears bilateral behind-the-ear hearing aids.    She returns today for routine follow-up.  She was last seen on 11/11/2024 at which time her right mastoid cavity was debrided and the left tympanic membrane perforation appeared dry and stable.  Her mother tells me they have had to use ototopicals once or twice since her last appointment.  She continues to wear her hearing aids and does not feel as though her hearing has changed.  They are looking into updated hearing aids as her last set was obtained in 2011 but is still working well.  She denies any otalgia.  She is still receiving IVIG.  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 Leal    Disease of thyroid gland     H/O Pneumonia     Heart murmur     Hypertension     follows with PCP     Kabuki make-up syndrome     Mitral valve prolapse     no current issues    Mixed conductive and sensorineural hearing loss of both ears     Otorrhea, left     PONV (postoperative nausea and vomiting)     Recurrent otitis media     Skin disease     PSORIASIS/ECXEMA       Past Surgical History:   Procedure Laterality Date    APPENDECTOMY      EXTRACORPOREAL CIRCULATION      at birth    INNER EAR SURGERY      multi    MULTIPLE TOOTH EXTRACTIONS      PORTACATH PLACEMENT      port placement x2    PORTACATH PLACEMENT      9/20/2024    TOENAIL EXCISION      TUNNELED VENOUS PORT PLACEMENT      approx 2019    VENOUS ACCESS DEVICE (PORT) INSERTION Right 9/20/2024    Procedure: INSERTION VENOUS ACCESS DEVICE;  Surgeon: Candy Call MD;  [facility]: [facility];  Service: General;  Laterality: Right;    VENOUS ACCESS DEVICE (PORT) REMOVAL      VENOUS ACCESS DEVICE (PORT) REMOVAL Left 9/20/2024    Procedure: REMOVAL VENOUS ACCESS DEVICE;  Surgeon: Candy Call MD;  Location: [facility];  Service: General;  Laterality: Left;         Current Outpatient Medications:     atenolol (TENORMIN) 25 MG tablet, Take 1 tablet by mouth Daily., Disp: 90 tablet, Rfl: 1    Cranberry 50 MG chewable tablet, Chew 2 (Two) Times a Day., Disp: , Rfl:     Cyanocobalamin (VITAMIN B 12 PO), Take  by mouth., Disp: , Rfl:     EPINEPHrine (EPIPEN) 0.3 MG/0.3ML solution auto-injector injection, 0.3 mL., Disp: , Rfl:     famotidine (PEPCID) 10 MG tablet, Take 2 tablets by mouth 2 (Two) Times a Day As Needed., Disp: , Rfl:     fenofibrate 160 MG tablet, Take 1 tablet by mouth Every Night., Disp: 90 tablet, Rfl: 3    ferrous gluconate (FERGON) 324 MG tablet, Take 1 tablet by mouth 2 (Two) Times a Day. Gummies, Disp: , Rfl:     fluticasone (FLONASE) 50 MCG/ACT nasal spray, 2 sprays into the nostril(s) as directed by provider Daily. (Patient taking differently: Administer 2 sprays into the nostril(s) as directed by provider Daily As Needed.),  Disp: 16 g, Rfl: 11    HYDROcodone-acetaminophen (NORCO) 5-325 MG per tablet, Take 1 tablet by mouth Every 6 (Six) Hours As Needed (Pain)., Disp: 7 tablet, Rfl: 0    levocetirizine (XYZAL) 5 MG tablet, Take 1 tablet by mouth Every Evening., Disp: 90 tablet, Rfl: 1    levothyroxine (SYNTHROID, LEVOTHROID) 50 MCG tablet, Take 1 tablet by mouth Daily., Disp: 90 tablet, Rfl: 1    lidocaine-prilocaine (EMLA) 2.5-2.5 % cream, Apply 1 Application topically to the appropriate area as directed Take As Directed. Apply to port site 30 minutes prior to use, Disp: 30 g, Rfl: 1    linaclotide (Linzess) 145 MCG capsule capsule, Take 1 capsule by mouth Every Morning Before Breakfast. (Patient taking differently: Take 1 capsule by mouth Daily As Needed.), Disp: 90 capsule, Rfl: 1    montelukast (SINGULAIR) 10 MG tablet, Take 1 tablet by mouth Every Night., Disp: 90 tablet, Rfl: 1    multivitamin with minerals tablet tablet, Take 1 tablet by mouth Daily., Disp: , Rfl:     Norgestimate-Eth Estradiol (Tri-Lo-Rosy) 0.18/0.215/0.25 MG-25 MCG tablet, TAKE 1 TABLET BY MOUTH DAILY, Disp: 84 tablet, Rfl: 3    ondansetron (Zofran) 4 MG tablet, Take 1 tablet by mouth Every 8 (Eight) Hours As Needed for Nausea or Vomiting., Disp: 90 tablet, Rfl: 1    promethazine-dextromethorphan (PROMETHAZINE-DM) 6.25-15 MG/5ML syrup, Take 5 mL by mouth 4 (Four) Times a Day As Needed for Cough., Disp: 240 mL, Rfl: 0    rosuvastatin (CRESTOR) 10 MG tablet, Take 1 tablet by mouth Daily., Disp: 90 tablet, Rfl: 1    vitamin C (ASCORBIC ACID) 250 MG tablet, Take 1 tablet by mouth Daily., Disp: , Rfl:     ofloxacin (Ocuflox) 0.3 % ophthalmic solution, Administer 4 drops into ear(s) as directed by provider 2 (Two) Times a Day for 7 days., Disp: 10 mL, Rfl: 2    Current Facility-Administered Medications:     cyanocobalamin injection 1,000 mcg, 1,000 mcg, Intramuscular, Q28 Days, Arnie Rucker MD, 1,000 mcg at 02/29/24 1414     Allergies   Allergen Reactions     "Cefuroxime Axetil Unknown - Low Severity    Neosporin [Bacitracin-Polymyxin B] Unknown - Low Severity       Social History     Tobacco Use    Smoking status: Never     Passive exposure: Yes    Smokeless tobacco: Never    Tobacco comments:     Daily exposure to 2nd hand smoke   Vaping Use    Vaping status: Never Used   Substance Use Topics    Alcohol use: Not Currently     Comment: DENIES    Drug use: Never        Objective     Vital Signs:   /66   Pulse 69   Ht 139.7 cm (55\")   Wt 63.8 kg (140 lb 9.6 oz)   SpO2 100%   BMI 32.68 kg/m²       Physical Exam    General: Well developed, well nourished patient of stated age in no acute distress. Voice is strong and clear.   Head: Normocephalic and atraumatic.  Face: No lesions.  Bilateral parotid and submandibular glands are unremarkable.  Stensen's and Warthin's ducts are productive of clear saliva bilaterally.  House-Brackmann I/VI     bilaterally.   muscles and temporomandibular joint nontender to palpation.  No TMJ crepitus.  Eyes: PERRLA, sclerae anicteric, no conjunctival injection. Extra ocular movements are intact and full. No nystagmus.   Ears: Auricles are normal in appearance.  Left external auditory canal with mild cerumen.  Left tympanic membrane with perforation that is stable and dry.  Right mastoid cavity is dry with moderate buildup of squamous and desiccated purulent debris which was removed using the operating microscope and alligator forceps revealing healthy appearing skin.  Hearing normal to conversational voice.   Nose: External nose is normal in appearance. Bilateral nares are patent with normal appearing mucosa. Septum midline. Turbinates are unremarkable. No lesions.   Oral Cavity: Lips are normal in appearance. Oral mucosa is unremarkable. Gingiva is unremarkable.  Partial dentition. Tongue is unremarkable with good movement. Hard palate is unremarkable.   Oropharynx: Soft palate is unremarkable with full movement. Uvula is " unremarkable. Bilateral tonsils are unremarkable. Posterior oropharynx is unremarkable.    Larynx and hypopharynx: Deferred secondary to gag reflex.  Neck: Supple.  No mass.  Nontender to palpation.  Trachea midline. Thyroid normal size and without nodules to palpation.   Lymphatic: No lymphadenopathy upon palpation.   Psychiatric: Appropriate affect, cooperative   Neurologic: Oriented x 3, strength symmetric in all extremities, Cranial Nerves II-XII are grossly intact to confrontation   Skin: Warm and dry. No rashes.    Procedures           Result Review :               Assessment and Plan    Diagnoses and all orders for this visit:    1. Chronic mastoiditis of right side (Primary)  -     ofloxacin (Ocuflox) 0.3 % ophthalmic solution; Administer 4 drops into ear(s) as directed by provider 2 (Two) Times a Day for 7 days.  Dispense: 10 mL; Refill: 2    2. Tympanic membrane perforation, left    3. Mixed hearing loss, bilateral        Impressions and findings were discussed.  Currently, she returns today for follow-up of her ears.  Examination today reveals moderate buildup of squamous debris and thick desiccated purulence in the right mastoid cavity which was removed using the operating microscope and alligator forceps.  Her left tympanic membranes perforation is stable and dry.  She was encouraged to pursue updated hearing aids although she seems to do well with her set from 2011.  She was given ample time to ask questions, all of which were answered to her satisfaction.  She will follow-up in 6 months or sooner if needed.  Follow Up   Return in about 6 months (around 11/12/2025).  Patient was given instructions and counseling regarding her condition or for health maintenance advice. Please see specific information pulled into the AVS if appropriate.

## 2025-05-13 ENCOUNTER — TELEPHONE (OUTPATIENT)
Dept: OTOLARYNGOLOGY | Facility: CLINIC | Age: 35
End: 2025-05-13

## 2025-05-13 NOTE — TELEPHONE ENCOUNTER
Provider: DR ETIENNE    Caller: EDMOND FELTON    Relationship to Patient: SELF    Reason for Call: MOM TOOK PATIENT TO [facility] YESTERDAY FOR UPDATED HEARING AIDS PHONE([phone]) FOUND A CHOLESTEATOMA AND EAR WAX . THE PATIENT WAS TOLD THEY COULD NOT GET NEW HEARING AIDS UNTIL THIS WAS RESOLVED. SHE IS REQUESTING WE REACH OUT TO [facility] FOR THAT REPORT FOR DR ETIENNE TO REVIEW AND THEN SEE ABOUT A SOONER APPT THAN 6 MONTHS.     BEST NUMBER IS HOME NUMBER [phone] YOU MAY LEAVE A MESSAGE IF NO ANSWER     When was the patient last seen: 05-12-25

## 2025-05-28 ENCOUNTER — OFFICE VISIT (OUTPATIENT)
Dept: OTOLARYNGOLOGY | Facility: CLINIC | Age: 35
End: 2025-05-28
Payer: MEDICARE

## 2025-05-28 VITALS
HEART RATE: 80 BPM | TEMPERATURE: 97.4 F | OXYGEN SATURATION: 98 % | SYSTOLIC BLOOD PRESSURE: 126 MMHG | DIASTOLIC BLOOD PRESSURE: 83 MMHG

## 2025-05-28 DIAGNOSIS — H72.92 TYMPANIC MEMBRANE PERFORATION, LEFT: ICD-10-CM

## 2025-05-28 DIAGNOSIS — H61.22 IMPACTED CERUMEN OF LEFT EAR: ICD-10-CM

## 2025-05-28 DIAGNOSIS — H90.6 MIXED HEARING LOSS, BILATERAL: ICD-10-CM

## 2025-05-28 DIAGNOSIS — H70.11 CHRONIC MASTOIDITIS OF RIGHT SIDE: Primary | ICD-10-CM

## 2025-05-28 PROCEDURE — 1160F RVW MEDS BY RX/DR IN RCRD: CPT | Performed by: OTOLARYNGOLOGY

## 2025-05-28 PROCEDURE — 3079F DIAST BP 80-89 MM HG: CPT | Performed by: OTOLARYNGOLOGY

## 2025-05-28 PROCEDURE — 3074F SYST BP LT 130 MM HG: CPT | Performed by: OTOLARYNGOLOGY

## 2025-05-28 PROCEDURE — 99212 OFFICE O/P EST SF 10 MIN: CPT | Performed by: OTOLARYNGOLOGY

## 2025-05-28 PROCEDURE — 69210 REMOVE IMPACTED EAR WAX UNI: CPT | Performed by: OTOLARYNGOLOGY

## 2025-05-28 PROCEDURE — 1159F MED LIST DOCD IN RCRD: CPT | Performed by: OTOLARYNGOLOGY

## 2025-05-28 NOTE — PROGRESS NOTES
Patient Name: Francine Preciado   Visit Date: 05/28/2025   Patient ID: [id number]  Provider: Jose Elias Jorgensen MD    Sex: female  Location: [facility]   YOB: 1990  Location Address: [address] [place] [address], [address],?[place]?[id number]    Primary Care Provider Aggie Chance APRN  Location Phone: [phone]    Referring Provider: No ref. provider found        Chief Complaint  Check ears    History of Present Illness  Francine Preciado is a 34 y.o. 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 She underwent an audiogram on 5/11/17 which revealed bilateral moderate to profound mixed hearing loss. Speech discrimination was 100% bilaterally at 90 dB. Tympanograms were type B bilaterally.  She wears bilateral behind-the-ear hearing aids.    She returns today for routine follow-up.  She was last seen on 5/12/2025 at which time her right mastoid cavity was debrided and the left tympanic membrane perforation appeared dry and stable.  Evidently, they were told that she needed her ears cleaned again prior to fitting her with new hearing aids and there was concern about cholesteatoma in her right ear.    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 Leal    Disease of thyroid gland     H/O Pneumonia     Heart murmur     Hypertension     follows with PCP    Kabuki make-up syndrome     Mitral valve prolapse     no current issues    Mixed conductive and sensorineural hearing loss of both ears     Otorrhea, left     PONV (postoperative  nausea and vomiting)     Recurrent otitis media     Skin disease     PSORIASIS/ECXEMA       Past Surgical History:   Procedure Laterality Date    APPENDECTOMY      EXTRACORPOREAL CIRCULATION      at birth    INNER EAR SURGERY      multi    MULTIPLE TOOTH EXTRACTIONS      PORTACATH PLACEMENT      port placement x2    PORTACATH PLACEMENT      9/20/2024    TOENAIL EXCISION      TUNNELED VENOUS PORT PLACEMENT      approx 2019    VENOUS ACCESS DEVICE (PORT) INSERTION Right 9/20/2024    Procedure: INSERTION VENOUS ACCESS DEVICE;  Surgeon: Candy Call MD;  Location: [facility] [place];  Service: General;  Laterality: Right;    VENOUS ACCESS DEVICE (PORT) REMOVAL      VENOUS ACCESS DEVICE (PORT) REMOVAL Left 9/20/2024    Procedure: REMOVAL VENOUS ACCESS DEVICE;  Surgeon: Candy Call MD;  Location: [facility] OR [facility] [place];  Service: General;  Laterality: Left;         Current Outpatient Medications:     atenolol (TENORMIN) 25 MG tablet, Take 1 tablet by mouth Daily., Disp: 90 tablet, Rfl: 1    Cranberry 50 MG chewable tablet, Chew 2 (Two) Times a Day., Disp: , Rfl:     Cyanocobalamin (VITAMIN B 12 PO), Take  by mouth., Disp: , Rfl:     famotidine (PEPCID) 10 MG tablet, Take 2 tablets by mouth 2 (Two) Times a Day As Needed., Disp: , Rfl:     fenofibrate 160 MG tablet, Take 1 tablet by mouth Every Night., Disp: 90 tablet, Rfl: 3    ferrous gluconate (FERGON) 324 MG tablet, Take 1 tablet by mouth 2 (Two) Times a Day. Gummies, Disp: , Rfl:     fluticasone (FLONASE) 50 MCG/ACT nasal spray, 2 sprays into the nostril(s) as directed by provider Daily. (Patient taking differently: Administer 2 sprays into the nostril(s) as directed by provider Daily As Needed.), Disp: 16 g, Rfl: 11    levocetirizine (XYZAL) 5 MG tablet, Take 1 tablet by mouth Every Evening., Disp: 90 tablet, Rfl: 1    levothyroxine (SYNTHROID, LEVOTHROID) 50 MCG tablet, Take 1 tablet by mouth Daily., Disp: 90 tablet, Rfl: 1    linaclotide (Linzess) 145 MCG capsule  capsule, Take 1 capsule by mouth Every Morning Before Breakfast. (Patient taking differently: Take 1 capsule by mouth Daily As Needed.), Disp: 90 capsule, Rfl: 1    montelukast (SINGULAIR) 10 MG tablet, Take 1 tablet by mouth Every Night., Disp: 90 tablet, Rfl: 1    multivitamin with minerals tablet tablet, Take 1 tablet by mouth Daily., Disp: , Rfl:     Norgestimate-Eth Estradiol (Tri-Lo-Rosy) 0.18/0.215/0.25 MG-25 MCG tablet, TAKE 1 TABLET BY MOUTH DAILY, Disp: 84 tablet, Rfl: 3    rosuvastatin (CRESTOR) 10 MG tablet, Take 1 tablet by mouth Daily., Disp: 90 tablet, Rfl: 1    vitamin C (ASCORBIC ACID) 250 MG tablet, Take 1 tablet by mouth Daily., Disp: , Rfl:     EPINEPHrine (EPIPEN) 0.3 MG/0.3ML solution auto-injector injection, 0.3 mL., Disp: , Rfl:     HYDROcodone-acetaminophen (NORCO) 5-325 MG per tablet, Take 1 tablet by mouth Every 6 (Six) Hours As Needed (Pain)., Disp: 7 tablet, Rfl: 0    lidocaine-prilocaine (EMLA) 2.5-2.5 % cream, Apply 1 Application topically to the appropriate area as directed Take As Directed. Apply to port site 30 minutes prior to use, Disp: 30 g, Rfl: 1    ondansetron (Zofran) 4 MG tablet, Take 1 tablet by mouth Every 8 (Eight) Hours As Needed for Nausea or Vomiting., Disp: 90 tablet, Rfl: 1    promethazine-dextromethorphan (PROMETHAZINE-DM) 6.25-15 MG/5ML syrup, Take 5 mL by mouth 4 (Four) Times a Day As Needed for Cough., Disp: 240 mL, Rfl: 0    Current Facility-Administered Medications:     cyanocobalamin injection 1,000 mcg, 1,000 mcg, Intramuscular, Q28 Days, Arnie Rucker MD, 1,000 mcg at 02/29/24 1414     Allergies   Allergen Reactions    Cefuroxime Axetil Unknown - Low Severity    Neosporin [Bacitracin-Polymyxin B] Unknown - Low Severity       Social History     Tobacco Use    Smoking status: Never     Passive exposure: Yes    Smokeless tobacco: Never    Tobacco comments:     Daily exposure to 2nd hand smoke   Vaping Use    Vaping status: Never Used   Substance Use Topics     Alcohol use: Not Currently     Comment: DENIES    Drug use: Never        Objective     Vital Signs:   /83   Pulse 80   Temp 97.4 °F (36.3 °C)   SpO2 98%       Physical Exam    General: Well developed, well nourished patient of stated age in no acute distress. Voice is strong and clear.   Head: Normocephalic and atraumatic.  Face: No lesions.  Bilateral parotid and submandibular glands are unremarkable.  Stensen's and Warthin's ducts are productive of clear saliva bilaterally.  House-Brackmann I/VI     bilaterally.   muscles and temporomandibular joint nontender to palpation.  No TMJ crepitus.  Eyes: PERRLA, sclerae anicteric, no conjunctival injection. Extra ocular movements are intact and full. No nystagmus.   Ears: Auricles are normal in appearance.  Left external auditory canal with mild cerumen which was debrided using the operating microscope and alligator forcep.  Left tympanic membrane with perforation that is stable and dry.  Right mastoid cavity is dry with a small amount of buildup of squamous debris in the mastoid bowl which was debrided using alligator forceps and the operating microscope.  There was a small white raised lesion involving the tegmen mastoideum which has been present for a number of years.  It is firm to palpation and likely consistent with a small osteoma.  Hearing normal to conversational voice.   Nose: External nose is normal in appearance. Bilateral nares are patent with normal appearing mucosa. Septum midline. Turbinates are unremarkable. No lesions.   Oral Cavity: Lips are normal in appearance. Oral mucosa is unremarkable. Gingiva is unremarkable.  Partial dentition. Tongue is unremarkable with good movement. Hard palate is unremarkable.   Oropharynx: Soft palate is unremarkable with full movement. Uvula is unremarkable. Bilateral tonsils are unremarkable. Posterior oropharynx is unremarkable.    Larynx and hypopharynx: Deferred secondary to gag reflex.  Neck:  Supple.  No mass.  Nontender to palpation.  Trachea midline. Thyroid normal size and without nodules to palpation.   Lymphatic: No lymphadenopathy upon palpation.   Psychiatric: Appropriate affect, cooperative   Neurologic: Oriented x 3, strength symmetric in all extremities, Cranial Nerves II-XII are grossly intact to confrontation   Skin: Warm and dry. No rashes.    Procedures           Result Review :               Assessment and Plan    Diagnoses and all orders for this visit:    1. Chronic mastoiditis of right side (Primary)    2. Tympanic membrane perforation, left    3. Mixed hearing loss, bilateral    4. Impacted cerumen of left ear          Impressions and findings were discussed.  Currently, she returns today for cerumen removal prior to further evaluation for hearing aid fitting.  There was also some concern about a right-sided cholesteatoma in the ear that she has undergone a previous canal wall down tympanomastoidectomy.  There is a small white lesion involving the tegmen mastoideum which has been present for years and is firm to palpation consistent with an underlying bony change versus osteoma.  We discussed that there is no evidence of right-sided cholesteatoma.  She is cleared for hearing aid evaluation and they may call me with any questions or concerns.    Follow Up   No follow-ups on file.  Patient was given instructions and counseling regarding her condition or for health maintenance advice. Please see specific information pulled into the AVS if appropriate.

## 2025-05-30 ENCOUNTER — HOSPITAL ENCOUNTER (OUTPATIENT)
Dept: ONCOLOGY | Facility: HOSPITAL | Age: 35
Discharge: HOME | End: 2025-05-30
Payer: MEDICARE

## 2025-05-30 ENCOUNTER — OFFICE VISIT (OUTPATIENT)
Dept: ONCOLOGY | Facility: HOSPITAL | Age: 35
End: 2025-05-30
Payer: MEDICARE

## 2025-05-30 VITALS
HEIGHT: 55 IN | TEMPERATURE: 97.8 F | BODY MASS INDEX: 33.01 KG/M2 | RESPIRATION RATE: 18 BRPM | OXYGEN SATURATION: 100 % | HEART RATE: 77 BPM | DIASTOLIC BLOOD PRESSURE: 75 MMHG | WEIGHT: 142.64 LBS | SYSTOLIC BLOOD PRESSURE: 101 MMHG

## 2025-05-30 DIAGNOSIS — J30.9 ALLERGIC RHINITIS, UNSPECIFIED SEASONALITY, UNSPECIFIED TRIGGER: ICD-10-CM

## 2025-05-30 DIAGNOSIS — K59.00 CONSTIPATION, UNSPECIFIED CONSTIPATION TYPE: ICD-10-CM

## 2025-05-30 DIAGNOSIS — D64.9 ANEMIA, UNSPECIFIED TYPE: ICD-10-CM

## 2025-05-30 DIAGNOSIS — D83.9 CVID (COMMON VARIABLE IMMUNODEFICIENCY): Primary | ICD-10-CM

## 2025-05-30 DIAGNOSIS — Z45.2 ENCOUNTER FOR ADJUSTMENT OR MANAGEMENT OF VASCULAR ACCESS DEVICE: ICD-10-CM

## 2025-05-30 LAB
BASOPHILS # BLD AUTO: 0.03 10*3/MM3 (ref 0–0.2)
BASOPHILS NFR BLD AUTO: 0.3 % (ref 0–1.5)
DEPRECATED RDW RBC AUTO: 45.2 FL (ref 37–54)
EOSINOPHIL # BLD AUTO: 0.08 10*3/MM3 (ref 0–0.4)
EOSINOPHIL NFR BLD AUTO: 0.8 % (ref 0.3–6.2)
ERYTHROCYTE [DISTWIDTH] IN BLOOD BY AUTOMATED COUNT: 13.6 % (ref 12.3–15.4)
HCT VFR BLD AUTO: 35.9 % (ref 34–46.6)
HGB BLD-MCNC: 11.9 G/DL (ref 12–15.9)
IGA1 MFR SER: <50 MG/DL (ref 70–400)
IGG1 SER-MCNC: 566 MG/DL (ref 700–1600)
IGM SERPL-MCNC: 83 MG/DL (ref 40–230)
IMM GRANULOCYTES # BLD AUTO: 0.05 10*3/MM3 (ref 0–0.05)
IMM GRANULOCYTES NFR BLD AUTO: 0.5 % (ref 0–0.5)
LYMPHOCYTES # BLD AUTO: 2.94 10*3/MM3 (ref 0.7–3.1)
LYMPHOCYTES NFR BLD AUTO: 28.4 % (ref 19.6–45.3)
MCH RBC QN AUTO: 30.4 PG (ref 26.6–33)
MCHC RBC AUTO-ENTMCNC: 33.1 G/DL (ref 31.5–35.7)
MCV RBC AUTO: 91.6 FL (ref 79–97)
MONOCYTES # BLD AUTO: 0.78 10*3/MM3 (ref 0.1–0.9)
MONOCYTES NFR BLD AUTO: 7.5 % (ref 5–12)
NEUTROPHILS NFR BLD AUTO: 6.46 10*3/MM3 (ref 1.7–7)
NEUTROPHILS NFR BLD AUTO: 62.5 % (ref 42.7–76)
NRBC BLD AUTO-RTO: 0 /100 WBC (ref 0–0.2)
PLATELET # BLD AUTO: 298 10*3/MM3 (ref 140–450)
PMV BLD AUTO: 9.1 FL (ref 6–12)
RBC # BLD AUTO: 3.92 10*6/MM3 (ref 3.77–5.28)
WBC NRBC COR # BLD AUTO: 10.34 10*3/MM3 (ref 3.4–10.8)

## 2025-05-30 PROCEDURE — 82784 ASSAY IGA/IGD/IGG/IGM EACH: CPT | Performed by: INTERNAL MEDICINE

## 2025-05-30 PROCEDURE — 36591 DRAW BLOOD OFF VENOUS DEVICE: CPT

## 2025-05-30 PROCEDURE — 85025 COMPLETE CBC W/AUTO DIFF WBC: CPT | Performed by: INTERNAL MEDICINE

## 2025-05-30 PROCEDURE — 25010000002 HEPARIN LOCK FLUSH PER 10 UNITS: Performed by: INTERNAL MEDICINE

## 2025-05-30 RX ORDER — HEPARIN SODIUM (PORCINE) LOCK FLUSH IV SOLN 100 UNIT/ML 100 UNIT/ML
500 SOLUTION INTRAVENOUS AS NEEDED
Status: DISCONTINUED | OUTPATIENT
Start: 2025-05-30 | End: 2025-05-31 | Stop reason: HOSPADM

## 2025-05-30 RX ORDER — DIPHENHYDRAMINE HCL 25 MG
25 CAPSULE ORAL ONCE
OUTPATIENT
Start: 2025-06-03

## 2025-05-30 RX ORDER — PSEUDOEPHEDRINE HCL 120 MG/1
120 TABLET, FILM COATED, EXTENDED RELEASE ORAL EVERY 12 HOURS
Qty: 12 TABLET | Refills: 1 | Status: SHIPPED | OUTPATIENT
Start: 2025-05-30

## 2025-05-30 RX ORDER — FAMOTIDINE 10 MG/ML
20 INJECTION, SOLUTION INTRAVENOUS AS NEEDED
OUTPATIENT
Start: 2025-06-03

## 2025-05-30 RX ORDER — MEPERIDINE HYDROCHLORIDE 25 MG/ML
25 INJECTION INTRAMUSCULAR; INTRAVENOUS; SUBCUTANEOUS
OUTPATIENT
Start: 2025-06-03

## 2025-05-30 RX ORDER — SODIUM CHLORIDE 9 MG/ML
250 INJECTION, SOLUTION INTRAVENOUS ONCE
OUTPATIENT
Start: 2025-06-03

## 2025-05-30 RX ORDER — DIPHENHYDRAMINE HYDROCHLORIDE 50 MG/ML
50 INJECTION, SOLUTION INTRAMUSCULAR; INTRAVENOUS AS NEEDED
OUTPATIENT
Start: 2025-06-03

## 2025-05-30 RX ORDER — SODIUM CHLORIDE 0.9 % (FLUSH) 0.9 %
20 SYRINGE (ML) INJECTION AS NEEDED
OUTPATIENT
Start: 2025-05-30

## 2025-05-30 RX ORDER — SODIUM CHLORIDE 0.9 % (FLUSH) 0.9 %
20 SYRINGE (ML) INJECTION AS NEEDED
Status: DISCONTINUED | OUTPATIENT
Start: 2025-05-30 | End: 2025-05-31 | Stop reason: HOSPADM

## 2025-05-30 RX ORDER — HEPARIN SODIUM (PORCINE) LOCK FLUSH IV SOLN 100 UNIT/ML 100 UNIT/ML
500 SOLUTION INTRAVENOUS AS NEEDED
OUTPATIENT
Start: 2025-06-03

## 2025-05-30 RX ORDER — FAMOTIDINE 10 MG/ML
20 INJECTION, SOLUTION INTRAVENOUS ONCE
OUTPATIENT
Start: 2025-06-03

## 2025-05-30 RX ADMIN — Medication 20 ML: at 09:09

## 2025-05-30 RX ADMIN — HEPARIN 500 UNITS: 100 SYRINGE at 09:09

## 2025-05-30 NOTE — PROGRESS NOTES
Chief Complaint/Reason for Referral:  COMMON VARIABLE IMMUNODEFIENCY    Arnie Rucker MD Kindervater, Kasey, APRN    Subjective    History of Present Illness  Ms. Francine Preciado presents with her mother for lab check for IVIG treatment.  She will get this on Tuesday.  She has been tolerating these treatments well.  No recent infections.  Patient reports increased allergies for the last week or so.  It is improved slightly.  Reports frequent coughing from this.  Coughing frequency has improved.  No fevers or chills.  No increased bleeding.  Off linzess. On OTC iron supplement and tolerating.  Not having much constipation at this time.  Mother reports some mild issues with bladder incontinence.  Reports she thinks this bowel holds her urine too long.  She is also coughing a lot from allergies and this is putting pressure on her bladder.    Last IgG level of 544 on 4/18/25.    Repeat iron labs 4/18/24 with elevated ferritin of 207 and low iron sat of 14%.     Hemoglobin 11.9 g/dL today. WBC normal at 10.34, plts 298K.        Oncology/Hematology History Overview Note   CVID:   -Diagnosed by Immunologist 8/20/12  -presented with frequent/severe infections          No history exists.       Review of Systems   Constitutional:  Positive for fatigue. Negative for appetite change, diaphoresis, fever, unexpected weight gain and unexpected weight loss.   HENT:  Negative for hearing loss, mouth sores, sore throat, swollen glands, trouble swallowing and voice change.    Eyes:  Negative for blurred vision.   Respiratory:  Negative for cough, shortness of breath and wheezing.    Cardiovascular:  Negative for chest pain and palpitations.   Gastrointestinal:  Negative for abdominal pain, blood in stool, constipation, diarrhea, nausea and vomiting.   Endocrine: Negative for cold intolerance and heat intolerance.   Genitourinary:  Negative for difficulty urinating, dysuria, frequency, hematuria and urinary incontinence.    Musculoskeletal:  Negative for arthralgias, back pain and myalgias.   Skin:  Negative for rash, skin lesions and wound.   Neurological:  Negative for dizziness, seizures, weakness, numbness and headache.   Hematological:  Does not bruise/bleed easily.   Psychiatric/Behavioral:  Negative for depressed mood. The patient is not nervous/anxious.    All other systems reviewed and are negative.      Current Outpatient Medications on File Prior to Visit   Medication Sig Dispense Refill    atenolol (TENORMIN) 25 MG tablet Take 1 tablet by mouth Daily. 90 tablet 1    Cranberry 50 MG chewable tablet Chew 2 (Two) Times a Day.      Cyanocobalamin (VITAMIN B 12 PO) Take  by mouth.      EPINEPHrine (EPIPEN) 0.3 MG/0.3ML solution auto-injector injection 0.3 mL.      famotidine (PEPCID) 10 MG tablet Take 2 tablets by mouth 2 (Two) Times a Day As Needed.      fenofibrate 160 MG tablet Take 1 tablet by mouth Every Night. 90 tablet 3    ferrous gluconate (FERGON) 324 MG tablet Take 1 tablet by mouth 2 (Two) Times a Day. Gummies      fluticasone (FLONASE) 50 MCG/ACT nasal spray 2 sprays into the nostril(s) as directed by provider Daily. (Patient taking differently: Administer 2 sprays into the nostril(s) as directed by provider Daily As Needed.) 16 g 11    HYDROcodone-acetaminophen (NORCO) 5-325 MG per tablet Take 1 tablet by mouth Every 6 (Six) Hours As Needed (Pain). 7 tablet 0    levocetirizine (XYZAL) 5 MG tablet Take 1 tablet by mouth Every Evening. 90 tablet 1    levothyroxine (SYNTHROID, LEVOTHROID) 50 MCG tablet Take 1 tablet by mouth Daily. 90 tablet 1    lidocaine-prilocaine (EMLA) 2.5-2.5 % cream Apply 1 Application topically to the appropriate area as directed Take As Directed. Apply to port site 30 minutes prior to use 30 g 1    linaclotide (Linzess) 145 MCG capsule capsule Take 1 capsule by mouth Every Morning Before Breakfast. (Patient taking differently: Take 1 capsule by mouth Daily As Needed.) 90 capsule 1     montelukast (SINGULAIR) 10 MG tablet Take 1 tablet by mouth Every Night. 90 tablet 1    multivitamin with minerals tablet tablet Take 1 tablet by mouth Daily.      Norgestimate-Eth Estradiol (Tri-Lo-Rosy) 0.18/0.215/0.25 MG-25 MCG tablet TAKE 1 TABLET BY MOUTH DAILY 84 tablet 3    ondansetron (Zofran) 4 MG tablet Take 1 tablet by mouth Every 8 (Eight) Hours As Needed for Nausea or Vomiting. 90 tablet 1    promethazine-dextromethorphan (PROMETHAZINE-DM) 6.25-15 MG/5ML syrup Take 5 mL by mouth 4 (Four) Times a Day As Needed for Cough. 240 mL 0    rosuvastatin (CRESTOR) 10 MG tablet Take 1 tablet by mouth Daily. 90 tablet 1    vitamin C (ASCORBIC ACID) 250 MG tablet Take 1 tablet by mouth Daily.       Current Facility-Administered Medications on File Prior to Visit   Medication Dose Route Frequency Provider Last Rate Last Admin    cyanocobalamin injection 1,000 mcg  1,000 mcg Intramuscular Q28 Days Arnie Rucker MD   1,000 mcg at 02/29/24 1414    heparin injection 500 Units  500 Units Intravenous PRN Kayden Leal MD   500 Units at 05/30/25 0909    sodium chloride 0.9 % flush 20 mL  20 mL Intravenous PRN Kayden Leal MD   20 mL at 05/30/25 0909       Allergies   Allergen Reactions    Cefuroxime Axetil Unknown - Low Severity    Neosporin [Bacitracin-Polymyxin B] Unknown - Low Severity     Past Medical History:   Diagnosis Date    Acid reflux     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 Leal    Disease of thyroid gland     H/O Pneumonia     Heart murmur     Hypertension     follows with PCP    Jackie make-up syndrome     Mitral valve prolapse     no current issues    Mixed conductive and sensorineural hearing loss of both ears     Otorrhea, left     PONV (postoperative nausea and  vomiting)     Recurrent otitis media     Skin disease     PSORIASIS/ECXEMA     Past Surgical History:   Procedure Laterality Date    APPENDECTOMY      EXTRACORPOREAL CIRCULATION      at birth    INNER EAR SURGERY      multi    MULTIPLE TOOTH EXTRACTIONS      PORTACATH PLACEMENT      port placement x2    PORTACATH PLACEMENT      9/20/2024    TOENAIL EXCISION      TUNNELED VENOUS PORT PLACEMENT      approx 2019    VENOUS ACCESS DEVICE (PORT) INSERTION Right 9/20/2024    Procedure: INSERTION VENOUS ACCESS DEVICE;  Surgeon: Candy Call MD;  Location: [facility] [place];  Service: General;  Laterality: Right;    VENOUS ACCESS DEVICE (PORT) REMOVAL      VENOUS ACCESS DEVICE (PORT) REMOVAL Left 9/20/2024    Procedure: REMOVAL VENOUS ACCESS DEVICE;  Surgeon: Candy Call MD;  Location: [facility] [place];  Service: General;  Laterality: Left;     Social History     Socioeconomic History    Marital status: Single   Tobacco Use    Smoking status: Never     Passive exposure: Yes    Smokeless tobacco: Never    Tobacco comments:     Daily exposure to 2nd hand smoke   Vaping Use    Vaping status: Never Used   Substance and Sexual Activity    Alcohol use: Not Currently     Comment: DENIES    Drug use: Never    Sexual activity: Never     Family History   Problem Relation Age of Onset    Leukemia Father     Heart disease Other      Immunization History   Administered Date(s) Administered    COVID-19 (PFIZER) Purple Cap Monovalent 04/04/2021, 04/25/2021    Flu Vaccine Quad PF >36MO 11/04/2013, 10/24/2014, 11/02/2015, 10/13/2016, 10/19/2017    FluMist 2-49yrs (Nasal) 10/13/2016, 10/14/2020    Fluzone  >6mos 11/11/2024    Fluzone (or Fluarix & Flulaval for VFC) >6mos 11/04/2013, 10/24/2014, 11/02/2015, 10/13/2016, 10/19/2017, 11/23/2021, 10/11/2022, 10/13/2023    Fluzone Quad >6mos (Multi-dose) 11/04/2013, 11/02/2015, 10/19/2017, 10/29/2019    Hep B, Adolescent or Pediatric 11/24/1998, 01/14/1999, 06/02/1999    Influenza TIV  "(IM) 09/17/2009, 11/07/2018    Influenza, Unspecified 10/14/2020, 10/11/2022    MMR 08/19/1996    Pneumococcal Conjugate 20-Valent (PCV20) 09/14/2023    Pneumococcal Polysaccharide (PPSV23) 10/04/2012, 04/10/2014    influenza Split 10/02/2018       Tobacco Use: Medium Risk (5/30/2025)    Patient History     Smoking Tobacco Use: Never     Smokeless Tobacco Use: Never     Passive Exposure: Yes       Objective     Physical Exam  Well appearing patient sitting on table on RA in NAD  Anicteric sclerae, no rash on exposed skin  Respirations non-labored  Awake, alert, and oriented x 4. No gross neurologic deficit  Appropriate mood and affect      Vitals:    05/30/25 0936   BP: 101/75   Pulse: 77   Resp: 18   Temp: 97.8 °F (36.6 °C)   TempSrc: Temporal   SpO2: 100%   Weight: 64.7 kg (142 lb 10.2 oz)   Height: 139.7 cm (55\")   PainSc: 0-No pain         Wt Readings from Last 3 Encounters:   05/12/25 63.8 kg (140 lb 9.6 oz)   04/22/25 63.8 kg (140 lb 10.5 oz)   04/18/25 63.8 kg (140 lb 10.5 oz)                  ECOG: (0) Fully Active - Able to Carry On All Pre-disease Performance Without Restriction  Fall Risk Assessment was completed, and patient is at low risk for falls.  PHQ-9 Total Score:         The patient is  experiencing fatigue. Fatigue score: 3    PT/OT Functional Screening: PT fx screen : No needs identified  Speech Functional Screening: Speech fx screen : No needs identified  Rehab to be ordered: Rehab to be ordered : No needs identified        Result Review :  The following data was reviewed by: Kayden Leal MD on 05/30/25:  Lab Results   Component Value Date    HGB 11.9 (L) 05/30/2025    HCT 35.9 05/30/2025    MCV 91.6 05/30/2025     05/30/2025    WBC 10.34 05/30/2025    NEUTROABS 6.46 05/30/2025    LYMPHSABS 2.94 05/30/2025    MONOSABS 0.78 05/30/2025    EOSABS 0.08 05/30/2025    BASOSABS 0.03 05/30/2025     Lab Results   Component Value Date    GLUCOSE 82 09/20/2024    BUN 13 09/20/2024    " "CREATININE 1.00 09/20/2024     09/20/2024    K 4.2 09/20/2024     (H) 09/20/2024    CO2 18.3 (L) 09/20/2024    CALCIUM 9.3 09/20/2024    PROTEINTOT 7.2 09/20/2024    ALBUMIN 4.1 09/20/2024    BILITOT 0.5 09/20/2024    ALKPHOS 111 09/20/2024    AST 21 09/20/2024    ALT 17 09/20/2024     Lab Results   Component Value Date     08/12/2019    Ferritin 207.00 (H) 04/18/2025    Folate >20.00 09/20/2024     Lab Results   Component Value Date    IRON 71 04/18/2025    LABIRON 14 (L) 04/18/2025    TRANSFERRIN 345 04/18/2025    TIBC 514 04/18/2025     Lab Results   Component Value Date     08/12/2019    FERRITIN 207.00 (H) 04/18/2025    WNDJDUBS27 850 09/20/2024    FOLATE >20.00 09/20/2024     No results found for: \"PSA\", \"CEA\", \"AFP\", \"\", \"\"    Labs personally reviewed. IgG levels above 500.  Anemia mild. Ferritin elevated. Iron sat low.        Assessment and Plan:  Diagnoses and all orders for this visit:    1. CVID (common variable immunodeficiency) (Primary)    2. Anemia, unspecified type    3. Constipation, unspecified constipation type    4. Allergic rhinitis, unspecified seasonality, unspecified trigger    Other orders  -     pseudoephedrine (SUDAFED) 120 MG 12 hr tablet; Take 1 tablet by mouth Every 12 (Twelve) Hours.  Dispense: 12 tablet; Refill: 1  -     sodium chloride 0.9 % infusion 250 mL  -     famotidine (PEPCID) injection 20 mg  -     diphenhydrAMINE (BENADRYL) capsule 25 mg  -     immune globulin (human) (GAMUNEX-C) 10 g infusion 100 mL  -     hydrocortisone sodium succinate (Solu-CORTEF) injection 100 mg  -     diphenhydrAMINE (BENADRYL) injection 50 mg  -     famotidine (PEPCID) injection 20 mg  -     meperidine (DEMEROL) injection 25 mg          CVID  Receives IVIG treatment every 6 weeks. Due for treatment on 6/3/25. IgG levels remain above 500.    Anemia  Anemia fluctuates and at mild level. Repeat iron labs 4/18/25 are stable with increased ferritin and low iron sat. " She can continue oral iron once daily - taking over the counter currently as prescription dose intolerable. This may be contributing to constipation, so patient could hold if she wants, especially as hgb is better. Repeat levels in 3 months or so.      Constipation  Better. Off linzess. Recommend increase hydration and use of stool softeners and/or miralax.     Allergic rhinitis  Send in sudafed. Counseled to use for no more than 3 days. Already on allergy medications.       I spent 20 minutes caring for Francine on this date of service. This time includes time spent by me in the following activities:preparing for the visit, reviewing tests, obtaining and/or reviewing a separately obtained history, performing a medically appropriate examination and/or evaluation , counseling and educating the patient/family/caregiver, ordering medications, tests, or procedures, referring and communicating with other health care professionals , documenting information in the medical record, independently interpreting results and communicating that information with the patient/family/caregiver, and care coordination    Patient Follow Up: 6 weeks    Patient was given instructions and counseling regarding her condition or for health maintenance advice. Please see specific information pulled into the AVS if appropriate.

## 2025-06-03 ENCOUNTER — HOSPITAL ENCOUNTER (OUTPATIENT)
Dept: ONCOLOGY | Facility: HOSPITAL | Age: 35
Discharge: HOME | End: 2025-06-03
Admitting: INTERNAL MEDICINE
Payer: MEDICARE

## 2025-06-03 VITALS
HEART RATE: 71 BPM | BODY MASS INDEX: 32.6 KG/M2 | OXYGEN SATURATION: 99 % | DIASTOLIC BLOOD PRESSURE: 62 MMHG | WEIGHT: 140.87 LBS | HEIGHT: 55 IN | TEMPERATURE: 98.4 F | SYSTOLIC BLOOD PRESSURE: 114 MMHG

## 2025-06-03 DIAGNOSIS — Z45.2 ENCOUNTER FOR ADJUSTMENT OR MANAGEMENT OF VASCULAR ACCESS DEVICE: ICD-10-CM

## 2025-06-03 DIAGNOSIS — D83.9 CVID (COMMON VARIABLE IMMUNODEFICIENCY): Primary | ICD-10-CM

## 2025-06-03 PROCEDURE — 25010000002 HEPARIN LOCK FLUSH PER 10 UNITS: Performed by: INTERNAL MEDICINE

## 2025-06-03 PROCEDURE — 25810000003 SODIUM CHLORIDE 0.9 % SOLUTION: Performed by: INTERNAL MEDICINE

## 2025-06-03 PROCEDURE — 96366 THER/PROPH/DIAG IV INF ADDON: CPT

## 2025-06-03 PROCEDURE — 96365 THER/PROPH/DIAG IV INF INIT: CPT

## 2025-06-03 PROCEDURE — 25010000002 IMMUNE GLOBULIN (HUMAN) 10 GM/100ML SOLUTION: Performed by: INTERNAL MEDICINE

## 2025-06-03 RX ORDER — SODIUM CHLORIDE 0.9 % (FLUSH) 0.9 %
20 SYRINGE (ML) INJECTION AS NEEDED
Status: DISCONTINUED | OUTPATIENT
Start: 2025-06-03 | End: 2025-06-04 | Stop reason: HOSPADM

## 2025-06-03 RX ORDER — HEPARIN SODIUM (PORCINE) LOCK FLUSH IV SOLN 100 UNIT/ML 100 UNIT/ML
500 SOLUTION INTRAVENOUS AS NEEDED
Status: DISCONTINUED | OUTPATIENT
Start: 2025-06-03 | End: 2025-06-04 | Stop reason: HOSPADM

## 2025-06-03 RX ORDER — HEPARIN SODIUM (PORCINE) LOCK FLUSH IV SOLN 100 UNIT/ML 100 UNIT/ML
500 SOLUTION INTRAVENOUS AS NEEDED
OUTPATIENT
Start: 2025-06-03

## 2025-06-03 RX ORDER — FAMOTIDINE 10 MG/ML
20 INJECTION, SOLUTION INTRAVENOUS ONCE
Status: DISCONTINUED | OUTPATIENT
Start: 2025-06-03 | End: 2025-06-04 | Stop reason: HOSPADM

## 2025-06-03 RX ORDER — SODIUM CHLORIDE 9 MG/ML
250 INJECTION, SOLUTION INTRAVENOUS ONCE
Status: COMPLETED | OUTPATIENT
Start: 2025-06-03 | End: 2025-06-03

## 2025-06-03 RX ORDER — DIPHENHYDRAMINE HCL 25 MG
25 CAPSULE ORAL ONCE
Status: DISCONTINUED | OUTPATIENT
Start: 2025-06-03 | End: 2025-06-04 | Stop reason: HOSPADM

## 2025-06-03 RX ORDER — SODIUM CHLORIDE 0.9 % (FLUSH) 0.9 %
20 SYRINGE (ML) INJECTION AS NEEDED
OUTPATIENT
Start: 2025-06-03

## 2025-06-03 RX ADMIN — Medication 20 ML: at 12:13

## 2025-06-03 RX ADMIN — HEPARIN 500 UNITS: 100 SYRINGE at 12:13

## 2025-06-03 RX ADMIN — IMMUNE GLOBULIN (HUMAN) 10 G: 10 INJECTION INTRAVENOUS; SUBCUTANEOUS at 10:19

## 2025-06-03 RX ADMIN — SODIUM CHLORIDE 250 ML: 9 INJECTION, SOLUTION INTRAVENOUS at 10:00

## 2025-07-11 ENCOUNTER — HOSPITAL ENCOUNTER (OUTPATIENT)
Dept: ONCOLOGY | Facility: HOSPITAL | Age: 35
Discharge: HOME | End: 2025-07-11
Payer: MEDICARE

## 2025-07-11 ENCOUNTER — OFFICE VISIT (OUTPATIENT)
Dept: ONCOLOGY | Facility: HOSPITAL | Age: 35
End: 2025-07-11
Payer: MEDICARE

## 2025-07-11 VITALS
WEIGHT: 140.87 LBS | SYSTOLIC BLOOD PRESSURE: 117 MMHG | HEART RATE: 77 BPM | RESPIRATION RATE: 18 BRPM | BODY MASS INDEX: 32.6 KG/M2 | DIASTOLIC BLOOD PRESSURE: 65 MMHG | TEMPERATURE: 98.1 F | OXYGEN SATURATION: 100 % | HEIGHT: 55 IN

## 2025-07-11 DIAGNOSIS — J30.9 ALLERGIC RHINITIS, UNSPECIFIED SEASONALITY, UNSPECIFIED TRIGGER: ICD-10-CM

## 2025-07-11 DIAGNOSIS — D50.9 IRON DEFICIENCY ANEMIA, UNSPECIFIED IRON DEFICIENCY ANEMIA TYPE: ICD-10-CM

## 2025-07-11 DIAGNOSIS — D83.9 CVID (COMMON VARIABLE IMMUNODEFICIENCY): ICD-10-CM

## 2025-07-11 DIAGNOSIS — Z45.2 ENCOUNTER FOR ADJUSTMENT OR MANAGEMENT OF VASCULAR ACCESS DEVICE: ICD-10-CM

## 2025-07-11 DIAGNOSIS — D83.9 CVID (COMMON VARIABLE IMMUNODEFICIENCY): Primary | ICD-10-CM

## 2025-07-11 DIAGNOSIS — D50.9 IRON DEFICIENCY ANEMIA, UNSPECIFIED IRON DEFICIENCY ANEMIA TYPE: Primary | ICD-10-CM

## 2025-07-11 DIAGNOSIS — K59.04 CHRONIC IDIOPATHIC CONSTIPATION: ICD-10-CM

## 2025-07-11 LAB
ALBUMIN SERPL-MCNC: 4 G/DL (ref 3.5–5.2)
ALBUMIN/GLOB SERPL: 1.4 G/DL
ALP SERPL-CCNC: 90 U/L (ref 39–117)
ALT SERPL W P-5'-P-CCNC: 32 U/L (ref 1–33)
ANION GAP SERPL CALCULATED.3IONS-SCNC: 14 MMOL/L (ref 5–15)
AST SERPL-CCNC: 47 U/L (ref 1–32)
BASOPHILS # BLD AUTO: 0.05 10*3/MM3 (ref 0–0.2)
BASOPHILS NFR BLD AUTO: 0.5 % (ref 0–1.5)
BILIRUB SERPL-MCNC: 0.5 MG/DL (ref 0–1.2)
BUN SERPL-MCNC: 10 MG/DL (ref 6–20)
BUN/CREAT SERPL: 12.7 (ref 7–25)
CALCIUM SPEC-SCNC: 9.1 MG/DL (ref 8.6–10.5)
CHLORIDE SERPL-SCNC: 108 MMOL/L (ref 98–107)
CO2 SERPL-SCNC: 18 MMOL/L (ref 22–29)
CREAT SERPL-MCNC: 0.79 MG/DL (ref 0.57–1)
DEPRECATED RDW RBC AUTO: 45.4 FL (ref 37–54)
EGFRCR SERPLBLD CKD-EPI 2021: 100.8 ML/MIN/1.73
EOSINOPHIL # BLD AUTO: 0.13 10*3/MM3 (ref 0–0.4)
EOSINOPHIL NFR BLD AUTO: 1.3 % (ref 0.3–6.2)
ERYTHROCYTE [DISTWIDTH] IN BLOOD BY AUTOMATED COUNT: 13.5 % (ref 12.3–15.4)
FERRITIN SERPL-MCNC: 226 NG/ML (ref 13–150)
GLOBULIN UR ELPH-MCNC: 2.9 GM/DL
GLUCOSE SERPL-MCNC: 72 MG/DL (ref 65–99)
HCT VFR BLD AUTO: 36.1 % (ref 34–46.6)
HGB BLD-MCNC: 11.9 G/DL (ref 12–15.9)
IGA1 MFR SER: <50 MG/DL (ref 70–400)
IGG1 SER-MCNC: 573 MG/DL (ref 700–1600)
IGM SERPL-MCNC: 92 MG/DL (ref 40–230)
IMM GRANULOCYTES # BLD AUTO: 0.03 10*3/MM3 (ref 0–0.05)
IMM GRANULOCYTES NFR BLD AUTO: 0.3 % (ref 0–0.5)
IRON 24H UR-MRATE: 72 MCG/DL (ref 37–145)
IRON SATN MFR SERPL: 13 % (ref 20–50)
LYMPHOCYTES # BLD AUTO: 2.38 10*3/MM3 (ref 0.7–3.1)
LYMPHOCYTES NFR BLD AUTO: 24.4 % (ref 19.6–45.3)
MCH RBC QN AUTO: 30 PG (ref 26.6–33)
MCHC RBC AUTO-ENTMCNC: 33 G/DL (ref 31.5–35.7)
MCV RBC AUTO: 90.9 FL (ref 79–97)
MONOCYTES # BLD AUTO: 0.63 10*3/MM3 (ref 0.1–0.9)
MONOCYTES NFR BLD AUTO: 6.5 % (ref 5–12)
NEUTROPHILS NFR BLD AUTO: 6.53 10*3/MM3 (ref 1.7–7)
NEUTROPHILS NFR BLD AUTO: 67 % (ref 42.7–76)
NRBC BLD AUTO-RTO: 0 /100 WBC (ref 0–0.2)
PLATELET # BLD AUTO: 251 10*3/MM3 (ref 140–450)
PMV BLD AUTO: 9.3 FL (ref 6–12)
POTASSIUM SERPL-SCNC: 4 MMOL/L (ref 3.5–5.2)
PROT SERPL-MCNC: 6.9 G/DL (ref 6–8.5)
RBC # BLD AUTO: 3.97 10*6/MM3 (ref 3.77–5.28)
SODIUM SERPL-SCNC: 140 MMOL/L (ref 136–145)
TIBC SERPL-MCNC: 535 MCG/DL (ref 298–536)
TRANSFERRIN SERPL-MCNC: 359 MG/DL (ref 200–360)
WBC NRBC COR # BLD AUTO: 9.75 10*3/MM3 (ref 3.4–10.8)

## 2025-07-11 PROCEDURE — 84466 ASSAY OF TRANSFERRIN: CPT | Performed by: INTERNAL MEDICINE

## 2025-07-11 PROCEDURE — 80053 COMPREHEN METABOLIC PANEL: CPT | Performed by: INTERNAL MEDICINE

## 2025-07-11 PROCEDURE — 25010000002 HEPARIN LOCK FLUSH PER 10 UNITS: Performed by: INTERNAL MEDICINE

## 2025-07-11 PROCEDURE — 82784 ASSAY IGA/IGD/IGG/IGM EACH: CPT | Performed by: INTERNAL MEDICINE

## 2025-07-11 PROCEDURE — 82728 ASSAY OF FERRITIN: CPT | Performed by: INTERNAL MEDICINE

## 2025-07-11 PROCEDURE — 83540 ASSAY OF IRON: CPT | Performed by: INTERNAL MEDICINE

## 2025-07-11 PROCEDURE — 36591 DRAW BLOOD OFF VENOUS DEVICE: CPT

## 2025-07-11 PROCEDURE — 85025 COMPLETE CBC W/AUTO DIFF WBC: CPT | Performed by: INTERNAL MEDICINE

## 2025-07-11 RX ORDER — FAMOTIDINE 10 MG/ML
20 INJECTION, SOLUTION INTRAVENOUS AS NEEDED
OUTPATIENT
Start: 2025-07-15

## 2025-07-11 RX ORDER — FAMOTIDINE 10 MG/ML
20 INJECTION, SOLUTION INTRAVENOUS ONCE
OUTPATIENT
Start: 2025-07-15

## 2025-07-11 RX ORDER — HEPARIN SODIUM (PORCINE) LOCK FLUSH IV SOLN 100 UNIT/ML 100 UNIT/ML
500 SOLUTION INTRAVENOUS AS NEEDED
Status: DISCONTINUED | OUTPATIENT
Start: 2025-07-11 | End: 2025-07-12 | Stop reason: HOSPADM

## 2025-07-11 RX ORDER — DIPHENHYDRAMINE HYDROCHLORIDE 50 MG/ML
50 INJECTION, SOLUTION INTRAMUSCULAR; INTRAVENOUS AS NEEDED
OUTPATIENT
Start: 2025-07-15

## 2025-07-11 RX ORDER — HEPARIN SODIUM (PORCINE) LOCK FLUSH IV SOLN 100 UNIT/ML 100 UNIT/ML
500 SOLUTION INTRAVENOUS AS NEEDED
OUTPATIENT
Start: 2025-07-15

## 2025-07-11 RX ORDER — SODIUM CHLORIDE 9 MG/ML
250 INJECTION, SOLUTION INTRAVENOUS ONCE
OUTPATIENT
Start: 2025-07-15

## 2025-07-11 RX ORDER — MEPERIDINE HYDROCHLORIDE 25 MG/ML
25 INJECTION INTRAMUSCULAR; INTRAVENOUS; SUBCUTANEOUS
OUTPATIENT
Start: 2025-07-15

## 2025-07-11 RX ORDER — SODIUM CHLORIDE 0.9 % (FLUSH) 0.9 %
20 SYRINGE (ML) INJECTION AS NEEDED
Status: DISCONTINUED | OUTPATIENT
Start: 2025-07-11 | End: 2025-07-12 | Stop reason: HOSPADM

## 2025-07-11 RX ORDER — DIPHENHYDRAMINE HCL 25 MG
25 CAPSULE ORAL ONCE
OUTPATIENT
Start: 2025-07-15

## 2025-07-11 RX ORDER — SODIUM CHLORIDE 0.9 % (FLUSH) 0.9 %
20 SYRINGE (ML) INJECTION AS NEEDED
OUTPATIENT
Start: 2025-07-11

## 2025-07-11 RX ADMIN — Medication 20 ML: at 09:19

## 2025-07-11 RX ADMIN — HEPARIN 500 UNITS: 100 SYRINGE at 09:20

## 2025-07-11 NOTE — PROGRESS NOTES
Chief Complaint/Reason for Referral:   COMMON VARIABLE IMMUNODEFIENCY-    Arnie Rucker MD Kindervater, Kasey, APRN    Subjective    History of Present Illness  Ms. Francine Preciado presents with her mother for lab check for IVIG treatment.  She will get this on Tuesday.  She has been tolerating these treatments well.  No recent infections.  Allergies have improved as they got rid of a camper that was harboring mold.  Patient with plans for new hearing aids and new glasses.  Denies any bleeding issues.  Reports taking iron tablet daily.  Tolerating this well.  Constipation managed.  Remains off linzess. No acute concerns today.     Last IgG level of 566 on 5/30/25.    Repeat iron labs 4/18/24 with elevated ferritin of 207 and low iron sat of 14%.     Hemoglobin 11.9 g/dL today 7/11/25. WBC normal at 9.75, plts 251K.        Oncology/Hematology History Overview Note   CVID:   -Diagnosed by Immunologist 8/20/12  -presented with frequent/severe infections          No history exists.       Review of Systems   Constitutional:  Positive for fatigue. Negative for appetite change, diaphoresis, fever, unexpected weight gain and unexpected weight loss.   HENT:  Negative for hearing loss, mouth sores, sore throat, swollen glands, trouble swallowing and voice change.    Eyes:  Negative for blurred vision.   Respiratory:  Negative for cough, shortness of breath and wheezing.    Cardiovascular:  Negative for chest pain and palpitations.   Gastrointestinal:  Negative for abdominal pain, blood in stool, constipation, diarrhea, nausea and vomiting.   Endocrine: Negative for cold intolerance and heat intolerance.   Genitourinary:  Negative for difficulty urinating, dysuria, frequency, hematuria and urinary incontinence.   Musculoskeletal:  Negative for arthralgias, back pain and myalgias.   Skin:  Negative for rash, skin lesions and wound.   Neurological:  Negative for dizziness, seizures, weakness, numbness and headache.    Hematological:  Does not bruise/bleed easily.   Psychiatric/Behavioral:  Negative for depressed mood. The patient is not nervous/anxious.    All other systems reviewed and are negative.      Current Outpatient Medications on File Prior to Visit   Medication Sig Dispense Refill    atenolol (TENORMIN) 25 MG tablet Take 1 tablet by mouth Daily. 90 tablet 1    Cranberry 50 MG chewable tablet Chew 2 (Two) Times a Day.      Cyanocobalamin (VITAMIN B 12 PO) Take  by mouth.      EPINEPHrine (EPIPEN) 0.3 MG/0.3ML solution auto-injector injection 0.3 mL.      famotidine (PEPCID) 10 MG tablet Take 2 tablets by mouth 2 (Two) Times a Day As Needed.      fenofibrate 160 MG tablet Take 1 tablet by mouth Every Night. 90 tablet 3    ferrous gluconate (FERGON) 324 MG tablet Take 1 tablet by mouth 2 (Two) Times a Day. Gummies      fluticasone (FLONASE) 50 MCG/ACT nasal spray 2 sprays into the nostril(s) as directed by provider Daily. (Patient taking differently: Administer 2 sprays into the nostril(s) as directed by provider Daily As Needed.) 16 g 11    HYDROcodone-acetaminophen (NORCO) 5-325 MG per tablet Take 1 tablet by mouth Every 6 (Six) Hours As Needed (Pain). 7 tablet 0    levocetirizine (XYZAL) 5 MG tablet Take 1 tablet by mouth Every Evening. 90 tablet 1    levothyroxine (SYNTHROID, LEVOTHROID) 50 MCG tablet Take 1 tablet by mouth Daily. 90 tablet 1    lidocaine-prilocaine (EMLA) 2.5-2.5 % cream Apply 1 Application topically to the appropriate area as directed Take As Directed. Apply to port site 30 minutes prior to use 30 g 1    linaclotide (Linzess) 145 MCG capsule capsule Take 1 capsule by mouth Every Morning Before Breakfast. (Patient taking differently: Take 1 capsule by mouth Daily As Needed.) 90 capsule 1    montelukast (SINGULAIR) 10 MG tablet Take 1 tablet by mouth Every Night. 90 tablet 1    multivitamin with minerals tablet tablet Take 1 tablet by mouth Daily.      Norgestimate-Eth Estradiol (Tri-Lo-Rosy)  0.18/0.215/0.25 MG-25 MCG tablet TAKE 1 TABLET BY MOUTH DAILY 84 tablet 3    ondansetron (Zofran) 4 MG tablet Take 1 tablet by mouth Every 8 (Eight) Hours As Needed for Nausea or Vomiting. 90 tablet 1    promethazine-dextromethorphan (PROMETHAZINE-DM) 6.25-15 MG/5ML syrup Take 5 mL by mouth 4 (Four) Times a Day As Needed for Cough. 240 mL 0    pseudoephedrine (SUDAFED) 120 MG 12 hr tablet Take 1 tablet by mouth Every 12 (Twelve) Hours. 12 tablet 1    rosuvastatin (CRESTOR) 10 MG tablet Take 1 tablet by mouth Daily. 90 tablet 1    vitamin C (ASCORBIC ACID) 250 MG tablet Take 1 tablet by mouth Daily.       Current Facility-Administered Medications on File Prior to Visit   Medication Dose Route Frequency Provider Last Rate Last Admin    cyanocobalamin injection 1,000 mcg  1,000 mcg Intramuscular Q28 Days Arnie Rucker MD   1,000 mcg at 02/29/24 1414    heparin injection 500 Units  500 Units Intravenous PRN Kayden Leal MD   500 Units at 07/11/25 0920    sodium chloride 0.9 % flush 20 mL  20 mL Intravenous PRN Kayden Leal MD   20 mL at 07/11/25 0919       Allergies   Allergen Reactions    Cefuroxime Axetil Unknown - Low Severity    Neosporin [Bacitracin-Polymyxin B] Unknown - Low Severity     Past Medical History:   Diagnosis Date    Acid reflux     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 Leal    Disease of thyroid gland     H/O Pneumonia     Heart murmur     Hypertension     follows with PCP    Jackie make-up syndrome     Mitral valve prolapse     no current issues    Mixed conductive and sensorineural hearing loss of both ears     Otorrhea, left     PONV (postoperative nausea and vomiting)     Recurrent otitis media     Skin disease     PSORIASIS/ECXEMA     Past Surgical History:    Procedure Laterality Date    APPENDECTOMY      EXTRACORPOREAL CIRCULATION      at birth    INNER EAR SURGERY      multi    MULTIPLE TOOTH EXTRACTIONS      PORTACATH PLACEMENT      port placement x2    PORTACATH PLACEMENT      9/20/2024    TOENAIL EXCISION      TUNNELED VENOUS PORT PLACEMENT      approx 2019    VENOUS ACCESS DEVICE (PORT) INSERTION Right 9/20/2024    Procedure: INSERTION VENOUS ACCESS DEVICE;  Surgeon: Candy Call MD;  Location: [facility];  Service: General;  Laterality: Right;    VENOUS ACCESS DEVICE (PORT) REMOVAL      VENOUS ACCESS DEVICE (PORT) REMOVAL Left 9/20/2024    Procedure: REMOVAL VENOUS ACCESS DEVICE;  Surgeon: Candy Call MD;  Location: [facility];  Service: General;  Laterality: Left;     Social History     Socioeconomic History    Marital status: Single   Tobacco Use    Smoking status: Never     Passive exposure: Yes    Smokeless tobacco: Never    Tobacco comments:     Daily exposure to 2nd hand smoke   Vaping Use    Vaping status: Never Used   Substance and Sexual Activity    Alcohol use: Not Currently     Comment: DENIES    Drug use: Never    Sexual activity: Never     Family History   Problem Relation Age of Onset    Leukemia Father     Heart disease Other      Immunization History   Administered Date(s) Administered    COVID-19 (PFIZER) Purple Cap Monovalent 04/04/2021, 04/25/2021    Flu Vaccine Quad PF >36MO 11/04/2013, 10/24/2014, 11/02/2015, 10/13/2016, 10/19/2017    FluMist 2-49yrs (Nasal) 10/13/2016, 10/14/2020    Fluzone  >6mos 11/11/2024    Fluzone (or Fluarix & Flulaval for VFC) >6mos 11/04/2013, 10/24/2014, 11/02/2015, 10/13/2016, 10/19/2017, 11/23/2021, 10/11/2022, 10/13/2023    Fluzone Quad >6mos (Multi-dose) 11/04/2013, 11/02/2015, 10/19/2017, 10/29/2019    Hep B, Adolescent or Pediatric 11/24/1998, 01/14/1999, 06/02/1999    Influenza TIV (IM) 09/17/2009, 11/07/2018    Influenza, Unspecified 10/14/2020, 10/11/2022    MMR 08/19/1996     "Pneumococcal Conjugate 20-Valent (PCV20) 09/14/2023    Pneumococcal Polysaccharide (PPSV23) 10/04/2012, 04/10/2014    influenza Split 10/02/2018       Tobacco Use: Medium Risk (7/11/2025)    Patient History     Smoking Tobacco Use: Never     Smokeless Tobacco Use: Never     Passive Exposure: Yes       Objective     Physical Exam  Well appearing patient sitting on table on RA in NAD  Anicteric sclerae, no rash on exposed skin  Respirations non-labored  Awake, alert, and oriented x 4. No gross neurologic deficit  Appropriate mood and affect      Vitals:    07/11/25 1010   BP: 117/65   Pulse: 77   Resp: 18   Temp: 98.1 °F (36.7 °C)   TempSrc: Oral   SpO2: 100%   Weight: 63.9 kg (140 lb 14 oz)   Height: 139.7 cm (55\")   PainSc: 0-No pain         Wt Readings from Last 3 Encounters:   07/11/25 63.9 kg (140 lb 14 oz)   06/03/25 63.9 kg (140 lb 14 oz)   05/30/25 64.7 kg (142 lb 10.2 oz)        ECOG score: 0         ECOG: (0) Fully Active - Able to Carry On All Pre-disease Performance Without Restriction  Fall Risk Assessment was completed, and patient is at low risk for falls.  PHQ-9 Total Score:         The patient is  experiencing fatigue. Fatigue score: 3    PT/OT Functional Screening: PT fx screen : No needs identified  Speech Functional Screening: Speech fx screen : No needs identified  Rehab to be ordered: Rehab to be ordered : No needs identified        Result Review :  The following data was reviewed by: Kayden Leal MD on 07/11/25:  Lab Results   Component Value Date    HGB 11.9 (L) 07/11/2025    HCT 36.1 07/11/2025    MCV 90.9 07/11/2025     07/11/2025    WBC 9.75 07/11/2025    NEUTROABS 6.53 07/11/2025    LYMPHSABS 2.38 07/11/2025    MONOSABS 0.63 07/11/2025    EOSABS 0.13 07/11/2025    BASOSABS 0.05 07/11/2025     Lab Results   Component Value Date    GLUCOSE 82 09/20/2024    BUN 13 09/20/2024    CREATININE 1.00 09/20/2024     09/20/2024    K 4.2 09/20/2024     (H) 09/20/2024    CO2 " "18.3 (L) 09/20/2024    CALCIUM 9.3 09/20/2024    PROTEINTOT 7.2 09/20/2024    ALBUMIN 4.1 09/20/2024    BILITOT 0.5 09/20/2024    ALKPHOS 111 09/20/2024    AST 21 09/20/2024    ALT 17 09/20/2024     Lab Results   Component Value Date     08/12/2019    Ferritin 207.00 (H) 04/18/2025    Folate >20.00 09/20/2024     Lab Results   Component Value Date    IRON 71 04/18/2025    LABIRON 14 (L) 04/18/2025    TRANSFERRIN 345 04/18/2025    TIBC 514 04/18/2025     Lab Results   Component Value Date     08/12/2019    FERRITIN 207.00 (H) 04/18/2025    BHGVBJER85 850 09/20/2024    FOLATE >20.00 09/20/2024     No results found for: \"PSA\", \"CEA\", \"AFP\", \"\", \"\"    Labs personally reviewed. IgG levels above 500.  Anemia mild. Ferritin elevated. Iron sat low.        Assessment and Plan:  Diagnoses and all orders for this visit:    1. Iron deficiency anemia, unspecified iron deficiency anemia type (Primary)  -     Comprehensive Metabolic Panel; Future  -     Ferritin; Future  -     Iron Profile w/o Ferritin; Future    2. CVID (common variable immunodeficiency)  -     Comprehensive Metabolic Panel; Future    3. Allergic rhinitis, unspecified seasonality, unspecified trigger    4. Chronic idiopathic constipation    Other orders  -     sodium chloride 0.9 % infusion 250 mL  -     famotidine (PEPCID) injection 20 mg  -     diphenhydrAMINE (BENADRYL) capsule 25 mg  -     immune globulin (human) (GAMUNEX-C) 10 g infusion 100 mL  -     hydrocortisone sodium succinate (Solu-CORTEF) injection 100 mg  -     diphenhydrAMINE (BENADRYL) injection 50 mg  -     famotidine (PEPCID) injection 20 mg  -     meperidine (DEMEROL) injection 25 mg            CVID  Receives IVIG treatment every 6 weeks. Due for treatment on 7/15/25. IgG levels remain above 500, last check 5/30/25. Pending IgG level today.    Anemia  Anemia fluctuates and at mild level. Repeat iron labs 4/18/25 are stable with increased ferritin and low iron sat. She " can continue oral iron once daily - taking over the counter currently as prescription dose intolerable. This may be contributing to constipation, so patient could hold if she wants, especially as hgb is better. Repeat levels due now.     Constipation  Better. Off linzess. Recommend increase hydration and use of stool softeners and/or miralax.     Allergic rhinitis  Improved due to getting rid of camper that was harboring mold.      I spent 20 minutes caring for Francine on this date of service. This time includes time spent by me in the following activities:preparing for the visit, reviewing tests, obtaining and/or reviewing a separately obtained history, performing a medically appropriate examination and/or evaluation , counseling and educating the patient/family/caregiver, ordering medications, tests, or procedures, referring and communicating with other health care professionals , documenting information in the medical record, independently interpreting results and communicating that information with the patient/family/caregiver, and care coordination    Patient Follow Up: 6 weeks    Patient was given instructions and counseling regarding her condition or for health maintenance advice. Please see specific information pulled into the AVS if appropriate.

## 2025-07-15 ENCOUNTER — HOSPITAL ENCOUNTER (OUTPATIENT)
Dept: ONCOLOGY | Facility: HOSPITAL | Age: 35
Discharge: HOME | End: 2025-07-15
Admitting: INTERNAL MEDICINE
Payer: MEDICARE

## 2025-07-15 VITALS
RESPIRATION RATE: 18 BRPM | TEMPERATURE: 98.6 F | WEIGHT: 141.09 LBS | OXYGEN SATURATION: 98 % | HEART RATE: 72 BPM | BODY MASS INDEX: 32.79 KG/M2 | DIASTOLIC BLOOD PRESSURE: 75 MMHG | SYSTOLIC BLOOD PRESSURE: 108 MMHG

## 2025-07-15 DIAGNOSIS — Z45.2 ENCOUNTER FOR ADJUSTMENT OR MANAGEMENT OF VASCULAR ACCESS DEVICE: ICD-10-CM

## 2025-07-15 DIAGNOSIS — D83.9 CVID (COMMON VARIABLE IMMUNODEFICIENCY): Primary | ICD-10-CM

## 2025-07-15 PROCEDURE — 96365 THER/PROPH/DIAG IV INF INIT: CPT

## 2025-07-15 PROCEDURE — 96366 THER/PROPH/DIAG IV INF ADDON: CPT

## 2025-07-15 PROCEDURE — 25010000002 IMMUNE GLOBULIN (HUMAN) 10 GM/100ML SOLUTION: Performed by: INTERNAL MEDICINE

## 2025-07-15 PROCEDURE — 25810000003 SODIUM CHLORIDE 0.9 % SOLUTION: Performed by: INTERNAL MEDICINE

## 2025-07-15 PROCEDURE — 25010000002 HEPARIN LOCK FLUSH PER 10 UNITS: Performed by: INTERNAL MEDICINE

## 2025-07-15 RX ORDER — HYDROCORTISONE SODIUM SUCCINATE 100 MG/2ML
100 INJECTION INTRAMUSCULAR; INTRAVENOUS AS NEEDED
Status: DISCONTINUED | OUTPATIENT
Start: 2025-07-15 | End: 2025-07-16 | Stop reason: HOSPADM

## 2025-07-15 RX ORDER — DIPHENHYDRAMINE HCL 25 MG
25 CAPSULE ORAL ONCE
Status: DISCONTINUED | OUTPATIENT
Start: 2025-07-15 | End: 2025-07-16 | Stop reason: HOSPADM

## 2025-07-15 RX ORDER — HEPARIN SODIUM (PORCINE) LOCK FLUSH IV SOLN 100 UNIT/ML 100 UNIT/ML
500 SOLUTION INTRAVENOUS AS NEEDED
OUTPATIENT
Start: 2025-07-15

## 2025-07-15 RX ORDER — SODIUM CHLORIDE 9 MG/ML
250 INJECTION, SOLUTION INTRAVENOUS ONCE
Status: COMPLETED | OUTPATIENT
Start: 2025-07-15 | End: 2025-07-15

## 2025-07-15 RX ORDER — SODIUM CHLORIDE 0.9 % (FLUSH) 0.9 %
20 SYRINGE (ML) INJECTION AS NEEDED
Status: DISCONTINUED | OUTPATIENT
Start: 2025-07-15 | End: 2025-07-16 | Stop reason: HOSPADM

## 2025-07-15 RX ORDER — FAMOTIDINE 10 MG/ML
20 INJECTION, SOLUTION INTRAVENOUS AS NEEDED
Status: DISCONTINUED | OUTPATIENT
Start: 2025-07-15 | End: 2025-07-16 | Stop reason: HOSPADM

## 2025-07-15 RX ORDER — FAMOTIDINE 10 MG/ML
20 INJECTION, SOLUTION INTRAVENOUS ONCE
Status: DISCONTINUED | OUTPATIENT
Start: 2025-07-15 | End: 2025-07-16 | Stop reason: HOSPADM

## 2025-07-15 RX ORDER — MEPERIDINE HYDROCHLORIDE 25 MG/ML
25 INJECTION INTRAMUSCULAR; INTRAVENOUS; SUBCUTANEOUS
Status: DISCONTINUED | OUTPATIENT
Start: 2025-07-15 | End: 2025-07-16 | Stop reason: HOSPADM

## 2025-07-15 RX ORDER — HEPARIN SODIUM (PORCINE) LOCK FLUSH IV SOLN 100 UNIT/ML 100 UNIT/ML
500 SOLUTION INTRAVENOUS AS NEEDED
Status: DISCONTINUED | OUTPATIENT
Start: 2025-07-15 | End: 2025-07-16 | Stop reason: HOSPADM

## 2025-07-15 RX ORDER — DIPHENHYDRAMINE HYDROCHLORIDE 50 MG/ML
50 INJECTION, SOLUTION INTRAMUSCULAR; INTRAVENOUS AS NEEDED
Status: DISCONTINUED | OUTPATIENT
Start: 2025-07-15 | End: 2025-07-16 | Stop reason: HOSPADM

## 2025-07-15 RX ORDER — SODIUM CHLORIDE 0.9 % (FLUSH) 0.9 %
20 SYRINGE (ML) INJECTION AS NEEDED
OUTPATIENT
Start: 2025-07-15

## 2025-07-15 RX ADMIN — Medication 20 ML: at 11:39

## 2025-07-15 RX ADMIN — SODIUM CHLORIDE 250 ML: 9 INJECTION, SOLUTION INTRAVENOUS at 09:50

## 2025-07-15 RX ADMIN — IMMUNE GLOBULIN (HUMAN) 10 G: 10 INJECTION INTRAVENOUS; SUBCUTANEOUS at 09:56

## 2025-07-15 RX ADMIN — HEPARIN 500 UNITS: 100 SYRINGE at 11:39

## 2025-08-22 ENCOUNTER — OFFICE VISIT (OUTPATIENT)
Dept: ONCOLOGY | Facility: HOSPITAL | Age: 35
End: 2025-08-22
Payer: MEDICARE

## 2025-08-22 ENCOUNTER — HOSPITAL ENCOUNTER (OUTPATIENT)
Dept: ONCOLOGY | Facility: HOSPITAL | Age: 35
Discharge: HOME | End: 2025-08-22
Payer: MEDICARE

## 2025-08-22 VITALS
HEIGHT: 55 IN | RESPIRATION RATE: 18 BRPM | BODY MASS INDEX: 32.86 KG/M2 | OXYGEN SATURATION: 100 % | HEART RATE: 71 BPM | DIASTOLIC BLOOD PRESSURE: 64 MMHG | WEIGHT: 141.98 LBS | TEMPERATURE: 97.2 F | SYSTOLIC BLOOD PRESSURE: 120 MMHG

## 2025-08-22 DIAGNOSIS — K59.09 CHRONIC CONSTIPATION: ICD-10-CM

## 2025-08-22 DIAGNOSIS — Z45.2 ENCOUNTER FOR ADJUSTMENT OR MANAGEMENT OF VASCULAR ACCESS DEVICE: ICD-10-CM

## 2025-08-22 DIAGNOSIS — D50.9 IRON DEFICIENCY ANEMIA, UNSPECIFIED IRON DEFICIENCY ANEMIA TYPE: Primary | ICD-10-CM

## 2025-08-22 DIAGNOSIS — D83.9 CVID (COMMON VARIABLE IMMUNODEFICIENCY): ICD-10-CM

## 2025-08-22 DIAGNOSIS — D83.9 CVID (COMMON VARIABLE IMMUNODEFICIENCY): Primary | ICD-10-CM

## 2025-08-22 LAB
BASOPHILS # BLD AUTO: 0.02 10*3/MM3 (ref 0–0.2)
BASOPHILS NFR BLD AUTO: 0.2 % (ref 0–1.5)
DEPRECATED RDW RBC AUTO: 46 FL (ref 37–54)
EOSINOPHIL # BLD AUTO: 0.04 10*3/MM3 (ref 0–0.4)
EOSINOPHIL NFR BLD AUTO: 0.4 % (ref 0.3–6.2)
ERYTHROCYTE [DISTWIDTH] IN BLOOD BY AUTOMATED COUNT: 13.7 % (ref 12.3–15.4)
HCT VFR BLD AUTO: 35 % (ref 34–46.6)
HGB BLD-MCNC: 11.6 G/DL (ref 12–15.9)
IGA1 MFR SER: <50 MG/DL (ref 70–400)
IGG1 SER-MCNC: 582 MG/DL (ref 700–1600)
IGM SERPL-MCNC: 71 MG/DL (ref 40–230)
IMM GRANULOCYTES # BLD AUTO: 0.02 10*3/MM3 (ref 0–0.05)
IMM GRANULOCYTES NFR BLD AUTO: 0.2 % (ref 0–0.5)
LYMPHOCYTES # BLD AUTO: 2.52 10*3/MM3 (ref 0.7–3.1)
LYMPHOCYTES NFR BLD AUTO: 25.7 % (ref 19.6–45.3)
MCH RBC QN AUTO: 30.3 PG (ref 26.6–33)
MCHC RBC AUTO-ENTMCNC: 33.1 G/DL (ref 31.5–35.7)
MCV RBC AUTO: 91.4 FL (ref 79–97)
MONOCYTES # BLD AUTO: 0.64 10*3/MM3 (ref 0.1–0.9)
MONOCYTES NFR BLD AUTO: 6.5 % (ref 5–12)
NEUTROPHILS NFR BLD AUTO: 6.55 10*3/MM3 (ref 1.7–7)
NEUTROPHILS NFR BLD AUTO: 67 % (ref 42.7–76)
NRBC BLD AUTO-RTO: 0 /100 WBC (ref 0–0.2)
PLATELET # BLD AUTO: 273 10*3/MM3 (ref 140–450)
PMV BLD AUTO: 9.3 FL (ref 6–12)
RBC # BLD AUTO: 3.83 10*6/MM3 (ref 3.77–5.28)
WBC NRBC COR # BLD AUTO: 9.79 10*3/MM3 (ref 3.4–10.8)

## 2025-08-22 PROCEDURE — 36591 DRAW BLOOD OFF VENOUS DEVICE: CPT

## 2025-08-22 PROCEDURE — 25010000002 HEPARIN LOCK FLUSH PER 10 UNITS: Performed by: INTERNAL MEDICINE

## 2025-08-22 PROCEDURE — 82784 ASSAY IGA/IGD/IGG/IGM EACH: CPT | Performed by: INTERNAL MEDICINE

## 2025-08-22 PROCEDURE — 85025 COMPLETE CBC W/AUTO DIFF WBC: CPT | Performed by: INTERNAL MEDICINE

## 2025-08-22 RX ORDER — SODIUM CHLORIDE 0.9 % (FLUSH) 0.9 %
20 SYRINGE (ML) INJECTION AS NEEDED
OUTPATIENT
Start: 2025-08-22

## 2025-08-22 RX ORDER — MEPERIDINE HYDROCHLORIDE 25 MG/ML
25 INJECTION INTRAMUSCULAR; INTRAVENOUS; SUBCUTANEOUS
OUTPATIENT
Start: 2025-08-26

## 2025-08-22 RX ORDER — DIPHENHYDRAMINE HYDROCHLORIDE 50 MG/ML
50 INJECTION, SOLUTION INTRAMUSCULAR; INTRAVENOUS AS NEEDED
OUTPATIENT
Start: 2025-08-26

## 2025-08-22 RX ORDER — DIPHENHYDRAMINE HCL 25 MG
25 CAPSULE ORAL ONCE
OUTPATIENT
Start: 2025-08-26

## 2025-08-22 RX ORDER — FAMOTIDINE 10 MG/ML
20 INJECTION, SOLUTION INTRAVENOUS ONCE
OUTPATIENT
Start: 2025-08-26

## 2025-08-22 RX ORDER — HEPARIN SODIUM (PORCINE) LOCK FLUSH IV SOLN 100 UNIT/ML 100 UNIT/ML
500 SOLUTION INTRAVENOUS AS NEEDED
OUTPATIENT
Start: 2025-08-26

## 2025-08-22 RX ORDER — SODIUM CHLORIDE 0.9 % (FLUSH) 0.9 %
20 SYRINGE (ML) INJECTION AS NEEDED
Status: DISCONTINUED | OUTPATIENT
Start: 2025-08-22 | End: 2025-08-23 | Stop reason: HOSPADM

## 2025-08-22 RX ORDER — SODIUM CHLORIDE 9 MG/ML
250 INJECTION, SOLUTION INTRAVENOUS ONCE
OUTPATIENT
Start: 2025-08-26

## 2025-08-22 RX ORDER — HEPARIN SODIUM (PORCINE) LOCK FLUSH IV SOLN 100 UNIT/ML 100 UNIT/ML
500 SOLUTION INTRAVENOUS AS NEEDED
Status: DISCONTINUED | OUTPATIENT
Start: 2025-08-22 | End: 2025-08-23 | Stop reason: HOSPADM

## 2025-08-22 RX ORDER — FAMOTIDINE 10 MG/ML
20 INJECTION, SOLUTION INTRAVENOUS AS NEEDED
OUTPATIENT
Start: 2025-08-26

## 2025-08-22 RX ADMIN — HEPARIN 500 UNITS: 100 SYRINGE at 09:36

## 2025-08-22 RX ADMIN — Medication 20 ML: at 09:35

## 2025-08-26 ENCOUNTER — HOSPITAL ENCOUNTER (OUTPATIENT)
Dept: ONCOLOGY | Facility: HOSPITAL | Age: 35
Discharge: HOME | End: 2025-08-26
Admitting: INTERNAL MEDICINE
Payer: MEDICARE

## 2025-08-26 VITALS
HEART RATE: 70 BPM | DIASTOLIC BLOOD PRESSURE: 68 MMHG | TEMPERATURE: 97.5 F | WEIGHT: 140.87 LBS | OXYGEN SATURATION: 100 % | SYSTOLIC BLOOD PRESSURE: 106 MMHG | RESPIRATION RATE: 16 BRPM | BODY MASS INDEX: 32.74 KG/M2

## 2025-08-26 DIAGNOSIS — Z45.2 ENCOUNTER FOR ADJUSTMENT OR MANAGEMENT OF VASCULAR ACCESS DEVICE: ICD-10-CM

## 2025-08-26 DIAGNOSIS — D83.9 CVID (COMMON VARIABLE IMMUNODEFICIENCY): Primary | ICD-10-CM

## 2025-08-26 PROCEDURE — 25010000002 IMMUNE GLOBULIN (HUMAN) 10 GM/100ML SOLUTION: Performed by: INTERNAL MEDICINE

## 2025-08-26 PROCEDURE — 96366 THER/PROPH/DIAG IV INF ADDON: CPT

## 2025-08-26 PROCEDURE — 25010000002 HEPARIN LOCK FLUSH PER 10 UNITS: Performed by: INTERNAL MEDICINE

## 2025-08-26 PROCEDURE — 25810000003 SODIUM CHLORIDE 0.9 % SOLUTION: Performed by: INTERNAL MEDICINE

## 2025-08-26 PROCEDURE — 96365 THER/PROPH/DIAG IV INF INIT: CPT

## 2025-08-26 RX ORDER — DIPHENHYDRAMINE HCL 25 MG
25 CAPSULE ORAL ONCE
Status: DISCONTINUED | OUTPATIENT
Start: 2025-08-26 | End: 2025-08-27 | Stop reason: HOSPADM

## 2025-08-26 RX ORDER — SODIUM CHLORIDE 0.9 % (FLUSH) 0.9 %
20 SYRINGE (ML) INJECTION AS NEEDED
Status: DISCONTINUED | OUTPATIENT
Start: 2025-08-26 | End: 2025-08-27 | Stop reason: HOSPADM

## 2025-08-26 RX ORDER — HEPARIN SODIUM (PORCINE) LOCK FLUSH IV SOLN 100 UNIT/ML 100 UNIT/ML
500 SOLUTION INTRAVENOUS AS NEEDED
OUTPATIENT
Start: 2025-08-26

## 2025-08-26 RX ORDER — SODIUM CHLORIDE 0.9 % (FLUSH) 0.9 %
20 SYRINGE (ML) INJECTION AS NEEDED
OUTPATIENT
Start: 2025-08-26

## 2025-08-26 RX ORDER — SODIUM CHLORIDE 9 MG/ML
250 INJECTION, SOLUTION INTRAVENOUS ONCE
Status: COMPLETED | OUTPATIENT
Start: 2025-08-26 | End: 2025-08-26

## 2025-08-26 RX ORDER — FAMOTIDINE 10 MG/ML
20 INJECTION, SOLUTION INTRAVENOUS ONCE
Status: DISCONTINUED | OUTPATIENT
Start: 2025-08-26 | End: 2025-08-27 | Stop reason: HOSPADM

## 2025-08-26 RX ORDER — HEPARIN SODIUM (PORCINE) LOCK FLUSH IV SOLN 100 UNIT/ML 100 UNIT/ML
500 SOLUTION INTRAVENOUS AS NEEDED
Status: DISCONTINUED | OUTPATIENT
Start: 2025-08-26 | End: 2025-08-27 | Stop reason: HOSPADM

## 2025-08-26 RX ADMIN — HEPARIN 500 UNITS: 100 SYRINGE at 11:35

## 2025-08-26 RX ADMIN — Medication 20 ML: at 11:35

## 2025-08-26 RX ADMIN — SODIUM CHLORIDE 250 ML: 9 INJECTION, SOLUTION INTRAVENOUS at 09:43

## 2025-08-26 RX ADMIN — IMMUNE GLOBULIN (HUMAN) 10 G: 10 INJECTION INTRAVENOUS; SUBCUTANEOUS at 09:46

## (undated) DEVICE — SUT PROLN 2/0 CT2 30IN 8411H

## (undated) DEVICE — SPNG GZ 2S 2X2 4PLY STRL PK/2

## (undated) DEVICE — GOWN,REINFRCE,POLY,SIRUS,BREATH SLV,XXLG: Brand: MEDLINE

## (undated) DEVICE — MAJOR-LF: Brand: MEDLINE INDUSTRIES, INC.

## (undated) DEVICE — PENCL SMOKE/EVAC MEGADYNE TELESCP 10FT

## (undated) DEVICE — ANTIBACTERIAL UNDYED BRAIDED (POLYGLACTIN 910), SYNTHETIC ABSORBABLE SUTURE: Brand: COATED VICRYL

## (undated) DEVICE — SUT VIC 3/0 SH 27IN J416H

## (undated) DEVICE — INTENDED FOR TISSUE SEPARATION, AND OTHER PROCEDURES THAT REQUIRE A SHARP SURGICAL BLADE TO PUNCTURE OR CUT.: Brand: BARD-PARKER ® CARBON RIB-BACK BLADES

## (undated) DEVICE — VASCULAR ACCESS-LF: Brand: MEDLINE INDUSTRIES, INC.

## (undated) DEVICE — DRSNG SURESITE WNDW 4X4.5

## (undated) DEVICE — STERILE POLYISOPRENE POWDER-FREE SURGICAL GLOVES WITH EMOLLIENT COATING: Brand: PROTEXIS

## (undated) DEVICE — GLV SURG SENSICARE PI ORTHO SZ8 LF STRL

## (undated) DEVICE — SOL IRR NACL 0.9PCT BT 1000ML

## (undated) DEVICE — ANTIBACTERIAL VIOLET BRAIDED (POLYGLACTIN 910), SYNTHETIC ABSORBABLE SUTURE: Brand: COATED VICRYL

## (undated) DEVICE — STRIP,CLOSURE,WOUND,MEDI-STRIP,1/2X4: Brand: MEDLINE

## (undated) DEVICE — ADHS LIQ MASTISOL 2/3ML